# Patient Record
Sex: MALE | Race: BLACK OR AFRICAN AMERICAN | Employment: UNEMPLOYED | URBAN - METROPOLITAN AREA
[De-identification: names, ages, dates, MRNs, and addresses within clinical notes are randomized per-mention and may not be internally consistent; named-entity substitution may affect disease eponyms.]

---

## 2017-07-22 ENCOUNTER — HOSPITAL ENCOUNTER (EMERGENCY)
Age: 47
Discharge: HOME | End: 2017-07-22
Attending: EMERGENCY MEDICINE | Admitting: EMERGENCY MEDICINE
Payer: SELF-PAY

## 2017-07-22 VITALS
OXYGEN SATURATION: 98 % | HEART RATE: 68 BPM | HEIGHT: 69 IN | TEMPERATURE: 98.8 F | WEIGHT: 315 LBS | RESPIRATION RATE: 16 BRPM | BODY MASS INDEX: 46.65 KG/M2

## 2017-07-22 DIAGNOSIS — S80.01XA CONTUSION OF RIGHT KNEE, INITIAL ENCOUNTER: Primary | ICD-10-CM

## 2017-07-22 DIAGNOSIS — S80.02XA CONTUSION OF LEFT KNEE, INITIAL ENCOUNTER: ICD-10-CM

## 2017-07-22 PROCEDURE — 74011250637 HC RX REV CODE- 250/637: Performed by: EMERGENCY MEDICINE

## 2017-07-22 PROCEDURE — 99283 EMERGENCY DEPT VISIT LOW MDM: CPT

## 2017-07-22 RX ORDER — IBUPROFEN 800 MG/1
TABLET ORAL
Qty: 15 TAB | Refills: 0 | Status: SHIPPED | OUTPATIENT
Start: 2017-07-22 | End: 2018-07-13 | Stop reason: ALTCHOICE

## 2017-07-22 RX ORDER — IBUPROFEN 400 MG/1
800 TABLET ORAL
Status: COMPLETED | OUTPATIENT
Start: 2017-07-22 | End: 2017-07-22

## 2017-07-22 RX ADMIN — IBUPROFEN 800 MG: 400 TABLET, FILM COATED ORAL at 07:55

## 2017-07-22 NOTE — DISCHARGE INSTRUCTIONS
Bruises: Care Instructions  Your Care Instructions    Bruises occur when small blood vessels under the skin tear or rupture, most often from a twist, bump, or fall. Blood leaks into tissues under the skin and causes a black-and-blue spot that often turns colors, including purplish black, reddish blue, or yellowish green, as the bruise heals. Bruises hurt, but most are not serious and will go away on their own within 2 to 4 weeks. Sometimes, gravity causes them to spread down the body. A leg bruise usually will take longer to heal than a bruise on the face or arms. Follow-up care is a key part of your treatment and safety. Be sure to make and go to all appointments, and call your doctor if you are having problems. Its also a good idea to know your test results and keep a list of the medicines you take. How can you care for yourself at home? · Take pain medicines exactly as directed. ¨ If the doctor gave you a prescription medicine for pain, take it as prescribed. ¨ If you are not taking a prescription pain medicine, ask your doctor if you can take an over-the-counter medicine. · Put ice or a cold pack on the area for 10 to 20 minutes at a time. Put a thin cloth between the ice and your skin. · If you can, prop up the bruised area on pillows as much as possible for the next few days. Try to keep the bruise above the level of your heart. When should you call for help? Call your doctor now or seek immediate medical care if:  · You have signs of infection, such as:  ¨ Increased pain, swelling, warmth, or redness. ¨ Red streaks leading from the bruise. ¨ Pus draining from the bruise. ¨ A fever. · You have a bruise on your leg and signs of a blood clot, such as:  ¨ Increasing redness and swelling along with warmth, tenderness, and pain in the bruised area. ¨ Pain in your calf, back of the knee, thigh, or groin. ¨ Redness and swelling in your leg or groin. · Your pain gets worse.   Watch closely for changes in your health, and be sure to contact your doctor if:  · You do not get better as expected. Where can you learn more? Go to http://valerie-nate.info/. Enter [phone] in the search box to learn more about \"Bruises: Care Instructions. \"  Current as of: March 20, 2017  Content Version: 11.3  © 0232-2162 [employer]. Care instructions adapted under license by [employer] (which disclaims liability or warranty for this information). If you have questions about a medical condition or this instruction, always ask your healthcare professional. [place] [address] any warranty or liability for your use of this information.

## 2017-07-22 NOTE — ED TRIAGE NOTES
Pt presents to the ED with bilateral knee pain and bilateral elbow pain onset yesterday after sustaining a trip and fall. Pt denies head injury or LOC.

## 2017-07-22 NOTE — ED PROVIDER NOTES
HPI Comments: Tripped and fell yesterday, landed on knees and hands, this mornin knees are sore and still and \"elbows are sore\". Needs note for work. No pain meds taken. No other c/o's or sx. Patient is a 52 y.o. male presenting with fall, elbow pain, and knee pain. Fall     Elbow Pain      Knee Pain           History reviewed. No pertinent past medical history. Past Surgical History:   Procedure Laterality Date    HX CHOLECYSTECTOMY           History reviewed. No pertinent family history. Social History     Social History    Marital status: LEGALLY      Spouse name: N/A    Number of children: N/A    Years of education: N/A     Occupational History    Not on file. Social History Main Topics    Smoking status: Never Smoker    Smokeless tobacco: Never Used    Alcohol use Yes      Comment: Socially    Drug use: No    Sexual activity: Not on file     Other Topics Concern    Not on file     Social History Narrative         ALLERGIES: Review of patient's allergies indicates no known allergies. Review of Systems   Constitutional: Negative. HENT: Negative. Respiratory: Negative. Cardiovascular: Negative. Neurological: Negative. Vitals:    07/22/17 0728   Pulse: 68   Resp: 16   Temp: 98.8 °F (37.1 °C)   SpO2: 98%   Weight: 158.8 kg (350 lb)   Height: 5' 9\" (1.753 m)            Physical Exam   Constitutional: He is oriented to person, place, and time. He appears well-developed and well-nourished. HENT:   Head: Normocephalic and atraumatic. Mouth/Throat: Oropharynx is clear and moist.   Eyes: EOM are normal. Pupils are equal, round, and reactive to light. Neck: Neck supple. Cardiovascular: Normal rate and regular rhythm. Pulmonary/Chest: Effort normal and breath sounds normal.   Abdominal: Soft. Musculoskeletal: Normal range of motion. He exhibits tenderness.    Knees mildly tender to palp, no effusions, ligs stable   Neurological: He is alert and oriented to person, place, and time. Skin: Skin is warm and dry. Psychiatric: He has a normal mood and affect. Nursing note and vitals reviewed.        [facility]  ED Course       Procedures

## 2017-07-22 NOTE — LETTER
[facility] EMERGENCY DEPT 
[address] [id number] [phone] Work/School Note Date: 7/22/2017 To Whom It May concern: 
 
Lisbeth Monroy was seen and treated today in the emergency room by the following provider(s): 
Attending Provider: Deb Bonilla MD.   
 
Lisbeth Monroy may return to work on 7/24/2017.  
 
Sincerely, 
 
 
 
 
Deb Bonilla MD

## 2018-07-13 ENCOUNTER — HOSPITAL ENCOUNTER (OUTPATIENT)
Dept: LAB | Age: 48
Discharge: HOME | End: 2018-07-13
Payer: COMMERCIAL

## 2018-07-13 ENCOUNTER — OFFICE VISIT (OUTPATIENT)
Dept: INTERNAL MEDICINE CLINIC | Age: 48
End: 2018-07-13

## 2018-07-13 VITALS
WEIGHT: 315 LBS | BODY MASS INDEX: 46.65 KG/M2 | HEIGHT: 69 IN | RESPIRATION RATE: 14 BRPM | SYSTOLIC BLOOD PRESSURE: 124 MMHG | TEMPERATURE: 99 F | DIASTOLIC BLOOD PRESSURE: 96 MMHG | OXYGEN SATURATION: 96 % | HEART RATE: 73 BPM

## 2018-07-13 DIAGNOSIS — Z83.3 FAMILY HISTORY OF DIABETES MELLITUS: ICD-10-CM

## 2018-07-13 DIAGNOSIS — G89.29 CHRONIC PAIN OF BOTH KNEES: ICD-10-CM

## 2018-07-13 DIAGNOSIS — E66.01 OBESITY, MORBID (HCC): ICD-10-CM

## 2018-07-13 DIAGNOSIS — M17.0 OSTEOARTHRITIS OF BOTH KNEES, UNSPECIFIED OSTEOARTHRITIS TYPE: ICD-10-CM

## 2018-07-13 DIAGNOSIS — M25.562 CHRONIC PAIN OF BOTH KNEES: ICD-10-CM

## 2018-07-13 DIAGNOSIS — M25.561 CHRONIC PAIN OF BOTH KNEES: ICD-10-CM

## 2018-07-13 DIAGNOSIS — Z86.73 HISTORY OF TIA (TRANSIENT ISCHEMIC ATTACK): ICD-10-CM

## 2018-07-13 DIAGNOSIS — E66.01 OBESITY, MORBID (HCC): Primary | ICD-10-CM

## 2018-07-13 LAB
ALBUMIN SERPL-MCNC: 3.2 G/DL (ref 3.4–5)
ALBUMIN/GLOB SERPL: 0.8 {RATIO} (ref 0.8–1.7)
ALP SERPL-CCNC: 75 U/L (ref 45–117)
ALT SERPL-CCNC: 20 U/L (ref 16–61)
ANION GAP SERPL CALC-SCNC: 8 MMOL/L (ref 3–18)
AST SERPL-CCNC: 16 U/L (ref 15–37)
BASOPHILS # BLD: 0 K/UL (ref 0–0.1)
BASOPHILS NFR BLD: 0 % (ref 0–2)
BILIRUB SERPL-MCNC: 0.3 MG/DL (ref 0.2–1)
BUN SERPL-MCNC: 16 MG/DL (ref 7–18)
BUN/CREAT SERPL: 18 (ref 12–20)
CALCIUM SERPL-MCNC: 8.3 MG/DL (ref 8.5–10.1)
CHLORIDE SERPL-SCNC: 106 MMOL/L (ref 100–108)
CHOLEST SERPL-MCNC: 157 MG/DL
CO2 SERPL-SCNC: 29 MMOL/L (ref 21–32)
CREAT SERPL-MCNC: 0.88 MG/DL (ref 0.6–1.3)
DIFFERENTIAL METHOD BLD: ABNORMAL
EOSINOPHIL # BLD: 0.2 K/UL (ref 0–0.4)
EOSINOPHIL NFR BLD: 4 % (ref 0–5)
ERYTHROCYTE [DISTWIDTH] IN BLOOD BY AUTOMATED COUNT: 14.4 % (ref 11.6–14.5)
GLOBULIN SER CALC-MCNC: 4.1 G/DL (ref 2–4)
GLUCOSE SERPL-MCNC: 89 MG/DL (ref 74–99)
HCT VFR BLD AUTO: 39.8 % (ref 36–48)
HDLC SERPL-MCNC: 41 MG/DL (ref 40–60)
HDLC SERPL: 3.8 {RATIO} (ref 0–5)
HGB BLD-MCNC: 12.9 G/DL (ref 13–16)
LDLC SERPL CALC-MCNC: 103.4 MG/DL (ref 0–100)
LIPID PROFILE,FLP: ABNORMAL
LYMPHOCYTES # BLD: 1.6 K/UL (ref 0.9–3.6)
LYMPHOCYTES NFR BLD: 31 % (ref 21–52)
MCH RBC QN AUTO: 27.1 PG (ref 24–34)
MCHC RBC AUTO-ENTMCNC: 32.4 G/DL (ref 31–37)
MCV RBC AUTO: 83.6 FL (ref 74–97)
MONOCYTES # BLD: 0.5 K/UL (ref 0.05–1.2)
MONOCYTES NFR BLD: 10 % (ref 3–10)
NEUTS SEG # BLD: 2.9 K/UL (ref 1.8–8)
NEUTS SEG NFR BLD: 55 % (ref 40–73)
PLATELET # BLD AUTO: 189 K/UL (ref 135–420)
PMV BLD AUTO: 11.1 FL (ref 9.2–11.8)
POTASSIUM SERPL-SCNC: 3.9 MMOL/L (ref 3.5–5.5)
PROT SERPL-MCNC: 7.3 G/DL (ref 6.4–8.2)
RBC # BLD AUTO: 4.76 M/UL (ref 4.7–5.5)
SODIUM SERPL-SCNC: 143 MMOL/L (ref 136–145)
T4 FREE SERPL-MCNC: 1 NG/DL (ref 0.7–1.5)
TRIGL SERPL-MCNC: 63 MG/DL (ref ?–150)
TSH SERPL DL<=0.05 MIU/L-ACNC: 1.9 UIU/ML (ref 0.36–3.74)
VLDLC SERPL CALC-MCNC: 12.6 MG/DL
WBC # BLD AUTO: 5.3 K/UL (ref 4.6–13.2)

## 2018-07-13 PROCEDURE — 80074 ACUTE HEPATITIS PANEL: CPT | Performed by: INTERNAL MEDICINE

## 2018-07-13 PROCEDURE — 87389 HIV-1 AG W/HIV-1&-2 AB AG IA: CPT | Performed by: INTERNAL MEDICINE

## 2018-07-13 PROCEDURE — 84439 ASSAY OF FREE THYROXINE: CPT | Performed by: INTERNAL MEDICINE

## 2018-07-13 PROCEDURE — 85025 COMPLETE CBC W/AUTO DIFF WBC: CPT | Performed by: INTERNAL MEDICINE

## 2018-07-13 PROCEDURE — 80061 LIPID PANEL: CPT | Performed by: INTERNAL MEDICINE

## 2018-07-13 PROCEDURE — 80053 COMPREHEN METABOLIC PANEL: CPT | Performed by: INTERNAL MEDICINE

## 2018-07-13 PROCEDURE — 83036 HEMOGLOBIN GLYCOSYLATED A1C: CPT | Performed by: INTERNAL MEDICINE

## 2018-07-13 RX ORDER — DICLOFENAC SODIUM 75 MG/1
75 TABLET, DELAYED RELEASE ORAL
Qty: 60 TAB | Refills: 3 | Status: SHIPPED | OUTPATIENT
Start: 2018-07-13 | End: 2018-10-12

## 2018-07-13 RX ORDER — GUAIFENESIN 100 MG/5ML
81 LIQUID (ML) ORAL DAILY
Qty: 90 TAB | Refills: 3 | Status: SHIPPED | OUTPATIENT
Start: 2018-07-13 | End: 2018-10-12

## 2018-07-13 NOTE — PROGRESS NOTES
HPI     Garima Hassan is a 50 y.o. male with relevant past medical history of morbid obesity, osteoarthritis, chronic knee pain, reported history of \"mini stroke\" in 1996 presenting with left-sided numbness, completely resolved (TIA) who presents today to establish medical care, and for evaluation and management of chronic bilateral knee pain. Patient tells me that he has been having chronic knee pain for many decades, gradually worse, associated with movement, and weightbearing, has tried in the past ibuprofen with no relief of his pain, says that he is tried Percocet from a friend in the past which helped better with his pain. The patient admits he has gained weight over 150 pounds over the past couple of years, admits to sedentary lifestyle, and no diet restrictions. Denies any history of depression or suicidal ideation. Patient says he is engaged, is , has 3 children. Tells me he has family history of diabetes and hypertension on both mother and father, and history of stroke in his father. Patient works in [employer] in [employer]. Says he had cutt a few years ago and required tetanus shot, says within the last 10 years but none recall the exact date. Used to get influenza vaccinations but not for the past couple of years. Denies any family history of colon cancer. Denies any drug allergies. Reports past surgical history of cholecystectomy. ROS  As above included in HPI. Otherwise 11 point review of systems negative including constitutional, skin, HENT, eyes, respiratory, cardiovascular, gastrointestinal, genitourinary, musculoskeletal, endocrine, hematologic, allergy, and neurologic. Past Medical History  Past Medical History:   Diagnosis Date    Arthritis      Past Surgical History:   Procedure Laterality Date    HX CHOLECYSTECTOMY          Family History  History reviewed. No pertinent family history.     Social History  He  reports that he has never smoked. He has never used smokeless tobacco.   History   Alcohol Use    Yes     Comment: Socially       Immunization History    There is no immunization history on file for this patient. Allergies  No Known Allergies    Medications  Current Outpatient Prescriptions   Medication Sig    diclofenac EC (VOLTAREN) 75 mg EC tablet Take 1 Tab by mouth two (2) times daily as needed. Indications: OSTEOARTHRITIS    aspirin 81 mg chewable tablet Take 1 Tab by mouth daily. No current facility-administered medications for this visit. Visit Vitals    BP (!) 124/96 (BP 1 Location: Right arm, BP Patient Position: Sitting)    Pulse 73    Temp 99 °F (37.2 °C) (Oral)    Resp 14    Ht 5' 9\" (1.753 m)    Wt (!) 417 lb (189.1 kg)    SpO2 96%    BMI 61.58 kg/m2     Body mass index is 61.58 kg/(m^2). Physical Exam   Constitutional: He is well-developed, well-nourished, and in no distress. HENT:   Head: Normocephalic and atraumatic. Right Ear: External ear normal.   Left Ear: External ear normal.   Nose: Nose normal.   Mouth/Throat: Oropharynx is clear and moist.   Eyes: Conjunctivae and EOM are normal. Pupils are equal, round, and reactive to light. Neck: Normal range of motion. Neck supple. No thyromegaly present. Cardiovascular: Normal rate, regular rhythm and normal heart sounds. Pulmonary/Chest: Effort normal and breath sounds normal. No respiratory distress. He has no wheezes. He has no rales. He exhibits no tenderness. Abdominal: Soft. Bowel sounds are normal. There is no tenderness. There is no guarding. Musculoskeletal: He exhibits no edema, tenderness or deformity. Pain and crepitus on both knees with knee extension and flexion, no significant joint effusion, mild local warmth suprapatellar on both knees. No local erythema or tenderness. Lymphadenopathy:     He has no cervical adenopathy.    Psychiatric: Mood, memory, affect and judgment normal.   Nursing note and vitals reviewed. REVIEW OF DATA    Labs  No visits with results within 1 Month(s) from this visit. Latest known visit with results is:    Admission on 06/08/2016, Discharged on 06/08/2016   Component Date Value Ref Range Status    Glucose (POC) 06/08/2016 79  70 - 110 mg/dL Final         CT Results (most recent):  No results found for this or any previous visit. XR Results (most recent):  No results found for this or any previous visit. CT   All Micro Results     None                DIAGNOSIS AND PLAN  Patient Active Problem List   Diagnosis Code    Obesity, morbid ([facility]) E66.01    History of CVA (cerebrovascular accident) Z80.78    Chronic pain of both knees M25.561, M25.562, G89.29    Osteoarthritis of both knees M17.0    Family history of diabetes mellitus Z83.3     1. Obesity, morbid ([address]  Weight loss encouraged and treatment options discussed, patient would like bariatric surgery assessment, referral done. Patient does not think he has a discipline to start a diet or try an exercise routine. 2. History of CVA (cerebrovascular accident)  Recommended to start baby aspirin daily, will try to obtain previous records  Check lipid profile and rule out diabetes    3. Chronic pain of both knees  4. Osteoarthritis of both knees, unspecified osteoarthritis type  Trial of diclofenac as needed  X-rays of both knees to assess severity of osteoarthritis  Referral to Ortho for evaluation and management   Weight loss encouraged and treatment options discussed, patient would like bariatric surgery assessment, referral done  5. Family history of diabetes mellitus  Check hemoglobin A1c    Follow-up Disposition:  Return in about 6 months (around 1/13/2019). Nia Rios MD

## 2018-07-13 NOTE — PROGRESS NOTES
Chief Complaint   Patient presents with    Knee Pain     New patient present for pain in both knees lasting since 6 months ago and states he isn't taking anything for the pain. Ling states that he has arthritis in his knees. 1. Have you been to the ER, urgent care clinic or hospitalized within the last month? NO.     2. Have you seen or consulted any other health care providers outside of the [facility] within the last month (Include any pap smears or colon screening)?  NO

## 2018-07-13 NOTE — LETTER
NOTIFICATION RETURN TO WORK / SCHOOL 
 
7/13/2018 12:13 PM 
 
Mr. Dulce [address]Kan [address] [address] To Whom It May Concern: 
 
Dulce Khan is currently under the care of Randy Lemons. He will return to work/school on: 7/13/18. He was seen in clinic today. If there are questions or concerns please have the patient contact our office.  
 
 
 
Sincerely, 
 
 
Oneil Larsen MD

## 2018-07-14 LAB — HBA1C MFR BLD: 5.4 % (ref 4.2–5.6)

## 2018-07-16 LAB
HAV IGM SER QL: NEGATIVE
HBV CORE IGM SER QL: NEGATIVE
HBV SURFACE AG SER QL: <0.1 INDEX
HBV SURFACE AG SER QL: NEGATIVE
HCV AB SER IA-ACNC: 0.12 INDEX
HCV AB SERPL QL IA: NEGATIVE
HCV COMMENT,HCGAC: NORMAL
HIV 1+2 AB+HIV1 P24 AG SERPL QL IA: NONREACTIVE
HIV12 RESULT COMMENT, HHIVC: NORMAL
SP1: NORMAL
SP2: NORMAL
SP3: NORMAL

## 2018-07-25 ENCOUNTER — OFFICE VISIT (OUTPATIENT)
Dept: SURGERY | Age: 48
End: 2018-07-25

## 2018-07-25 VITALS
HEIGHT: 66 IN | WEIGHT: 315 LBS | SYSTOLIC BLOOD PRESSURE: 140 MMHG | HEART RATE: 86 BPM | BODY MASS INDEX: 50.62 KG/M2 | TEMPERATURE: 98.3 F | RESPIRATION RATE: 20 BRPM | DIASTOLIC BLOOD PRESSURE: 90 MMHG

## 2018-07-25 DIAGNOSIS — E66.01 MORBID OBESITY WITH BMI OF 60.0-69.9, ADULT (HCC): ICD-10-CM

## 2018-07-25 DIAGNOSIS — Z86.73 HISTORY OF CVA (CEREBROVASCULAR ACCIDENT): ICD-10-CM

## 2018-07-25 DIAGNOSIS — E66.01 OBESITY, MORBID (HCC): Primary | ICD-10-CM

## 2018-07-25 NOTE — PROGRESS NOTES
Pt ID confirmed    Weight Loss Metrics 7/25/2018 7/25/2018 7/13/2018 7/22/2017 12/30/2016 6/8/2016   Pre op / Initial Wt 419 - - - - -   Today's Wt - 419 lb 417 lb 350 lb 350 lb 323 lb   BMI - 67.63 kg/m2 61.58 kg/m2 51.69 kg/m2 51.69 kg/m2 47.68 kg/m2   Ideal Body Wt 145 - - - - -   Excess Body Wt 274 - - - - -   Goal Wt 200 - - - - -   Wt loss to date 0 - - - - -   % Wt Loss 0 - - - - -   80% .2 - - - - -   Rashi has been given the following recommendations today due to his elevated BP reading: to discuss bp with pcp    Body mass index is 67.63 kg/(m^2).

## 2018-07-25 NOTE — PROGRESS NOTES
Initial Consultation for Bariatric Surgery Template (Undecided)    Domenic Bee is a 50 y.o. male who comes into the office today for initial consultation for the surgical options for the treatment of morbid obesity. The patient initially identified obesity at the age of 15 and at age 25 weighed 36lbs. He has tried a variety of unsupervised weight-loss attempts including self imposed and exercise, but has yet to meet with lasting success. Maximum weight lost on a diet is about 200 lbs, but that the weight loss always seems to return. Today, the patient is  Height: 5' 6\" (167.6 cm) tall, Weight: (!) 190.1 kg (419 lb) lbs for a Body mass index is 67.63 kg/(m^2). It is due to the patient's severe obesity, which is further complicated by hypertension and weight related arthopathies  that the patient is now seeking out bariatric surgery. Past Medical History:   Diagnosis Date    Arthritis     Hypertension        Past Surgical History:   Procedure Laterality Date    HX CHOLECYSTECTOMY         Current Outpatient Prescriptions   Medication Sig Dispense Refill    diclofenac EC (VOLTAREN) 75 mg EC tablet Take 1 Tab by mouth two (2) times daily as needed. Indications: OSTEOARTHRITIS 60 Tab 3    aspirin 81 mg chewable tablet Take 1 Tab by mouth daily.  90 Tab 3       No Known Allergies    Social History   Substance Use Topics    Smoking status: Never Smoker    Smokeless tobacco: Never Used    Alcohol use Yes      Comment: Socially       Family History   Problem Relation Age of Onset    Heart Disease Mother     Stroke Father        Family Status   Relation Status    Mother     Father        Review of Systems:  Positive in BOLD    CONST: Fever, weight loss, fatigue or chills  GI: Nausea, vomiting, abdominal pain, change in bowel habits, hematochezia, melena, and GERD   INTEG: Dermatitis, abnormal moles  HEENT: Recent changes in vision, vertigo, epistaxis, dysphagia and hoarseness  CV: Chest pain, palpitations, HTN, edema and varicosities  RESP: Cough, shortness of breath, wheezing, hemoptysis, snoring and reactive airway disease  : Hematuria, dysuria, frequency, urgency, nocturia and stress urinary incontinence   MS: Weakness, joint pain and arthritis  ENDO: Diabetes, thyroid disease, polyuria, polydipsia, polyphagia, poor wound healing, heat intolerance, cold intolerance  LYMPH/HEME: Anemia, bruising and history of blood transfusions  NEURO: Dizziness, headache, fainting, seizures and stroke  PSYCH: Anxiety and depression      Physical Exam    Visit Vitals    /90    Pulse 86    Temp 98.3 °F (36.8 °C)    Resp 20    Ht 5' 6\" (1.676 m)    Wt (!) 190.1 kg (419 lb)    BMI 67.63 kg/m2       Pre op weight: 419  EBW: 274  Wt loss to date: 0       General: 50 y.o.) male in no acute distress. Morbidly obese in abdomen, hips, thighs - mixed pattern  HEENT: Normocephalic, atraumatic, Pupils equal and reactive, nasopharynx clear, oropharynx clear and moist without lesions  NECK: Supple, no lymphadenopathy, thyromegaly, carotid bruits or jugular venous distension. trachea midline  RESP: Clear to auscultation bilaterally, no wheezes, rhonchi, or rales, normal respiratory excursion  CV: Regular rate and rhythm, no murmurs, rubs or gallops. 3+/4 pulses in bilateral dorsalis pedis and posterior tibialis. No distal edema or varicosities. ABD: Soft, nontender, nondistended, normoactive bowel sounds, no hernias, no hepatosplenomegaly, minimally palpable costal margins, mixed distribution  Extremities: Warm, well perfused, no tenderness or swelling, normal gait/station  Neuro: Sensation and strength grossly intact and symmetrical  Psych: Alert and oriented to person, place, and time. Impression:    aJson High is a 50 y.o. male who is suffering from morbid obesity with a BMI of 68  and comorbidities including hypertension and weight related arthopathies  who would benefit from bariatric surgery. We have had an extensive discussion with regard to the risks, benefits and likely outcomes of the operation. We've discussed the restrictive and malabsorptive nature of the gastric bypass and compared and contrasted with the sleeve gastrectomy. The patient understands the likelihood of losing approximately 80% of their excess weight in 12 to 18 months. He also understands the risks including but not limited to bleeding, infection, need for reoperation, ulcers, leaks and strictures, bowel obstruction secondary to adhesions and internal hernias, DVT, PE, heart attack, stroke, and death. Patient also understands risks of inadequate weight loss, excess weight loss, vitamin insufficiency, protein malnutrition, excess skin, and loss of hair. We have reviewed the components of a successful postoperative course including requirement for a high protein, low carbohydrate diet, 60 oz a day of zero calorie liquids, daily vitamin supplementation, daily exercise, regular follow-up, and participation in support groups. At this time we will enroll the patient in our bariatric program, undertake routine laboratory evaluation, chest X-ray, EKG, possible UGI and evaluation by  nutritionist as well as psychologist and pending their satisfactory completion of the preop evaluation, plan to perform a gastric bypass if alec to lose weight in the WLT. (40 lbs). If unable to lose the weight, 2 stage is preferred.

## 2018-07-25 NOTE — LETTER
7/25/2018 11:31 AM 
 
Patient:  Demetrice Bey YOB: 1970 Date of Visit: 7/25/2018 Paul Vazquez MD 
[address] [address] VIA In Basket Dear Paul Vazquez MD, Thank you for referring Mr. Suleiman Laar to [facility]  for evaluation and treatment. Below are the relevant portions of my assessment and plan of care. Initial Consultation for Bariatric Surgery Template (Undecided) Demetrice Bey is a 50 y.o. male who comes into the office today for initial consultation for the surgical options for the treatment of morbid obesity. The patient initially identified obesity at the age of 15 and at age 25 weighed 36lbs. He has tried a variety of unsupervised weight-loss attempts including self imposed and exercise, but has yet to meet with lasting success. Maximum weight lost on a diet is about 200 lbs, but that the weight loss always seems to return. Today, the patient is  Height: 5' 6\" (167.6 cm) tall, Weight: (!) 190.1 kg (419 lb) lbs for a Body mass index is 67.63 kg/(m^2). It is due to the patient's severe obesity, which is further complicated by hypertension and weight related arthopathies  that the patient is now seeking out bariatric surgery. Past Medical History:  
Diagnosis Date  Arthritis  Hypertension Past Surgical History:  
Procedure Laterality Date  HX CHOLECYSTECTOMY Current Outpatient Prescriptions Medication Sig Dispense Refill  diclofenac EC (VOLTAREN) 75 mg EC tablet Take 1 Tab by mouth two (2) times daily as needed. Indications: OSTEOARTHRITIS 60 Tab 3  
 aspirin 81 mg chewable tablet Take 1 Tab by mouth daily. 90 Tab 3 No Known Allergies Social History Substance Use Topics  Smoking status: Never Smoker  Smokeless tobacco: Never Used  Alcohol use Yes Comment: Socially Family History Problem Relation Age of Onset  Heart Disease Mother  Stroke Father Family Status Relation Status  Mother   Father  Review of Systems:  Positive in BOLD 
 
CONST: Fever, weight loss, fatigue or chills GI: Nausea, vomiting, abdominal pain, change in bowel habits, hematochezia, melena, and GERD INTEG: Dermatitis, abnormal moles HEENT: Recent changes in vision, vertigo, epistaxis, dysphagia and hoarseness CV: Chest pain, palpitations, HTN, edema and varicosities RESP: Cough, shortness of breath, wheezing, hemoptysis, snoring and reactive airway disease : Hematuria, dysuria, frequency, urgency, nocturia and stress urinary incontinence MS: Weakness, joint pain and arthritis ENDO: Diabetes, thyroid disease, polyuria, polydipsia, polyphagia, poor wound healing, heat intolerance, cold intolerance LYMPH/HEME: Anemia, bruising and history of blood transfusions NEURO: Dizziness, headache, fainting, seizures and stroke PSYCH: Anxiety and depression Physical Exam 
 
Visit Vitals  /90  Pulse 86  Temp 98.3 °F (36.8 °C)  Resp 20  
 Ht 5' 6\" (1.676 m)  Wt (!) 190.1 kg (419 lb)  BMI 67.63 kg/m2 Pre op weight: 419 EBW: 274 Wt loss to date: 0 General: 50 y.o.) male in no acute distress. Morbidly obese in abdomen, hips, thighs - mixed pattern HEENT: Normocephalic, atraumatic, Pupils equal and reactive, nasopharynx clear, oropharynx clear and moist without lesions NECK: Supple, no lymphadenopathy, thyromegaly, carotid bruits or jugular venous distension. trachea midline RESP: Clear to auscultation bilaterally, no wheezes, rhonchi, or rales, normal respiratory excursion CV: Regular rate and rhythm, no murmurs, rubs or gallops. 3+/4 pulses in bilateral dorsalis pedis and posterior tibialis. No distal edema or varicosities.  
ABD: Soft, nontender, nondistended, normoactive bowel sounds, no hernias, no hepatosplenomegaly, minimally palpable costal margins, mixed distribution Extremities: Warm, well perfused, no tenderness or swelling, normal gait/station Neuro: Sensation and strength grossly intact and symmetrical 
Psych: Alert and oriented to person, place, and time. Impression: 
 
Esthela Barnhart is a 50 y.o. male who is suffering from morbid obesity with a BMI of 68  and comorbidities including hypertension and weight related arthopathies  who would benefit from bariatric surgery. We have had an extensive discussion with regard to the risks, benefits and likely outcomes of the operation. We've discussed the restrictive and malabsorptive nature of the gastric bypass and compared and contrasted with the sleeve gastrectomy. The patient understands the likelihood of losing approximately 80% of their excess weight in 12 to 18 months. He also understands the risks including but not limited to bleeding, infection, need for reoperation, ulcers, leaks and strictures, bowel obstruction secondary to adhesions and internal hernias, DVT, PE, heart attack, stroke, and death. Patient also understands risks of inadequate weight loss, excess weight loss, vitamin insufficiency, protein malnutrition, excess skin, and loss of hair. We have reviewed the components of a successful postoperative course including requirement for a high protein, low carbohydrate diet, 60 oz a day of zero calorie liquids, daily vitamin supplementation, daily exercise, regular follow-up, and participation in support groups. At this time we will enroll the patient in our bariatric program, undertake routine laboratory evaluation, chest X-ray, EKG, possible UGI and evaluation by  nutritionist as well as psychologist and pending their satisfactory completion of the preop evaluation, plan to perform a gastric bypass if alec to lose weight in the WLT. (40 lbs). If unable to lose the weight, 2 stage is preferred. Thank you very much for your referral of Mr. Tina Fraire. If you have questions, please do not hesitate to call me. I [facility] you and will keep you updated as to his progress.   
 
 
 
 
Sincerely, 
 
 
Eldon aMldonado MD

## 2018-07-25 NOTE — COMMUNICATION BODY
Initial Consultation for Bariatric Surgery Template (Undecided)    Satinder Bagley is a 50 y.o. male who comes into the office today for initial consultation for the surgical options for the treatment of morbid obesity. The patient initially identified obesity at the age of 15 and at age 25 weighed 36lbs. He has tried a variety of unsupervised weight-loss attempts including self imposed and exercise, but has yet to meet with lasting success. Maximum weight lost on a diet is about 200 lbs, but that the weight loss always seems to return. Today, the patient is  Height: 5' 6\" (167.6 cm) tall, Weight: (!) 190.1 kg (419 lb) lbs for a Body mass index is 67.63 kg/(m^2). It is due to the patient's severe obesity, which is further complicated by hypertension and weight related arthopathies  that the patient is now seeking out bariatric surgery. Past Medical History:   Diagnosis Date    Arthritis     Hypertension        Past Surgical History:   Procedure Laterality Date    HX CHOLECYSTECTOMY         Current Outpatient Prescriptions   Medication Sig Dispense Refill    diclofenac EC (VOLTAREN) 75 mg EC tablet Take 1 Tab by mouth two (2) times daily as needed. Indications: OSTEOARTHRITIS 60 Tab 3    aspirin 81 mg chewable tablet Take 1 Tab by mouth daily.  90 Tab 3       No Known Allergies    Social History   Substance Use Topics    Smoking status: Never Smoker    Smokeless tobacco: Never Used    Alcohol use Yes      Comment: Socially       Family History   Problem Relation Age of Onset    Heart Disease Mother     Stroke Father        Family Status   Relation Status    Mother     Father        Review of Systems:  Positive in BOLD    CONST: Fever, weight loss, fatigue or chills  GI: Nausea, vomiting, abdominal pain, change in bowel habits, hematochezia, melena, and GERD   INTEG: Dermatitis, abnormal moles  HEENT: Recent changes in vision, vertigo, epistaxis, dysphagia and hoarseness  CV: Chest pain, palpitations, HTN, edema and varicosities  RESP: Cough, shortness of breath, wheezing, hemoptysis, snoring and reactive airway disease  : Hematuria, dysuria, frequency, urgency, nocturia and stress urinary incontinence   MS: Weakness, joint pain and arthritis  ENDO: Diabetes, thyroid disease, polyuria, polydipsia, polyphagia, poor wound healing, heat intolerance, cold intolerance  LYMPH/HEME: Anemia, bruising and history of blood transfusions  NEURO: Dizziness, headache, fainting, seizures and stroke  PSYCH: Anxiety and depression      Physical Exam    Visit Vitals    /90    Pulse 86    Temp 98.3 °F (36.8 °C)    Resp 20    Ht 5' 6\" (1.676 m)    Wt (!) 190.1 kg (419 lb)    BMI 67.63 kg/m2       Pre op weight: 419  EBW: 274  Wt loss to date: 0       General: 50 y.o.) male in no acute distress. Morbidly obese in abdomen, hips, thighs - mixed pattern  HEENT: Normocephalic, atraumatic, Pupils equal and reactive, nasopharynx clear, oropharynx clear and moist without lesions  NECK: Supple, no lymphadenopathy, thyromegaly, carotid bruits or jugular venous distension. trachea midline  RESP: Clear to auscultation bilaterally, no wheezes, rhonchi, or rales, normal respiratory excursion  CV: Regular rate and rhythm, no murmurs, rubs or gallops. 3+/4 pulses in bilateral dorsalis pedis and posterior tibialis. No distal edema or varicosities. ABD: Soft, nontender, nondistended, normoactive bowel sounds, no hernias, no hepatosplenomegaly, minimally palpable costal margins, mixed distribution  Extremities: Warm, well perfused, no tenderness or swelling, normal gait/station  Neuro: Sensation and strength grossly intact and symmetrical  Psych: Alert and oriented to person, place, and time. Impression:    Lesli Sinclair is a 50 y.o. male who is suffering from morbid obesity with a BMI of 68  and comorbidities including hypertension and weight related arthopathies  who would benefit from bariatric surgery. We have had an extensive discussion with regard to the risks, benefits and likely outcomes of the operation. We've discussed the restrictive and malabsorptive nature of the gastric bypass and compared and contrasted with the sleeve gastrectomy. The patient understands the likelihood of losing approximately 80% of their excess weight in 12 to 18 months. He also understands the risks including but not limited to bleeding, infection, need for reoperation, ulcers, leaks and strictures, bowel obstruction secondary to adhesions and internal hernias, DVT, PE, heart attack, stroke, and death. Patient also understands risks of inadequate weight loss, excess weight loss, vitamin insufficiency, protein malnutrition, excess skin, and loss of hair. We have reviewed the components of a successful postoperative course including requirement for a high protein, low carbohydrate diet, 60 oz a day of zero calorie liquids, daily vitamin supplementation, daily exercise, regular follow-up, and participation in support groups. At this time we will enroll the patient in our bariatric program, undertake routine laboratory evaluation, chest X-ray, EKG, possible UGI and evaluation by  nutritionist as well as psychologist and pending their satisfactory completion of the preop evaluation, plan to perform a gastric bypass if alec to lose weight in the WLT. (40 lbs). If unable to lose the weight, 2 stage is preferred.

## 2018-07-26 LAB — UREA BREATH TEST QL: POSITIVE

## 2018-07-30 ENCOUNTER — OFFICE VISIT (OUTPATIENT)
Dept: ORTHOPEDIC SURGERY | Facility: CLINIC | Age: 48
End: 2018-07-30

## 2018-07-30 VITALS
DIASTOLIC BLOOD PRESSURE: 97 MMHG | HEART RATE: 85 BPM | OXYGEN SATURATION: 97 % | TEMPERATURE: 97.5 F | BODY MASS INDEX: 47.74 KG/M2 | HEIGHT: 68 IN | SYSTOLIC BLOOD PRESSURE: 148 MMHG | RESPIRATION RATE: 18 BRPM | WEIGHT: 315 LBS

## 2018-07-30 DIAGNOSIS — M22.41 CHONDROMALACIA OF BOTH PATELLAE: ICD-10-CM

## 2018-07-30 DIAGNOSIS — G89.29 CHRONIC PAIN OF LEFT KNEE: ICD-10-CM

## 2018-07-30 DIAGNOSIS — M25.562 CHRONIC PAIN OF BOTH KNEES: ICD-10-CM

## 2018-07-30 DIAGNOSIS — G89.29 CHRONIC PAIN OF BOTH KNEES: ICD-10-CM

## 2018-07-30 DIAGNOSIS — M25.562 CHRONIC PAIN OF LEFT KNEE: ICD-10-CM

## 2018-07-30 DIAGNOSIS — M22.42 CHONDROMALACIA OF BOTH PATELLAE: ICD-10-CM

## 2018-07-30 DIAGNOSIS — M25.561 CHRONIC PAIN OF RIGHT KNEE: ICD-10-CM

## 2018-07-30 DIAGNOSIS — M25.561 CHRONIC PAIN OF BOTH KNEES: ICD-10-CM

## 2018-07-30 DIAGNOSIS — M17.0 BILATERAL PRIMARY OSTEOARTHRITIS OF KNEE: Primary | ICD-10-CM

## 2018-07-30 DIAGNOSIS — G89.29 CHRONIC PAIN OF RIGHT KNEE: ICD-10-CM

## 2018-07-30 RX ORDER — BUPIVACAINE HYDROCHLORIDE 2.5 MG/ML
8 INJECTION, SOLUTION EPIDURAL; INFILTRATION; INTRACAUDAL ONCE
Qty: 8 ML | Refills: 0
Start: 2018-07-30 | End: 2018-07-30

## 2018-07-30 RX ORDER — BETAMETHASONE SODIUM PHOSPHATE AND BETAMETHASONE ACETATE 3; 3 MG/ML; MG/ML
6 INJECTION, SUSPENSION INTRA-ARTICULAR; INTRALESIONAL; INTRAMUSCULAR; SOFT TISSUE ONCE
Qty: 0.5 ML | Refills: 0
Start: 2018-07-30 | End: 2018-07-30

## 2018-07-30 NOTE — PATIENT INSTRUCTIONS
Knee Arthritis: Exercises  Your Care Instructions  Here are some examples of exercises for knee arthritis. Start each exercise slowly. Ease off the exercise if you start to have pain. Your doctor or physical therapist will tell you when you can start these exercises and which ones will work best for you. How to do the exercises  Knee flexion with heel slide    1. Lie on your back with your knees bent. 2. Slide your heel back by bending your affected knee as far as you can. Then hook your other foot around your ankle to help pull your heel even farther back. 3. Hold for about 6 seconds, then rest for up to 10 seconds. 4. Repeat 8 to 12 times. 5. Switch legs and repeat steps 1 through 4, even if only one knee is sore. Quad sets    1. Sit with your affected leg straight and supported on the floor or a firm bed. Place a small, rolled-up towel under your knee. Your other leg should be bent, with that foot flat on the floor. 2. Tighten the thigh muscles of your affected leg by pressing the back of your knee down into the towel. 3. Hold for about 6 seconds, then rest for up to 10 seconds. 4. Repeat 8 to 12 times. 5. Switch legs and repeat steps 1 through 4, even if only one knee is sore. Straight-leg raises to the front    1. Lie on your back with your good knee bent so that your foot rests flat on the floor. Your affected leg should be straight. Make sure that your low back has a normal curve. You should be able to slip your hand in between the floor and the small of your back, with your palm touching the floor and your back touching the back of your hand. 2. Tighten the thigh muscles in your affected leg by pressing the back of your knee flat down to the floor. Hold your knee straight. 3. Keeping the thigh muscles tight and your leg straight, lift your affected leg up so that your heel is about 12 inches off the floor. Hold for about 6 seconds, then lower slowly.   4. Relax for up to 10 seconds between repetitions. 5. Repeat 8 to 12 times. 6. Switch legs and repeat steps 1 through 5, even if only one knee is sore. Active knee flexion    1. Lie on your stomach with your knees straight. If your kneecap is uncomfortable, roll up a washcloth and put it under your leg just above your kneecap. 2. Lift the foot of your affected leg by bending the knee so that you bring the foot up toward your buttock. If this motion hurts, try it without bending your knee quite as far. This may help you avoid any painful motion. 3. Slowly move your leg up and down. 4. Repeat 8 to 12 times. 5. Switch legs and repeat steps 1 through 4, even if only one knee is sore. Quadriceps stretch (facedown)    1. Lie flat on your stomach, and rest your face on the floor. 2. Wrap a towel or belt strap around the lower part of your affected leg. Then use the towel or belt strap to slowly pull your heel toward your buttock until you feel a stretch. 3. Hold for about 15 to 30 seconds, then relax your leg against the towel or belt strap. 4. Repeat 2 to 4 times. 5. Switch legs and repeat steps 1 through 4, even if only one knee is sore. Stationary exercise bike    1. If you do not have a stationary exercise bike at home, you can find one to ride at your local health club or community center. 2. Adjust the height of the bike seat so that your knee is slightly bent when your leg is extended downward. If your knee hurts when the pedal reaches the top, you can raise the seat so that your knee does not bend as much. 3. Start slowly. At first, try to do 5 to 10 minutes of cycling with little to no resistance. Then increase your time and the resistance bit by bit until you can do 20 to 30 minutes without pain. 4. If you start to have pain, rest your knee until your pain gets back to the level that is normal for you. Or cycle for less time or with less effort. Follow-up care is a key part of your treatment and safety.  Be sure to make and go to all appointments, and call your doctor if you are having problems. It's also a good idea to know your test results and keep a list of the medicines you take. Where can you learn more? Go to http://valerie-nate.info/. Enter C159 in the search box to learn more about \"Knee Arthritis: Exercises. \"  Current as of: November 29, 2017  Content Version: 11.7  © [id number] [employer]. Care instructions adapted under license by [employer] (which disclaims liability or warranty for this information). If you have questions about a medical condition or this instruction, always ask your healthcare professional. [place] [address] any warranty or liability for your use of this information. Knee Arthritis: Care Instructions  Your Care Instructions    Knee arthritis is a breakdown of the cartilage that cushions your knee joint. When the cartilage wears down, your bones rub against each other. This causes pain and stiffness. Knee arthritis tends to get worse with time. Treatment for knee arthritis involves reducing pain, making the leg muscles stronger, and staying at a healthy body weight. The treatment usually does not improve the health of the cartilage, but it can reduce pain and improve how well your knee works. You can take simple measures to protect your knee joints, ease your pain, and help you stay active. Follow-up care is a key part of your treatment and safety. Be sure to make and go to all appointments, and call your doctor if you are having problems. It's also a good idea to know your test results and keep a list of the medicines you take. How can you care for yourself at home? · Know that knee arthritis will cause more pain on some days than on others. · Stay at a healthy weight. Lose weight if you are overweight. When you stand up, the pressure on your knees from every pound of body weight is multiplied four times.  So if you lose 10 pounds, you will reduce the pressure on your knees by 40 pounds. · Talk to your doctor or physical therapist about exercises that will help ease joint pain. ¨ Stretch to help prevent stiffness and to prevent injury before you exercise. You may enjoy gentle forms of yoga to help keep your knee joints and muscles flexible. ¨ Walk instead of jog. ¨ Ride a bike. This makes your thigh muscles stronger and takes pressure off your knee. ¨ Wear well-fitting and comfortable shoes. ¨ Exercise in chest-deep water. This can help you exercise longer with less pain. ¨ Avoid exercises that include squatting or kneeling. They can put a lot of strain on your knees. ¨ Talk to your doctor to make sure that the exercise you do is not making the arthritis worse. · Do not sit for long periods of time. Try to walk once in a while to keep your knee from getting stiff. · Ask your doctor or physical therapist whether shoe inserts may reduce your arthritis pain. · If you can afford it, get new athletic shoes at least every year. This can help reduce the strain on your knees. · Use a device to help you do everyday activities. ¨ A cane or walking stick can help you keep your balance when you walk. Hold the cane or walking stick in the hand opposite the painful knee. ¨ If you feel like you may fall when you walk, try using crutches or a front-wheeled walker. These can prevent falls that could cause more damage to your knee. ¨ A knee brace may help keep your knee stable and prevent pain. ¨ You also can use other things to make life easier, such as a higher toilet seat and handrails in the bathtub or shower. · Take pain medicines exactly as directed. ¨ Do not wait until you are in severe pain. You will get better results if you take it sooner. ¨ If you are not taking a prescription pain medicine, take an over-the-counter medicine such as acetaminophen (Tylenol), ibuprofen (Advil, Motrin), or naproxen (Aleve).  Read and follow all instructions on the label. ¨ Do not take two or more pain medicines at the same time unless the doctor told you to. Many pain medicines have acetaminophen, which is Tylenol. Too much acetaminophen (Tylenol) can be harmful. ¨ Tell your doctor if you take a blood thinner, have diabetes, or have allergies to shellfish. · Ask your doctor if you might benefit from a shot of steroid medicine into your knee. This may provide pain relief for several months. · Many people take the supplements glucosamine and chondroitin for osteoarthritis. Some people feel they help, but the medical research does not show that they work. Talk to your doctor before you take these supplements. When should you call for help? Call your doctor now or seek immediate medical care if:    · You have sudden swelling, warmth, or pain in your knee.     · You have knee pain and a fever or rash.     · You have such bad pain that you cannot use your knee.    Watch closely for changes in your health, and be sure to contact your doctor if you have any problems. Where can you learn more? Go to http://valerie-nate.info/. Enter G252 in the search box to learn more about \"Knee Arthritis: Care Instructions. \"  Current as of: October 10, 2017  Content Version: 11.7  © 3038-3797 [employer]. Care instructions adapted under license by [employer] (which disclaims liability or warranty for this information). If you have questions about a medical condition or this instruction, always ask your healthcare professional. [place] [address] any warranty or liability for your use of this information.

## 2018-07-30 NOTE — PROGRESS NOTES
Patient: Jason High                MRN: [id number]       SSN: [id number]  YOB: 1970        AGE: 50 y.o. SEX: male    PCP: Cj Ashley MD  07/30/18    Chief Complaint   Patient presents with    Knee Pain     D[place]     HISTORY:  Jason High is a 50 y.o. male who is seen for bilateral knee pain. He feels medial and lateral knee pain. He reports pain for the past few years. He reports no h/o injury. He notes pain with standing and walking--especially at work. 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 He lives with his fiance in [place]. Current weight is 419 pounds. He is 5'6\" tall. He is not hypertensive. He is not hypertensive. He will be seeing Dr. Jluis North for bariatric surgery consultation. He was instructed to lose 40 pounds prior to gastric bypass. Lab Results   Component Value Date/Time    Hemoglobin A1c 5.4 07/13/2018 12:21 PM     Weight Metrics 7/30/2018 7/25/2018 7/13/2018 7/22/2017 12/30/2016 6/8/2016   Weight 416 lb 3.2 oz 419 lb 417 lb 350 lb 350 lb 323 lb   BMI 63.28 kg/m2 67.63 kg/m2 61.58 kg/m2 51.69 kg/m2 51.69 kg/m2 47.68 kg/m2       Patient Active Problem List   Diagnosis Code    Obesity, morbid (HCC) E66.01    History of CVA (cerebrovascular accident) Z80.78    Chronic pain of both knees M25.561, M25.562, G89.29    Osteoarthritis of both knees M17.0    Family history of diabetes mellitus Z83.3     REVIEW OF SYSTEMS: All Below are Negative except: See HPI   Constitutional: negative for fever, chills, and weight loss.    Cardiovascular: negative for chest pain, claudication, leg swelling, SOB, LEWIS   Gastrointestinal: Negative for pain, N/V/C/D, Blood in stool or urine, dysuria,  hematuria, incontinence, pelvic pain. Musculoskeletal: See HPI   Neurological: Negative for dizziness and weakness. Negative for headaches, Visual changes, confusion, seizures   Phychiatric/Behavioral: Negative for depression, memory loss, substance  abuse. Extremities: Negative for hair changes, rash, or skin lesion changes. Hematologic: Negative for bleeding problems, bruising, pallor or swollen lymph  nodes   Peripheral Vascular: No calf pain, no circulation deficits. Social History     Social History    Marital status:      Spouse name: N/A    Number of children: N/A    Years of education: N/A     Occupational History    Not on file. Social History Main Topics    Smoking status: Never Smoker    Smokeless tobacco: Never Used    Alcohol use Yes      Comment: Socially    Drug use: No    Sexual activity: Not Currently     Other Topics Concern    Not on file     Social History Narrative      No Known Allergies   Current Outpatient Prescriptions   Medication Sig    diclofenac EC (VOLTAREN) 75 mg EC tablet Take 1 Tab by mouth two (2) times daily as needed. Indications: OSTEOARTHRITIS    aspirin 81 mg chewable tablet Take 1 Tab by mouth daily. No current facility-administered medications for this visit.        PHYSICAL EXAMINATION:  Visit Vitals    BP (!) 148/97    Pulse 85    Temp 97.5 °F (36.4 °C) (Oral)    Resp 18    Ht 5' 8\" (1.727 m)    Wt (!) 416 lb 3.2 oz (188.8 kg)    SpO2 97%    BMI 63.28 kg/m2      ORTHO EXAMINATION:  Examination Right knee Left knee   Skin Intact Intact   Range of motion 95-0 95-0   Effusion - -   Medial joint line tenderness + +   Lateral joint line tenderness + +   Popliteal tenderness - -   Osteophytes palpable - -   Demetras - -   Patella crepitus + +   Anterior drawer - -   Lateral laxity - -   Medial laxity - -   Varus deformity - -   Valgus deformity - -   Pretibial edema 2+ 2+   Calf tenderness - -     PROCEDURE:  After discussing treatment options, patient's knees were injected with 4 cc Marcaine and 1/2 cc Celestone. Chart reviewed for the following:   Terry Noriega MD, have reviewed the History, Physical and updated the Allergic reactions for Ravindra Parker 2 performed immediately prior to start of procedure:  Terry Noriega MD, have performed the following reviews on [facility] prior to the start of the procedure:            * Patient was identified by name and date of birth   * Agreement on procedure being performed was verified  * Risks and Benefits explained to the patient  * Procedure site verified and marked as necessary  * Patient was positioned for comfort  * Consent was obtained     Time: 11:14 AM     Date of procedure: 7/30/2018    Procedure performed by:  Sammie Amador MD    Mr. Nicol Cummings tolerated the procedure well with no complications. RADIOGRAPHS:  XR ASHLY KNEE 7/30/18 ANJEL  IMPRESSION:  Three views - No fractures, no effusion, varus deformities, severe medial and mild lateral joint space narrowing, + osteophytes present. IKDC Grade D    IMPRESSION:      ICD-10-CM ICD-9-CM    1. Bilateral primary osteoarthritis of knee M17.0 715.16 betamethasone (CELESTONE SOLUSPAN) 6 mg/mL injection      BETAMETHASONE ACETATE & SODIUM PHOSPHATE INJECTION 3 MG EA.      DRAIN/INJECT LARGE JOINT/BURSA      bupivacaine, PF, (MARCAINE, PF,) 0.25 % (2.5 mg/mL) injection      PROCEDURE AUTHORIZATION TO    2. Chronic pain of both knees M25.561 719.46 betamethasone (CELESTONE SOLUSPAN) 6 mg/mL injection    M25.562 338.29 BETAMETHASONE ACETATE & SODIUM PHOSPHATE INJECTION 3 MG EA.    G89.29  DRAIN/INJECT LARGE JOINT/BURSA      bupivacaine, PF, (MARCAINE, PF,) 0.25 % (2.5 mg/mL) injection      PROCEDURE AUTHORIZATION TO    3.  Chronic pain of right knee M25.561 719.46 AMB POC X-RAY KNEE 3 VIEW    G89.29 338.29    4. Chronic pain of left knee M25.562 719.46 AMB POC X-RAY KNEE 3 VIEW    G89.29 338.29    5. Chondromalacia of both patellae M22.41 717.7 betamethasone (CELESTONE SOLUSPAN) 6 mg/mL injection    M22.42  BETAMETHASONE ACETATE & SODIUM PHOSPHATE INJECTION 3 MG EA.      DRAIN/INJECT LARGE JOINT/BURSA      bupivacaine, PF, (MARCAINE, PF,) 0.25 % (2.5 mg/mL) injection      PROCEDURE AUTHORIZATION TO      PLAN:  After discussing treatment options, patient's knees were injected with 4 cc Marcaine and 1/2 cc Celestone. Lucia Hinsonr He will follow up as needed. Consider visco supplementation if pain continues. Continue efforts to lose weight with low carb diet. Follow up with Dr. Carmen Corbett for bariatric surgery. He will follow up with his PCP for his hypertension.       Scribed by [facility]) as dictated by Jose Enrique Sabillon MD

## 2018-07-31 ENCOUNTER — TELEPHONE (OUTPATIENT)
Dept: SURGERY | Age: 48
End: 2018-07-31

## 2018-07-31 RX ORDER — AMOXICILLIN 500 MG/1
500 CAPSULE ORAL 3 TIMES DAILY
Qty: 42 CAP | Refills: 0 | Status: SHIPPED | OUTPATIENT
Start: 2018-07-31 | End: 2018-08-14

## 2018-07-31 RX ORDER — BETAMETHASONE SODIUM PHOSPHATE AND BETAMETHASONE ACETATE 3; 3 MG/ML; MG/ML
6 INJECTION, SUSPENSION INTRA-ARTICULAR; INTRALESIONAL; INTRAMUSCULAR; SOFT TISSUE ONCE
Qty: 1 ML | Refills: 0 | Status: CANCELLED
Start: 2018-07-31 | End: 2018-07-31

## 2018-07-31 RX ORDER — OMEPRAZOLE 20 MG/1
20 CAPSULE, DELAYED RELEASE ORAL DAILY
Qty: 14 CAP | Refills: 0 | Status: SHIPPED | OUTPATIENT
Start: 2018-07-31 | End: 2018-08-14

## 2018-07-31 RX ORDER — CLARITHROMYCIN 500 MG/1
500 TABLET, FILM COATED ORAL 2 TIMES DAILY
Qty: 28 TAB | Refills: 0 | Status: SHIPPED | OUTPATIENT
Start: 2018-07-31 | End: 2018-08-14

## 2018-08-02 ENCOUNTER — TELEPHONE (OUTPATIENT)
Dept: SURGERY | Age: 48
End: 2018-08-02

## 2018-08-02 NOTE — TELEPHONE ENCOUNTER
Patient called in and inquired about the infection he was told he had. I informed him that it is called H.Pylori and that the bacteria can enter your body and live in the digestive tract. After many years, they can cause sores, or ulcers, in the lining of your stomach and to prevent that we send in 2 antibiotics and Prilosec to kill off the infection. Patient stated he understood and when I asked if he had any further questions he stated no he believes he understands fully now.

## 2018-08-07 ENCOUNTER — DOCUMENTATION ONLY (OUTPATIENT)
Dept: BARIATRICS/WEIGHT MGMT | Age: 48
End: 2018-08-07

## 2018-08-13 ENCOUNTER — TELEPHONE (OUTPATIENT)
Dept: ORTHOPEDIC SURGERY | Age: 48
End: 2018-08-13

## 2018-08-13 NOTE — TELEPHONE ENCOUNTER
Called patient and informed him that we could not prescribe him any pain medication but that we put in an auth for Euflexxa.

## 2018-08-13 NOTE — TELEPHONE ENCOUNTER
Pt was seen a couple weeks ago for bileral knee pain, at which time dr Dl Osman administered a cortisone injection. The patient commented the injection only worked for a couple days. Pt is requesting pain medication.  His f/u with dr Dl Osman is 8/27/18    Patient can be reached at p# [phone]

## 2018-08-22 ENCOUNTER — OFFICE VISIT (OUTPATIENT)
Dept: INTERNAL MEDICINE CLINIC | Age: 48
End: 2018-08-22

## 2018-08-22 VITALS
DIASTOLIC BLOOD PRESSURE: 96 MMHG | BODY MASS INDEX: 47.74 KG/M2 | WEIGHT: 315 LBS | OXYGEN SATURATION: 99 % | HEART RATE: 64 BPM | RESPIRATION RATE: 14 BRPM | SYSTOLIC BLOOD PRESSURE: 152 MMHG | TEMPERATURE: 98.3 F | HEIGHT: 68 IN

## 2018-08-22 DIAGNOSIS — E66.01 OBESITY, MORBID (HCC): ICD-10-CM

## 2018-08-22 DIAGNOSIS — M25.562 CHRONIC PAIN OF BOTH KNEES: Primary | ICD-10-CM

## 2018-08-22 DIAGNOSIS — M25.561 CHRONIC PAIN OF BOTH KNEES: Primary | ICD-10-CM

## 2018-08-22 DIAGNOSIS — M17.0 OSTEOARTHRITIS OF BOTH KNEES, UNSPECIFIED OSTEOARTHRITIS TYPE: ICD-10-CM

## 2018-08-22 DIAGNOSIS — G89.29 CHRONIC PAIN OF BOTH KNEES: Primary | ICD-10-CM

## 2018-08-22 RX ORDER — ACETAMINOPHEN AND CODEINE PHOSPHATE 300; 30 MG/1; MG/1
1 TABLET ORAL
Qty: 20 TAB | Refills: 0 | Status: SHIPPED | OUTPATIENT
Start: 2018-08-22 | End: 2018-11-15

## 2018-08-22 NOTE — PROGRESS NOTES
HPI     Robin Cruz is a 50 y.o. male with relevant past medical history of morbid obesity, osteoarthritis, chronic knee pain, reported history of \"mini stroke\" in 1996 presenting with left-sided numbness, completely resolved (TIA) who presents today for c/o acute on chronic bilateral knee pain. Patient has been having chronic knee pain for many decades, gradually worse, associated with movement, and weightbearing. He has been going to see bariatric surgery for weight loss management and orthopedic surgery- Dr. Hal Jennings, who performed BL intra-articular analgesia with mild relief of pain. The patient says diclofenac helps some but not completely. He denies any recent trauma, falls, injuries. His pain is worse during the day at work. He says it can be severe and he is having a hard time coping with the pain and would like a stronger medication. ROS  As above included in HPI. Otherwise 11 point review of systems negative including constitutional, skin, HENT, eyes, respiratory, cardiovascular, gastrointestinal, genitourinary, musculoskeletal, endocrine, hematologic, allergy, and neurologic. Past Medical History  Past Medical History:   Diagnosis Date    Arthritis     Hypertension      Past Surgical History:   Procedure Laterality Date    HX CHOLECYSTECTOMY          Family History  Family History   Problem Relation Age of Onset    Heart Disease Mother     Stroke Father        Social History  He  reports that he has never smoked. He has never used smokeless tobacco.   History   Alcohol Use    Yes     Comment: Socially       Immunization History    There is no immunization history on file for this patient. Allergies  No Known Allergies    Medications  Current Outpatient Prescriptions   Medication Sig    acetaminophen-codeine (TYLENOL #3) 300-30 mg per tablet Take 1 Tab by mouth two (2) times daily as needed for Pain. Max Daily Amount: 2 Tabs.     diclofenac EC (VOLTAREN) 75 mg EC tablet Take 1 Tab by mouth two (2) times daily as needed. Indications: OSTEOARTHRITIS    aspirin 81 mg chewable tablet Take 1 Tab by mouth daily. No current facility-administered medications for this visit. Visit Vitals    BP (!) 152/96 (BP 1 Location: Left arm, BP Patient Position: Sitting)    Pulse 64    Temp 98.3 °F (36.8 °C) (Oral)    Resp 14    Ht 5' 8\" (1.727 m)    Wt (!) 417 lb (189.1 kg)    SpO2 99%    BMI 63.4 kg/m2     Body mass index is 63.4 kg/(m^2). Physical Exam   Constitutional: He is well-developed, well-nourished, and in no distress. HENT:   Head: Normocephalic and atraumatic. Right Ear: External ear normal.   Left Ear: External ear normal.   Nose: Nose normal.   Mouth/Throat: Oropharynx is clear and moist.   Eyes: Conjunctivae and EOM are normal. Pupils are equal, round, and reactive to light. Neck: Normal range of motion. Neck supple. No thyromegaly present. Cardiovascular: Normal rate, regular rhythm and normal heart sounds. Pulmonary/Chest: Effort normal and breath sounds normal. No respiratory distress. He has no wheezes. He has no rales. He exhibits no tenderness. Abdominal: Soft. Bowel sounds are normal. There is no tenderness. There is no guarding. Musculoskeletal: He exhibits no edema, tenderness or deformity. Pain and crepitus on both knees with knee extension and flexion, no significant joint effusion, mild local warmth suprapatellar on both knees. No local erythema or tenderness. Lymphadenopathy:     He has no cervical adenopathy. Psychiatric: Mood, memory, affect and judgment normal.   Nursing note and vitals reviewed. REVIEW OF DATA    Labs  Office Visit on 07/25/2018   Component Date Value Ref Range Status    H pylori Breath Test 07/25/2018 Positive* Negative Final         CT Results (most recent):  No results found for this or any previous visit. XR Results (most recent):  No results found for this or any previous visit.    CT   All Micro Results     None                DIAGNOSIS AND PLAN  Patient Active Problem List   Diagnosis Code    Obesity, morbid ([address] E66.01    History of CVA (cerebrovascular accident) Z80.78    Chronic pain of both knees M25.561, M25.562, G89.29    Osteoarthritis of both knees M17.0    Family history of diabetes mellitus Z83.3     1. Obesity, morbid ([address]  2. Chronic pain of both knees  3. Osteoarthritis of both knees, unspecified osteoarthritis type  Referred to pain management as patient may need opiate chronic medication for pain control. Short term prescription of tylenol 3 to use only for severe pain not improved with diclofenac while awaiting to see pain management  Continue to follow up with ortho  Recommended to try OTC arnica gel or cream topically and turmeric for inflammation and pain. Referred to PT. Weight loss plan per bariatric surgery. Follow-up Disposition:  Return in about 6 months (around 2/22/2019). Nia Hong MD

## 2018-08-22 NOTE — MR AVS SNAPSHOT
[facility] 
 
 
 [address] [facility], [address] [facility] 
[phone] Patient: Sierra Almanza MRN: [id number] LII:[birth year] Visit Information Date & Time Provider Department Dept. Phone Encounter #  
 8/22/2018  1:00 PM Patricia Kimbrough MD [facility] [address] [place] [address] [id number] Follow-up Instructions Return in about 6 months (around 2/22/2019). Follow-up and Disposition History Your Appointments 8/27/2018  1:20 PM  
Follow Up with Bart Mei MD  
[address] [facility], [address] [facility] [place] [address] [address] [place] [address]) Appt Note: 1 m f/u  
 [address] [facility], [address] [facility]  
[phone]  
  
   
 [address] [facility], [address] [facility] [address]  
  
    
 10/31/2018 10:00 AM  
Follow Up with SHELDON Boss [facility] ([address] [place] [address]) Appt Note: [address] [facility], [place] [address] [facility], [place] [address] [facility]  
  
    
 2/15/2019 10:00 AM  
Office Visit with Patricia Kimbrough MD  
[facility] [address] [place] [address]) Appt Note: 6 month f/u  
 [address] [facility], [place] [id number] [address] [facility]  
  
   
 [address], [address] Upcoming Health Maintenance Date Due DTaP/Tdap/Td series (1 - Tdap) 1/2/1991 Influenza Age 5 to Adult 8/1/2018 Allergies as of 8/22/2018  Review Complete On: 8/22/2018 By: Patricia Kimbrough MD  
 No Known Allergies Current Immunizations  Never Reviewed No immunizations on file. Not reviewed this visit You Were Diagnosed With   
  
 Codes Comments Chronic pain of both knees    -  Primary ICD-10-CM: M25.561, M25.562, G89.29 ICD-9-CM: 719.46, 338.29 Osteoarthritis of both knees, unspecified osteoarthritis type     ICD-10-CM: M17.0 ICD-9-CM: 715.96 Obesity, morbid ([facility]     ICD-10-CM: E66.01 
ICD-9-CM: 278.01 Vitals BP Pulse Temp Resp Height(growth percentile) Weight(growth percentile) (!) 152/96 (BP 1 Location: Left arm, BP Patient Position: Sitting) 64 98.3 °F (36.8 °C) (Oral) 14 5' 8\" (1.727 m) (!) 417 lb (189.1 kg) SpO2 BMI Smoking Status 99% 63.4 kg/m2 Never Smoker Vitals History BMI and BSA Data Body Mass Index Body Surface Area  
 63.4 kg/m 2 3.01 m 2 Preferred Pharmacy Pharmacy Name Phone [address] [phone] Your Updated Medication List  
  
   
This list is accurate as of 8/22/18  1:57 PM.  Always use your most recent med list.  
  
  
  
  
 acetaminophen-codeine 300-30 mg per tablet Commonly known as:  TYLENOL #3 Take 1 Tab by mouth two (2) times daily as needed for Pain. Max Daily Amount: 2 Tabs. aspirin 81 mg chewable tablet Take 1 Tab by mouth daily. diclofenac EC 75 mg EC tablet Commonly known as:  VOLTAREN Take 1 Tab by mouth two (2) times daily as needed. Indications: OSTEOARTHRITIS Prescriptions Printed Refills  
 acetaminophen-codeine (TYLENOL #3) 300-30 mg per tablet 0 Sig: Take 1 Tab by mouth two (2) times daily as needed for Pain. Max Daily Amount: 2 Tabs. Class: Print Route: Oral  
  
We Performed the Following REFERRAL TO PAIN MANAGEMENT [PLU125 Custom] REFERRAL TO PHYSICAL THERAPY [VII96 Custom] Follow-up Instructions Return in about 6 months (around 2/22/2019). Referral Information Referral ID Referred By Referred To  
  
 [id number] DAVIDE Christianson Not Available Visits Status Start Date End Date 1 New Request 8/22/18 8/22/19 If your referral has a status of pending review or denied, additional information will be sent to support the outcome of this decision. Referral ID Referred By Referred To  
 [id number] DAVIDE Andre Not Available Visits Status Start Date End Date 1 New Request 8/22/18 8/22/19 If your referral has a status of pending review or denied, additional information will be sent to support the outcome of this decision. Introducing [facility]! [facility] introduces D4P patient portal. Now you can access parts of your medical record, email your doctor's office, and request medication refills online. 1. In your internet browser, go to [email or web address] 2. Click on the First Time User? Click Here link in the Sign In box. You will see the New Member Sign Up page. 3. Enter your D4P Access Code exactly as it appears below. You will not need to use this code after youve completed the sign-up process. If you do not sign up before the expiration date, you must request a new code. · D4P Access Code: [email or web address] Expires: 10/11/2018 11:23 AM 
 
4. Enter the last four digits of your Social Security Number (xxxx) and Date of Birth (mm/dd/yyyy) as indicated and click Submit. You will be taken to the next sign-up page. 5. Create a D4P ID. This will be your D4P login ID and cannot be changed, so think of one that is secure and easy to remember. 6. Create a D4P password. You can change your password at any time. 7. Enter your Password Reset Question and Answer. This can be used at a later time if you forget your password. 8. Enter your e-mail address. You will receive e-mail notification when new information is available in [address] 9. Click Sign Up. You can now view and download portions of your medical record. 10. Click the Download Summary menu link to download a portable copy of your medical information. If you have questions, please visit the Frequently Asked Questions section of the D4P website.  Remember, D4P is NOT to be used for urgent needs. For medical emergencies, dial 911. Now available from your iPhone and Android! Please provide this summary of care documentation to your next provider. Your primary care clinician is listed as Robert Recio. If you have any questions after today's visit, please call [phone].

## 2018-08-22 NOTE — PROGRESS NOTES
1. Have you been to the ER, urgent care clinic or hospitalized since your last visit? NO.     2. Have you seen or consulted any other health care providers outside of the [address] [place] [address] since your last visit (Include any pap smears or colon screening)? NO      Do you have an Advanced Directive? NO    Would you like information on Advanced Directives?  NO

## 2018-08-22 NOTE — LETTER
NOTIFICATION RETURN TO WORK / SCHOOL 
 
8/22/2018 1:17 PM 
 
Mr. Marcin Mtz [phone] [id number] To Whom It May Concern: 
 
Marcin Mtz is currently under the care of Randy Lemons. He will return to work/school on: 8/23/18 If there are questions or concerns please have the patient contact our office.  
 
 
 
Sincerely, 
 
 
Antonia Wong MD

## 2018-09-07 ENCOUNTER — TELEPHONE (OUTPATIENT)
Dept: INTERNAL MEDICINE CLINIC | Age: 48
End: 2018-09-07

## 2018-09-07 NOTE — TELEPHONE ENCOUNTER
Pt drop off [employer] form to have filled out.   Pt lost his job for coming to a doctors appt and trying to get Unemployment Benefits

## 2018-09-28 ENCOUNTER — TELEPHONE (OUTPATIENT)
Dept: SURGERY | Age: 48
End: 2018-09-28

## 2018-09-28 NOTE — TELEPHONE ENCOUNTER
Attempted to reach patient in regards to his enrollment in the bariatric program. Upon checking in CC, note from his PCP office states that \"patient lost his job due to coming to appointments\" ran insurance and insurance is inactive.

## 2018-10-12 ENCOUNTER — HOSPITAL ENCOUNTER (EMERGENCY)
Age: 48
Discharge: HOME | End: 2018-10-12
Attending: EMERGENCY MEDICINE
Payer: SELF-PAY

## 2018-10-12 VITALS
RESPIRATION RATE: 18 BRPM | BODY MASS INDEX: 46.65 KG/M2 | HEIGHT: 69 IN | DIASTOLIC BLOOD PRESSURE: 100 MMHG | TEMPERATURE: 98.1 F | SYSTOLIC BLOOD PRESSURE: 148 MMHG | WEIGHT: 315 LBS | OXYGEN SATURATION: 97 % | HEART RATE: 79 BPM

## 2018-10-12 DIAGNOSIS — G44.209 TENSION HEADACHE: Primary | ICD-10-CM

## 2018-10-12 PROCEDURE — 74011250636 HC RX REV CODE- 250/636: Performed by: EMERGENCY MEDICINE

## 2018-10-12 PROCEDURE — 96374 THER/PROPH/DIAG INJ IV PUSH: CPT

## 2018-10-12 PROCEDURE — 96375 TX/PRO/DX INJ NEW DRUG ADDON: CPT

## 2018-10-12 PROCEDURE — 99283 EMERGENCY DEPT VISIT LOW MDM: CPT

## 2018-10-12 PROCEDURE — 74011000258 HC RX REV CODE- 258: Performed by: EMERGENCY MEDICINE

## 2018-10-12 RX ORDER — ONDANSETRON 2 MG/ML
4 INJECTION INTRAMUSCULAR; INTRAVENOUS
Status: COMPLETED | OUTPATIENT
Start: 2018-10-12 | End: 2018-10-12

## 2018-10-12 RX ORDER — KETOROLAC TROMETHAMINE 30 MG/ML
15 INJECTION, SOLUTION INTRAMUSCULAR; INTRAVENOUS
Status: COMPLETED | OUTPATIENT
Start: 2018-10-12 | End: 2018-10-12

## 2018-10-12 RX ORDER — IBUPROFEN 800 MG/1
TABLET ORAL
Qty: 15 TAB | Refills: 0 | Status: SHIPPED | OUTPATIENT
Start: 2018-10-12 | End: 2018-11-15

## 2018-10-12 RX ORDER — DIPHENHYDRAMINE HYDROCHLORIDE 50 MG/ML
25 INJECTION, SOLUTION INTRAMUSCULAR; INTRAVENOUS ONCE
Status: COMPLETED | OUTPATIENT
Start: 2018-10-12 | End: 2018-10-12

## 2018-10-12 RX ADMIN — DIPHENHYDRAMINE HYDROCHLORIDE 25 MG: 50 INJECTION INTRAMUSCULAR; INTRAVENOUS at 07:47

## 2018-10-12 RX ADMIN — KETOROLAC TROMETHAMINE 15 MG: 30 INJECTION, SOLUTION INTRAMUSCULAR at 09:13

## 2018-10-12 RX ADMIN — SODIUM CHLORIDE 20 MG: 900 INJECTION, SOLUTION INTRAVENOUS at 07:46

## 2018-10-12 RX ADMIN — ONDANSETRON HYDROCHLORIDE 4 MG: 2 INJECTION, SOLUTION INTRAMUSCULAR; INTRAVENOUS at 09:14

## 2018-10-12 NOTE — ED NOTES
Sridhar Sierra is a 50 y.o. male that was discharged in stable condition. The patients diagnosis, condition and treatment were explained to  patient and aftercare instructions were given. The patient verbalized understanding. Patient armband removed and shredded.

## 2018-10-12 NOTE — ED NOTES
Patient states headache has improved to a 7/10 and now complains of nausea and is requesting medication for it. Provider aware.

## 2018-10-12 NOTE — DISCHARGE INSTRUCTIONS
Tension Headache: Care Instructions  Your Care Instructions  Most headaches are tension headaches. These headaches tend to happen again, especially if you are under stress. A tension headache may cause pain or a feeling of pressure all over your head. You probably can't pinpoint the center of the pain. If you keep getting tension headaches, the best thing you can do to limit them is to find out what is causing them and then make changes in those areas. Follow-up care is a key part of your treatment and safety. Be sure to make and go to all appointments, and call your doctor if you are having problems. It's also a good idea to know your test results and keep a list of the medicines you take. How can you care for yourself at home? · Rest in a quiet, dark room with a cool cloth on your forehead until your headache is gone. Close your eyes, and try to relax or go to sleep. Don't watch TV or read. Avoid using the computer. · Use a warm, moist towel or a heating pad set on low to relax tight shoulder and neck muscles. · Have someone gently massage your neck and shoulders. · Take pain medicines exactly as directed. ¨ If the doctor gave you a prescription medicine for pain, take it as prescribed. ¨ If you are not taking a prescription pain medicine, ask your doctor if you can take an over-the-counter medicine. · Be careful not to take pain medicine more often than the instructions allow, because you may get worse or more frequent headaches when the medicine wears off. · If you get another tension headache, stop what you are doing and sit quietly for a moment. Close your eyes and breathe slowly. Try to relax your head and neck muscles. · Do not ignore new symptoms that occur with a headache, such as fever, weakness or numbness, vision changes, or confusion. These may be signs of a more serious problem. To help prevent headaches  · Keep a headache diary so you can figure out what triggers your headaches. Avoiding triggers may help you prevent headaches. Record when each headache began, how long it lasted, and what the pain was like (throbbing, aching, stabbing, or dull). List anything that may have triggered the headache, such as being physically or emotionally stressed or being anxious or depressed. Other possible triggers are hunger, anger, fatigue, poor posture, and muscle strain. · Find healthy ways to deal with stress. Headaches are most common during or right after stressful times. Take time to relax before and after you do something that has caused a headache in the past.  · Exercise daily to relieve stress. Relaxation exercises may help reduce tension. · Get plenty of sleep. · Eat regularly and well. Long periods without food can trigger a headache. · Treat yourself to a massage. Some people find that massages are very helpful in relieving tension. · Try to keep your muscles relaxed by keeping good posture. Check your jaw, face, neck, and shoulder muscles for tension, and try to relax them. When sitting at a desk, change positions often, and stretch for 30 seconds each hour. · Reduce eyestrain from computers by blinking frequently and looking away from the computer screen every so often. Make sure you have proper eyewear and that your monitor is set up properly, about an arm's length away. When should you call for help? Call 911 anytime you think you may need emergency care. For example, call if:    · You have signs of a stroke. These may include:  ¨ Sudden numbness, paralysis, or weakness in your face, arm, or leg, especially on only one side of your body. ¨ Sudden vision changes. ¨ Sudden trouble speaking. ¨ Sudden confusion or trouble understanding simple statements. ¨ Sudden problems with walking or balance.   ¨ A sudden, severe headache that is different from past headaches.    Call your doctor now or seek immediate medical care if:    · You have new or worse nausea and vomiting.     · You have a new or higher fever.     · Your headache gets much worse.    Watch closely for changes in your health, and be sure to contact your doctor if:    · You are not getting better after 2 days (48 hours). Where can you learn more? Go to http://valerie-nate.info/. Enter 84 17 [phone] in the search box to learn more about \"Tension Headache: Care Instructions. \"  Current as of: June 4, 2018  Content Version: 11.8  © 0663-9301 [employer]. Care instructions adapted under license by [employer] (which disclaims liability or warranty for this information). If you have questions about a medical condition or this instruction, always ask your healthcare professional. [address] any warranty or liability for your use of this information.

## 2018-10-12 NOTE — ED NOTES
Patient states he needed to use the restroom after triage. Patient still is restroom. Knocked on door to check on patient and he said he was fine.

## 2018-10-12 NOTE — ED PROVIDER NOTES
EMERGENCY DEPARTMENT HISTORY AND PHYSICAL EXAM    7:20 AM    Date: 10/12/2018  Patient Name: Sridhar Sierra    History of Presenting Illness     Chief Complaint   Patient presents with    Headache     History Provided By: Patient    Chief Complaint:  Headache  Duration:2 Hours (Patient woke up with HA)  Timing:  Acute and Constant  Location: Frontal  Quality: Aching  Severity: Moderate  Modifying Factors: Took tylenol with no relief. Associated Symptoms: nausea    Additional History (Context): 7:22 AM Sridhar Sierra is a 50 y.o. male with h/o arthritis, HTN, cholecysectomy who presents to ED complaining of acute, constant, moderate frontal headache with onset 2 hours PTA upon the patient waking up. States he is also experiencing nausea. Reports he took Tylenol this morning with no relief. He did have a HA yesterday and was able to relieve it with Tylenol. Says he has had headaches in the past, but \"nothing like this before. \" Denies abd pain, CP. No other concerns or symptoms at this time. PCP: Camryn Stout MD    Current Outpatient Prescriptions   Medication Sig Dispense Refill    acetaminophen-codeine (TYLENOL #3) 300-30 mg per tablet Take 1 Tab by mouth two (2) times daily as needed for Pain. Max Daily Amount: 2 Tabs. 20 Tab 0     Past History     Past Medical History:  Past Medical History:   Diagnosis Date    Arthritis     Hypertension        Past Surgical History:  Past Surgical History:   Procedure Laterality Date    HX CHOLECYSTECTOMY         Family History:  Family History   Problem Relation Age of Onset    Heart Disease Mother     Stroke Father        Social History:  Social History   Substance Use Topics    Smoking status: Never Smoker    Smokeless tobacco: Never Used    Alcohol use Yes      Comment: Socially     Allergies:  No Known Allergies    Review of Systems     Review of Systems   Constitutional: Negative for activity change, fatigue and fever.    HENT: Negative for congestion and rhinorrhea. Eyes: Negative for visual disturbance. Respiratory: Negative for shortness of breath. Cardiovascular: Negative for chest pain and palpitations. Gastrointestinal: Positive for nausea. Negative for abdominal pain, diarrhea and vomiting. Genitourinary: Negative for dysuria and hematuria. Musculoskeletal: Negative for back pain. Skin: Negative for rash. Neurological: Positive for headaches. Negative for dizziness, weakness and light-headedness. All other systems reviewed and are negative. Physical Exam     Visit Vitals    BP (!) 148/100 (BP 1 Location: Left arm, BP Patient Position: At rest;Sitting)    Pulse 79    Temp 98.1 °F (36.7 °C)    Resp 18    Ht 5' 9\" (1.753 m)    Wt (!) 190.5 kg (420 lb)    SpO2 97%    BMI 62.02 kg/m2     Physical Exam   Constitutional: He is oriented to person, place, and time. He appears well-developed and well-nourished. No distress. Morbidly obese   HENT:   Head: Normocephalic and atraumatic. Right Ear: External ear normal.   Left Ear: External ear normal.   Nose: Nose normal.   Mouth/Throat: Oropharynx is clear and moist.   Eyes: Conjunctivae and EOM are normal. Pupils are equal, round, and reactive to light. No scleral icterus. Neck: Normal range of motion. Neck supple. No JVD present. No tracheal deviation present. No thyromegaly present. Cardiovascular: Normal rate, regular rhythm, normal heart sounds and intact distal pulses. Exam reveals no gallop and no friction rub. No murmur heard. Pulmonary/Chest: Effort normal and breath sounds normal. He exhibits no tenderness. Abdominal: Soft. Bowel sounds are normal. He exhibits no distension. There is no tenderness. There is no rebound and no guarding. Musculoskeletal: Normal range of motion. He exhibits no edema or tenderness. Lymphadenopathy:     He has no cervical adenopathy. Neurological: He is alert and oriented to person, place, and time.  No cranial nerve deficit. Coordination normal.   No sensory loss, Gait normal, Motor 5/5   Skin: Skin is warm and dry. Psychiatric: He has a normal mood and affect. His behavior is normal. Judgment and thought content normal.   Nursing note and vitals reviewed. Diagnostic Study Results     Labs -  No results found for this or any previous visit (from the past 12 hour(s)). Radiologic Studies -   No orders to display     Medical Decision Making   I am the first provider for this patient. I reviewed the vital signs, available nursing notes, past medical history, past surgical history, family history and social history. Vital Signs-Reviewed the patient's vital signs. Pulse Oximetry Analysis -  97% on room air - stable    Records Reviewed: Nursing Notes (Time of Review: 7:20 AM)    ED Course: Progress Notes, Reevaluation, and Consults:    Provider Notes (Medical Decision Making):     Diagnosis     Clinical Impression: No diagnosis found. Disposition: Discharged     Follow-up Information     None           Patient's Medications   Start Taking    No medications on file   Continue Taking    ACETAMINOPHEN-CODEINE (TYLENOL #3) 300-30 MG PER TABLET    Take 1 Tab by mouth two (2) times daily as needed for Pain. Max Daily Amount: 2 Tabs. These Medications have changed    No medications on file   Stop Taking    ASPIRIN 81 MG CHEWABLE TABLET    Take 1 Tab by mouth daily. DICLOFENAC EC (VOLTAREN) 75 MG EC TABLET    Take 1 Tab by mouth two (2) times daily as needed. Indications: OSTEOARTHRITIS     _______________________________    Attestations:  Scribe Attestation     [facility] acting as a scribe for and in the presence of Ruben Moore MD      October 12, 2018 at 10:05 AM       Provider Attestation:      I personally performed the services described in the documentation, reviewed the documentation, as recorded by the scribe in my presence, and it accurately and completely records my words and actions.  October 12, 2018 at 10:05 AM - Sea Riggs MD  _______________________________    PT feeling better after meds, wants to go home, agrees with dispo and F/U plan.   Jaqui Casper MD  10:09 AM

## 2018-10-12 NOTE — ED NOTES
Patient still complain of a headache, Dr. Lb Wilhelm aware. Patient just finished his IV medications.

## 2018-10-24 ENCOUNTER — TELEPHONE (OUTPATIENT)
Dept: SURGERY | Age: 48
End: 2018-10-24

## 2018-11-15 ENCOUNTER — HOSPITAL ENCOUNTER (EMERGENCY)
Age: 48
Discharge: HOME | End: 2018-11-15
Attending: EMERGENCY MEDICINE | Admitting: EMERGENCY MEDICINE
Payer: SELF-PAY

## 2018-11-15 ENCOUNTER — APPOINTMENT (OUTPATIENT)
Dept: GENERAL RADIOLOGY | Age: 48
End: 2018-11-15
Attending: EMERGENCY MEDICINE
Payer: SELF-PAY

## 2018-11-15 VITALS
DIASTOLIC BLOOD PRESSURE: 101 MMHG | SYSTOLIC BLOOD PRESSURE: 141 MMHG | RESPIRATION RATE: 16 BRPM | HEIGHT: 69 IN | BODY MASS INDEX: 46.65 KG/M2 | HEART RATE: 76 BPM | TEMPERATURE: 98.7 F | WEIGHT: 315 LBS | OXYGEN SATURATION: 96 %

## 2018-11-15 DIAGNOSIS — R03.0 ELEVATED BLOOD PRESSURE READING: ICD-10-CM

## 2018-11-15 DIAGNOSIS — S66.912A STRAIN OF LEFT WRIST, INITIAL ENCOUNTER: Primary | ICD-10-CM

## 2018-11-15 DIAGNOSIS — M54.50 ACUTE BILATERAL LOW BACK PAIN WITHOUT SCIATICA: ICD-10-CM

## 2018-11-15 PROCEDURE — L3809 WHFO W/O JOINTS PRE OTS: HCPCS

## 2018-11-15 PROCEDURE — 99283 EMERGENCY DEPT VISIT LOW MDM: CPT

## 2018-11-15 PROCEDURE — 75810000053 HC SPLINT APPLICATION

## 2018-11-15 PROCEDURE — 73110 X-RAY EXAM OF WRIST: CPT

## 2018-11-15 RX ORDER — NAPROXEN 500 MG/1
500 TABLET ORAL 2 TIMES DAILY WITH MEALS
Qty: 20 TAB | Refills: 0 | Status: SHIPPED | OUTPATIENT
Start: 2018-11-15 | End: 2019-02-22

## 2018-11-15 RX ORDER — LIDOCAINE 50 MG/G
PATCH TOPICAL
Qty: 1 PACKAGE | Refills: 0 | Status: SHIPPED | OUTPATIENT
Start: 2018-11-15 | End: 2019-02-22

## 2018-11-15 NOTE — ED TRIAGE NOTES
C/o left wrist pain. Attributes left wrist pain to \"new job and working on  C/o lower back pain. Attributes lower back pain to \"standing all day long at work\".

## 2018-11-15 NOTE — DISCHARGE INSTRUCTIONS
Back Pain: Care Instructions  Your Care Instructions    Back pain has many possible causes. It is often related to problems with muscles and ligaments of the back. It may also be related to problems with the nerves, discs, or bones of the back. Moving, lifting, standing, sitting, or sleeping in an awkward way can strain the back. Sometimes you don't notice the injury until later. Arthritis is another common cause of back pain. Although it may hurt a lot, back pain usually improves on its own within several weeks. Most people recover in 12 weeks or less. Using good home treatment and being careful not to stress your back can help you feel better sooner. Follow-up care is a key part of your treatment and safety. Be sure to make and go to all appointments, and call your doctor if you are having problems. It's also a good idea to know your test results and keep a list of the medicines you take. How can you care for yourself at home? · Sit or lie in positions that are most comfortable and reduce your pain. Try one of these positions when you lie down:  ? Lie on your back with your knees bent and supported by large pillows. ? Lie on the floor with your legs on the seat of a sofa or chair. ? Lie on your side with your knees and hips bent and a pillow between your legs. ? Lie on your stomach if it does not make pain worse. · Do not sit up in bed, and avoid soft couches and twisted positions. Bed rest can help relieve pain at first, but it delays healing. Avoid bed rest after the first day of back pain. · Change positions every 30 minutes. If you must sit for long periods of time, take breaks from sitting. Get up and walk around, or lie in a comfortable position. · Try using a heating pad on a low or medium setting for 15 to 20 minutes every 2 or 3 hours. Try a warm shower in place of one session with the heating pad. · You can also try an ice pack for 10 to 15 minutes every 2 to 3 hours.  Put a thin cloth between the ice pack and your skin. · Take pain medicines exactly as directed. ? If the doctor gave you a prescription medicine for pain, take it as prescribed. ? If you are not taking a prescription pain medicine, ask your doctor if you can take an over-the-counter medicine. · Take short walks several times a day. You can start with 5 to 10 minutes, 3 or 4 times a day, and work up to longer walks. Walk on level surfaces and avoid hills and stairs until your back is better. · Return to work and other activities as soon as you can. Continued rest without activity is usually not good for your back. · To prevent future back pain, do exercises to stretch and strengthen your back and stomach. Learn how to use good posture, safe lifting techniques, and proper body mechanics. When should you call for help? Call your doctor now or seek immediate medical care if:    · You have new or worsening numbness in your legs.     · You have new or worsening weakness in your legs. (This could make it hard to stand up.)     · You lose control of your bladder or bowels.    Watch closely for changes in your health, and be sure to contact your doctor if:    · You have a fever, lose weight, or don't feel well.     · You do not get better as expected. Where can you learn more? Go to http://valerie-nate.info/. Enter Q821 in the search box to learn more about \"Back Pain: Care Instructions. \"  Current as of: November 29, 2017  Content Version: 11.8  © 3634-8056 [employer]. Care instructions adapted under license by [employer] (which disclaims liability or warranty for this information). If you have questions about a medical condition or this instruction, always ask your healthcare professional. [place] [address] any warranty or liability for your use of this information. Learning About Relief for Back Pain  What is back tension and strain?     Back strain happens when you overstretch, or pull, a muscle in your back. You may hurt your back in an accident or when you exercise or lift something. Most back pain will get better with rest and time. You can take care of yourself at home to help your back heal.  What can you do first to relieve back pain? When you first feel back pain, try these steps:  · Walk. Take a short walk (10 to 20 minutes) on a level surface (no slopes, hills, or stairs) every 2 to 3 hours. Walk only distances you can manage without pain, especially leg pain. · Relax. Find a comfortable position for rest. Some people are comfortable on the floor or a medium-firm bed with a small pillow under their head and another under their knees. Some people prefer to lie on their side with a pillow between their knees. Don't stay in one position for too long. · Try heat or ice. Try using a heating pad on a low or medium setting, or take a warm shower, for 15 to 20 minutes every 2 to 3 hours. Or you can buy single-use heat wraps that last up to 8 hours. You can also try an ice pack for 10 to 15 minutes every 2 to 3 hours. You can use an ice pack or a bag of frozen vegetables wrapped in a thin towel. There is not strong evidence that either heat or ice will help, but you can try them to see if they help. You may also want to try switching between heat and cold. · Take pain medicine exactly as directed. ? If the doctor gave you a prescription medicine for pain, take it as prescribed. ? If you are not taking a prescription pain medicine, ask your doctor if you can take an over-the-counter medicine. What else can you do? · Stretch and exercise. Exercises that increase flexibility may relieve your pain and make it easier for your muscles to keep your spine in a good, neutral position. And don't forget to keep walking. · Do self-massage. You can use self-massage to unwind after work or school or to energize yourself in the morning.  You can easily massage your feet, hands, or neck. Self-massage works best if you are in comfortable clothes and are sitting or lying in a comfortable position. Use oil or lotion to massage bare skin. · Reduce stress. Back pain can lead to a vicious [place]: Distress about the pain tenses the muscles in your back, which in turn causes more pain. Learn how to relax your mind and your muscles to lower your stress. Where can you learn more? Go to http://valerie-nate.info/. Enter R957 in the search box to learn more about \"Learning About Relief for Back Pain. \"  Current as of: November 29, 2017  Content Version: 11.8  © 7304-8650 [employer]. Care instructions adapted under license by [employer] (which disclaims liability or warranty for this information). If you have questions about a medical condition or this instruction, always ask your healthcare professional. [address] any warranty or liability for your use of this information.

## 2018-11-15 NOTE — LETTER
[facility] EMERGENCY DEPT 
[address] [facility] [id number] 
[phone] Work/School Note Date: 11/15/2018 To Whom It May concern: 
 
Berenice Hobson was seen and treated today in the emergency room by the following provider(s): 
Attending Provider: Sapphire Hernandez MD 
Nurse Practitioner: Salima Mendoza NP. Berenice Hobson may return to work on 11/17/2018. Sincerely, Germnaia Martinez NP

## 2018-11-15 NOTE — ED NOTES
Rodrick Stein is a 50 y.o. male that was discharged in stable condition. The patients diagnosis, condition and treatment were explained to  patient and aftercare instructions were given. The patient verbalized understanding. Patient armband removed and shredded.

## 2018-11-15 NOTE — ED PROVIDER NOTES
3:09 PM   50 y.o. male presents to ED C/O left wrist and back pain. 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 Patient points to ulnar aspect of wrist when asked where pain is located. Patient is right hand dominant. Patient also reports intermittent lower back pain while at work. patient denies loss of bowel or bladder function, weakness in lower extremities, loss of sensation. Patient denies injury to back. Patient denies fever, SOB, CP. Patient denies any other symptoms or complaints. Past Medical History:   Diagnosis Date    Arthritis     Hypertension     Knee pain        Past Surgical History:   Procedure Laterality Date    HX CHOLECYSTECTOMY           Family History:   Problem Relation Age of Onset    Heart Disease Mother     Stroke Father        Social History     Socioeconomic History    Marital status:      Spouse name: Not on file    Number of children: Not on file    Years of education: Not on file    Highest education level: Not on file   Social Needs    Financial resource strain: Not on file    Food insecurity - worry: Not on file    Food insecurity - inability: Not on file   [employer] needs - medical: Not on file   [employer] needs - non-medical: Not on file   Occupational History    Not on file   Tobacco Use    Smoking status: Never Smoker    Smokeless tobacco: Never Used   Substance and Sexual Activity    Alcohol use: Yes     Comment: Socially    Drug use: No    Sexual activity: Not Currently   Other Topics Concern    Not on file   Social History Narrative    Not on file         ALLERGIES: Patient has no known allergies. Review of Systems   Constitutional: Negative for activity change, appetite change, chills, fatigue and fever. HENT: Negative for congestion, ear pain, rhinorrhea and sore throat. Eyes: Negative for pain, discharge, redness and itching. Respiratory: Negative for cough, chest tightness, shortness of breath and wheezing. Cardiovascular: Negative for chest pain and palpitations. Gastrointestinal: Negative for abdominal pain, blood in stool, constipation, diarrhea, nausea and vomiting. Endocrine: Negative for polyuria. Genitourinary: Negative for discharge, dysuria, flank pain, hematuria, penile pain and testicular pain. Musculoskeletal: Positive for arthralgias and back pain. Negative for joint swelling and neck pain. Skin: Negative for rash and wound. Allergic/Immunologic: Negative for immunocompromised state. Neurological: Negative for dizziness, weakness, light-headedness, numbness and headaches. Hematological: Negative for adenopathy. Psychiatric/Behavioral: Negative for agitation and confusion. The patient is not nervous/anxious. Vitals:    11/15/18 1444   BP: (!) 141/101   Pulse: 76   Resp: 16   Temp: 98.7 °F (37.1 °C)   SpO2: 96%   Weight: (!) 189.6 kg (418 lb)   Height: 5' 9\" (1.753 m)            Physical Exam   Constitutional: He is oriented to person, place, and time. Obese male, appears to be in no distress    HENT:   Head: Atraumatic. Right Ear: External ear normal.   Left Ear: External ear normal.   Mouth/Throat: Oropharynx is clear and moist.   Cardiovascular: Normal rate, regular rhythm and intact distal pulses. Pulmonary/Chest: Effort normal and breath sounds normal. No stridor. No respiratory distress. He has no wheezes. He has no rales. Musculoskeletal:        Left wrist: He exhibits tenderness and bony tenderness. He exhibits normal range of motion, no swelling, no deformity and no laceration. Lumbar back: He exhibits pain. He exhibits no spasm and normal pulse. Back:         Arms:  Patient ambulating with out issue. Neurological: He is alert and oriented to person, place, and time. No cranial nerve deficit or sensory deficit. He exhibits normal muscle tone.  Coordination normal. Skin: Skin is warm and dry. No rash noted. No erythema. Nursing note and vitals reviewed. MDM  Number of Diagnoses or Management Options  Acute bilateral low back pain without sciatica:   Elevated blood pressure reading:   Strain of left wrist, initial encounter:   Diagnosis management comments: MDM:  Triage ordered a left wrist xray  Progress - FINDINGS: 3 views of the left wrist were obtained. Bony alignment joint spaces  are intact. There is no acute fracture or dislocation. Soft tissues are  Unremarkable. -Patient informed of results, this a new job, discussed modifications of activity while at work and [employer]. Patient has no neuro deficits or trauma, do not believe imagining of lumbar spine is indicated. Kalie Mazariegos has taken no medication for pain, prescribed NSAID and splint of left wrist.  dicussed pain/ injury associated with repetitive motions. Patient referred to PCP for further evaluation of elevated blood pressure and pain as needed. Patient educated to return to the ED for any new or worsening symptoms. Patient denies questions. Amount and/or Complexity of Data Reviewed  Tests in the radiology section of CPT®: ordered and reviewed           Procedures        RESULTS:    XR WRIST LT AP/LAT/OBL MIN 3V   Final Result          Labs Reviewed - No data to display    No results found for this or any previous visit (from the past 12 hour(s)). PROGRESS NOTE:   3:09 PM   Initial assessment completed. Written by Kristen VILLAFUERTE    One or more blood pressure readings were noted elevated during the Pt's presentation in the emergency department this date. This abnormal reading has been cited in the Pt's diagnosis, and they have been encouraged to follow up with their primary care physician, or referred to a consultant for further evaluation and treatment. DISCHARGE NOTE:  3:32 PM   Vargas Guerrero  results have been reviewed with him.   He has been counseled regarding his diagnosis, treatment, and plan. He verbally conveys understanding and agreement of the signs, symptoms, diagnosis, treatment and prognosis and additionally agrees to follow up as discussed. He also agrees with the care-plan and conveys that all of his questions have been answered. I have also provided discharge instructions for him that include: educational information regarding their diagnosis and treatment, and list of reasons why they would want to return to the ED prior to their follow-up appointment, should his condition change. CLINICAL IMPRESSION:    1. Strain of left wrist, initial encounter    2. Acute bilateral low back pain without sciatica    3. Elevated blood pressure reading        AFTER VISIT PLAN:    Current Discharge Medication List      START taking these medications    Details   naproxen (NAPROSYN) 500 mg tablet Take 1 Tab by mouth two (2) times daily (with meals). Qty: 20 Tab, Refills: 0      lidocaine (LIDODERM) 5 % Apply patch to the affected area for 12 hours a day and remove for 12 hours a day.   Qty: 1 Package, Refills: 0              Follow-up Information     Follow up With Specialties Details Why Contact Info    Corinna Link MD Internal Medicine Schedule an appointment as soon as possible for a visit in 1 week Further evaluation [address] [facility]  [phone]             Written by Millie VILLAFUERTE

## 2019-01-15 ENCOUNTER — TELEPHONE (OUTPATIENT)
Dept: INTERNAL MEDICINE CLINIC | Age: 49
End: 2019-01-15

## 2019-02-15 ENCOUNTER — OFFICE VISIT (OUTPATIENT)
Dept: INTERNAL MEDICINE CLINIC | Age: 49
End: 2019-02-15

## 2019-02-15 VITALS
DIASTOLIC BLOOD PRESSURE: 92 MMHG | HEIGHT: 69 IN | HEART RATE: 81 BPM | RESPIRATION RATE: 16 BRPM | OXYGEN SATURATION: 98 % | TEMPERATURE: 98.4 F | BODY MASS INDEX: 46.65 KG/M2 | SYSTOLIC BLOOD PRESSURE: 142 MMHG | WEIGHT: 315 LBS

## 2019-02-15 DIAGNOSIS — Z00.00 WELLNESS EXAMINATION: Primary | ICD-10-CM

## 2019-02-15 DIAGNOSIS — M19.90 OSTEOARTHRITIS, UNSPECIFIED OSTEOARTHRITIS TYPE, UNSPECIFIED SITE: ICD-10-CM

## 2019-02-15 DIAGNOSIS — Z86.19 HISTORY OF HELICOBACTER PYLORI INFECTION: ICD-10-CM

## 2019-02-15 DIAGNOSIS — E66.01 MORBID OBESITY (HCC): ICD-10-CM

## 2019-02-15 NOTE — PROGRESS NOTES
ROOM # [id number] [facility] presents today for   Chief Complaint   Patient presents with    Follow-up       Zain Tomlin preferred language for health care discussion is english/other. Depression Screening:  3 most recent PHQ Screens 2/15/2019 8/22/2018 7/30/2018   PHQ Not Done - - Patient Decline   Little interest or pleasure in doing things Not at all Not at all -   Feeling down, depressed, irritable, or hopeless Not at all Not at all -   Total Score PHQ 2 0 0 -       Learning Assessment:  Learning Assessment 7/25/2018   PRIMARY LEARNER Patient   BARRIERS PRIMARY LEARNER NONE   PRIMARY LANGUAGE ENGLISH   LEARNER PREFERENCE PRIMARY LISTENING   ANSWERED BY self   RELATIONSHIP SELF       Abuse Screening:  No flowsheet data found. Fall Risk  No flowsheet data found. Visit Vitals  BP (!) 142/92 (BP 1 Location: Left arm, BP Patient Position: Sitting)   Pulse 81   Temp 98.4 °F (36.9 °C) (Oral)   Resp 16   Ht 5' 9\" (1.753 m)   Wt (!) 436 lb (197.8 kg)   SpO2 98%   BMI 64.39 kg/m²       Health Maintenance reviewed and discussed per provider. Yes    Zain Tomlin is due for   Health Maintenance Due   Topic Date Due    DTaP/Tdap/Td series (1 - Tdap) 01/02/1991    Influenza Age 5 to Adult  08/01/2018     Please order/place referral if appropriate. Advance Directive:  1. Do you have an advance directive in place? Patient Reply: NO    2. If not, would you like material regarding how to put one in place? Patient Reply: No    Coordination of Care:  1. Have you been to the ER, urgent care clinic since your last visit? Hospitalized since your last visit?  No

## 2019-02-15 NOTE — PROGRESS NOTES
HPI     Ana Jamil is a 52 y.o. male with relevant past medical history of morbid obesity (BMI 64), with osteoarthritis and chronic pain in knees, here for follow up. Tells me he lost follow up with all providers as he had lost his job and health insurance. Says he has now medicaid and would like to resume care with ortho and bariatric surgery. Reports no new symptoms other than chronic pain. Denies any GERD symptoms, Cardiopulmonary, GI/ or derm symptoms. Denies any CP, SOB, dizziness, F/C, N/V/D.  ROS  As above included in HPI. Otherwise 11 point review of systems negative including constitutional, skin, HENT, eyes, respiratory, cardiovascular, gastrointestinal, genitourinary, musculoskeletal, endocrine, hematologic, allergy, and neurologic. Past Medical History  Past Medical History:   Diagnosis Date    Arthritis     Hypertension     Knee pain      Past Surgical History:   Procedure Laterality Date    HX CHOLECYSTECTOMY          Family History  Family History   Problem Relation Age of Onset    Heart Disease Mother     Stroke Father        Social History  He  reports that  has never smoked. he has never used smokeless tobacco.   Social History     Substance and Sexual Activity   Alcohol Use Yes    Comment: Socially       Immunization History    There is no immunization history on file for this patient. Allergies  No Known Allergies    Medications  Current Outpatient Medications   Medication Sig    naproxen (NAPROSYN) 500 mg tablet Take 1 Tab by mouth two (2) times daily (with meals).  lidocaine (LIDODERM) 5 % Apply patch to the affected area for 12 hours a day and remove for 12 hours a day. No current facility-administered medications for this visit.           Visit Vitals  BP (!) 142/92 (BP 1 Location: Left arm, BP Patient Position: Sitting)   Pulse 81   Temp 98.4 °F (36.9 °C) (Oral)   Resp 16   Ht 5' 9\" (1.753 m)   Wt (!) 436 lb (197.8 kg)   SpO2 98%   BMI 64.39 kg/m²     Body mass index is 64.39 kg/m². Physical Exam   Constitutional: He is oriented to person, place, and time and well-developed, well-nourished, and in no distress. Neck: Neck supple. Cardiovascular: Normal rate and regular rhythm. Pulmonary/Chest: Effort normal and breath sounds normal.   Abdominal: Soft. Musculoskeletal: He exhibits no edema or deformity. Neurological: He is alert and oriented to person, place, and time. Nursing note and vitals reviewed. REVIEW OF DATA    Labs  No visits with results within 1 Month(s) from this visit. Latest known visit with results is:   Office Visit on 07/25/2018   Component Date Value Ref Range Status    H pylori Breath Test 07/25/2018 Positive* Negative Final         CT Results (most recent):  No results found for this or any previous visit. XR Results (most recent):  Results from Hospital Encounter encounter on 11/15/18   XR WRIST LT AP/LAT/OBL MIN 3V    Narrative EXAM: XR WRIST LT AP/LAT/OBL MIN 3V    INDICATION:  wrist pain    COMPARISON: None. FINDINGS: 3 views of the left wrist were obtained. Bony alignment joint spaces  are intact. There is no acute fracture or dislocation. Soft tissues are  unremarkable. Impression IMPRESSION: No acute osseous abnormality          CT   All Micro Results     None                  DIAGNOSIS AND PLAN  Patient Active Problem List   Diagnosis Code    Obesity, morbid ([facility]) E66.01    History of CVA (cerebrovascular accident) Z80.78    Chronic pain of both knees M25.561, M25.562, G89.29    Osteoarthritis of both knees M17.0    Family history of diabetes mellitus Z83.3     1. Wellness examination  Screening labs as ordered. - LIPID PANEL; Future  - CBC WITH AUTOMATED DIFF; Future  - METABOLIC PANEL, COMPREHENSIVE; Future  - HEMOGLOBIN A1C W/O EAG; Future  - TSH 3RD GENERATION; Future    2.  Morbid obesity ([facility] [address]  Baseline lab monitoring as follows, referred back to Bariatric surgery by paitent request.  - LIPID PANEL; Future  - CBC WITH AUTOMATED DIFF; Future  - METABOLIC PANEL, COMPREHENSIVE; Future  - HEMOGLOBIN A1C W/O EAG; Future  - TSH 3RD GENERATION; Future    3. Osteoarthritis, unspecified osteoarthritis type, unspecified site  Referred back to Dr. Bird Olivas, patient is interested in resuming care. 4. History of Helicobacter pylori infection  BOWEN  - H PYLORI AG, STOOL; Future             Follow-up Disposition:  Return in about 6 months (around 8/15/2019). Nia Arroyo MD

## 2019-02-18 ENCOUNTER — APPOINTMENT (OUTPATIENT)
Dept: INTERNAL MEDICINE CLINIC | Age: 49
End: 2019-02-18

## 2019-02-18 ENCOUNTER — HOSPITAL ENCOUNTER (OUTPATIENT)
Dept: LAB | Age: 49
Discharge: HOME | End: 2019-02-18
Payer: MEDICAID

## 2019-02-18 DIAGNOSIS — E66.01 MORBID OBESITY (HCC): ICD-10-CM

## 2019-02-18 DIAGNOSIS — Z00.00 WELLNESS EXAMINATION: ICD-10-CM

## 2019-02-18 LAB
ALBUMIN SERPL-MCNC: 3.5 G/DL (ref 3.4–5)
ALBUMIN/GLOB SERPL: 0.9 {RATIO} (ref 0.8–1.7)
ALP SERPL-CCNC: 79 U/L (ref 45–117)
ALT SERPL-CCNC: 21 U/L (ref 16–61)
ANION GAP SERPL CALC-SCNC: 7 MMOL/L (ref 3–18)
AST SERPL-CCNC: 18 U/L (ref 15–37)
BASOPHILS # BLD: 0 K/UL (ref 0–0.1)
BASOPHILS NFR BLD: 0 % (ref 0–2)
BILIRUB SERPL-MCNC: 0.5 MG/DL (ref 0.2–1)
BUN SERPL-MCNC: 10 MG/DL (ref 7–18)
BUN/CREAT SERPL: 11 (ref 12–20)
CALCIUM SERPL-MCNC: 8.4 MG/DL (ref 8.5–10.1)
CHLORIDE SERPL-SCNC: 104 MMOL/L (ref 100–108)
CHOLEST SERPL-MCNC: 146 MG/DL
CO2 SERPL-SCNC: 32 MMOL/L (ref 21–32)
CREAT SERPL-MCNC: 0.9 MG/DL (ref 0.6–1.3)
DIFFERENTIAL METHOD BLD: ABNORMAL
EOSINOPHIL # BLD: 0.2 K/UL (ref 0–0.4)
EOSINOPHIL NFR BLD: 3 % (ref 0–5)
ERYTHROCYTE [DISTWIDTH] IN BLOOD BY AUTOMATED COUNT: 14.8 % (ref 11.6–14.5)
GLOBULIN SER CALC-MCNC: 4 G/DL (ref 2–4)
GLUCOSE SERPL-MCNC: 94 MG/DL (ref 74–99)
HBA1C MFR BLD: 5.8 % (ref 4.2–5.6)
HCT VFR BLD AUTO: 40.4 % (ref 36–48)
HDLC SERPL-MCNC: 38 MG/DL (ref 40–60)
HDLC SERPL: 3.8 {RATIO} (ref 0–5)
HGB BLD-MCNC: 12.8 G/DL (ref 13–16)
LDLC SERPL CALC-MCNC: 95.2 MG/DL (ref 0–100)
LIPID PROFILE,FLP: ABNORMAL
LYMPHOCYTES # BLD: 1.9 K/UL (ref 0.9–3.6)
LYMPHOCYTES NFR BLD: 30 % (ref 21–52)
MCH RBC QN AUTO: 26.7 PG (ref 24–34)
MCHC RBC AUTO-ENTMCNC: 31.7 G/DL (ref 31–37)
MCV RBC AUTO: 84.3 FL (ref 74–97)
MONOCYTES # BLD: 0.5 K/UL (ref 0.05–1.2)
MONOCYTES NFR BLD: 8 % (ref 3–10)
NEUTS SEG # BLD: 3.7 K/UL (ref 1.8–8)
NEUTS SEG NFR BLD: 59 % (ref 40–73)
PLATELET # BLD AUTO: 223 K/UL (ref 135–420)
PMV BLD AUTO: 11.3 FL (ref 9.2–11.8)
POTASSIUM SERPL-SCNC: 3.8 MMOL/L (ref 3.5–5.5)
PROT SERPL-MCNC: 7.5 G/DL (ref 6.4–8.2)
RBC # BLD AUTO: 4.79 M/UL (ref 4.7–5.5)
SODIUM SERPL-SCNC: 143 MMOL/L (ref 136–145)
TRIGL SERPL-MCNC: 64 MG/DL (ref ?–150)
TSH SERPL DL<=0.05 MIU/L-ACNC: 3.1 UIU/ML (ref 0.36–3.74)
VLDLC SERPL CALC-MCNC: 12.8 MG/DL
WBC # BLD AUTO: 6.3 K/UL (ref 4.6–13.2)

## 2019-02-18 PROCEDURE — 36415 COLL VENOUS BLD VENIPUNCTURE: CPT

## 2019-02-18 PROCEDURE — 83036 HEMOGLOBIN GLYCOSYLATED A1C: CPT

## 2019-02-18 PROCEDURE — 84443 ASSAY THYROID STIM HORMONE: CPT

## 2019-02-18 PROCEDURE — 80053 COMPREHEN METABOLIC PANEL: CPT

## 2019-02-18 PROCEDURE — 85025 COMPLETE CBC W/AUTO DIFF WBC: CPT

## 2019-02-18 PROCEDURE — 80061 LIPID PANEL: CPT

## 2019-02-19 ENCOUNTER — TELEPHONE (OUTPATIENT)
Dept: INTERNAL MEDICINE CLINIC | Age: 49
End: 2019-02-19

## 2019-02-19 DIAGNOSIS — M25.561 CHRONIC PAIN OF BOTH KNEES: Primary | ICD-10-CM

## 2019-02-19 DIAGNOSIS — G89.29 CHRONIC PAIN OF BOTH KNEES: Primary | ICD-10-CM

## 2019-02-19 DIAGNOSIS — M25.562 CHRONIC PAIN OF BOTH KNEES: Primary | ICD-10-CM

## 2019-02-19 NOTE — TELEPHONE ENCOUNTER
Received call from Wood Hernandez at [facility] and she stated that Pain management will only generally see the patient if there is only additional imaging done like a MRI on the patient. Wood Hernandez wants to know if you would like to order a MRI of the knees or do you want to cancel this referral or send him else where. Wood Hernandez stated that Dr. Emil Lopez request this imaging be done before they schedule the patient.

## 2019-02-21 ENCOUNTER — OFFICE VISIT (OUTPATIENT)
Dept: ORTHOPEDIC SURGERY | Facility: CLINIC | Age: 49
End: 2019-02-21

## 2019-02-21 VITALS
DIASTOLIC BLOOD PRESSURE: 97 MMHG | WEIGHT: 315 LBS | TEMPERATURE: 97 F | RESPIRATION RATE: 16 BRPM | BODY MASS INDEX: 46.65 KG/M2 | HEART RATE: 76 BPM | OXYGEN SATURATION: 99 % | HEIGHT: 69 IN | SYSTOLIC BLOOD PRESSURE: 147 MMHG

## 2019-02-21 DIAGNOSIS — M22.41 CHONDROMALACIA OF BOTH PATELLAE: ICD-10-CM

## 2019-02-21 DIAGNOSIS — G89.29 CHRONIC PAIN OF BOTH KNEES: ICD-10-CM

## 2019-02-21 DIAGNOSIS — M17.0 BILATERAL PRIMARY OSTEOARTHRITIS OF KNEE: Primary | ICD-10-CM

## 2019-02-21 DIAGNOSIS — M25.561 CHRONIC PAIN OF BOTH KNEES: ICD-10-CM

## 2019-02-21 DIAGNOSIS — M22.42 CHONDROMALACIA OF BOTH PATELLAE: ICD-10-CM

## 2019-02-21 DIAGNOSIS — M25.562 CHRONIC PAIN OF BOTH KNEES: ICD-10-CM

## 2019-02-21 RX ORDER — BUPIVACAINE HYDROCHLORIDE 2.5 MG/ML
8 INJECTION, SOLUTION EPIDURAL; INFILTRATION; INTRACAUDAL ONCE
Qty: 8 ML | Refills: 0
Start: 2019-02-21 | End: 2019-02-21

## 2019-02-21 RX ORDER — BETAMETHASONE SODIUM PHOSPHATE AND BETAMETHASONE ACETATE 3; 3 MG/ML; MG/ML
6 INJECTION, SUSPENSION INTRA-ARTICULAR; INTRALESIONAL; INTRAMUSCULAR; SOFT TISSUE ONCE
Qty: 0.5 ML | Refills: 0
Start: 2019-02-21 | End: 2019-02-21

## 2019-02-21 NOTE — PROGRESS NOTES
Patient: Erick Potter                MRN: [id number]       SSN: [id number]  YOB: 1970        AGE: 52 y.o. SEX: male    PCP: Beatrice Prado MD  02/21/19    Chief Complaint   Patient presents with    Knee Pain     B/L knee pain     HISTORY:  Erick Potter is a 52 y.o. male who is seen for increased bilateral knee pain. He feels medial and lateral knee pain. He reports worsening pain for the past few years. He reports no h/o injury. He notes pain with standing and walking--especially at work. He has lost his job due to his knee pain. Pain Assessment  2/21/2019   Location of Pain Knee   Location Modifiers Left;Right   Severity of Pain 7   Quality of Pain Dull;Aching   Duration of Pain Persistent   Frequency of Pain Constant   Aggravating Factors Bending;Walking;Standing   Limiting Behavior Yes   Relieving Factors Rest   Result of Injury No     Occupation, etc:  Mr. 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 He lives with his fiance in [place]. Current weight is 419 pounds. He is 5'6\" tall. He is hypertensive. He will be seeing Dr. Doroteo Mejia or [facility] for bariatric surgery consultation. He was instructed to lose 40 pounds prior to gastric bypass. He lost his insurance when he lost his job but has since obtained medicare. He was present with his son Glenda Sterling. He has 2 sons.      Lab Results   Component Value Date/Time    Hemoglobin A1c 5.8 (H) 02/18/2019 09:50 AM     Weight Metrics 2/21/2019 2/15/2019 11/15/2018 10/12/2018 8/22/2018 7/30/2018 7/25/2018   Weight 433 lb 436 lb 418 lb 420 lb 417 lb 416 lb 3.2 oz 419 lb   BMI 63.94 kg/m2 64.39 kg/m2 61.73 kg/m2 62.02 kg/m2 63.4 kg/m2 63.28 kg/m2 67.63 kg/m2       Patient Active Problem List   Diagnosis Code    Obesity, morbid ([facility] [address] E66.01    History of CVA (cerebrovascular accident) Z80.78    Chronic pain of both knees M25.561, M25.562, G89.29    Osteoarthritis of both knees M17.0    Family history of diabetes mellitus Z83.3     REVIEW OF SYSTEMS: All Below are Negative except: See HPI   Constitutional: negative for fever, chills, and weight loss. Cardiovascular: negative for chest pain, claudication, leg swelling, SOB, LEWIS   Gastrointestinal: Negative for pain, N/V/C/D, Blood in stool or urine, dysuria,  hematuria, incontinence, pelvic pain. Musculoskeletal: See HPI   Neurological: Negative for dizziness and weakness. Negative for headaches, Visual changes, confusion, seizures   Phychiatric/Behavioral: Negative for depression, memory loss, substance  abuse. Extremities: Negative for hair changes, rash, or skin lesion changes. Hematologic: Negative for bleeding problems, bruising, pallor or swollen lymph  nodes   Peripheral Vascular: No calf pain, no circulation deficits. Social History     Socioeconomic History    Marital status:      Spouse name: Not on file    Number of children: Not on file    Years of education: Not on file    Highest education level: Not on file   Social Needs    Financial resource strain: Not on file    Food insecurity - worry: Not on file    Food insecurity - inability: Not on file    Transportation needs - medical: Not on file   [email or web address] needs - non-medical: Not on file   Occupational History    Not on file   Tobacco Use    Smoking status: Never Smoker    Smokeless tobacco: Never Used   Substance and Sexual Activity    Alcohol use: Yes     Comment: Socially    Drug use: No    Sexual activity: Not Currently   Other Topics Concern    Not on file   Social History Narrative    Not on file      No Known Allergies   Current Outpatient Medications   Medication Sig    betamethasone (CELESTONE SOLUSPAN) 6 mg/mL injection 1 mL by Intra artICUlar route once for 1 dose.     bupivacaine, PF, (MARCAINE, PF,) 0.25 % (2.5 mg/mL) injection 8 mL by Intra artICUlar route once for 1 dose.  naproxen (NAPROSYN) 500 mg tablet Take 1 Tab by mouth two (2) times daily (with meals).  lidocaine (LIDODERM) 5 % Apply patch to the affected area for 12 hours a day and remove for 12 hours a day. No current facility-administered medications for this visit. PHYSICAL EXAMINATION:  Visit Vitals  BP (!) 147/97 (BP 1 Location: Left arm, BP Patient Position: Sitting)   Pulse 76   Temp 97 °F (36.1 °C) (Oral)   Resp 16   Ht 5' 9\" (1.753 m)   Wt (!) 433 lb (196.4 kg)   SpO2 99%   BMI 63.94 kg/m²      ORTHO EXAMINATION:  Examination Right knee Left knee   Skin Intact Intact   Range of motion 95-0 95-0   Effusion - -   Medial joint line tenderness + +   Lateral joint line tenderness + +   Popliteal tenderness - -   Osteophytes palpable - -   Demetras - -   Patella crepitus + +   Anterior drawer - -   Lateral laxity - -   Medial laxity - -   Varus deformity - -   Valgus deformity - -   Pretibial edema 2+ 2+   Calf tenderness - -     Chart reviewed for the following:   I, Rachael Magana MD, have reviewed the History, Physical and updated the Allergic reactions for Ravindra Parker 2 performed immediately prior to start of procedure:  Caitie Carnes MD, have performed the following reviews on [place] prior to the start of the procedure:            * Patient was identified by name and date of birth   * Agreement on procedure being performed was verified  * Risks and Benefits explained to the patient  * Procedure site verified and marked as necessary  * Patient was positioned for comfort  * Consent was obtained     Time: 11:14 AM     Date of procedure: 2/21/2019    Procedure performed by:  Rachael Magana MD    Mr. Ravi Houser tolerated the procedure well with no complications.       RADIOGRAPHS:  XR ASHLY KNEE 7/30/18 ANJEL  IMPRESSION:  Three views - No fractures, no effusion, varus deformities, severe medial and mild lateral joint space narrowing, + osteophytes present. IKDC Grade D    IMPRESSION:      ICD-10-CM ICD-9-CM    1. Bilateral primary osteoarthritis of knee M17.0 715.16 PROCEDURE AUTHORIZATION TO       betamethasone (CELESTONE SOLUSPAN) 6 mg/mL injection      BETAMETHASONE ACETATE & SODIUM PHOSPHATE INJECTION 3 MG EA.      DRAIN/INJECT LARGE JOINT/BURSA      bupivacaine, PF, (MARCAINE, PF,) 0.25 % (2.5 mg/mL) injection   2. Chronic pain of both knees M25.561 719.46 PROCEDURE AUTHORIZATION TO     M25.562 338.29 betamethasone (CELESTONE SOLUSPAN) 6 mg/mL injection    G89.29  BETAMETHASONE ACETATE & SODIUM PHOSPHATE INJECTION 3 MG EA.      DRAIN/INJECT LARGE JOINT/BURSA      bupivacaine, PF, (MARCAINE, PF,) 0.25 % (2.5 mg/mL) injection   3. Chondromalacia of both patellae M22.41 717.7 PROCEDURE AUTHORIZATION TO     M22.42  betamethasone (CELESTONE SOLUSPAN) 6 mg/mL injection      BETAMETHASONE ACETATE & SODIUM PHOSPHATE INJECTION 3 MG EA.      DRAIN/INJECT LARGE JOINT/BURSA      bupivacaine, PF, (MARCAINE, PF,) 0.25 % (2.5 mg/mL) injection     PLAN:  After discussing treatment options, patient's knees were injected with 4 cc Marcaine and 1/2 cc Celestone. Wilfredo Hare He will follow up as needed. Consider visco supplementation if pain continues. Continue efforts to lose weight with low carb diet. Follow up with Dr. Farhad Justice for bariatric surgery. He will follow up with his PCP for his hypertension.       Scribed by Jacqui Hayes [facility]) as dictated by Jhoana Andersen MD

## 2019-02-22 ENCOUNTER — HOSPITAL ENCOUNTER (EMERGENCY)
Age: 49
Discharge: HOME | End: 2019-02-22
Attending: EMERGENCY MEDICINE
Payer: MEDICAID

## 2019-02-22 ENCOUNTER — APPOINTMENT (OUTPATIENT)
Dept: CT IMAGING | Age: 49
End: 2019-02-22
Attending: PHYSICIAN ASSISTANT
Payer: MEDICAID

## 2019-02-22 VITALS
TEMPERATURE: 98.4 F | HEART RATE: 72 BPM | DIASTOLIC BLOOD PRESSURE: 98 MMHG | OXYGEN SATURATION: 99 % | RESPIRATION RATE: 17 BRPM | SYSTOLIC BLOOD PRESSURE: 162 MMHG | BODY MASS INDEX: 46.65 KG/M2 | HEIGHT: 69 IN | WEIGHT: 315 LBS

## 2019-02-22 DIAGNOSIS — R51.9 NONINTRACTABLE EPISODIC HEADACHE, UNSPECIFIED HEADACHE TYPE: Primary | ICD-10-CM

## 2019-02-22 DIAGNOSIS — R03.0 ELEVATED BLOOD PRESSURE READING: ICD-10-CM

## 2019-02-22 DIAGNOSIS — Z86.73 HISTORY OF CVA (CEREBROVASCULAR ACCIDENT): ICD-10-CM

## 2019-02-22 LAB
ANION GAP SERPL CALC-SCNC: 6 MMOL/L (ref 3–18)
BASOPHILS # BLD: 0 K/UL (ref 0–0.1)
BASOPHILS NFR BLD: 0 % (ref 0–2)
BUN SERPL-MCNC: 20 MG/DL (ref 7–18)
BUN/CREAT SERPL: 19 (ref 12–20)
CALCIUM SERPL-MCNC: 8.7 MG/DL (ref 8.5–10.1)
CHLORIDE SERPL-SCNC: 104 MMOL/L (ref 100–108)
CO2 SERPL-SCNC: 29 MMOL/L (ref 21–32)
CREAT SERPL-MCNC: 1.06 MG/DL (ref 0.6–1.3)
DIFFERENTIAL METHOD BLD: ABNORMAL
EOSINOPHIL # BLD: 0 K/UL (ref 0–0.4)
EOSINOPHIL NFR BLD: 0 % (ref 0–5)
ERYTHROCYTE [DISTWIDTH] IN BLOOD BY AUTOMATED COUNT: 14.4 % (ref 11.6–14.5)
GLUCOSE SERPL-MCNC: 96 MG/DL (ref 74–99)
HCT VFR BLD AUTO: 42.5 % (ref 36–48)
HGB BLD-MCNC: 13.7 G/DL (ref 13–16)
LYMPHOCYTES # BLD: 2 K/UL (ref 0.9–3.6)
LYMPHOCYTES NFR BLD: 17 % (ref 21–52)
MCH RBC QN AUTO: 26.7 PG (ref 24–34)
MCHC RBC AUTO-ENTMCNC: 32.2 G/DL (ref 31–37)
MCV RBC AUTO: 82.7 FL (ref 74–97)
MONOCYTES # BLD: 1 K/UL (ref 0.05–1.2)
MONOCYTES NFR BLD: 8 % (ref 3–10)
NEUTS SEG # BLD: 8.8 K/UL (ref 1.8–8)
NEUTS SEG NFR BLD: 75 % (ref 40–73)
PLATELET # BLD AUTO: 215 K/UL (ref 135–420)
PMV BLD AUTO: 10.5 FL (ref 9.2–11.8)
POTASSIUM SERPL-SCNC: 4.5 MMOL/L (ref 3.5–5.5)
RBC # BLD AUTO: 5.14 M/UL (ref 4.7–5.5)
SODIUM SERPL-SCNC: 139 MMOL/L (ref 136–145)
WBC # BLD AUTO: 11.8 K/UL (ref 4.6–13.2)

## 2019-02-22 PROCEDURE — 70450 CT HEAD/BRAIN W/O DYE: CPT

## 2019-02-22 PROCEDURE — 96375 TX/PRO/DX INJ NEW DRUG ADDON: CPT

## 2019-02-22 PROCEDURE — 74011250636 HC RX REV CODE- 250/636: Performed by: PHYSICIAN ASSISTANT

## 2019-02-22 PROCEDURE — 74011250637 HC RX REV CODE- 250/637: Performed by: PHYSICIAN ASSISTANT

## 2019-02-22 PROCEDURE — 99283 EMERGENCY DEPT VISIT LOW MDM: CPT

## 2019-02-22 PROCEDURE — 96361 HYDRATE IV INFUSION ADD-ON: CPT

## 2019-02-22 PROCEDURE — 96374 THER/PROPH/DIAG INJ IV PUSH: CPT

## 2019-02-22 PROCEDURE — 85025 COMPLETE CBC W/AUTO DIFF WBC: CPT

## 2019-02-22 PROCEDURE — 80048 BASIC METABOLIC PNL TOTAL CA: CPT

## 2019-02-22 RX ORDER — DIPHENHYDRAMINE HYDROCHLORIDE 50 MG/ML
12.5 INJECTION, SOLUTION INTRAMUSCULAR; INTRAVENOUS
Status: COMPLETED | OUTPATIENT
Start: 2019-02-22 | End: 2019-02-22

## 2019-02-22 RX ORDER — METOCLOPRAMIDE HYDROCHLORIDE 5 MG/ML
10 INJECTION INTRAMUSCULAR; INTRAVENOUS
Status: COMPLETED | OUTPATIENT
Start: 2019-02-22 | End: 2019-02-22

## 2019-02-22 RX ORDER — IBUPROFEN 800 MG/1
800 TABLET ORAL
COMMUNITY
End: 2019-03-15

## 2019-02-22 RX ORDER — AMLODIPINE BESYLATE 5 MG/1
5 TABLET ORAL
Status: COMPLETED | OUTPATIENT
Start: 2019-02-22 | End: 2019-02-22

## 2019-02-22 RX ADMIN — DIPHENHYDRAMINE HYDROCHLORIDE 12.5 MG: 50 INJECTION INTRAMUSCULAR; INTRAVENOUS at 15:34

## 2019-02-22 RX ADMIN — METOCLOPRAMIDE 10 MG: 5 INJECTION, SOLUTION INTRAMUSCULAR; INTRAVENOUS at 15:35

## 2019-02-22 RX ADMIN — AMLODIPINE BESYLATE 5 MG: 5 TABLET ORAL at 16:38

## 2019-02-22 RX ADMIN — SODIUM CHLORIDE 1000 ML: 900 INJECTION, SOLUTION INTRAVENOUS at 15:35

## 2019-02-22 NOTE — ED PROVIDER NOTES
EMERGENCY DEPARTMENT HISTORY AND PHYSICAL EXAM    3:23 PM      Date: 2/22/2019  Patient Name: Susie Dempsey    History of Presenting Illness     Chief Complaint   Patient presents with    Headache    Nausea    Hypertension         History Provided By: Patient      Additional History (Context): Susie Dempsey is a 52 y.o. male with hypertension who presents with constant HA of left side of head onset x1 day PTA. Notes gradual onset of symptoms. Not WHOL. Patient confirms nausea but denies any vision changes, CP, SOB, vomiting, slurred speech, numbness/tingling, weakness. He notes he had similar sx in past when he came to ED and was dx with a tension HA. He denies any modifying factors of light or loud noises. Did not take any medication for symptoms PTA. He last saw PCP last week and is not currently on BP medications. No other concerns or symptoms at this time. PCP: Edgardo Lee MD    Chief Complaint: HA  Duration:  Days  Timing:  Acute and Constant  Location: left side  Quality: Aching  Severity: Moderate  Modifying Factors: none  Associated Symptoms: nausea    Current Outpatient Medications   Medication Sig Dispense Refill    ibuprofen (MOTRIN) 800 mg tablet Take 800 mg by mouth every eight (8) hours as needed for Pain.  hydrocodone/acetaminophen (VICODIN PO) Take  by mouth.       OTHER          Past History     Past Medical History:  Past Medical History:   Diagnosis Date    Arthritis     Hypertension     Knee pain        Past Surgical History:  Past Surgical History:   Procedure Laterality Date    HX CHOLECYSTECTOMY         Family History:  Family History   Problem Relation Age of Onset    Heart Disease Mother     Stroke Father        Social History:  Social History     Tobacco Use    Smoking status: Never Smoker    Smokeless tobacco: Never Used   Substance Use Topics    Alcohol use: Yes     Comment: Socially    Drug use: No       Allergies:  No Known Allergies      Review of Systems       Review of Systems   Constitutional: Negative for activity change, fatigue and fever. HENT: Negative for congestion and rhinorrhea. Eyes: Negative for photophobia and visual disturbance. Respiratory: Negative for shortness of breath. Cardiovascular: Negative for chest pain and palpitations. Gastrointestinal: Positive for nausea. Negative for abdominal pain, diarrhea and vomiting. Genitourinary: Negative for dysuria and hematuria. Musculoskeletal: Negative for back pain. Skin: Negative for rash. Neurological: Positive for headaches. Negative for dizziness, weakness and light-headedness. All other systems reviewed and are negative. Physical Exam     Visit Vitals  BP (!) 162/98 (BP 1 Location: Left arm, BP Patient Position: At rest)   Pulse 72   Temp 98.4 °F (36.9 °C)   Resp 17   Ht 5' 9\" (1.753 m)   Wt (!) 196.4 kg (433 lb)   SpO2 99%   BMI 63.94 kg/m²       Physical Exam   Constitutional: He is oriented to person, place, and time. He appears well-developed and well-nourished. No distress. HENT:   Head: Normocephalic and atraumatic. Eyes: Pupils are equal, round, and reactive to light. Neck: Normal range of motion. Neck supple. No nuchal rigidity    Cardiovascular: Normal rate, regular rhythm, normal heart sounds and intact distal pulses. Exam reveals no gallop and no friction rub. No murmur heard. Pulmonary/Chest: Effort normal and breath sounds normal. No respiratory distress. He has no wheezes. He has no rales. Musculoskeletal: Normal range of motion. Neurological: He is alert and oriented to person, place, and time. He has normal strength. He is not disoriented. No cranial nerve deficit or sensory deficit. He displays a negative Romberg sign. Gait normal.   Skin: Skin is warm. No rash noted. He is not diaphoretic. Nursing note and vitals reviewed.         Diagnostic Study Results     Labs -  Recent Results (from the past 12 hour(s)) CBC WITH AUTOMATED DIFF    Collection Time: 02/22/19  3:26 PM   Result Value Ref Range    WBC 11.8 4.6 - 13.2 K/uL    RBC 5.14 4.70 - 5.50 M/uL    HGB 13.7 13.0 - 16.0 g/dL    HCT 42.5 36.0 - 48.0 %    MCV 82.7 74.0 - 97.0 FL    MCH 26.7 24.0 - 34.0 PG    MCHC 32.2 31.0 - 37.0 g/dL    RDW 14.4 11.6 - 14.5 %    PLATELET 721 993 - 969 K/uL    MPV 10.5 9.2 - 11.8 FL    NEUTROPHILS 75 (H) 40 - 73 %    LYMPHOCYTES 17 (L) 21 - 52 %    MONOCYTES 8 3 - 10 %    EOSINOPHILS 0 0 - 5 %    BASOPHILS 0 0 - 2 %    ABS. NEUTROPHILS 8.8 (H) 1.8 - 8.0 K/UL    ABS. LYMPHOCYTES 2.0 0.9 - 3.6 K/UL    ABS. MONOCYTES 1.0 0.05 - 1.2 K/UL    ABS. EOSINOPHILS 0.0 0.0 - 0.4 K/UL    ABS. BASOPHILS 0.0 0.0 - 0.1 K/UL    DF AUTOMATED     METABOLIC PANEL, BASIC    Collection Time: 02/22/19  3:26 PM   Result Value Ref Range    Sodium 139 136 - 145 mmol/L    Potassium 4.5 3.5 - 5.5 mmol/L    Chloride 104 100 - 108 mmol/L    CO2 29 21 - 32 mmol/L    Anion gap 6 3.0 - 18 mmol/L    Glucose 96 74 - 99 mg/dL    BUN 20 (H) 7.0 - 18 MG/DL    Creatinine 1.06 0.6 - 1.3 MG/DL    BUN/Creatinine ratio 19 12 - 20      GFR est AA >60 >60 ml/min/1.73m2    GFR est non-AA >60 >60 ml/min/1.73m2    Calcium 8.7 8.5 - 10.1 MG/DL       Radiologic Studies -   CT HEAD WO CONT   Final Result   IMPRESSION:      No acute hemorrhage or mass effect identified. Multiple suspected nonacute right cerebral convexity infarcts, as well as right   greater than left probable periventricular small nonacute lacunar infarcts. No   prior imaging available for comparison. MRI would be useful to further   characterize. Medical Decision Making     It should be noted that Mary MARTIN PA will be the provider of record for this patient. I reviewed the vital signs, available nursing notes, past medical history, past surgical history, family history and social history. Vital Signs-Reviewed the patient's vital signs.     Pulse Oximetry Analysis -  96% on room air (Interpretation not WNL)    Cardiac Monitor:  Rate: 70 BPM    Records Reviewed: Nursing Notes and Old Medical Records (Time of Review: 3:23 PM)    ED Course: Progress Notes, Reevaluation, and Consults:  4:30 PM: Pt notes pain has reduced from 9/10 to 6/10.   5:23 PM: Pt notes pain has reduced to 2/10. Discussed findings on CT head, discussed need for close outpatient follow-up. Printed report for his records. One or more blood pressure readings were noted elevated during the patient's presentation in the emergency department today. This abnormal reading has been cited in the patients' diagnosis, and they have been encouraged to follow up with their primary care physician, or referred to a consultant for further evaluation and treatment. Provider Notes (Medical Decision Making): 53 yo M who presents due to L frontal CHUNG. No distress, non-toxic appearing. Alert and oriented, no neurologic deficits on examination. Not WHOL, not sudden in onset. Pain reduced with IVF and medicine to 2/10. Blood pressure improved to 162/98. CT brain negative for acute process, hx of CVA. Pt looks well, feeling better. Stable for d/c with close follow-up with PMD.      Diagnosis     Clinical Impression:   1. Nonintractable episodic headache, unspecified headache type    2. Elevated blood pressure reading    3. History of CVA (cerebrovascular accident)        Disposition: Discharge    Follow-up Information     Follow up With Specialties Details [facility] EMERGENCY DEPT Emergency Medicine  If symptoms worsen 1970 [address] Rhoda Roca MD Internal Medicine In 2 days  [facility]  [phone]                Medication List      ASK your doctor about these medications    betamethasone 6 mg/mL injection  Commonly known as:  CELESTONE SOLUSPAN  1 mL by Intra artICUlar route once for 1 dose.   Ask about: Should I take this medication? bupivacaine (PF) 0.25 % (2.5 mg/mL) injection  Commonly known as:  MARCAINE (PF)  8 mL by Intra artICUlar route once for 1 dose. Ask about: Should I take this medication?     ibuprofen 800 mg tablet  Commonly known as:  MOTRIN     OTHER     VICODIN PO          _______________________________       Scribe Lagos Peabody acting as a scribe for and in the presence of Kisha Morrisma        February 22, 2019 at 3:23 PM       Provider Attestation:      I personally performed the services described in the documentation, reviewed the documentation, as recorded by the scribe in my presence, and it accurately and completely records my words and actions.  February 22, 2019 at 3:23 PM - C[place] Morirs       _______________________________

## 2019-02-22 NOTE — DISCHARGE INSTRUCTIONS
Patient Education      Take medication as prescribed. Follow-up with your primary care physician in 2 days for reassessment. Bring the results from this visit with your for their review. Return to the ED immediately for any new, worsening, or persistent symptoms, including dizziness, weakness, or any other medical concerns. Elevated Blood Pressure: Care Instructions  Your Care Instructions    Blood pressure is a measure of how hard the blood pushes against the walls of your arteries. It's normal for blood pressure to go up and down throughout the day. But if it stays up over time, you have high blood pressure. Two numbers tell you your blood pressure. The first number is the systolic pressure. It shows how hard the blood pushes when your heart is pumping. The second number is the diastolic pressure. It shows how hard the blood pushes between heartbeats, when your heart is relaxed and filling with blood. An ideal blood pressure in adults is less than 120/80 (say \"120 over 80\"). High blood pressure is 140/90 or higher. You have high blood pressure if your top number is 140 or higher or your bottom number is 90 or higher, or both. The main test for high blood pressure is simple, fast, and painless. To diagnose high blood pressure, your doctor will test your blood pressure at different times. After testing your blood pressure, your doctor may ask you to test it again when you are home. If you are diagnosed with high blood pressure, you can work with your doctor to make a long-term plan to manage it. Follow-up care is a key part of your treatment and safety. Be sure to make and go to all appointments, and call your doctor if you are having problems. It's also a good idea to know your test results and keep a list of the medicines you take. How can you care for yourself at home? · Do not smoke. Smoking increases your risk for heart attack and stroke.  If you need help quitting, talk to your doctor about stop-smoking programs and medicines. These can increase your chances of quitting for good. · Stay at a healthy weight. · Try to limit how much sodium you eat to less than 2,300 milligrams (mg) a day. Your doctor may ask you to try to eat less than 1,500 mg a day. · Be physically active. Get at least 30 minutes of exercise on most days of the week. Walking is a good choice. You also may want to do other activities, such as running, swimming, cycling, or playing tennis or team sports. · Avoid or limit alcohol. Talk to your doctor about whether you can drink any alcohol. · Eat plenty of fruits, vegetables, and low-fat dairy products. Eat less saturated and total fats. · Learn how to check your blood pressure at home. When should you call for help? Call your doctor now or seek immediate medical care if:  ? · Your blood pressure is much higher than normal (such as 180/110 or higher). ? · You think high blood pressure is causing symptoms such as:  ¨ Severe headache. ¨ Blurry vision. ? Watch closely for changes in your health, and be sure to contact your doctor if:  ? · You do not get better as expected. Where can you learn more? Go to http://valerie-nate.info/. Enter E198 in the search box to learn more about \"Elevated Blood Pressure: Care Instructions. \"  Current as of: September 21, 2016  Content Version: 11.4  © 1851-5700 [employer]. Care instructions adapted under license by [employer] (which disclaims liability or warranty for this information). If you have questions about a medical condition or this instruction, always ask your healthcare professional. [place] [address] any warranty or liability for your use of this information. Patient Education        Headache: Care Instructions  Your Care Instructions    Headaches have many possible causes.  Most headaches aren't a sign of a more serious problem, and they will get better on their own. Home treatment may help you feel better faster. The doctor has checked you carefully, but problems can develop later. If you notice any problems or new symptoms, get medical treatment right away. Follow-up care is a key part of your treatment and safety. Be sure to make and go to all appointments, and call your doctor if you are having problems. It's also a good idea to know your test results and keep a list of the medicines you take. How can you care for yourself at home? · Do not drive if you have taken a prescription pain medicine. · Rest in a quiet, dark room until your headache is gone. Close your eyes and try to relax or go to sleep. Don't watch TV or read. · Put a cold, moist cloth or cold pack on the painful area for 10 to 20 minutes at a time. Put a thin cloth between the cold pack and your skin. · Use a warm, moist towel or a heating pad set on low to relax tight shoulder and neck muscles. · Have someone gently massage your neck and shoulders. · Take pain medicines exactly as directed. ? If the doctor gave you a prescription medicine for pain, take it as prescribed. ? If you are not taking a prescription pain medicine, ask your doctor if you can take an over-the-counter medicine. · Be careful not to take pain medicine more often than the instructions allow, because you may get worse or more frequent headaches when the medicine wears off. · Do not ignore new symptoms that occur with a headache, such as a fever, weakness or numbness, vision changes, or confusion. These may be signs of a more serious problem. To prevent headaches  · Keep a headache diary so you can figure out what triggers your headaches. Avoiding triggers may help you prevent headaches. Record when each headache began, how long it lasted, and what the pain was like (throbbing, aching, stabbing, or dull).  Write down any other symptoms you had with the headache, such as nausea, flashing lights or dark spots, or sensitivity to bright light or loud noise. Note if the headache occurred near your period. List anything that might have triggered the headache, such as certain foods (chocolate, cheese, wine) or odors, smoke, bright light, stress, or lack of sleep. · Find healthy ways to deal with stress. Headaches are most common during or right after stressful times. Take time to relax before and after you do something that has caused a headache in the past.  · Try to keep your muscles relaxed by keeping good posture. Check your jaw, face, neck, and shoulder muscles for tension, and try relaxing them. When sitting at a desk, change positions often, and stretch for 30 seconds each hour. · Get plenty of sleep and exercise. · Eat regularly and well. Long periods without food can trigger a headache. · Treat yourself to a massage. Some people find that regular massages are very helpful in relieving tension. · Limit caffeine by not drinking too much coffee, tea, or soda. But don't quit caffeine suddenly, because that can also give you headaches. · Reduce eyestrain from computers by blinking frequently and looking away from the computer screen every so often. Make sure you have proper eyewear and that your monitor is set up properly, about an arm's length away. · Seek help if you have depression or anxiety. Your headaches may be linked to these conditions. Treatment can both prevent headaches and help with symptoms of anxiety or depression. When should you call for help? Call 911 anytime you think you may need emergency care. For example, call if:    · You have signs of a stroke. These may include:  ? Sudden numbness, paralysis, or weakness in your face, arm, or leg, especially on only one side of your body. ? Sudden vision changes. ? Sudden trouble speaking. ? Sudden confusion or trouble understanding simple statements. ? Sudden problems with walking or balance. ? A sudden, severe headache that is different from past headaches.  Call your doctor now or seek immediate medical care if:    · You have a new or worse headache.     · Your headache gets much worse. Where can you learn more? Go to http://valerie-nate.info/. Enter M271 in the search box to learn more about \"Headache: Care Instructions. \"  Current as of: Siobhan 3, 2018  Content Version: 11.9  © 1867-1677 [employer]. Care instructions adapted under license by [employer] (which disclaims liability or warranty for this information). If you have questions about a medical condition or this instruction, always ask your healthcare professional. [place] [address] any warranty or liability for your use of this information.

## 2019-02-22 NOTE — ED TRIAGE NOTES
Pt reports left sided headache since last night. Denies blurred vision. + nausea. Pt states h/o hypertension; took meds while incarcerated but has not had any meds since release in 2007.   Pt is hypertensive at triage 175/110

## 2019-02-23 ENCOUNTER — HOSPITAL ENCOUNTER (EMERGENCY)
Age: 49
Discharge: HOME | End: 2019-02-23
Attending: EMERGENCY MEDICINE
Payer: MEDICAID

## 2019-02-23 VITALS
OXYGEN SATURATION: 98 % | SYSTOLIC BLOOD PRESSURE: 158 MMHG | BODY MASS INDEX: 46.65 KG/M2 | HEART RATE: 58 BPM | DIASTOLIC BLOOD PRESSURE: 108 MMHG | RESPIRATION RATE: 18 BRPM | TEMPERATURE: 98.9 F | HEIGHT: 69 IN | WEIGHT: 315 LBS

## 2019-02-23 DIAGNOSIS — R90.89 ABNORMAL CT OF BRAIN: ICD-10-CM

## 2019-02-23 DIAGNOSIS — R51.9 NONINTRACTABLE EPISODIC HEADACHE, UNSPECIFIED HEADACHE TYPE: Primary | ICD-10-CM

## 2019-02-23 DIAGNOSIS — I10 HYPERTENSION, UNSPECIFIED TYPE: ICD-10-CM

## 2019-02-23 PROCEDURE — 99283 EMERGENCY DEPT VISIT LOW MDM: CPT

## 2019-02-23 PROCEDURE — 74011250637 HC RX REV CODE- 250/637: Performed by: EMERGENCY MEDICINE

## 2019-02-23 RX ORDER — AMLODIPINE BESYLATE 5 MG/1
5 TABLET ORAL DAILY
Qty: 30 TAB | Refills: 0 | Status: SHIPPED | OUTPATIENT
Start: 2019-02-23 | End: 2019-03-25 | Stop reason: SDUPTHER

## 2019-02-23 RX ORDER — AMLODIPINE BESYLATE 5 MG/1
5 TABLET ORAL
Status: COMPLETED | OUTPATIENT
Start: 2019-02-23 | End: 2019-02-23

## 2019-02-23 RX ORDER — ACETAMINOPHEN 500 MG
1000 TABLET ORAL ONCE
Status: COMPLETED | OUTPATIENT
Start: 2019-02-23 | End: 2019-02-23

## 2019-02-23 RX ADMIN — AMLODIPINE BESYLATE 5 MG: 5 TABLET ORAL at 10:00

## 2019-02-23 RX ADMIN — ACETAMINOPHEN 1000 MG: 500 TABLET ORAL at 10:00

## 2019-02-23 NOTE — ED TRIAGE NOTES
Pt c/o head pain this morning. Pt was seen last night here in ED for head pain and pt states his head pain came   Back.

## 2019-02-23 NOTE — DISCHARGE INSTRUCTIONS
Patient Education     If you were prescribed any medication take as directed. Do not drive or use heavy equipment if prescribed narcotics. Follow up with your primary care physician or with specialist as directed. Return to the emergency room with any new or worsening conditions. High Blood Pressure: Care Instructions  Overview    It's normal for blood pressure to go up and down throughout the day. But if it stays up, you have high blood pressure. Another name for high blood pressure is hypertension. Despite what a lot of people think, high blood pressure usually doesn't cause headaches or make you feel dizzy or lightheaded. It usually has no symptoms. But it does increase your risk of stroke, heart attack, and other problems. You and your doctor will talk about your risks of these problems based on your blood pressure. Your doctor will give you a goal for your blood pressure. Your goal will be based on your health and your age. Lifestyle changes, such as eating healthy and being active, are always important to help lower blood pressure. You might also take medicine to reach your blood pressure goal.  Follow-up care is a key part of your treatment and safety. Be sure to make and go to all appointments, and call your doctor if you are having problems. It's also a good idea to know your test results and keep a list of the medicines you take. How can you care for yourself at home? Medical treatment  · If you stop taking your medicine, your blood pressure will go back up. You may take one or more types of medicine to lower your blood pressure. Be safe with medicines. Take your medicine exactly as prescribed. Call your doctor if you think you are having a problem with your medicine. · Talk to your doctor before you start taking aspirin every day. Aspirin can help certain people lower their risk of a heart attack or stroke.  But taking aspirin isn't right for everyone, because it can cause serious bleeding. · See your doctor regularly. You may need to see the doctor more often at first or until your blood pressure comes down. · If you are taking blood pressure medicine, talk to your doctor before you take decongestants or anti-inflammatory medicine, such as ibuprofen. Some of these medicines can raise blood pressure. · Learn how to check your blood pressure at home. Lifestyle changes  · Stay at a healthy weight. This is especially important if you put on weight around the waist. Losing even 10 pounds can help you lower your blood pressure. · If your doctor recommends it, get more exercise. Walking is a good choice. Bit by bit, increase the amount you walk every day. Try for at least 30 minutes on most days of the week. You also may want to swim, bike, or do other activities. · Avoid or limit alcohol. Talk to your doctor about whether you can drink any alcohol. · Try to limit how much sodium you eat to less than 2,300 milligrams (mg) a day. Your doctor may ask you to try to eat less than 1,500 mg a day. · Eat plenty of fruits (such as bananas and oranges), vegetables, legumes, whole grains, and low-fat dairy products. · Lower the amount of saturated fat in your diet. Saturated fat is found in animal products such as milk, cheese, and meat. Limiting these foods may help you lose weight and also lower your risk for heart disease. · Do not smoke. Smoking increases your risk for heart attack and stroke. If you need help quitting, talk to your doctor about stop-smoking programs and medicines. These can increase your chances of quitting for good. When should you call for help? Call 911 anytime you think you may need emergency care. This may mean having symptoms that suggest that your blood pressure is causing a serious heart or blood vessel problem. Your blood pressure may be over 180/120.   For example, call 911 if:    · You have symptoms of a heart attack. These may include:  ?  Chest pain or pressure, or a strange feeling in the chest.  ? Sweating. ? Shortness of breath. ? Nausea or vomiting. ? Pain, pressure, or a strange feeling in the back, neck, jaw, or upper belly or in one or both shoulders or arms. ? Lightheadedness or sudden weakness. ? A fast or irregular heartbeat.     · You have symptoms of a stroke. These may include:  ? Sudden numbness, tingling, weakness, or loss of movement in your face, arm, or leg, especially on only one side of your body. ? Sudden vision changes. ? Sudden trouble speaking. ? Sudden confusion or trouble understanding simple statements. ? Sudden problems with walking or balance. ? A sudden, severe headache that is different from past headaches.     · You have severe back or belly pain.    Do not wait until your blood pressure comes down on its own. Get help right away.   Call your doctor now or seek immediate care if:    · Your blood pressure is much higher than normal (such as 180/120 or higher), but you don't have symptoms.     · You think high blood pressure is causing symptoms, such as:  ? Severe headache.  ? Blurry vision.    Watch closely for changes in your health, and be sure to contact your doctor if:    · Your blood pressure measures higher than your doctor recommends at least 2 times. That means the top number is higher or the bottom number is higher, or both.     · You think you may be having side effects from your blood pressure medicine. Where can you learn more? Go to http://valerie-nate.info/. Enter U136 in the search box to learn more about \"High Blood Pressure: Care Instructions. \"  Current as of: July 22, 2018  Content Version: 11.9  © 7300-7646 [employer]. Care instructions adapted under license by [employer] (which disclaims liability or warranty for this information).  If you have questions about a medical condition or this instruction, always ask your healthcare professional. Manisha Auguste disclaims any warranty or liability for your use of this information. Patient Education        Headache: Care Instructions  Your Care Instructions    Headaches have many possible causes. Most headaches aren't a sign of a more serious problem, and they will get better on their own. Home treatment may help you feel better faster. The doctor has checked you carefully, but problems can develop later. If you notice any problems or new symptoms, get medical treatment right away. Follow-up care is a key part of your treatment and safety. Be sure to make and go to all appointments, and call your doctor if you are having problems. It's also a good idea to know your test results and keep a list of the medicines you take. How can you care for yourself at home? · Do not drive if you have taken a prescription pain medicine. · Rest in a quiet, dark room until your headache is gone. Close your eyes and try to relax or go to sleep. Don't watch TV or read. · Put a cold, moist cloth or cold pack on the painful area for 10 to 20 minutes at a time. Put a thin cloth between the cold pack and your skin. · Use a warm, moist towel or a heating pad set on low to relax tight shoulder and neck muscles. · Have someone gently massage your neck and shoulders. · Take pain medicines exactly as directed. ? If the doctor gave you a prescription medicine for pain, take it as prescribed. ? If you are not taking a prescription pain medicine, ask your doctor if you can take an over-the-counter medicine. · Be careful not to take pain medicine more often than the instructions allow, because you may get worse or more frequent headaches when the medicine wears off. · Do not ignore new symptoms that occur with a headache, such as a fever, weakness or numbness, vision changes, or confusion. These may be signs of a more serious problem. To prevent headaches  · Keep a headache diary so you can figure out what triggers your headaches. Avoiding triggers may help you prevent headaches. Record when each headache began, how long it lasted, and what the pain was like (throbbing, aching, stabbing, or dull). Write down any other symptoms you had with the headache, such as nausea, flashing lights or dark spots, or sensitivity to bright light or loud noise. Note if the headache occurred near your period. List anything that might have triggered the headache, such as certain foods (chocolate, cheese, wine) or odors, smoke, bright light, stress, or lack of sleep. · Find healthy ways to deal with stress. Headaches are most common during or right after stressful times. Take time to relax before and after you do something that has caused a headache in the past.  · Try to keep your muscles relaxed by keeping good posture. Check your jaw, face, neck, and shoulder muscles for tension, and try relaxing them. When sitting at a desk, change positions often, and stretch for 30 seconds each hour. · Get plenty of sleep and exercise. · Eat regularly and well. Long periods without food can trigger a headache. · Treat yourself to a massage. Some people find that regular massages are very helpful in relieving tension. · Limit caffeine by not drinking too much coffee, tea, or soda. But don't quit caffeine suddenly, because that can also give you headaches. · Reduce eyestrain from computers by blinking frequently and looking away from the computer screen every so often. Make sure you have proper eyewear and that your monitor is set up properly, about an arm's length away. · Seek help if you have depression or anxiety. Your headaches may be linked to these conditions. Treatment can both prevent headaches and help with symptoms of anxiety or depression. When should you call for help? Call 911 anytime you think you may need emergency care. For example, call if:    · You have signs of a stroke.  These may include:  ? Sudden numbness, paralysis, or weakness in your face, arm, or leg, especially on only one side of your body. ? Sudden vision changes. ? Sudden trouble speaking. ? Sudden confusion or trouble understanding simple statements. ? Sudden problems with walking or balance. ? A sudden, severe headache that is different from past headaches.    Call your doctor now or seek immediate medical care if:    · You have a new or worse headache.     · Your headache gets much worse. Where can you learn more? Go to http://valerie-nate.info/. Enter M271 in the search box to learn more about \"Headache: Care Instructions. \"  Current as of: Siobhan 3, 2018  Content Version: 11.9  © 1928-7549 [employer]. Care instructions adapted under license by [employer] (which disclaims liability or warranty for this information). If you have questions about a medical condition or this instruction, always ask your healthcare professional. [address] any warranty or liability for your use of this information.

## 2019-02-23 NOTE — ED PROVIDER NOTES
EMERGENCY DEPARTMENT HISTORY AND PHYSICAL EXAM    9:59 AM      Date: 2/23/2019  Patient Name: Bridget Bonner    History of Presenting Illness     Chief Complaint   Patient presents with    Headache         History Provided By: Patient    Additional History (Context): 9:59 AM Bridget Bonner is a 52 y.o. male with h/o HTN who presents to ED complaining of constant, moderate, left-sided HA onset 2 days, with associated nausea. He states that he is not on any medication for HTN. The patient reports that he came to [facility] yesterday with the same Sx. He notes that his HA improved when he left [facility] yesterday. He claims that Ibuprofen has not provided relief. The patient notes that his last dose of Ibuprofen was 2 hours before coming to the ED. He denies numbness, dizziness, blurred vision,  vomiting, fever, and congestion. The patient claims to have h/o migraines. No other concerns or symptoms at this time. PCP: Parris Painter MD      Chief Complaint: HA  Duration: 2 Days  Timing:  Constant  Location: Left-sided  Quality: Not obtained   Severity: Moderate  Modifying Factors: Motrin has not provided relief. Pt notes that the Kostas Moore was better when he left [facility] yesterday. Associated Symptoms: nausea      Past History     Past Medical History:  Past Medical History:   Diagnosis Date    Arthritis     Hypertension     Knee pain        Past Surgical History:  Past Surgical History:   Procedure Laterality Date    HX CHOLECYSTECTOMY         Family History:  Family History   Problem Relation Age of Onset    Heart Disease Mother     Stroke Father        Social History:  Social History     Tobacco Use    Smoking status: Never Smoker    Smokeless tobacco: Never Used   Substance Use Topics    Alcohol use: Yes     Comment: Socially    Drug use: No       Allergies:  No Known Allergies      Review of Systems       Review of Systems   Constitutional: Negative for chills and fever.    HENT: Negative for congestion, rhinorrhea, sore throat and trouble swallowing. Eyes: Negative for visual disturbance. Respiratory: Negative for cough, shortness of breath and wheezing. Cardiovascular: Negative for chest pain and leg swelling. Gastrointestinal: Positive for nausea. Negative for abdominal pain and vomiting. Endocrine: Negative for polyuria. Genitourinary: Negative for difficulty urinating and dysuria. Musculoskeletal: Negative for arthralgias and neck stiffness. Skin: Negative for rash. Neurological: Positive for headaches. Negative for dizziness, weakness and numbness. Hematological: Does not bruise/bleed easily. Psychiatric/Behavioral: Negative for confusion and dysphoric mood. All other systems reviewed and are negative. Physical Exam     Visit Vitals  BP (!) 158/108 (BP 1 Location: Left arm, BP Patient Position: At rest) Comment: Dr. Semaj Calvillo aware. Pt given a blood pressure prescription. Pulse (!) 58   Temp 98.9 °F (37.2 °C)   Resp 18   Ht 5' 9\" (1.753 m)   Wt (!) 196.4 kg (433 lb)   SpO2 98%   BMI 63.94 kg/m²       Physical Exam   Constitutional: He is oriented to person, place, and time. He appears well-developed and well-nourished. No distress. HENT:   Head: Normocephalic and atraumatic. Nose: Right sinus exhibits no maxillary sinus tenderness. Left sinus exhibits no maxillary sinus tenderness. Mouth/Throat: Oropharynx is clear and moist.   Eyes: Conjunctivae are normal. Pupils are equal, round, and reactive to light. No scleral icterus. Neck: Normal range of motion. Neck supple. Cardiovascular: Normal rate and intact distal pulses. No carotid bruit. Pulmonary/Chest: Effort normal and breath sounds normal. No respiratory distress. He has no wheezes. Abdominal: Soft. Bowel sounds are normal. He exhibits no distension. There is no tenderness. Musculoskeletal: Normal range of motion. He exhibits no edema. Full ROM head and neck.    Lymphadenopathy:     He has no cervical adenopathy. Neurological: He is alert and oriented to person, place, and time. No cranial nerve deficit or sensory deficit. No cerebellar ataxia. Strength 5/5 UE and LE. Skin: Skin is warm and dry. He is not diaphoretic. Nursing note and vitals reviewed. Diagnostic Study Results     Labs -  No results found for this or any previous visit (from the past 12 hour(s)). Radiologic Studies -   No orders to display         Medical Decision Making   I am the first provider for this patient. I reviewed the vital signs, available nursing notes, past medical history, past surgical history, family history and social history. Vital Signs-Reviewed the patient's vital signs. Pulse Oximetry Analysis -  99% on room air (Interpretation) WNL    Cardiac Monitor:  Rate: 63  Rhythm:  Normal Sinus Rhythm     Records Reviewed: Nursing Notes (Time of Review: 9:59 AM)    Provider Notes (Medical Decision Making):  MDM  Number of Diagnoses or Management Options  Diagnosis management comments: Review of multiple visits to ER as well as other provider visits shows pt with elevated BP. Will start him on BP medication,  Norvasc. Give dose now and Tylenol. Pt has CT brain yesterday, nothing acute. Does show old non-acute lacunar infarct. Pt is neuro intact today. No slurred speech, facial droop, or weakness. Will have pt FU with neurologist and PCP. Medications   acetaminophen (TYLENOL) tablet 1,000 mg (1,000 mg Oral Given 2/23/19 1000)   amLODIPine (NORVASC) tablet 5 mg (5 mg Oral Given 2/23/19 1000)       Diagnosis     Clinical Impression:   1. Nonintractable episodic headache, unspecified headache type    2. Hypertension, unspecified type    3.  Abnormal CT of brain        Disposition: home    Follow-up Information     Follow up With Specialties Details Why Contact Info    Maycol Mack MD Neurology Schedule an appointment as soon as possible for a visit in 2 days  [address] [facility] WYATT Stevenson MD Internal Medicine Schedule an appointment as soon as possible for a visit in 2 days  [address] [facility] [address]  [phone] [address] [facility] EMERGENCY DEPT Emergency Medicine  As needed, If symptoms worsen [id number] [facility]  [phone]              Medication List      START taking these medications    amLODIPine 5 mg tablet  Commonly known as:  NORVASC  Take 1 Tab by mouth daily. ASK your doctor about these medications    ibuprofen 800 mg tablet  Commonly known as:  MOTRIN     OTHER     VICODIN PO           Where to Get Your Medications      Information about where to get these medications is not yet available    Ask your nurse or doctor about these medications  · amLODIPine 5 mg tablet       _______________________________    Attestations:  Scribe Attestation     Helen St acting as a scribe for and in the presence of Yayo Tse DO      February 23, 2019 at 9:59 AM       Provider Attestation:      I personally performed the services described in the documentation, reviewed the documentation, as recorded by the scribe in my presence, and it accurately and completely records my words and actions.  February 23, 2019 at 9:59 AM - Yayo Tse DO    _______________________________

## 2019-02-26 ENCOUNTER — TELEPHONE (OUTPATIENT)
Dept: INTERNAL MEDICINE CLINIC | Age: 49
End: 2019-02-26

## 2019-03-01 ENCOUNTER — DOCUMENTATION ONLY (OUTPATIENT)
Dept: ORTHOPEDIC SURGERY | Facility: CLINIC | Age: 49
End: 2019-03-01

## 2019-03-15 ENCOUNTER — OFFICE VISIT (OUTPATIENT)
Dept: SURGERY | Age: 49
End: 2019-03-15

## 2019-03-15 VITALS
OXYGEN SATURATION: 96 % | HEART RATE: 63 BPM | WEIGHT: 315 LBS | RESPIRATION RATE: 18 BRPM | DIASTOLIC BLOOD PRESSURE: 94 MMHG | HEIGHT: 69 IN | BODY MASS INDEX: 46.65 KG/M2 | SYSTOLIC BLOOD PRESSURE: 138 MMHG | TEMPERATURE: 98.5 F

## 2019-03-15 DIAGNOSIS — R06.09 DYSPNEA ON EXERTION: ICD-10-CM

## 2019-03-15 DIAGNOSIS — M17.0 OSTEOARTHRITIS OF BOTH KNEES, UNSPECIFIED OSTEOARTHRITIS TYPE: ICD-10-CM

## 2019-03-15 DIAGNOSIS — Z86.73 HISTORY OF CVA (CEREBROVASCULAR ACCIDENT): ICD-10-CM

## 2019-03-15 DIAGNOSIS — Z83.3 FAMILY HISTORY OF DIABETES MELLITUS: ICD-10-CM

## 2019-03-15 DIAGNOSIS — I10 HYPERTENSION, UNSPECIFIED TYPE: ICD-10-CM

## 2019-03-15 DIAGNOSIS — E66.01 OBESITY, MORBID (HCC): ICD-10-CM

## 2019-03-15 DIAGNOSIS — Z01.812 BLOOD TESTS PRIOR TO TREATMENT OR PROCEDURE: ICD-10-CM

## 2019-03-15 DIAGNOSIS — R73.03 PRE-DIABETES: ICD-10-CM

## 2019-03-15 DIAGNOSIS — Z86.19 HISTORY OF HELICOBACTER PYLORI INFECTION: ICD-10-CM

## 2019-03-15 DIAGNOSIS — M25.561 CHRONIC PAIN OF BOTH KNEES: ICD-10-CM

## 2019-03-15 DIAGNOSIS — M25.562 CHRONIC PAIN OF BOTH KNEES: ICD-10-CM

## 2019-03-15 DIAGNOSIS — R06.83 SNORING: ICD-10-CM

## 2019-03-15 DIAGNOSIS — E66.01 OBESITY, MORBID (HCC): Primary | ICD-10-CM

## 2019-03-15 DIAGNOSIS — G89.29 CHRONIC PAIN OF BOTH KNEES: ICD-10-CM

## 2019-03-15 NOTE — PROGRESS NOTES
Chief Complaint   Patient presents with    Morbid Obesity     follow up consult     Pt ID confirmed    Weight Loss Metrics 3/15/2019 2/23/2019 2/22/2019 2/21/2019 2/15/2019 11/15/2018 10/12/2018   Pre op / Initial Wt - - - - - - -   Today's Wt 435 lb 433 lb 433 lb 433 lb 436 lb 418 lb 420 lb   BMI 64.24 kg/m2 63.94 kg/m2 63.94 kg/m2 63.94 kg/m2 64.39 kg/m2 61.73 kg/m2 62.02 kg/m2   Ideal Body Wt - - - - - - -   Excess Body Wt - - - - - - -   Goal Wt - - - - - - -   Wt loss to date - - - - - - -   % Wt Loss - - - - - - -   80% EBW - - - - - - -       Body mass index is 64.24 kg/m².

## 2019-03-15 NOTE — PROGRESS NOTES
Initial Consultation for Bariatric Surgery Template (Undecided)    Viraj Coronel is a 52 y.o. male who comes into the office today for initial consultation for the surgical options for the treatment of morbid obesity. The patient initially identified obesity at the age of 15 and at age 25 weighed 36lbs. He has tried a variety of unsupervised weight-loss attempts including self imposed and exercise, but has yet to meet with lasting success. Maximum weight lost on a diet is about 200 lbs, but that the weight loss always seems to return. Today, the patient is  Height: 5' 9\" (175.3 cm) tall, Weight: (!) 197.3 kg (435 lb) lbs for a Body mass index is 64.24 kg/m². It is due to the patient's severe obesity, which is further complicated by hypertension and weight related arthopathies  that the patient is now seeking out bariatric surgery. Past Medical History:   Diagnosis Date    Arthritis     Hypertension     Knee pain        Past Surgical History:   Procedure Laterality Date    HX CHOLECYSTECTOMY           Current Outpatient Medications:     amLODIPine (NORVASC) 5 mg tablet, Take 1 Tab by mouth daily. , Disp: 30 Tab, Rfl: 0    No Known Allergies    Social History     Tobacco Use    Smoking status: Never Smoker    Smokeless tobacco: Never Used   Substance Use Topics    Alcohol use: Yes     Comment: Socially    Drug use: No       Family History   Problem Relation Age of Onset    Heart Disease Mother     Stroke Father        Family Status   Relation Name Status    Mother      Father         Review of Systems:  Positive in BOLD  CONST: Fever, weight loss, fatigue or chills  GI: Nausea, vomiting, abdominal pain, change in bowel habits, hematochezia, melena, and GERD   INTEG: Dermatitis, abnormal moles  HEENT: Recent changes in vision, vertigo, epistaxis, dysphagia and hoarseness, tooth pain  CV: Chest pain, palpitations, HTN, edema and varicosities  RESP: Cough, shortness of breath, wheezing, hemoptysis, snoring and reactive airway disease  : Hematuria, dysuria, frequency, urgency, nocturia and stress urinary incontinence   MS: Weakness, joint pain and arthritis  ENDO: Diabetes, thyroid disease, polyuria, polydipsia, polyphagia, poor wound healing, heat intolerance, cold intolerance  LYMPH/HEME: Anemia, bruising and history of blood transfusions  NEURO: Dizziness, headache, fainting, seizures and stroke 1997 (was on blood thinner but was taken off)   PSYCH: Anxiety and depression      Physical Exam    Visit Vitals  BP (!) 138/94 (BP 1 Location: Left arm, BP Patient Position: Sitting) Comment: took bp meds this morning   Pulse 63   Temp 98.5 °F (36.9 °C) (Oral)   Resp 18   Ht 5' 9\" (1.753 m)   Wt (!) 197.3 kg (435 lb)   SpO2 96% Comment: room air   BMI 64.24 kg/m²       Pre op weight: 435  EBW: 281  Wt loss to date: 0       General: 52 y.o.) male in no acute distress. Morbidly obese in abdomen, hips, thighs - mixed pattern  HEENT: Normocephalic, atraumatic, Pupils equal and reactive, nasopharynx clear, oropharynx clear and moist without lesions  NECK: Supple, no lymphadenopathy, thyromegaly, carotid bruits or jugular venous distension. trachea midline  RESP: Clear to auscultation bilaterally, no wheezes, rhonchi, or rales, normal respiratory excursion  CV: Regular rate and rhythm, no murmurs, rubs or gallops. 3+/4 pulses in bilateral dorsalis pedis and posterior tibialis. No distal edema or varicosities. ABD: Soft, nontender, nondistended, normoactive bowel sounds, no hernias, no hepatosplenomegaly, minimally palpable costal margins, mixed distribution  Extremities: Warm, well perfused, no tenderness or swelling, normal gait/station  Neuro: Sensation and strength grossly intact and symmetrical  Psych: Alert and oriented to person, place, and time.     Impression:    Lit Llamas is a 52 y.o. male who is suffering from morbid obesity with a BMI of 68  and comorbidities including hypertension and weight related arthopathies  who would benefit from bariatric surgery. We have had an extensive discussion with regard to the risks, benefits and likely outcomes of the operation. We've discussed the restrictive and malabsorptive nature of the gastric bypass and compared and contrasted with the sleeve gastrectomy. The patient understands the likelihood of losing approximately 80% of their excess weight in 12 to 18 months. He also understands the risks including but not limited to bleeding, infection, need for reoperation, ulcers, leaks and strictures, bowel obstruction secondary to adhesions and internal hernias, DVT, PE, heart attack, stroke, and death. Patient also understands risks of inadequate weight loss, excess weight loss, vitamin insufficiency, protein malnutrition, excess skin, and loss of hair. We have reviewed the components of a successful postoperative course including requirement for a high protein, low carbohydrate diet, 60 oz a day of zero calorie liquids, daily vitamin supplementation, daily exercise, regular follow-up, and participation in support groups. At this time we will enroll the patient in our bariatric program, undertake routine laboratory evaluation, chest X-ray, EKG, possible UGI and evaluation by  nutritionist as well as psychologist and pending their satisfactory completion of the preop evaluation, plan to perform a gastric bypass if alec to lose weight in the WLT. (40 lbs). If unable to lose the weight, 2 stage is preferred. Sleep study needed  Pt to speak with surgery scheduler today to facilitate scheduling.

## 2019-03-25 RX ORDER — AMLODIPINE BESYLATE 5 MG/1
5 TABLET ORAL DAILY
Qty: 90 TAB | Refills: 3 | Status: SHIPPED | OUTPATIENT
Start: 2019-03-25 | End: 2020-01-28 | Stop reason: DRUGHIGH

## 2019-03-25 NOTE — TELEPHONE ENCOUNTER
Patient received this medication during a recent ED visit. Last Visit: 2-15-19 with MD Aysha Sherman  Next Appointment: 8-16-19 with MD [place] Prescriptions     Pending Prescriptions Disp Refills    amLODIPine (NORVASC) 5 mg tablet 90 Tab 3     Sig: Take 1 Tab by mouth daily.  Indications: high blood pressure

## 2019-04-03 ENCOUNTER — DOCUMENTATION ONLY (OUTPATIENT)
Dept: BARIATRICS/WEIGHT MGMT | Age: 49
End: 2019-04-03

## 2019-04-03 ENCOUNTER — HOSPITAL ENCOUNTER (OUTPATIENT)
Dept: BARIATRICS/WEIGHT MGMT | Age: 49
Discharge: HOME | End: 2019-04-03

## 2019-04-03 NOTE — PROGRESS NOTES
[address] [place] [facility] [address] [facility], [address]    Patient's Name: Sierra Almanza   Age: 52 y.o. YOB: 1970   Sex: male    Date:   4/3/2019    Insurance:              Session: 1 of 6  Revision:     Surgeon:  Dr. Sonja Palencia    Height: 5 f 9   Weight:    438      Lbs. BMI: 64.8   Pounds Lost since last month: 0                 Pounds Gained since last month: 3    Starting Weight: 435     Previous Months Weight: 435  Overall Pounds Lost: 0   Overall Pounds Gained: 3    Do you smoke? None    Alcohol intake:  Number of drinks at a time:  5  Number of times a week: 1    Class Guidelines    Guidelines are reviewed with patient at the start of every class. 1. Patient understands that weight loss trial classes must be consecutive. Patient understands if they miss a class, it is their responsibility to contact me to reschedule class. I will reach out to patient after their first no show. 2.  Patient understands the expectations that weight maintenance/weight loss is expected during the classes. Failure to demonstrate changes may result in one extra month of weight loss trial, followed by going back to see the surgeon. Patient understands that they CAN NOT gain any weight during the weight loss trial.  Gaining weight will result in extra classes. 3. Patient is also instructed to be doing their labs, blood work, psych visit, support group and any other test that the surgeon has used while they are working on their weight loss trial.  4.  Patient was instructed to bring their blue binder to every class and appointment. Other Pertinent Information:     Changes Made Since Last Class: Slowed down on alcohol intake. Eating Habits and Behaviors      We started off class today by reviewing key diet principles.   Patient was given a very specific list of foods that they can eat, which included meat, fish, vegetables, eggs, cheese, fats, soy, and berries. Patient was also given a list of foods that should be avoided. These included sweets (candy, soda, baked goods, ice cream), and starches, including pasta, rice, crackers, chips, oatmeal, bread. We talked about appropriate protein-based snacks, including deli meat, low fat cheese, yogurt, hummus, small handful of almonds. I also gave a power point on ways to help with the metabolism. Some of the food-related ways included: Healthy snacking, eating adequate protein, and getting adequate water. Mild dehydration may slow down one's weight loss. Patient's current diet habits include: 3 meals per day. Patient's diet history indicated fried food and bread, which I have talked to patient about other options that are lower in carbohydrates. Patient states he is snacking once a day. He states he will snack on cakes. He is drinking soda and water. Physical Activity/Exercise    Comments:     Currently for exercise, patient is doing very little, stating his knees hurt. We talked about activities for patient to do, including walking, swimming, or chair exercises. I also talked with patient about doing some strength training, which helps the metabolism, as well. Behavior Modification       Comments: In the power point, Mastering Your Metabolism, I also gave behaviors that can help with one's metabolism. These include not skipping meals and being sure to feed and fuel that body rather than going large gaps and putting the body in starvation mode. One goal for next month includes:  1. Cut out all soda. 2. Cut out sweets. 3. Cut out bread.       Devendra Nowak [address]  4/3/2019

## 2019-05-01 ENCOUNTER — DOCUMENTATION ONLY (OUTPATIENT)
Dept: BARIATRICS/WEIGHT MGMT | Age: 49
End: 2019-05-01

## 2019-05-01 ENCOUNTER — HOSPITAL ENCOUNTER (OUTPATIENT)
Dept: BARIATRICS/WEIGHT MGMT | Age: 49
Discharge: HOME | End: 2019-05-01

## 2019-05-01 NOTE — PROGRESS NOTES
[address] [place] [facility] [address] [facility], [address]    Patient's Name: Fab Flores   Age: 52 y.o. YOB: 1970   Sex: male    Date:   5/1/2019    Insurance:              Session: 2 of 6  Revision: None    Surgeon:  Dr. Melva Chirinos    Height: 5 f 9   Weight:    440      Lbs. BMI: 65.1   Pounds Lost since last month: 0                 Pounds Gained since last month: 2    Starting Weight: 435     Previous Months Weight: 438  Overall Pounds Lost: 0   Overall Pounds Gained: 5    Do you smoke? None    Alcohol intake:  Number of drinks at a time:  None  Number of times a week: None    Class Guidelines    Guidelines are reviewed with patient at the start of every class. 1. Patient understands that weight loss trial classes must be consecutive. Patient understands if they miss a class, it is their responsibility to contact me to reschedule class. I will reach out to patient after their first no show. 2.  Patient understands the expectations that weight maintenance/weight loss is expected during the classes. Failure to demonstrate changes may result in one extra month of weight loss trial, followed by going back to see the surgeon. Patient understands that they CAN NOT gain any weight during the weight loss trial.  Gaining weight will result in extra classes. 3. Patient is also instructed to be doing their labs, blood work, psych visit, support group and any other test that the surgeon has used while they are working on their weight loss trial.  4.  Patient was instructed to bring their blue binder to every class and appointment. Other Pertinent Information:     Changes Made Since Last Class: No alcohol every where. Eating Habits and Behaviors      Today in class we talked about the key diet principles.   We start off each class talking about these principles, which include cutting out liquid calories and focusing on water or other non-calorie, non-carbonated drinks. We also spent time talking about carbohydrates, including foods that have carbohydrates and the goal to keep daily carbohydrates under 100 grams per day. Patient was given ideas of meal and snack choices that are lower in carbohydrates and focus more on protein. Patient was encouraged to start trying protein shakes and was given a list of suggestions. The main topic of class today was: Portion Control. We reviewed in class a power point filled with tips on ways to control portions, including using smaller plates, boxing up portions at a restaurant before starting to eat, and not eating from the container, but rather portioning snacks into smaller bags. Patient's were encouraged to food journal, which helps increase awareness of what and how much they are eating. It was emphasized to patient the importance of reading labels and portion sizes, but also applying these portion sizes. Patient was given a list of items that can help to make portion control easier. For example, a deck of cards or a palm of a hand is a proper portion of meat, a fist is a cup or a proper serving of vegetables. Patient was given 10 tips to help with the portion control. Patient's current diet habits include: 3 meals per day. Patient states he is eating bread or something cooked in grease for breakfast.  Lunch is fast food. Dinner is fried food. I talked with patient about these habits and I made recommendations for meal choices that would be more appropriate. Patient is snacking on cookies, cake, and ice cream.  He is eating fast food daily. He is also drinking soda. I have talked to him about meal options that he can prepare at home. Physical Activity/Exercise    Comments:     Currently for exercise, patient is not doing anything stating he has very bad news.   Patient was given a list of ideas for activity and was encouraged to incorporate 30 minutes a day into their daily routine. Behavior Modification       Comments: In class, we also focused on the behavior aspects of weight management. This includes being a mindful eater and not eating in front of the TV. Patient is also encouraged to take 20 minutes to eat a meal and eat at a table. One goal for next month includes:  1. No snacks. 2. No soda. 3. Stop bread and pasta.       Devendra Monahan [address]  5/1/2019

## 2019-05-17 ENCOUNTER — TELEPHONE (OUTPATIENT)
Dept: INTERNAL MEDICINE CLINIC | Age: 49
End: 2019-05-17

## 2019-05-17 NOTE — TELEPHONE ENCOUNTER
This Pt has appt Monday -please advise- are you going to order imaging- at least an xray    Call Liu Ashley today at # [phone]

## 2019-05-17 NOTE — TELEPHONE ENCOUNTER
It looks like his last X-rays were a year ago almost. Please proceed with X-rays as ordered before seeing Ortho.  Thanks

## 2019-05-17 NOTE — TELEPHONE ENCOUNTER
He had bilateral knee xrays with ortho but we are unable to see them, just what ortho reported    RADIOGRAPHS:  XR ASHLY KNEE 7/30/18 ANJEL  IMPRESSION:  Three views - No fractures, no effusion, varus deformities, severe medial and mild lateral joint space narrowing, + osteophytes present. IKDC Grade D      Do you want them xrayed again?

## 2019-05-20 ENCOUNTER — OFFICE VISIT (OUTPATIENT)
Dept: PAIN MANAGEMENT | Age: 49
End: 2019-05-20

## 2019-05-20 VITALS
BODY MASS INDEX: 46.65 KG/M2 | TEMPERATURE: 97.2 F | OXYGEN SATURATION: 97 % | SYSTOLIC BLOOD PRESSURE: 144 MMHG | DIASTOLIC BLOOD PRESSURE: 98 MMHG | WEIGHT: 315 LBS | HEIGHT: 69 IN | RESPIRATION RATE: 20 BRPM | HEART RATE: 73 BPM

## 2019-05-20 DIAGNOSIS — E66.01 OBESITY, MORBID (HCC): ICD-10-CM

## 2019-05-20 DIAGNOSIS — M22.40 CHONDROMALACIA OF PATELLA, UNSPECIFIED LATERALITY: ICD-10-CM

## 2019-05-20 DIAGNOSIS — M25.561 CHRONIC PAIN OF BOTH KNEES: Primary | ICD-10-CM

## 2019-05-20 DIAGNOSIS — M25.562 CHRONIC PAIN OF BOTH KNEES: Primary | ICD-10-CM

## 2019-05-20 DIAGNOSIS — M17.0 OSTEOARTHRITIS OF BOTH KNEES, UNSPECIFIED OSTEOARTHRITIS TYPE: ICD-10-CM

## 2019-05-20 DIAGNOSIS — G89.29 CHRONIC PAIN OF BOTH KNEES: Primary | ICD-10-CM

## 2019-05-20 RX ORDER — CAPSAICIN 0.1 %
CREAM (GRAM) TOPICAL 3 TIMES DAILY
Qty: 56.6 G | Refills: 2 | Status: SHIPPED | OUTPATIENT
Start: 2019-05-20 | End: 2019-10-18 | Stop reason: ALTCHOICE

## 2019-05-20 RX ORDER — ACETAMINOPHEN 500 MG
500 TABLET ORAL
Qty: 180 TAB | Refills: 2 | Status: SHIPPED | OUTPATIENT
Start: 2019-05-20 | End: 2020-02-20

## 2019-05-20 RX ORDER — CELECOXIB 100 MG/1
100 CAPSULE ORAL 2 TIMES DAILY
Qty: 60 CAP | Refills: 2 | Status: SHIPPED | OUTPATIENT
Start: 2019-05-20 | End: 2019-06-11

## 2019-05-20 NOTE — PROGRESS NOTES
BRYANNA - 42%    HPI:  Esthela Barnhart is a 52 y.o. male here for initial visit referred by primary care provider for evaluation of bilateral knee pain. Pain began with Insidious onset and unknown mechanism beginning around and has been progressively worsening since then. He reports a constant aching pain symmetrically in the peripatellar region and along the joint lines bilaterally. The pain ranges from 8-10 over 10. He currently treats with Dr. Marco Antonio Horton with orthopedics and apparently has pending bilateral total knee arthroplasty but not until he loses a significant amount of weight. His BMI is currently 64.24. He has been evaluated by the bariatric surgeon and team and has pending gastric bypass surgery for September 2019. He gets relief with relative rest,     He has not tried ice, heat, PT, orthotics. No perceived benefit from topicals. Interventional history includes Feb 2019 had Corticosteroid injection with Dr. Marco Antonio Horton which gave only very transient relief. Patient reports bilat knee pain that worsens with physical activity and improves with rest.  There is also  difficulty walking, climbing stairs, or getting in and out of chairs, bath tubs, or cars. There is increased stiffness, especially in the morning or after prolonged sitting. Most recent Physical Therapy for primary pain issue was never. He has no knee orthotics. Positive locking and popping of bilateral knees. He denies giving way. He denies any falls. ROS:Review of systems is negative for fever, chills, nausea, vomiting, diarrhea, constipation, chest pain, shortness of breath, abdominal pain, weakness, trouble swallowing, acute changes in vision, acute changes in hearing, falls, dizziness, bladder incontinence, bowel incontinence, depression, anxiety, suicidal ideation, homicidal ideation, alcohol use.   Review of systems positive for Bilateral knee pain      Imaging: Point-of-care x-rays of bilateral knees as interpreted by Dr. Aguilar Neither showed,\"\"\"\"\"\"\"\"\"\"\"\"\"\"\"XR ASHLY KNEE 7/30/18 ANJEL  IMPRESSION:  Three views - No fractures, no effusion, varus deformities, severe medial and mild lateral joint space narrowing, + osteophytes present\"\"\"\"\"\"\"\"\"\"\"\"\"       Vitals:    05/20/19 1009 05/20/19 1032   BP: (!) 147/98 (!) 144/98   Pulse: 62 73   Resp: 20    Temp: 97.2 °F (36.2 °C)    TempSrc: Oral    SpO2: 97%    Weight: (!) 197.3 kg (435 lb)    Height: 5' 9\" (1.753 m)    PainSc:   8         PE:  AFVSS with elevated blood pressure, no acute distress, endomorphic body habitus. A&OXs 3.  normocephalic, atraumatic. Conjugate gaze, clear sclerae. Speech is clear and appropriate. Mood is pleasant and appropriate. Patient is cooperative. Knee EXAMINATION:  Examination Right knee Left knee   Skin     Range of motion 120-0 120-0   Effusion - -   Medial joint line tenderness + +   Lateral joint line tenderness - -   Popliteal tenderness - -   Osteophytes palpable + +   Demetras     Patella crepitus  Patellar mobility +  limited +  limited   Anterior drawer  Posterior Drawer         Lateral laxity - -   Medial laxity - -   Varus deformity - -   Valgus deformity + +   Pretibial edema - -   Calf tenderness  Pes Ancerine tenderness  Medial Plica tenderness -  -  + -  +  -        Gait is within functional limits with antalgia and AD. Balance is within functional limits. Primary Care Physician  MONISHA [address]  [address] [facility]  [phone]      PHQ -- .  3 most recent PHQ Screens 5/20/2019   PHQ Not Done -   Little interest or pleasure in doing things Not at all   Feeling down, depressed, irritable, or hopeless Not at all   Total Score PHQ 2 0         Assessment/Plan:     ICD-10-CM ICD-9-CM    1.  Chronic pain of both knees M25.561 719.46 XR KNEES BI AP LAT    M25.562 338.29 AMB SUPPLY ORDER    G89.29  capsaicin (CAPZASIN-HP) 0.1 % topical cream      REFERRAL TO PHYSICAL THERAPY acetaminophen (TYLENOL) 500 mg tablet      celecoxib (CELEBREX) 100 mg capsule   2. Obesity, morbid ([facility] [address] E66.01 278.01 XR KNEES BI AP LAT      AMB SUPPLY ORDER      capsaicin (CAPZASIN-HP) 0.1 % topical cream      REFERRAL TO PHYSICAL THERAPY      acetaminophen (TYLENOL) 500 mg tablet      celecoxib (CELEBREX) 100 mg capsule   3. Chondromalacia of patella, unspecified laterality M22.40 717.7 XR KNEES BI AP LAT      AMB SUPPLY ORDER      capsaicin (CAPZASIN-HP) 0.1 % topical cream      REFERRAL TO PHYSICAL THERAPY      acetaminophen (TYLENOL) 500 mg tablet      celecoxib (CELEBREX) 100 mg capsule   4. Osteoarthritis of both knees, unspecified osteoarthritis type M17.0 715.96 XR KNEES BI AP LAT      AMB SUPPLY ORDER      capsaicin (CAPZASIN-HP) 0.1 % topical cream      REFERRAL TO PHYSICAL THERAPY      acetaminophen (TYLENOL) 500 mg tablet      celecoxib (CELEBREX) 100 mg capsule        --I do not recommend long-term opioid therapy for this person's chronic pain. I believe the risks outweigh any potential benefits. --Patient to maintain close follow-up with his primary care physician and with the bariatric team.  --We will refer the patient for aquatic physical therapy which hopefully will help care for his knees while allowing him to remain active to assist with his weight loss goals. We will begin a trial of Celebrex 100 mg twice daily  -We will continue Tylenol as needed  -trial of h-wave therapy  --Obtain updated bilateral knee x-rays.  -Begin trial of capsaicin topical cream.  --Later, may consider trial of Visco supplementation of bilateral knees and will maintain consideration for bilateral geniculate nerve block/RF. Pt will return in 2 months for f/u with [facility]. GOALS:  To establish complementary and integrative plan of care to address chronic pain issues while minimizing pharmaceuticals to maximize patient's function improve quality of life.     A total of 45 minutes were spent with the patient, of which more than half of the time was spent counseling and/or coordinating care. F/u:. Follow-up and Dispositions    · Return in about 2 months (around 7/20/2019) for 30 min.               Social History     Socioeconomic History    Marital status:      Spouse name: Not on file    Number of children: Not on file    Years of education: Not on file    Highest education level: Not on file   Occupational History    Not on file   Social Needs    Financial resource strain: Not on file    Food insecurity:     Worry: Not on file     Inability: Not on file    Transportation needs:     Medical: Not on file     Non-medical: Not on file   Tobacco Use    Smoking status: Never Smoker    Smokeless tobacco: Never Used   Substance and Sexual Activity    Alcohol use: Yes     Comment: Socially    Drug use: No    Sexual activity: Not Currently   Lifestyle    Physical activity:     Days per week: Not on file     Minutes per session: Not on file    Stress: Not on file   Relationships    Social connections:     Talks on phone: Not on file     Gets together: Not on file     Attends [employer] service: Not on file     Active member of club or organization: Not on file     Attends meetings of clubs or organizations: Not on file     Relationship status: Not on file    Intimate partner violence:     Fear of current or ex partner: Not on file     Emotionally abused: Not on file     Physically abused: Not on file     Forced sexual activity: Not on file   Other Topics Concern    Not on file   Social History Narrative    Not on file     Family History   Problem Relation Age of Onset    Heart Disease Mother     Stroke Father      No Known Allergies  Past Medical History:   Diagnosis Date    Arthritis     Hypertension     Knee pain      Past Surgical History:   Procedure Laterality Date    HX CHOLECYSTECTOMY       Current Outpatient Medications on File Prior to Visit   Medication Sig    amLODIPine (NORVASC) 5 mg tablet Take 1 Tab by mouth daily. Indications: high blood pressure     No current facility-administered medications on file prior to visit.

## 2019-05-20 NOTE — PROGRESS NOTES
Nursing Notes    Patient presents to the office today for a new pt consult with Dr. Jaylyn Bailon for his chronic bilateral knee pain. Patient rates his pain at 8/10 on the numerical pain scale.  reviewed YES  Any aberrancies noted on  NO  Last opioid agreement N/A  Last urine drug screen N/A    Comments:     POC UDS was not performed in office today    Any new labs or imaging since last appointment? NO    Have you been to an emergency room (ER) or urgent care clinic since your last visit? NO            Have you been hospitalized since your last visit? NO     If yes, where, when, and reason for visit? Have you seen or consulted any other health care providers outside of the [address] [place] [address]  since your last visit? NO     If yes, where, when, and reason for visit? Mr. Mello Brar has a reminder for a \"due or due soon\" health maintenance. I have asked that he contact his primary care provider for follow-up on this health maintenance. 3 most recent PHQ Screens 5/20/2019   PHQ Not Done -   Little interest or pleasure in doing things Not at all   Feeling down, depressed, irritable, or hopeless Not at all   Total Score PHQ 2 0     Abuse Screening Questionnaire 5/20/2019   Do you ever feel afraid of your partner? N   Are you in a relationship with someone who physically or mentally threatens you? N   Is it safe for you to go home? Y     Fall Risk Assessment, last 12 mths 5/20/2019   Able to walk? Yes   Fall in past 12 months? No     The pt does not have an advanced medical directive, POA, or living will.

## 2019-05-20 NOTE — PATIENT INSTRUCTIONS
High Blood Pressure: Care Instructions  Overview    It's normal for blood pressure to go up and down throughout the day. But if it stays up, you have high blood pressure. Another name for high blood pressure is hypertension. Despite what a lot of people think, high blood pressure usually doesn't cause headaches or make you feel dizzy or lightheaded. It usually has no symptoms. But it does increase your risk of stroke, heart attack, and other problems. You and your doctor will talk about your risks of these problems based on your blood pressure. Your doctor will give you a goal for your blood pressure. Your goal will be based on your health and your age. Lifestyle changes, such as eating healthy and being active, are always important to help lower blood pressure. You might also take medicine to reach your blood pressure goal.  Follow-up care is a key part of your treatment and safety. Be sure to make and go to all appointments, and call your doctor if you are having problems. It's also a good idea to know your test results and keep a list of the medicines you take. How can you care for yourself at home? Medical treatment  · If you stop taking your medicine, your blood pressure will go back up. You may take one or more types of medicine to lower your blood pressure. Be safe with medicines. Take your medicine exactly as prescribed. Call your doctor if you think you are having a problem with your medicine. · Talk to your doctor before you start taking aspirin every day. Aspirin can help certain people lower their risk of a heart attack or stroke. But taking aspirin isn't right for everyone, because it can cause serious bleeding. · See your doctor regularly. You may need to see the doctor more often at first or until your blood pressure comes down. · If you are taking blood pressure medicine, talk to your doctor before you take decongestants or anti-inflammatory medicine, such as ibuprofen.  Some of these medicines can raise blood pressure. · Learn how to check your blood pressure at home. Lifestyle changes  · Stay at a healthy weight. This is especially important if you put on weight around the waist. Losing even 10 pounds can help you lower your blood pressure. · If your doctor recommends it, get more exercise. Walking is a good choice. Bit by bit, increase the amount you walk every day. Try for at least 30 minutes on most days of the week. You also may want to swim, bike, or do other activities. · Avoid or limit alcohol. Talk to your doctor about whether you can drink any alcohol. · Try to limit how much sodium you eat to less than 2,300 milligrams (mg) a day. Your doctor may ask you to try to eat less than 1,500 mg a day. · Eat plenty of fruits (such as bananas and oranges), vegetables, legumes, whole grains, and low-fat dairy products. · Lower the amount of saturated fat in your diet. Saturated fat is found in animal products such as milk, cheese, and meat. Limiting these foods may help you lose weight and also lower your risk for heart disease. · Do not smoke. Smoking increases your risk for heart attack and stroke. If you need help quitting, talk to your doctor about stop-smoking programs and medicines. These can increase your chances of quitting for good. When should you call for help? Call 911 anytime you think you may need emergency care. This may mean having symptoms that suggest that your blood pressure is causing a serious heart or blood vessel problem. Your blood pressure may be over 180/120.   For example, call 911 if:    · You have symptoms of a heart attack. These may include:  ? Chest pain or pressure, or a strange feeling in the chest.  ? Sweating. ? Shortness of breath. ? Nausea or vomiting. ? Pain, pressure, or a strange feeling in the back, neck, jaw, or upper belly or in one or both shoulders or arms. ? Lightheadedness or sudden weakness.   ? A fast or irregular heartbeat.     · You have symptoms of a stroke. These may include:  ? Sudden numbness, tingling, weakness, or loss of movement in your face, arm, or leg, especially on only one side of your body. ? Sudden vision changes. ? Sudden trouble speaking. ? Sudden confusion or trouble understanding simple statements. ? Sudden problems with walking or balance. ? A sudden, severe headache that is different from past headaches.     · You have severe back or belly pain.    Do not wait until your blood pressure comes down on its own. Get help right away.   Call your doctor now or seek immediate care if:    · Your blood pressure is much higher than normal (such as 180/120 or higher), but you don't have symptoms.     · You think high blood pressure is causing symptoms, such as:  ? Severe headache.  ? Blurry vision.    Watch closely for changes in your health, and be sure to contact your doctor if:    · Your blood pressure measures higher than your doctor recommends at least 2 times. That means the top number is higher or the bottom number is higher, or both.     · You think you may be having side effects from your blood pressure medicine. Where can you learn more? Go to http://valerie-nate.info/. Enter E922 in the search box to learn more about \"High Blood Pressure: Care Instructions. \"  Current as of: July 22, 2018  Content Version: 11.9  © 8896-0294 [employer]. Care instructions adapted under license by [employer] (which disclaims liability or warranty for this information). If you have questions about a medical condition or this instruction, always ask your healthcare professional. [place] [address] any warranty or liability for your use of this information. Knee Arthritis: Care Instructions  Your Care Instructions    Knee arthritis is a breakdown of the cartilage that cushions your knee joint. When the cartilage wears down, your bones rub against each other.  This causes pain and stiffness. Knee arthritis tends to get worse with time. Treatment for knee arthritis involves reducing pain, making the leg muscles stronger, and staying at a healthy body weight. The treatment usually does not improve the health of the cartilage, but it can reduce pain and improve how well your knee works. You can take simple measures to protect your knee joints, ease your pain, and help you stay active. Follow-up care is a key part of your treatment and safety. Be sure to make and go to all appointments, and call your doctor if you are having problems. It's also a good idea to know your test results and keep a list of the medicines you take. How can you care for yourself at home? · Know that knee arthritis will cause more pain on some days than on others. · Stay at a healthy weight. Lose weight if you are overweight. When you stand up, the pressure on your knees from every pound of body weight is multiplied four times. So if you lose 10 pounds, you will reduce the pressure on your knees by 40 pounds. · Talk to your doctor or physical therapist about exercises that will help ease joint pain. ? Stretch to help prevent stiffness and to prevent injury before you exercise. You may enjoy gentle forms of yoga to help keep your knee joints and muscles flexible. ? Walk instead of jog.  ? Ride a bike. This makes your thigh muscles stronger and takes pressure off your knee. ? Wear well-fitting and comfortable shoes. ? Exercise in chest-deep water. This can help you exercise longer with less pain. ? Avoid exercises that include squatting or kneeling. They can put a lot of strain on your knees. ? Talk to your doctor to make sure that the exercise you do is not making the arthritis worse. · Do not sit for long periods of time. Try to walk once in a while to keep your knee from getting stiff. · Ask your doctor or physical therapist whether shoe inserts may reduce your arthritis pain.   · If you can afford it, get new athletic shoes at least every year. This can help reduce the strain on your knees. · Use a device to help you do everyday activities. ? A cane or walking stick can help you keep your balance when you walk. Hold the cane or walking stick in the hand opposite the painful knee. ? If you feel like you may fall when you walk, try using crutches or a front-wheeled walker. These can prevent falls that could cause more damage to your knee. ? A knee brace may help keep your knee stable and prevent pain. ? You also can use other things to make life easier, such as a higher toilet seat and handrails in the bathtub or shower. · Take pain medicines exactly as directed. ? Do not wait until you are in severe pain. You will get better results if you take it sooner. ? If you are not taking a prescription pain medicine, take an over-the-counter medicine such as acetaminophen (Tylenol), ibuprofen (Advil, Motrin), or naproxen (Aleve). Read and follow all instructions on the label. ? Do not take two or more pain medicines at the same time unless the doctor told you to. Many pain medicines have acetaminophen, which is Tylenol. Too much acetaminophen (Tylenol) can be harmful. ? Tell your doctor if you take a blood thinner, have diabetes, or have allergies to shellfish. · Ask your doctor if you might benefit from a shot of steroid medicine into your knee. This may provide pain relief for several months. · Many people take the supplements glucosamine and chondroitin for osteoarthritis. Some people feel they help, but the medical research does not show that they work. Talk to your doctor before you take these supplements. When should you call for help?   Call your doctor now or seek immediate medical care if:    · You have sudden swelling, warmth, or pain in your knee.     · You have knee pain and a fever or rash.     · You have such bad pain that you cannot use your knee.    Watch closely for changes in your health, and be sure to contact your doctor if you have any problems. Where can you learn more? Go to http://valerie-nate.info/. Enter B436 in the search box to learn more about \"Knee Arthritis: Care Instructions. \"  Current as of: Siobhan 10, 2018  Content Version: 11.9  © 9977-6414 [employer]. Care instructions adapted under license by [employer] (which disclaims liability or warranty for this information). If you have questions about a medical condition or this instruction, always ask your healthcare professional. [address] any warranty or liability for your use of this information.

## 2019-05-21 ENCOUNTER — DOCUMENTATION ONLY (OUTPATIENT)
Dept: PAIN MANAGEMENT | Age: 49
End: 2019-05-21

## 2019-05-21 NOTE — PROGRESS NOTES
The pt's order for an H-wave, last office note and demographics were faxed over to [facility] for review. A fax confirmation was received and they will contact the pt to set this up.

## 2019-05-23 ENCOUNTER — TELEPHONE (OUTPATIENT)
Dept: PAIN MANAGEMENT | Age: 49
End: 2019-05-23

## 2019-05-23 NOTE — TELEPHONE ENCOUNTER
Celebrex was denied by [facility]. Must have trial and failure of 2 preferred medications - nsaids (ibuprofen, naproxen, meloxicam, sulindac, etc) within the past 12 months. [facility] pharmacy claims show pt has had trial and failure of ibuprofen. Pt must have trial and failure of 1 more prescription strength nsaid before celebrex will considered. No documentation of trial and failure of prescription strength nsaids pt has tried within the past year. [facility] does not show any other pharmacy claims for prescription strength nsaids.

## 2019-05-24 ENCOUNTER — TELEPHONE (OUTPATIENT)
Dept: PAIN MANAGEMENT | Age: 49
End: 2019-05-24

## 2019-05-24 NOTE — TELEPHONE ENCOUNTER
The pt had requested a handicap placard from Dr. Nita Corea. The form was filled out for the office section and signed by the physician. This is a 6 months temporary placard. I called the pt to make him aware that this was done and ready for . He was not able to be reached. A message could not be left. The automated message said that the pt is not accepting calls at this time.

## 2019-05-29 NOTE — TELEPHONE ENCOUNTER
Attempted to contact pt a second time and was not able to reach him. A message could not be left for the pt. The automated message on the phone said that the caller was not available at this time. No other contact numbers for pt.

## 2019-06-05 ENCOUNTER — DOCUMENTATION ONLY (OUTPATIENT)
Dept: BARIATRICS/WEIGHT MGMT | Age: 49
End: 2019-06-05

## 2019-06-05 ENCOUNTER — HOSPITAL ENCOUNTER (OUTPATIENT)
Dept: BARIATRICS/WEIGHT MGMT | Age: 49
Discharge: HOME | End: 2019-06-05

## 2019-06-05 NOTE — PROGRESS NOTES
[address] [place] [address] [facility], [address]    Patient's Name: Barbara Rodriguez   Age: 52 y.o. YOB: 1970   Sex: male    Date:   6/5/2019    Insurance:            Session: 3 of  6  Revision:   Surgeon:  Dr. Israel Cody    Height: 5 f 9 Weight:    442      Lbs. BMI: 65.4   Pounds Lost since last month: 0               Pounds Gained since last month: 2    Starting Weight: 435   Previous Months Weight: 440  Overall Pounds Lost: 0 Overall Pounds Gained: 7      Do you smoke? None    Alcohol intake:  Number of drinks at a time:  None  Number of times a week: None    Class Guidelines    Guidelines are reviewed with patient at the start of every class. 1. Patient understands that weight loss trial classes must be consecutive. Patient understands if they miss a class, it is their responsibility to contact me to reschedule class. I will reach out to patient after their first no show. 2.  Patient understands the expectations that weight maintenance/weight loss is expected during the classes. Failure to demonstrate changes may result in one extra month of weight loss trial, followed by going back to see the surgeon. Patient understands that they CAN NOT gain any weight during the weight loss trial.  Gaining weight will result in extra classes. 3. Patient is also instructed to be doing their labs, blood work, psych visit, support group and any other test that the surgeon has used while they are working on their weight loss trial.  4.  Patient was instructed to bring their blue binder to every class and appointment. Other Pertinent Information:     Changes Made Since Last Class: Started to drink more water. Eating Habits and Behaviors    Today in class, I reviewed a power point that discussed Nutrition, Behavior, and Exercise changes to start working on.   Some of the eating behaviors that we discussed included the importance of eating breakfast.  We talked about some of the reasons that people don't eat breakfast and food choices that would be appropriate. We also talked about avoiding liquid calories. Patient is encouraged to aim for 64 ounces of fluid per day and trying to get them from water, Crystal Light, sugar free drinks. We also talked about eating out options that are healthier. Patient was encouraged to always request sauces and dressings on the side and request a salad, broth-based soup, or vegetables in place of fries. Patient's current diet habits include: Patient is eating 3 meals per day. Breakfast is a sandwich. Lunch is a sandwich. He is eating a full meal for dinner. We talked about some lower carbohydrate options. He is snacking on cookies and cake. He is drinking 64 ounces of water and 36 ounces of soda, which he was instructed to stop immediately. Physical Activity/Exercise    Comments: We talked about exercise. Patient was given reasons of why exercise is so important and how that can help with their long-term success. I have encouraged patient to get a support system to help with the activity. Currently for activity, patient is doing nothing. Goals have been set. Behavior Modification       Comments: We also talked about behavior modifications. We talked about eating triggers, such as eating in front of the TV and solutions, such as making the TV a no eating zone. If patient is eating out out of emotion, food will only temporarily solve that. Patient is encouraged to HALT and assess if they are eating because they are Hungry, or out of emotions: Anxious, Lonely, Tired, which the food will only temporarily solve. We also talked about ways to prevent relapse. Goals that patient wants to work on includes:  1. Cut out all soda. 2. Cut all sweets.        Jay Martin, MS RD  6/5/2019

## 2019-06-06 ENCOUNTER — HOSPITAL ENCOUNTER (OUTPATIENT)
Dept: PHYSICAL THERAPY | Age: 49
Discharge: HOME | End: 2019-06-06
Payer: MEDICAID

## 2019-06-06 PROCEDURE — 97530 THERAPEUTIC ACTIVITIES: CPT

## 2019-06-06 PROCEDURE — 97110 THERAPEUTIC EXERCISES: CPT

## 2019-06-06 PROCEDURE — 97161 PT EVAL LOW COMPLEX 20 MIN: CPT

## 2019-06-06 NOTE — PROGRESS NOTES
PT DAILY TREATMENT NOTE - [facility]     Patient Name: Calderon Funez  Date:2019  : 1970  [x]  Patient  Verified  Payor: [facility] MEDICAID / Plan: [address] [place] [address] / Product Type: Managed Care Medicaid /    In time:10:11  Out time:10:43  Total Treatment Time (min): 32  Visit #: 1 of 10    Medicare/BCBS Only   Total Timed Codes (min):  n/a 1:1 Treatment Time:  n/a     Treatment Area: Bilateral knee pain [M25.561, M25.562]    SUBJECTIVE  Pain Level (0-10 scale): 7  Any medication changes, allergies to medications, adverse drug reactions, diagnosis change, or new procedure performed?: [x] No    [] Yes (see summary sheet for update)  Subjective functional status/changes:   [] No changes reported  See POC    OBJECTIVE    14 min [x]Eval                  []Re-Eval     10 min Therapeutic Exercise:  [] See flow sheet : HEP instruction and demonstration   Rationale: increase ROM and increase strength to improve the patients ability to tolerate ADLs    8 min Therapeutic Activity:  []  See flow sheet : pt education regarding anatomy and physiology of the knees and how it relates to the pt's condition, pt education regarding how aquatic therapy can help with the pt's mobility/ROM/pain. Rationale: increase ROM, increase strength, improve coordination and increase proprioception  to improve the patients ability to tolerate community ambulation           With   [] TE   [] TA   [] neuro   [] other: Patient Education: [x] Review HEP    [] Progressed/Changed HEP based on:   [] positioning   [] body mechanics   [] transfers   [] heat/ice application    [] other:      Other Objective/Functional Measures: See evaluation. Pain Level (0-10 scale) post treatment: 5    ASSESSMENT/Changes in Function: Pt given HEP handout to perform. Pt understood exercises in HEP handout. Pt demonstrated decreased AROM, decreased strength, muscle tightness, impaired gait and mobility.  Fair B patellar mobility noted but reported no pain with B patellar mobility. Tenderness noted to B HS and medial aspect of the knees. Pt would benefit from aquatic physical therapy to improve the above impairments to help the pt return to performing ADLs and functional activities. Patient will continue to benefit from skilled PT services to modify and progress therapeutic interventions, address functional mobility deficits, address ROM deficits, address strength deficits, analyze and address soft tissue restrictions, analyze and cue movement patterns, analyze and modify body mechanics/ergonomics, address imbalance/dizziness and instruct in home and community integration to attain remaining goals. [x]  See Plan of Care  []  See progress note/recertification  []  See Discharge Summary         Progress towards goals / Updated goals:  Short Term Goals: To be accomplished in 2 weeks:  1. Pt will report compliance and independence to [facility] to help the pt manage their pain and symptoms. Eval: established   2. Pt will tolerate a full aquatic therapy session with no increased pain/symptoms to improve ease of mobility. Eval: will establish in upcoming aquatic sessions  Long Term Goals: To be accomplished in 5 weeks:  1. Pt will increase FOTO score to 47 points to improve ability to perform ADLs. Eval: 26 points  2. Pt will report a decrease in at best B knee pain to 2/10 to improve the pt's ease of household mobility. Eval: 5/10 at best  3. Pt will increase AROM B knee to 0-115 degs to improve tolerance to weight bearing. Eval: right 3-105 degs with pain in the anterior right patella, left 2-109 degs   4. Pt will report being able to ambulate/stand for 10 mins with minimal to no increased B knee pain to improve the pt's activity tolerance in the community.   Eval: Reports increased B knee pain with walking/standing after a few mins    PLAN  [x]  Upgrade activities as tolerated     [x]  Continue plan of care  [x]  Update interventions per flow sheet       []  Discharge due to:_  []  Other:_      Abigail Marti, PT 6/6/2019  10:53 AM    Future Appointments   Date Time Provider Michelle Reddy   6/6/2019 10:30 AM Chandu Lin, EVELINA [facility]   6/7/2019 11:00 AM Julia Casarez NP [facility] SCHED   7/11/2019 10:00 AM CARRINGTON Garcia [facility] SCHED   7/12/2019 11:00 AM [facility]ET[facility]   8/16/2019 11:00 AM Fang Zhang MD [facility]

## 2019-06-06 NOTE — PROGRESS NOTES
[facility] - Erich Po Steward Ishaan Garcia [address]  [phone] fax  Plan of Care/ Statement of Necessity for Physical Therapy Services     Patient name: Jason High Start of Care: 2019   Referral source: Yevgeniy Tipton  : 1970    Medical Diagnosis: Bilateral knee pain [M25.561, M25.562]  Payor: [facility] MEDICAID / Plan: [address] [place] [address] / Product Type: Managed Care Medicaid /  Onset Date: 2017    Treatment Diagnosis: B knee pain   Prior Hospitalization: see medical history Provider#: [id number]   Medications: Verified on Patient summary List    Comorbidities: HTN   Prior Level of Function: Independent with ADLs and functional tasks with no limitations. The Plan of Care and following information is based on the information from the initial evaluation. Assessment/ key information:   Pt is a 52year old male who presents to therapy today with B knee pain. Pt states that his symptoms began in 2017 with insidious onset and denies any injury/trauma. Pt reports having increased pain with standing/walking. Pt uses a SPC for gait as well. Pt reports that this pain is mostly in the inferior B patellar region but has some pain in the medial/lateral aspects of the B knees. Pt demonstrated decreased AROM, decreased strength, muscle tightness, impaired gait and mobility. Fair B patellar mobility noted but reported no pain with B patellar mobility. Tenderness noted to B HS and medial aspect of the knees. Pt would benefit from aquatic physical therapy to improve the above impairments to help the pt return to performing ADLs and functional activities.      Evaluation Complexity History MEDIUM  Complexity : 1-2 comorbidities / personal factors will impact the outcome/ POC ; Examination MEDIUM Complexity : 3 Standardized tests and measures addressing body structure, function, activity limitation and / or participation in recreation ;Presentation LOW Complexity : Stable, uncomplicated  ;Clinical Decision Making MEDIUM Complexity : FOTO score of 26-74  Overall Complexity Rating: LOW   Problem List: pain affecting function, decrease ROM, decrease strength, edema affecting function, impaired gait/ balance, decrease ADL/ functional abilitiies, decrease activity tolerance, decrease flexibility/ joint mobility and decrease transfer abilities   Treatment Plan may include any combination of the following: Therapeutic exercise, Therapeutic activities, Neuromuscular re-education, Physical agent/modality, Gait/balance training, Manual therapy, Aquatic therapy, Patient education, Self Care training, Functional mobility training, Home safety training and Stair training  Patient / Family readiness to learn indicated by: asking questions, trying to perform skills and interest  Persons(s) to be included in education: patient (P)  Barriers to Learning/Limitations: None  Patient Goal (s): relieve the pain  Patient Self Reported Health Status: fair  Rehabilitation Potential: fair    Short Term Goals: To be accomplished in 2 weeks:  1. Pt will report compliance and independence to [facility] to help the pt manage their pain and symptoms. Eval: established   2. Pt will tolerate a full aquatic therapy session with no increased pain/symptoms to improve ease of mobility. Eval: will establish in upcoming aquatic sessions  Long Term Goals: To be accomplished in 5 weeks:  1. Pt will increase FOTO score to 47 points to improve ability to perform ADLs. Eval: 26 points  2. Pt will report a decrease in at best B knee pain to 2/10 to improve the pt's ease of household mobility. Eval: 5/10 at best  3. Pt will increase AROM B knee to 0-115 degs to improve tolerance to weight bearing. Eval: right 3-105 degs with pain in the anterior right patella, left 2-109 degs   4.  Pt will report being able to ambulate/stand for 10 mins with minimal to no increased B knee pain to improve the pt's activity tolerance in the community. Eval: Reports increased B knee pain with walking/standing after a few mins    Frequency / Duration: Patient to be seen 2 times per week for 5 weeks. Patient/ Caregiver education and instruction: Diagnosis, prognosis, self care, activity modification and exercises   [x]  Plan of care has been reviewed with DANAY Heredia, PT 6/6/2019 10:50 AM  _____________________________________________________________________  I certify that the above Therapy Services are being furnished while the patient is under my care. I agree with the treatment plan and certify that this therapy is necessary.     Physician's Signature:____________Date:_________TIME:________    ** Signature, Date and Time must be completed for valid certification **    Please sign and return to [facility] - [address]  [phone] fax

## 2019-06-10 ENCOUNTER — OFFICE VISIT (OUTPATIENT)
Dept: INTERNAL MEDICINE CLINIC | Age: 49
End: 2019-06-10

## 2019-06-10 VITALS
OXYGEN SATURATION: 96 % | DIASTOLIC BLOOD PRESSURE: 83 MMHG | RESPIRATION RATE: 16 BRPM | HEIGHT: 69 IN | BODY MASS INDEX: 46.65 KG/M2 | HEART RATE: 73 BPM | TEMPERATURE: 98 F | WEIGHT: 315 LBS | SYSTOLIC BLOOD PRESSURE: 132 MMHG

## 2019-06-10 DIAGNOSIS — G89.29 CHRONIC MIDLINE LOW BACK PAIN WITHOUT SCIATICA: Primary | ICD-10-CM

## 2019-06-10 DIAGNOSIS — M54.50 CHRONIC MIDLINE LOW BACK PAIN WITHOUT SCIATICA: Primary | ICD-10-CM

## 2019-06-10 PROBLEM — I10 ESSENTIAL HYPERTENSION: Status: ACTIVE | Noted: 2019-06-10

## 2019-06-10 PROBLEM — M17.0 OSTEOARTHRITIS OF BOTH KNEES: Status: RESOLVED | Noted: 2018-07-13 | Resolved: 2019-06-10

## 2019-06-10 PROBLEM — Z83.3 FAMILY HISTORY OF DIABETES MELLITUS: Status: RESOLVED | Noted: 2018-07-13 | Resolved: 2019-06-10

## 2019-06-10 PROBLEM — Z86.19 HISTORY OF HELICOBACTER PYLORI INFECTION: Status: ACTIVE | Noted: 2019-06-10

## 2019-06-10 PROBLEM — R73.02 IMPAIRED GLUCOSE TOLERANCE: Status: ACTIVE | Noted: 2019-06-10

## 2019-06-10 RX ORDER — IBUPROFEN 800 MG/1
800 TABLET ORAL
Refills: 0 | COMMUNITY
Start: 2019-05-02 | End: 2019-07-03 | Stop reason: ALTCHOICE

## 2019-06-10 RX ORDER — CYCLOBENZAPRINE HCL 5 MG
5 TABLET ORAL
Qty: 30 TAB | Refills: 3 | Status: SHIPPED | OUTPATIENT
Start: 2019-06-10 | End: 2019-07-08

## 2019-06-10 NOTE — PATIENT INSTRUCTIONS
Patient was given a copy of the Advanced Medical Directive Form, and understands to bring it in once completed. Health Maintenance Due   Topic Date Due    DTaP/Tdap/Td series (1 - Tdap) 01/02/1991          Learning About How to Have a Healthy Back  What causes back pain? Back pain is often caused by overuse, strain, or injury. For example, people often hurt their backs playing sports or working in the yard, being jolted in a car accident, or lifting something too heavy. Aging plays a part too. Your bones and muscles tend to lose strength as you age, which makes injury more likely. The spongy discs between the bones of the spine (vertebrae) may suffer from wear and tear and no longer provide enough cushion between the bones. A disc that bulges or breaks open (herniated disc) can press on nerves, causing back pain. In some people, back pain is the result of arthritis, broken vertebrae caused by bone loss (osteoporosis), illness, or a spine problem. Although most people have back pain at one time or another, there are steps you can take to make it less likely. How can you have a healthy back? Reduce stress on your back through good posture  Slumping or slouching alone may not cause low back pain. But after the back has been strained or injured, bad posture can make pain worse. · Sleep in a position that maintains your back's normal curves and on a mattress that feels comfortable. Sleep on your side with a pillow between your knees, or sleep on your back with a pillow under your knees. These positions can reduce strain on your back. · Stand and sit up straight. \"Good posture\" generally means your ears, shoulders, and hips are in a straight line. · If you must stand for a long time, put one foot on a stool, ledge, or box. Switch feet every now and then. · Sit in a chair that is low enough to let you place both feet flat on the floor with both knees nearly level with your hips.  If your chair or desk is too high, use a footrest to raise your knees. Place a small pillow, a rolled-up towel, or a lumbar roll in the curve of your back if you need extra support. · Try a kneeling chair, which helps tilt your hips forward. This takes pressure off your lower back. · Try sitting on an exercise ball. It can rock from side to side, which helps keep your back loose. · When driving, keep your knees nearly level with your hips. Sit straight, and drive with both hands on the steering wheel. Your arms should be in a slightly bent position. Reduce stress on your back through careful lifting  · Squat down, bending at the hips and knees only. If you need to, put one knee to the floor and extend your other knee in front of you, bent at a right angle (half kneeling). · Press your chest straight forward. This helps keep your upper back straight while keeping a slight arch in your low back. · Hold the load as close to your body as possible, at the level of your belly button (navel). · Use your feet to change direction, taking small steps. · Lead with your hips as you change direction. Keep your shoulders in line with your hips as you move. · Set down your load carefully, squatting with your knees and hips only. Exercise and stretch your back  · Do some exercise on most days of the week, if your doctor says it is okay. You can walk, run, swim, or cycle. · Stretch your back muscles. Here are a few exercises to try:  ? Lie on your back, and gently pull one bent knee to your chest. Put that foot back on the floor, and then pull the other knee to your chest.  ? Do pelvic tilts. Lie on your back with your knees bent. Tighten your stomach muscles. Pull your belly button (navel) in and up toward your ribs. You should feel like your back is pressing to the floor and your hips and pelvis are slightly lifting off the floor. Hold for 6 seconds while breathing smoothly. ? Sit with your back flat against a wall.   · Keep your core muscles strong. The muscles of your back, belly (abdomen), and buttocks support your spine. ? Pull in your belly and imagine pulling your navel toward your spine. Hold this for 6 seconds, then relax. Remember to keep breathing normally as you tense your muscles. ? Do curl-ups. Always do them with your knees bent. Keep your low back on the floor, and curl your shoulders toward your knees using a smooth, slow motion. Keep your arms folded across your chest. If this bothers your neck, try putting your hands behind your neck (not your head), with your elbows spread apart. ? Lie on your back with your knees bent and your feet flat on the floor. Tighten your belly muscles, and then push with your feet and raise your buttocks up a few inches. Hold this position 6 seconds as you continue to breathe normally, then lower yourself slowly to the floor. Repeat 8 to 12 times. ? If you like group exercise, try Pilates or yoga. These classes have poses that strengthen the core muscles. Lead a healthy lifestyle  · Stay at a healthy weight to avoid strain on your back. · Do not smoke. Smoking increases the risk of osteoporosis, which weakens the spine. If you need help quitting, talk to your doctor about stop-smoking programs and medicines. These can increase your chances of quitting for good. Where can you learn more? Go to http://valerie-nate.info/. Enter L315 in the search box to learn more about \"Learning About How to Have a Healthy Back. \"  Current as of: September 20, 2018  Content Version: 11.9  © 8743-3815 [employer]. Care instructions adapted under license by [employer] (which disclaims liability or warranty for this information). If you have questions about a medical condition or this instruction, always ask your healthcare professional. [address] any warranty or liability for your use of this information.

## 2019-06-10 NOTE — PROGRESS NOTES
Chief Complaint   Patient presents with    LOW BACK PAIN     since February 2019, and in that same month was newly diagnosed with having Bilateral Osteoarthritis of Knee by Orthopedic Specialist Dr Jarret Han      The patient states his Lower Back has been hurting since being diagnosed in February 2019 with having Bilateral Osteoarthritis of Knee. The patient is under the care of Orthopedic Specialist Dr Jarret Han. 1. Have you been to the ER, urgent care clinic since your last visit? Hospitalized since your last visit? No    2. Have you seen or consulted any other health care providers outside of the [address] [place] [address] since your last visit? Include any pap smears or colon screening. No    Patient was given a copy of the Advanced Directive and understands to bring it in once completed.   Health Maintenance Due   Topic Date Due    DTaP/Tdap/Td series (1 - Tdap) 01/02/1991

## 2019-06-10 NOTE — PROGRESS NOTES
INTERNISTS [facility]:  6/10/2019, MRN: [id number]      Andrew Pacheco is a 52 y.o. male and presents to clinic for LOW BACK PAIN (since February 2019, and in that same month was newly diagnosed with having Bilateral Osteoarthritis of Knee by Orthopedic Specialist Dr Kacey Doherty )    Subjective: The patient is a 68-year-old male with history of hypertension, obesity, CVA, h/o H.pylori infection (treated last year), prediabetes, and chronic bilateral knee pain (followed by Orthopedics). Lower Back Pain: Present since February. No alleviating factors are known. Tylenol does not work to control his pain. Sx are worsening. No h/o trauma. Aggravating factors include: walking. Pain does not radiate. No BLE weakness or paresthesias. He has never had sx like this before. Symptoms are intermittent. Patient Active Problem List    Diagnosis Date Noted    Essential hypertension 06/10/2019    Impaired glucose tolerance 06/10/2019    History of Helicobacter pylori infection 06/10/2019    Obesity, morbid ([address] 07/13/2018    History of CVA (cerebrovascular accident) 07/13/2018    Chronic pain of both knees 07/13/2018       Current Outpatient Medications   Medication Sig Dispense Refill    amLODIPine (NORVASC) 5 mg tablet Take 1 Tab by mouth daily. Indications: high blood pressure 90 Tab 3    ibuprofen (MOTRIN) 800 mg tablet Take 800 mg by mouth every eight (8) hours as needed. 0    capsaicin (CAPZASIN-HP) 0.1 % topical cream Apply  to affected area three (3) times daily. 56.6 g 2    acetaminophen (TYLENOL) 500 mg tablet Take 1 Tab by mouth every six (6) hours as needed for Pain. Indications: Pain associated with Arthritis 180 Tab 2    celecoxib (CELEBREX) 100 mg capsule Take 1 Cap by mouth two (2) times a day for 90 days.  60 Cap 2       No Known Allergies    Past Medical History:   Diagnosis Date    Arthritis     Hypertension     Knee pain        Past Surgical History:   Procedure Laterality Date    HX CHOLECYSTECTOMY         Family History   Problem Relation Age of Onset    Heart Disease Mother     Stroke Father        Social History     Tobacco Use    Smoking status: Never Smoker    Smokeless tobacco: Never Used   Substance Use Topics    Alcohol use: Yes     Comment: Socially       ROS   Review of Systems   Constitutional: Negative for chills and fever. HENT: Negative for ear pain and sore throat. Eyes: Negative for blurred vision and pain. Respiratory: Negative for cough and shortness of breath. Cardiovascular: Negative for chest pain. Gastrointestinal: Negative for abdominal pain, blood in stool and melena. Genitourinary: Negative for dysuria and hematuria. Musculoskeletal: Positive for back pain (none today) and joint pain. Negative for myalgias. Skin: Negative for rash. Neurological: Negative for tingling, focal weakness and headaches. Endo/Heme/Allergies: Does not bruise/bleed easily. Psychiatric/Behavioral: Negative for substance abuse. Objective     Vitals:    06/10/19 0841 06/10/19 0846   BP: 132/83    Pulse: 73    Resp: 16    Temp: 98 °F (36.7 °C)    TempSrc: Oral    SpO2: 96%    Weight: (!) 440 lb 12.8 oz (199.9 kg)    Height: 5' 9\" (1.753 m)    PainSc:   8   9   PainLoc: Back Knee       Physical Exam   Constitutional: He is well-developed, well-nourished, and in no distress. HENT:   Head: Normocephalic and atraumatic. Right Ear: External ear normal.   Left Ear: External ear normal.   Nose: Nose normal.   Mouth/Throat: Oropharynx is clear and moist. No oropharyngeal exudate. Eyes: Conjunctivae and EOM are normal. Right eye exhibits no discharge. Left eye exhibits no discharge. No scleral icterus. Neck: Neck supple. Cardiovascular: Normal rate, regular rhythm, normal heart sounds and intact distal pulses. Pulmonary/Chest: Effort normal and breath sounds normal. No respiratory distress. He has no wheezes. Abdominal: Soft.  Bowel sounds are normal. He exhibits no distension. Musculoskeletal: He exhibits no edema or tenderness (BUE). Stable gait with DME. All spinous processes and paraspinal muscles are nontender to palpation today. Lymphadenopathy:     He has no cervical adenopathy. Neurological: He is alert. He exhibits normal muscle tone. Skin: Skin is warm and dry. No erythema. Psychiatric: Affect normal.   Nursing note and vitals reviewed. LABS   Data Review:   Lab Results   Component Value Date/Time    WBC 11.8 02/22/2019 03:26 PM    HGB 13.7 02/22/2019 03:26 PM    HCT 42.5 02/22/2019 03:26 PM    PLATELET 209 19/95/3564 03:26 PM    MCV 82.7 02/22/2019 03:26 PM       Lab Results   Component Value Date/Time    Sodium 139 02/22/2019 03:26 PM    Potassium 4.5 02/22/2019 03:26 PM    Chloride 104 02/22/2019 03:26 PM    CO2 29 02/22/2019 03:26 PM    Anion gap 6 02/22/2019 03:26 PM    Glucose 96 02/22/2019 03:26 PM    BUN 20 (H) 02/22/2019 03:26 PM    Creatinine 1.06 02/22/2019 03:26 PM    BUN/Creatinine ratio 19 02/22/2019 03:26 PM    GFR est AA >60 02/22/2019 03:26 PM    GFR est non-AA >60 02/22/2019 03:26 PM    Calcium 8.7 02/22/2019 03:26 PM       Lab Results   Component Value Date/Time    Cholesterol, total 146 02/18/2019 09:50 AM    HDL Cholesterol 38 (L) 02/18/2019 09:50 AM    LDL, calculated 95.2 02/18/2019 09:50 AM    VLDL, calculated 12.8 02/18/2019 09:50 AM    Triglyceride 64 02/18/2019 09:50 AM    CHOL/HDL Ratio 3.8 02/18/2019 09:50 AM       Lab Results   Component Value Date/Time    Hemoglobin A1c 5.8 (H) 02/18/2019 09:50 AM       Assessment/Plan:   1. Chronic midline low back pain without sciatica  -Activity as tolerated. -Ordering Flexeril.  -I encouraged him to use warm compresses. -Tylenol as needed. - He is already following up with PT later this month. I encouraged him to ask them about lower back stretching exercises as well. - He declines a referral to Orthopedics at this time.   I agree with this decision as I think his symptoms are relatively mild. His PE findings are reassuring today. ORDERS:  - cyclobenzaprine (FLEXERIL) 5 mg tablet; Take 1 Tab by mouth three (3) times daily as needed for Muscle Spasm(s). Dispense: 30 Tab; Refill: 3    2. Prediabetes:  - I encouraged him to maintain a healthier diet given his new diagnosis of prediabetes. I will recheck his weight at his follow-up appointment. Health Maintenance Due   Topic Date Due    DTaP/Tdap/Td series (1 - Tdap) 01/02/1991     Lab review: labs are reviewed in the EHR    I have discussed the diagnosis with the patient and the intended plan as seen in the above orders. The patient has received an after-visit summary and questions were answered concerning future plans. I have discussed medication side effects and warnings with the patient as well. I have reviewed the plan of care with the patient, accepted their input and they are in agreement with the treatment goals. All questions were answered. The patient understands the plan of care. Handouts provided today with above information. Pt instructed if symptoms worsen to call the office or report to the ED for continued care. Greater than 50% of the visit time was spent in counseling and/or coordination of care. Voice recognition was used to generate this report, which may have resulted in some phonetic based errors in grammar and contents. Even though attempts were made to correct all the mistakes, some may have been missed, and remained in the body of the document.           Guevara Abarca MD

## 2019-06-11 ENCOUNTER — HOSPITAL ENCOUNTER (OUTPATIENT)
Dept: PHYSICAL THERAPY | Age: 49
Discharge: HOME | End: 2019-06-11
Payer: MEDICAID

## 2019-06-11 DIAGNOSIS — M22.40 CHONDROMALACIA OF PATELLA, UNSPECIFIED LATERALITY: ICD-10-CM

## 2019-06-11 DIAGNOSIS — M25.561 CHRONIC PAIN OF BOTH KNEES: Primary | ICD-10-CM

## 2019-06-11 DIAGNOSIS — M25.562 CHRONIC PAIN OF BOTH KNEES: Primary | ICD-10-CM

## 2019-06-11 DIAGNOSIS — G89.29 CHRONIC PAIN OF BOTH KNEES: Primary | ICD-10-CM

## 2019-06-11 DIAGNOSIS — M17.0 OSTEOARTHRITIS OF BOTH KNEES, UNSPECIFIED OSTEOARTHRITIS TYPE: ICD-10-CM

## 2019-06-11 PROCEDURE — 97113 AQUATIC THERAPY/EXERCISES: CPT

## 2019-06-11 RX ORDER — MELOXICAM 7.5 MG/1
7.5 TABLET ORAL DAILY
Qty: 30 TAB | Refills: 1 | Status: SHIPPED | OUTPATIENT
Start: 2019-06-11 | End: 2019-07-11 | Stop reason: SDUPTHER

## 2019-06-13 ENCOUNTER — HOSPITAL ENCOUNTER (OUTPATIENT)
Dept: PHYSICAL THERAPY | Age: 49
Discharge: HOME | End: 2019-06-13
Payer: MEDICAID

## 2019-06-13 PROCEDURE — 97113 AQUATIC THERAPY/EXERCISES: CPT

## 2019-06-18 ENCOUNTER — HOSPITAL ENCOUNTER (OUTPATIENT)
Dept: PHYSICAL THERAPY | Age: 49
Discharge: HOME | End: 2019-06-18
Payer: MEDICAID

## 2019-06-18 PROCEDURE — 97113 AQUATIC THERAPY/EXERCISES: CPT

## 2019-06-25 ENCOUNTER — APPOINTMENT (OUTPATIENT)
Dept: PHYSICAL THERAPY | Age: 49
End: 2019-06-25
Payer: MEDICAID

## 2019-06-27 ENCOUNTER — HOSPITAL ENCOUNTER (OUTPATIENT)
Dept: PHYSICAL THERAPY | Age: 49
Discharge: HOME | End: 2019-06-27
Payer: MEDICAID

## 2019-06-27 PROCEDURE — 97113 AQUATIC THERAPY/EXERCISES: CPT

## 2019-06-27 NOTE — PROGRESS NOTES
[facility] [place]  [address]  [phone] fax    Discharge Summary    Patient name: Reynold Collet Start of Care: 2019   Referral source: Lisa Fernandez DO : 1970                Medical Diagnosis: Bilateral knee pain [M25.561, M25.562]  Payor: [facility] MEDICAID / Plan: [address] [place] [address] / Product Type: Managed Care Medicaid /  Onset Date: 2017                Treatment Diagnosis: B knee pain   Prior Hospitalization: see medical history Provider#: [id number]   Medications: Verified on Patient summary List    Comorbidities: HTN   Prior Level of Function: Independent with ADLs and functional tasks with no limitations. Visits from Start of Care: 5    Missed Visits: 1    Reporting Period : 19 to 19    Short Term Goals: To be accomplished in 2 weeks:  1. Pt will report compliance and independence to HEP to help the pt manage their pain and symptoms. Eval: established   Current status: met - Pt compliant with HEP  2. Pt will tolerate a full aquatic therapy session with no increased pain/symptoms to improve ease of mobility. Eval: will establish in upcoming aquatic sessions  Current status: met  Long Term Goals: To be accomplished in 5 weeks:  1. Pt will increase FOTO score to 47 points to improve ability to perform ADLs. Eval: 26 points  Current status: progressing - FOTO 45 pts  2. Pt will report a decrease in at best B knee pain to 2/10 to improve the pt's ease of household mobility. Eval: 5/10 at best  Current status: met - 0/10 pain at best in B knees  3. Pt will increase AROM B knee to 0-115 degs to improve tolerance to weight bearing. Eval: right 3-105 degs with pain in the anterior right patella, left 2-109 degs   Current status: progressing - 0-111 deg right; 0-101 deg left  4.  Pt will report being able to ambulate/stand for 10 mins with minimal to no increased B knee pain to improve the pt's activity tolerance in the community. Eval: Reports increased B knee pain with walking/standing after a few mins  Current status: progressing - 5 minutes due to LBP but feels could amb 20 minutes if LBP not present      Assessment/ Summary of Care: Pt made good progress with skilled therapy measures. Pt reports reduced pain overall in B knees of 0/10 at best, 5/10 on average, and 8/10 at worst with prolonged amb; 50% improvement in overall function. Pt reports limitations at this time are more attributed to the pain in his back - not a part of current treatment plan - than his knees. Pt does note ability to squat and  objects with ease but does have some pain with stairs. Pt is compliant with updated HEP and is appropriate to D/C at this time to continue to self-manage care. Pt to follow up with PCP in reference to LBP. Thank you for this referral.    RECOMMENDATIONS:  [x]Discontinue therapy: [x]Patient has reached or is progressing toward set goals      []Patient is non-compliant or has abdicated      []Due to lack of appreciable progress towards set goals    Celi Zimmer, PT 6/27/2019 10:25 AM    NOTE TO PHYSICIAN:  Please complete the following and fax to: [facility] [place] at [phone]  . Retain this original for your records. If you are unable to process this request in   24 hours, please contact our office.      [] I have read the above report and request that my patient continue therapy with the following changes/special instructions:  [] I have read the above report and request that my patient be discharged from therapy    Physician's Signature:____________Date:_________TIME:________    ** Signature, Date and Time must be completed for valid certification **

## 2019-07-03 ENCOUNTER — OFFICE VISIT (OUTPATIENT)
Dept: INTERNAL MEDICINE CLINIC | Age: 49
End: 2019-07-03

## 2019-07-03 ENCOUNTER — HOSPITAL ENCOUNTER (OUTPATIENT)
Dept: GENERAL RADIOLOGY | Age: 49
Discharge: HOME | End: 2019-07-03
Payer: MEDICAID

## 2019-07-03 VITALS
TEMPERATURE: 97.7 F | BODY MASS INDEX: 46.65 KG/M2 | WEIGHT: 315 LBS | HEIGHT: 69 IN | RESPIRATION RATE: 18 BRPM | OXYGEN SATURATION: 96 % | DIASTOLIC BLOOD PRESSURE: 82 MMHG | HEART RATE: 72 BPM | SYSTOLIC BLOOD PRESSURE: 138 MMHG

## 2019-07-03 DIAGNOSIS — G89.29 CHRONIC MIDLINE LOW BACK PAIN WITHOUT SCIATICA: Primary | ICD-10-CM

## 2019-07-03 DIAGNOSIS — M54.50 CHRONIC MIDLINE LOW BACK PAIN WITHOUT SCIATICA: ICD-10-CM

## 2019-07-03 DIAGNOSIS — G89.29 CHRONIC MIDLINE LOW BACK PAIN WITHOUT SCIATICA: ICD-10-CM

## 2019-07-03 DIAGNOSIS — R73.02 IMPAIRED GLUCOSE TOLERANCE: ICD-10-CM

## 2019-07-03 DIAGNOSIS — R35.1 NOCTURIA: ICD-10-CM

## 2019-07-03 DIAGNOSIS — N52.9 ERECTILE DYSFUNCTION, UNSPECIFIED ERECTILE DYSFUNCTION TYPE: ICD-10-CM

## 2019-07-03 DIAGNOSIS — M54.50 CHRONIC MIDLINE LOW BACK PAIN WITHOUT SCIATICA: Primary | ICD-10-CM

## 2019-07-03 PROCEDURE — 72114 X-RAY EXAM L-S SPINE BENDING: CPT

## 2019-07-03 RX ORDER — TADALAFIL 5 MG/1
5 TABLET ORAL DAILY
Qty: 30 TAB | Refills: 11 | Status: SHIPPED | OUTPATIENT
Start: 2019-07-03 | End: 2019-07-08

## 2019-07-03 RX ORDER — METHYLPREDNISOLONE 4 MG/1
TABLET ORAL
Qty: 1 DOSE PACK | Refills: 0 | Status: SHIPPED | OUTPATIENT
Start: 2019-07-03 | End: 2019-10-18 | Stop reason: ALTCHOICE

## 2019-07-03 NOTE — PROGRESS NOTES
Chief Complaint   Patient presents with    LOW BACK PAIN     ROOM  4       1. Have you been to the ER, urgent care clinic since your last visit? Hospitalized since your last visit? No    2. Have you seen or consulted any other health care providers outside of the [address] [place] [address] since your last visit? Include any pap smears or colon screening. No    Patient was given a copy of the Advanced Directive and understands to bring it in once completed.   Health Maintenance Due   Topic Date Due    DTaP/Tdap/Td series (1 - Tdap) 01/02/1991

## 2019-07-03 NOTE — PROGRESS NOTES
INTERNISTS OF [facility] [place]:  7/3/2019, MRN: [id number]      Lucita Peñaloza is a 52 y.o. male and presents to clinic for LOW BACK PAIN (follow up  ROOM  4)    Subjective: The patient is a 20-year-old male with history of hypertension, obesity, CVA, h/o H.pylori infection (treated last year), prediabetes, and chronic bilateral knee pain (followed by Orthopedics). 1. Lower Back Pain: At his last appointment, he reported lower back pain since February. He stated that Tylenol did not relieve his symptoms. There is no history of trauma. Walking makes his symptoms worse. Pain was along his lower back and did not radiate. He denies paresthesias. Symptoms come and go. At his last appointment, he declined a referral to orthopedics for this issue. Flexeril was prescribed. He was also pretty spitting in PT earlier this year. Since then, he has had persistent lower back pain. Symptoms are off and on and brought about by prolonged periods of standing or walking. His Flexeril did not work well to relieve symptoms of lower back pain. It caused drowsiness as well. He has been taking NSAIDs sparingly. He takes Tylenol as needed. Pain is still not radiating, localized to his midline lower back. No paresthesias. 2. Obesity: His weight today is 443 pounds. His weight was 440 pounds at his last appointment. He has a history of prediabetes with an A1c of 5.8 in February 2019. He is scheduled to follow-up with Bariatric Surgery later this month. He is scheduled to have bariatric surgery later this year. 3.  ED and Nocturia: He reports difficulty maintaining erection and getting an erection for the past 3 to 4 weeks. As mentioned above, he has a history of prediabetes. His last A1c was in February. Meanwhile, he has a several month history of nocturia symptoms, getting up on average 6-7 times per night to urinate. No alleviating factors are known. No dysuria or hematuria.       Patient Active Problem List Diagnosis Date Noted    Essential hypertension 06/10/2019    Impaired glucose tolerance 06/10/2019    History of Helicobacter pylori infection 06/10/2019    Obesity, morbid ([address] 07/13/2018    History of CVA (cerebrovascular accident) 07/13/2018    Chronic pain of both knees 07/13/2018       Current Outpatient Medications   Medication Sig Dispense Refill    meloxicam (MOBIC) 7.5 mg tablet Take 1 Tab by mouth daily for 30 days. Indications: Joint Damage causing Pain and Loss of Function 30 Tab 1    ibuprofen (MOTRIN) 800 mg tablet Take 800 mg by mouth every eight (8) hours as needed. 0    cyclobenzaprine (FLEXERIL) 5 mg tablet Take 1 Tab by mouth three (3) times daily as needed for Muscle Spasm(s). 30 Tab 3    capsaicin (CAPZASIN-HP) 0.1 % topical cream Apply  to affected area three (3) times daily. 56.6 g 2    acetaminophen (TYLENOL) 500 mg tablet Take 1 Tab by mouth every six (6) hours as needed for Pain. Indications: Pain associated with Arthritis 180 Tab 2    amLODIPine (NORVASC) 5 mg tablet Take 1 Tab by mouth daily. Indications: high blood pressure 90 Tab 3       No Known Allergies    Past Medical History:   Diagnosis Date    Arthritis     Hypertension     Knee pain        Past Surgical History:   Procedure Laterality Date    HX CHOLECYSTECTOMY         Family History   Problem Relation Age of Onset    Heart Disease Mother     Stroke Father        Social History     Tobacco Use    Smoking status: Never Smoker    Smokeless tobacco: Never Used   Substance Use Topics    Alcohol use: Not Currently     Comment: Socially       ROS   Review of Systems   Constitutional: Negative for chills and fever. HENT: Negative for ear pain and sore throat. Eyes: Negative for blurred vision and pain. Respiratory: Negative for cough and shortness of breath. Cardiovascular: Negative for chest pain. Gastrointestinal: Negative for abdominal pain, blood in stool and melena.    Genitourinary: Negative for dysuria and hematuria. Musculoskeletal: Positive for back pain and joint pain. Negative for myalgias. Skin: Negative for rash. Neurological: Negative for tingling, focal weakness and headaches. Endo/Heme/Allergies: Does not bruise/bleed easily. Psychiatric/Behavioral: Negative for substance abuse. Objective     Vitals:    07/03/19 0847 07/03/19 0855   BP: (!) 144/101 138/82   Pulse: 75 72   Resp: 20 18   Temp: 97.7 °F (36.5 °C)    TempSrc: Oral    SpO2: 93% 96%   Weight: (!) 443 lb 9.6 oz (201.2 kg)    Height: 5' 9\" (1.753 m)    PainSc:   4    PainLoc: Back        Physical Exam   Constitutional: He is oriented to person, place, and time and well-developed, well-nourished, and in no distress. HENT:   Head: Normocephalic and atraumatic. Right Ear: External ear normal.   Left Ear: External ear normal.   Nose: Nose normal.   Mouth/Throat: Oropharynx is clear and moist. No oropharyngeal exudate. Eyes: Pupils are equal, round, and reactive to light. Conjunctivae and EOM are normal. Right eye exhibits no discharge. Left eye exhibits no discharge. No scleral icterus. Neck: Neck supple. Cardiovascular: Normal rate, regular rhythm, normal heart sounds and intact distal pulses. Pulmonary/Chest: Effort normal and breath sounds normal. No respiratory distress. Abdominal: Soft. Bowel sounds are normal. He exhibits no distension. There is no tenderness. There is no rebound and no guarding. Musculoskeletal: He exhibits no edema or tenderness (BUE). All spinous processes and paraspinal muscles are nontender to palpation. Lymphadenopathy:     He has no cervical adenopathy. Neurological: He is alert and oriented to person, place, and time. He exhibits normal muscle tone. Gait normal.   Skin: Skin is warm and dry. No erythema. Psychiatric: Affect normal.   Nursing note and vitals reviewed.       LABS   Data Review:   Lab Results   Component Value Date/Time    WBC 11.8 02/22/2019 03:26 PM HGB 13.7 02/22/2019 03:26 PM    HCT 42.5 02/22/2019 03:26 PM    PLATELET 986 75/95/6415 03:26 PM    MCV 82.7 02/22/2019 03:26 PM       Lab Results   Component Value Date/Time    Sodium 139 02/22/2019 03:26 PM    Potassium 4.5 02/22/2019 03:26 PM    Chloride 104 02/22/2019 03:26 PM    CO2 29 02/22/2019 03:26 PM    Anion gap 6 02/22/2019 03:26 PM    Glucose 96 02/22/2019 03:26 PM    BUN 20 (H) 02/22/2019 03:26 PM    Creatinine 1.06 02/22/2019 03:26 PM    BUN/Creatinine ratio 19 02/22/2019 03:26 PM    GFR est AA >60 02/22/2019 03:26 PM    GFR est non-AA >60 02/22/2019 03:26 PM    Calcium 8.7 02/22/2019 03:26 PM       Lab Results   Component Value Date/Time    Cholesterol, total 146 02/18/2019 09:50 AM    HDL Cholesterol 38 (L) 02/18/2019 09:50 AM    LDL, calculated 95.2 02/18/2019 09:50 AM    VLDL, calculated 12.8 02/18/2019 09:50 AM    Triglyceride 64 02/18/2019 09:50 AM    CHOL/HDL Ratio 3.8 02/18/2019 09:50 AM       Lab Results   Component Value Date/Time    Hemoglobin A1c 5.8 (H) 02/18/2019 09:50 AM       Assessment/Plan:   1. Chronic midline low back pain without sciatica:   -Ordering a lumbar x-ray series given to the duration of symptoms.  -Placing a referral to Physical Therapy.  -Ordering a Medrol Dosepak. He was instructed not to take any NSAIDs while on this medication. He was also told that he can take NSAIDs after he completes the Medrol Dosepak. He was told to only take his Mobic and no other NSAIDs.  -Activity as tolerated. ORDERS:  - XR SPINE LUMB COMP W BEND; Future  - REFERRAL TO PHYSICAL THERAPY  - methylPREDNISolone (MEDROL DOSEPACK) 4 mg tablet; Take per package instructions  Dispense: 1 Dose Pack; Refill: 0    2. Nocturia and ED Sx:   -Checking a PSA and A1c.  - Ordering Cialis. - Placing a referral to Urology. Will defer YAMILKA to Urology. ORDERS:  - PSA, TOTAL &  FREE; Future  - HEMOGLOBIN A1C W/O EAG; Future  - REFERRAL TO UROLOGY  - tadalafil (CIALIS) 5 mg tablet;  Take 1 Tab by mouth daily. Indications: enlarged prostate with urination problem, Inability to have an Erection  Dispense: 30 Tab; Refill: 11    3. Obesity: +Impaired glucose tolerance. His obesity is likely worsening #1 and his chronic bilateral knee pain from OA. This was discussed with him today.  -Checking A1c as mentioned above.  -I encouraged him to reduce his weight. I encouraged him to get bariatric surgery later this year and to follow-up with Bariatric Surgery. I will recheck his weight at his follow-up appointment. ORDERS:  - HEMOGLOBIN A1C W/O EAG; Future      Health Maintenance Due   Topic Date Due    DTaP/Tdap/Td series (1 - Tdap) 01/02/1991     Lab review: labs are reviewed in the EHR and ordered as mentioned above    I have discussed the diagnosis with the patient and the intended plan as seen in the above orders. The patient has received an after-visit summary and questions were answered concerning future plans. I have discussed medication side effects and warnings with the patient as well. I have reviewed the plan of care with the patient, accepted their input and they are in agreement with the treatment goals. All questions were answered. The patient understands the plan of care. Handouts provided today with above information. Pt instructed if symptoms worsen to call the office or report to the ED for continued care. Greater than 50% of the visit time was spent in counseling and/or coordination of care. Voice recognition was used to generate this report, which may have resulted in some phonetic based errors in grammar and contents. Even though attempts were made to correct all the mistakes, some may have been missed, and remained in the body of the document.           Jamee Lujan MD

## 2019-07-03 NOTE — PATIENT INSTRUCTIONS
Health Maintenance Due   Topic Date Due    DTaP/Tdap/Td series (1 - Tdap) 01/02/1991          Back Pain: Care Instructions  Your Care Instructions    Back pain has many possible causes. It is often related to problems with muscles and ligaments of the back. It may also be related to problems with the nerves, discs, or bones of the back. Moving, lifting, standing, sitting, or sleeping in an awkward way can strain the back. Sometimes you don't notice the injury until later. Arthritis is another common cause of back pain. Although it may hurt a lot, back pain usually improves on its own within several weeks. Most people recover in 12 weeks or less. Using good home treatment and being careful not to stress your back can help you feel better sooner. Follow-up care is a key part of your treatment and safety. Be sure to make and go to all appointments, and call your doctor if you are having problems. It's also a good idea to know your test results and keep a list of the medicines you take. How can you care for yourself at home? · Sit or lie in positions that are most comfortable and reduce your pain. Try one of these positions when you lie down:  ? Lie on your back with your knees bent and supported by large pillows. ? Lie on the floor with your legs on the seat of a sofa or chair. ? Lie on your side with your knees and hips bent and a pillow between your legs. ? Lie on your stomach if it does not make pain worse. · Do not sit up in bed, and avoid soft couches and twisted positions. Bed rest can help relieve pain at first, but it delays healing. Avoid bed rest after the first day of back pain. · Change positions every 30 minutes. If you must sit for long periods of time, take breaks from sitting. Get up and walk around, or lie in a comfortable position. · Try using a heating pad on a low or medium setting for 15 to 20 minutes every 2 or 3 hours.  Try a warm shower in place of one session with the heating pad.  · You can also try an ice pack for 10 to 15 minutes every 2 to 3 hours. Put a thin cloth between the ice pack and your skin. · Take pain medicines exactly as directed. ? If the doctor gave you a prescription medicine for pain, take it as prescribed. ? If you are not taking a prescription pain medicine, ask your doctor if you can take an over-the-counter medicine. · Take short walks several times a day. You can start with 5 to 10 minutes, 3 or 4 times a day, and work up to longer walks. Walk on level surfaces and avoid hills and stairs until your back is better. · Return to work and other activities as soon as you can. Continued rest without activity is usually not good for your back. · To prevent future back pain, do exercises to stretch and strengthen your back and stomach. Learn how to use good posture, safe lifting techniques, and proper body mechanics. When should you call for help? Call your doctor now or seek immediate medical care if:    · You have new or worsening numbness in your legs.     · You have new or worsening weakness in your legs. (This could make it hard to stand up.)     · You lose control of your bladder or bowels.    Watch closely for changes in your health, and be sure to contact your doctor if:    · You have a fever, lose weight, or don't feel well.     · You do not get better as expected. Where can you learn more? Go to http://valerie-nate.info/. Enter N672 in the search box to learn more about \"Back Pain: Care Instructions. \"  Current as of: September 20, 2018  Content Version: 11.9  © 2402-5610 [employer]. Care instructions adapted under license by "[employer] (which disclaims liability or warranty for this information). If you have questions about a medical condition or this instruction, always ask your healthcare professional. [address] any warranty or liability for your use of this information.

## 2019-07-04 LAB
AVG GLU, 10930: 106 MG/DL (ref 91–123)
HBA1C MFR BLD HPLC: 5.3 % (ref 4.8–5.9)

## 2019-07-04 NOTE — PROGRESS NOTES
Please let him know that his lumbar x-ray series shows findings consistent with osteoarthritis and lumbar disc disease.     Dr. Linda Lopez  [facility], [address] [place] [address], [address] [place] [address].  Phone: [phone]  Fax: [phone]

## 2019-07-04 NOTE — PROGRESS NOTES
Please let him know that his A1c is normal indicating that he does not have diabetes at this time.     Dr. Kaela Reyes  [facility], [place], [facility], [address] [place] [address] Str.  Phone: [phone]  Fax: [phone]

## 2019-07-05 ENCOUNTER — TELEPHONE (OUTPATIENT)
Dept: INTERNAL MEDICINE CLINIC | Age: 49
End: 2019-07-05

## 2019-07-05 NOTE — TELEPHONE ENCOUNTER
----- Message from Kimberlee Bates MD sent at 7/4/2019 12:33 PM EDT -----  Please let him know that his lumbar x-ray series shows findings consistent with osteoarthritis and lumbar disc disease.     Dr. Anna Marie Sanchez  [facility] [address], [address] [place] [address] Str.  Phone: [phone]  Fax: [phone]

## 2019-07-05 NOTE — TELEPHONE ENCOUNTER
----- Message from Yair Gonzalez MD sent at 7/4/2019 12:35 PM EDT -----  Please let him know that his A1c is normal indicating that he does not have diabetes at this time.     Dr. Abdon Gooden  [facility] [address], [address] [place] [address] Str.  Phone: [phone]  Fax: [phone]

## 2019-07-06 LAB
PSA FREE MFR SERPL: 15.3 %
PSA FREE SERPL-MCNC: 0.23 NG/ML
PSA SERPL-MCNC: 1.5 NG/ML (ref 0–4)

## 2019-07-08 ENCOUNTER — HOSPITAL ENCOUNTER (OUTPATIENT)
Dept: LAB | Age: 49
Discharge: HOME | End: 2019-07-08

## 2019-07-08 ENCOUNTER — OFFICE VISIT (OUTPATIENT)
Dept: SURGERY | Age: 49
End: 2019-07-08

## 2019-07-08 VITALS
SYSTOLIC BLOOD PRESSURE: 130 MMHG | WEIGHT: 315 LBS | DIASTOLIC BLOOD PRESSURE: 92 MMHG | RESPIRATION RATE: 16 BRPM | OXYGEN SATURATION: 95 % | HEART RATE: 74 BPM | HEIGHT: 69 IN | TEMPERATURE: 98.5 F | BODY MASS INDEX: 46.65 KG/M2

## 2019-07-08 DIAGNOSIS — E66.01 OBESITY, MORBID (HCC): ICD-10-CM

## 2019-07-08 DIAGNOSIS — M17.0 OSTEOARTHRITIS OF BOTH KNEES, UNSPECIFIED OSTEOARTHRITIS TYPE: ICD-10-CM

## 2019-07-08 DIAGNOSIS — R73.03 PRE-DIABETES: ICD-10-CM

## 2019-07-08 DIAGNOSIS — M25.562 CHRONIC PAIN OF BOTH KNEES: ICD-10-CM

## 2019-07-08 DIAGNOSIS — G89.29 CHRONIC PAIN OF BOTH KNEES: ICD-10-CM

## 2019-07-08 DIAGNOSIS — Z83.3 FAMILY HISTORY OF DIABETES MELLITUS: ICD-10-CM

## 2019-07-08 DIAGNOSIS — Z01.818 PREOPERATIVE TESTING: Primary | ICD-10-CM

## 2019-07-08 DIAGNOSIS — M25.561 CHRONIC PAIN OF BOTH KNEES: ICD-10-CM

## 2019-07-08 DIAGNOSIS — Z86.73 HISTORY OF CVA (CEREBROVASCULAR ACCIDENT): ICD-10-CM

## 2019-07-08 DIAGNOSIS — Z01.818 PREOPERATIVE TESTING: ICD-10-CM

## 2019-07-08 LAB — SENTARA SPECIMEN COL,SENBCF: NORMAL

## 2019-07-08 PROCEDURE — 99001 SPECIMEN HANDLING PT-LAB: CPT

## 2019-07-08 NOTE — PROGRESS NOTES
Chief Complaint   Patient presents with    Morbid Obesity     possible stage 2. Pt ID confirmed    Weight Loss Metrics 7/8/2019 7/3/2019 6/10/2019 5/20/2019 3/15/2019 3/15/2019 2/23/2019   Pre op / Initial Wt - - - - 435 - -   Today's Wt 445 lb 443 lb 9.6 oz 440 lb 12.8 oz 435 lb - 435 lb 433 lb   BMI 65.72 kg/m2 65.51 kg/m2 65.09 kg/m2 64.24 kg/m2 - 64.24 kg/m2 63.94 kg/m2   Ideal Body Wt - - - - 154 - -   Excess Body Wt - - - - 281 - -   Goal Wt - - - - 211 - -   Wt loss to date - - - - 0 - -   % Wt Loss - - - - 0 - -   80% EBW - - - - 224.8 - -       Body mass index is 65.72 kg/m².

## 2019-07-08 NOTE — PROGRESS NOTES
Bariatric Preoperative Progress Note    Subjective:     Katharine Lam is a 52 y.o. male who presents today for followup of their candidacy for bariatric surgery. Since last seen, Katharine Lam has been working through bariatric program towards possible two stage procedure for weight loss, starting with laparoscopic gastric sleeve to be followed thereafter by potential gastric bypass. Pt was instructed that prior to surgery he will need to lose 43 lbs to be at or below 400 lbs to move forward with 2 stage approach. As of June 2019, he has not lost any weight. .     Past Medical History:   Diagnosis Date    Arthritis     Hypertension     Knee pain        Past Surgical History:   Procedure Laterality Date    HX CHOLECYSTECTOMY         Current Outpatient Medications   Medication Sig Dispense Refill    methylPREDNISolone (MEDROL DOSEPACK) 4 mg tablet Take per package instructions 1 Dose Pack 0    meloxicam (MOBIC) 7.5 mg tablet Take 1 Tab by mouth daily for 30 days. Indications: Joint Damage causing Pain and Loss of Function 30 Tab 1    capsaicin (CAPZASIN-HP) 0.1 % topical cream Apply  to affected area three (3) times daily. 56.6 g 2    amLODIPine (NORVASC) 5 mg tablet Take 1 Tab by mouth daily. Indications: high blood pressure 90 Tab 3    acetaminophen (TYLENOL) 500 mg tablet Take 1 Tab by mouth every six (6) hours as needed for Pain.  Indications: Pain associated with Arthritis 180 Tab 2       No Known Allergies    Review of Systems:  Positive in BOLD  CONST: Fever, weight loss, fatigue or chills  GI: Nausea, vomiting, abdominal pain, change in bowel habits, hematochezia, melena, and GERD   INTEG: Dermatitis, abnormal moles  HEENT: Recent changes in vision, vertigo, epistaxis, dysphagia and hoarseness, tooth pain  CV: Chest pain, palpitations, HTN, edema and varicosities  RESP: Cough, shortness of breath, wheezing, hemoptysis, snoring and reactive airway disease  : Hematuria, dysuria, frequency, urgency, nocturia and stress urinary incontinence   MS: Weakness, joint pain and arthritis  ENDO: Diabetes, thyroid disease, polyuria, polydipsia, polyphagia, poor wound healing, heat intolerance, cold intolerance  LYMPH/HEME: Anemia, bruising and history of blood transfusions  NEURO: Dizziness, headache, fainting, seizures and stroke 1997 (was on blood thinner but was taken off)   PSYCH: Anxiety and depression        Remainder of ROS as per HPI. Pre op weight: 435  EBW: 281  Wt loss to date: 0 --> up 10 lbs. Objective:     Physical Exam:  Visit Vitals  BP (!) 130/92   Pulse 74   Temp 98.5 °F (36.9 °C) (Oral)   Resp 16   Ht 5' 9\" (1.753 m)   Wt (!) 201.9 kg (445 lb)   SpO2 95%   BMI 65.72 kg/m²         General: 52 y.o. male in no acute distress. Morbidly obese in abdomen, hips, thighs - mixed pattern  HEENT: Normocephalic, atraumatic, Pupils equal and reactive, nasopharynx clear, oropharynx clear and moist without lesions  NECK: Supple, no lymphadenopathy, thyromegaly, carotid bruits or jugular venous distension. trachea midline  RESP: Clear to auscultation bilaterally, no wheezes, rhonchi, or rales, normal respiratory excursion  CV: Regular rate and rhythm, no murmurs, rubs or gallops. 3+/4 pulses in bilateral dorsalis pedis and posterior tibialis. No distal edema or varicosities. ABD: Soft, nontender, nondistended, normoactive bowel sounds, no hernias, no hepatosplenomegaly, minimally palpable costal margins, mixed distribution  Extremities: Warm, well perfused, no tenderness or swelling, normal gait/station  Neuro: Sensation and strength grossly intact and symmetrical  Psych: Alert and oriented to person, place, and time. Studies to date:    Labs: significant Pending. H. pylori positive, treated.     EKG: Pending    Nutritional evaluation: 3/6 WLT with Edis Farris RD 5 June 2019    Psychiatric evaluation: Pending    Support Group: Pending    Additional evaluations: PCP pending. Sleep study pending. Weight loss: 0  Assessment:   Reynold Collet is a 52 y.o. male who is progressing through the bariatric preoperative evaluation. At this time, they are not yet an appropriate candidate for weight loss surgery. Given the fact the patient is having trouble not only losing weight, but has actually gained weight, we have talked about options including medically managed weight loss. He is agreeable to proceeding with medically managed weight loss. He will obtain his preoperative labs today in addition to his EKG and uric acid levels. Once he is able to be oriented with regards to the very low calorie diet he will be brought back in to see me and started on this weight loss program preoperatively. He will also stop steroids as soon as possible. Questions were solicited and answered to his satisfaction. Mr. Marley Segal has a reminder for a \"due or due soon\" health maintenance. I have asked that he contact his primary care provider for follow-up on this health maintenance. Plan:   -complete remainder of preop evaluation including Those outlined above. -Patient voices understanding that weight gain during weight loss trial may result in cancellation of weight loss surgery.   -Follow up once has completed entirety of weight loss workup to determine next steps.       Gertrude Monge, MS, PA-C

## 2019-07-08 NOTE — PATIENT INSTRUCTIONS
Monthly goal:      4 meal replacements daily, no other food. First one within about 1 hour of waking up to get metabolism started. Don't go more than 6 hours between meals. No more than 1 soup a day, this is too much salt. No more than 1 bar a day, this not enough protein. You are allotted 10 carbs extra a day. Recommendations       - Consume your 4 daily meal replacements equally spaced over the day. Dont go more than 6 hours between each meal. Breakfast is especially important!      - Get the support of family and friends. - Snack-proof your home. - Have strategies for social situations, meetings that run over or vacation.       - If you fall off the plan; just start right back. Reflect on those days into examples of what to change or avoid next time. Program Compliance      We do not expect perfection. However, we do ask for your persistence and your willingness to do the work of growing yourself. You will hit plateaus in your weight loss. You will run into situations that test your will power. You will encounter times when you feel frustrated. How your respond to your slip ups and, the adjustments you make to prevent future slip ups will determine you long-term success. If you find yourself temporarily slipping in the program we will gently nudge you forward and encourage you to do the work of identifying and move beyond your stuck areas. However, if you find yourself wavering in the program for a prolonged time (more than 3 months) we will ask that you take a break from the program. At that time you will have 3 options:       1. Consult with one of our weight loss specialists to explore additional weight loss options. 2. Work with a counselor to do some focused work in order to identify and break the patterns that are holding you back. 3. A combination of both these.       Once you, your counselor and/or your weight loss specialist feel that you have moved past those patterns and want to give the program another chance, we will gladly work with you to determine if you're ready to start back in the program.      When returning after a break, if it has been over 3 months you will required to repeat an orientation and, if greater than 6 months, you will be required to repeat an orientation, labs and an EKG. Homework for [employer]        Exercise:   - Daily starting slow, gradually increase your time by 10- 20 % per week     - To prevent injury, take a recovery week every 4 weeks (reduce your exercise time and intensity by 1/2 during this time)     - Your goal is to work up to 150 min a week; hard enough that you can't whistle or sing. It may take 6 months to work up to this. - Call the Health  at [facility] labs for exercise ideas ([phone])          Diet:   VLCD    See brochure for emergency meal instructions.

## 2019-07-09 LAB
25(OH)D3 SERPL-MCNC: 11.6 NG/ML (ref 32–100)
ABSOLUTE LYMPHOCYTE COUNT, 10803: 2.2 K/UL (ref 1–4.8)
ALBUMIN SERPL-MCNC: 3.8 G/DL (ref 3.5–5)
ANION GAP SERPL CALC-SCNC: 12 MMOL/L
BASOPHILS # BLD: 0 K/UL (ref 0–0.2)
BASOPHILS NFR BLD: 0 % (ref 0–2)
BUN SERPL-MCNC: 13 MG/DL (ref 6–22)
CALCIUM SERPL-MCNC: 8.3 MG/DL (ref 8.4–10.4)
CHLORIDE SERPL-SCNC: 102 MMOL/L (ref 98–110)
CO2 SERPL-SCNC: 28 MMOL/L (ref 20–32)
CREAT SERPL-MCNC: 0.8 MG/DL (ref 0.5–1.2)
EOSINOPHIL # BLD: 0.1 K/UL (ref 0–0.5)
EOSINOPHIL NFR BLD: 2 % (ref 0–6)
ERYTHROCYTE [DISTWIDTH] IN BLOOD BY AUTOMATED COUNT: 15.4 % (ref 10–15.5)
FOLATE,FOL: 8.48 NG/ML
GFRAA, 66117: >60
GFRNA, 66118: >60
GLUCOSE SERPL-MCNC: 92 MG/DL (ref 70–99)
GRANULOCYTES,GRANS: 63 % (ref 40–75)
HCT VFR BLD AUTO: 39.8 % (ref 39.3–51.6)
HGB BLD-MCNC: 12.2 G/DL (ref 13.1–17.2)
IRON,IRN: 55 MCG/DL (ref 45–160)
LYMPHOCYTES, LYMLT: 30 % (ref 20–45)
MCH RBC QN AUTO: 26 PG (ref 26–34)
MCHC RBC AUTO-ENTMCNC: 31 G/DL (ref 31–36)
MCV RBC AUTO: 85 FL (ref 80–95)
MONOCYTES # BLD: 0.4 K/UL (ref 0.1–1)
MONOCYTES NFR BLD: 6 % (ref 3–12)
NEUTROPHILS # BLD AUTO: 4.6 K/UL (ref 1.8–7.7)
PLATELET # BLD AUTO: 230 K/UL (ref 140–440)
PMV BLD AUTO: 10.8 FL (ref 9–13)
POTASSIUM SERPL-SCNC: 3.9 MMOL/L (ref 3.5–5.5)
RBC # BLD AUTO: 4.66 M/UL (ref 3.8–5.8)
SODIUM SERPL-SCNC: 142 MMOL/L (ref 133–145)
TSH SERPL DL<=0.005 MIU/L-ACNC: 2.77 MCU/ML (ref 0.27–4.2)
VIT B12 SERPL-MCNC: 431 PG/ML (ref 211–911)
WBC # BLD AUTO: 7.3 K/UL (ref 4–11)

## 2019-07-10 ENCOUNTER — TELEPHONE (OUTPATIENT)
Dept: SURGERY | Age: 49
End: 2019-07-10

## 2019-07-10 NOTE — PROGRESS NOTES
7/10/19: Patient was contacted regarding his blood work. Normal Vitamin D is . Patient's level is 11.6. He has been instructed to take 5000 IU of Vitamin D3 daily. Patient understood these instructions.     Flavio Griffith MS RD

## 2019-07-10 NOTE — TELEPHONE ENCOUNTER
Attempted to reach patient today per Trudi  request to discuss [facility] program and lisa lester followup for official [facility] consult with Patti Post assuming patient got his labs/EKG done.  Patient will receive 1-1 orientation facilitated by  due to quick start [facility] referral.

## 2019-07-11 ENCOUNTER — OFFICE VISIT (OUTPATIENT)
Dept: PAIN MANAGEMENT | Age: 49
End: 2019-07-11

## 2019-07-11 ENCOUNTER — HOSPITAL ENCOUNTER (OUTPATIENT)
Dept: GENERAL RADIOLOGY | Age: 49
Discharge: HOME | End: 2019-07-11
Payer: MEDICAID

## 2019-07-11 VITALS
WEIGHT: 315 LBS | HEIGHT: 69 IN | HEART RATE: 86 BPM | SYSTOLIC BLOOD PRESSURE: 141 MMHG | BODY MASS INDEX: 46.65 KG/M2 | TEMPERATURE: 98.1 F | RESPIRATION RATE: 16 BRPM | OXYGEN SATURATION: 96 % | DIASTOLIC BLOOD PRESSURE: 87 MMHG

## 2019-07-11 DIAGNOSIS — M25.561 CHRONIC PAIN OF BOTH KNEES: ICD-10-CM

## 2019-07-11 DIAGNOSIS — M17.0 OSTEOARTHRITIS OF BOTH KNEES, UNSPECIFIED OSTEOARTHRITIS TYPE: ICD-10-CM

## 2019-07-11 DIAGNOSIS — G89.29 CHRONIC PAIN OF BOTH KNEES: ICD-10-CM

## 2019-07-11 DIAGNOSIS — M25.562 CHRONIC PAIN OF BOTH KNEES: ICD-10-CM

## 2019-07-11 DIAGNOSIS — E66.01 OBESITY, MORBID (HCC): Primary | ICD-10-CM

## 2019-07-11 DIAGNOSIS — M22.40 CHONDROMALACIA OF PATELLA, UNSPECIFIED LATERALITY: ICD-10-CM

## 2019-07-11 PROCEDURE — 73560 X-RAY EXAM OF KNEE 1 OR 2: CPT

## 2019-07-11 RX ORDER — MELOXICAM 15 MG/1
15 TABLET ORAL DAILY
Qty: 30 TAB | Refills: 1 | Status: SHIPPED | OUTPATIENT
Start: 2019-07-11 | End: 2019-08-10

## 2019-07-11 RX ORDER — MELOXICAM 7.5 MG/1
7.5 TABLET ORAL DAILY
Qty: 30 TAB | Refills: 1 | Status: SHIPPED | OUTPATIENT
Start: 2019-07-11 | End: 2019-07-11

## 2019-07-11 NOTE — PROGRESS NOTES
Nursing Notes    Patient presents to the office today in follow-up. Patient rates his pain at 8/10 on the numerical pain scale. Reviewed medications with counts as follows:      Comments:       POC UDS was not performed in office today    Any new labs or imaging since last appointment? YES, Lab work    Have you been to an emergency room (ER) or urgent care clinic since your last visit? NO            Have you been hospitalized since your last visit? NO     If yes, where, when, and reason for visit? Have you seen or consulted any other health care providers outside of the [address] [place] [address]  since your last visit? YES, PCP. If yes, where, when, and reason for visit? Mr. Akila Kaufman has a reminder for a \"due or due soon\" health maintenance. I have asked that he contact his primary care provider for follow-up on this health maintenance.       3 most recent PHQ Screens 7/11/2019   PHQ Not Done -   Little interest or pleasure in doing things Several days   Feeling down, depressed, irritable, or hopeless Several days   Total Score PHQ 2 2

## 2019-07-11 NOTE — PATIENT INSTRUCTIONS

## 2019-07-12 ENCOUNTER — HOSPITAL ENCOUNTER (OUTPATIENT)
Dept: BARIATRICS/WEIGHT MGMT | Age: 49
Discharge: HOME | End: 2019-07-12

## 2019-07-12 ENCOUNTER — DOCUMENTATION ONLY (OUTPATIENT)
Dept: BARIATRICS/WEIGHT MGMT | Age: 49
End: 2019-07-12

## 2019-07-12 ENCOUNTER — DOCUMENTATION ONLY (OUTPATIENT)
Dept: PAIN MANAGEMENT | Age: 49
End: 2019-07-12

## 2019-07-12 LAB — VITAMIN B1, WHOLE BLOOD, 66250: 123.8 NMOL/L (ref 66.5–200)

## 2019-07-12 NOTE — PROGRESS NOTES
Referral date 5/20/19, source PCP and for bilateral knee pain. Today    Last Visit  [facility] - 2 & 8  [facility] - at least 40%  42%   COMM - 12      HPI:  Lara Osuna is a 52 y.o. male here for f/u visit for ongoing evaluation of chronic bilateral knee pain. Pt was last seen here on 5/20/19. Pt denies interval changes on the character or distribution of pain. Pain is located symmetrically in the peripatellar region and along bilateral joint lines described as constant aching. Stiffness andrade in the morning or after prolonged sitting. Some locking and popping. Pain at its best is 6/10. Pain at its worse is 9/10. The pain is worsened by physical activity, prolonged walking, climbing stairs and getting in and out of chairs/bathtubs/cars. Symptoms are improved with rest. Pt has tried topical medications with no perceived benefit. Pt has never tried ice, heat or orthotics. Treats with Dr Galvan/orthopedics and needs bilateral TKA but not until he loses a significant amount of weight. He has been evaluated by bariatric team and has pending gastric bypass in September 2019. Since last visit, hs completed PT which help. He has not had trail of H-wave yet. He has been taking about 2 tylenol 500mg tabs a day. He has not completed requested x-ray. Celebrex ordered at last visit was not covered by his insurance, therefore, it was switched to Mobic. He doesn't see much relief with the Mobic. ROS:  Reports depression. Denies fever, chills, nausea, vomiting, diarrhea, constipation, abdominal pain, chest pain, shortness or breath/trouble breathing, weakness, trouble swallowing, changes in vision, changes in hearing, falls, dizziness, bladder incontinence, bowel incontinence, anxiety, suicidal ideations, homicidal ideations or alcohol use.       Vitals:    07/11/19 0922   BP: 141/87   Pulse: 86   Resp: 16   Temp: 98.1 °F (36.7 °C)   TempSrc: Oral   SpO2: 96%   Weight: (!) 201.9 kg (445 lb)   Height: 5' 9\" (1.753 m) PainSc:   8        Imaging:  Pending bilateral knee x-rays. Physical exam:  AFVSS with elevated blood pressure, no acute distress, endomorphic body habitus. A&OXs 3. Normocephalic, atraumatic. Conjugate gaze, clear sclerae. EOM intact. Speech is clear and appropriate. Mood is appropriate and patient is cooperative. Gait is antalgicwithin functional limits. Balance is within functional limits. Non-labored breathing. Even rise of chest wall. Primary Care Physician  Antonio Richardson [address]  [phone]      PHQ -- .  3 most recent PHQ Screens 7/11/2019   PHQ Not Done -   Little interest or pleasure in doing things Several days   Feeling down, depressed, irritable, or hopeless Several days   Total Score PHQ 2 2         Assessment/Plan:     ICD-10-CM ICD-9-CM    1. Obesity, morbid ([facility] [address] E66.01 278.01    2. Chronic pain of both knees M25.561 719.46 meloxicam (MOBIC) 15 mg tablet    M25.562 338.29 DISCONTINUED: meloxicam (MOBIC) 7.5 mg tablet    G89.29     3. Osteoarthritis of both knees, unspecified osteoarthritis type M17.0 715.96 meloxicam (MOBIC) 15 mg tablet      DISCONTINUED: meloxicam (MOBIC) 7.5 mg tablet   4. Chondromalacia of patella, unspecified laterality M22.40 717.7 meloxicam (MOBIC) 15 mg tablet      DISCONTINUED: meloxicam (MOBIC) 7.5 mg tablet        --I do not recommend long term opioid therapy for this patient at this time for their chronic pain; the risks outweigh the potential benefits. --Mobic increased from 7.5 daily to 15mg daily. --Continue to optimize your tylenol; he can take a total of 3000mg of acetaminophen per 24 hour period. --Continue capsaicin cream as needed. --Compound cream (diclofenac and lidocaine) ordered from [facility] today. --Obtain requested xrays as previously recommended; this order was reprinted today. --Will request in office trial of H-Wave therapy with their rep Ashley Pro.   --Later, may consider trial of Visco supplementation of bilateral knees and will maintain consideration for bilateral geniculate nerve block/RF. --Follow up ongoing assessment and ongoing development of integrative and comprehensive plan of care for chronic pain. Goals: To establish complementary and integrative plan of care to address chronic pain issues while minimizing pharmaceuticals to maximize patient's function improve quality of life. Follow-up and Dispositions    · Return in about 3 months (around 10/11/2019) for 30 min.               Social History     Socioeconomic History    Marital status:      Spouse name: Not on file    Number of children: Not on file    Years of education: Not on file    Highest education level: Not on file   Occupational History    Not on file   Social Needs    Financial resource strain: Not on file    Food insecurity:     Worry: Not on file     Inability: Not on file    Transportation needs:     Medical: Not on file     Non-medical: Not on file   Tobacco Use    Smoking status: Never Smoker    Smokeless tobacco: Never Used   Substance and Sexual Activity    Alcohol use: Not Currently     Comment: Socially    Drug use: Not Currently    Sexual activity: Not Currently   Lifestyle    Physical activity:     Days per week: Not on file     Minutes per session: Not on file    Stress: Not on file   Relationships    Social connections:     Talks on phone: Not on file     Gets together: Not on file     Attends [employer] service: Not on file     Active member of club or organization: Not on file     Attends meetings of clubs or organizations: Not on file     Relationship status: Not on file    Intimate partner violence:     Fear of current or ex partner: Not on file     Emotionally abused: Not on file     Physically abused: Not on file     Forced sexual activity: Not on file   Other Topics Concern    Not on file   Social History Narrative    Not on file     Family History   Problem Relation Age of Onset    Heart Disease Mother     Stroke Father      No Known Allergies  Past Medical History:   Diagnosis Date    Arthritis     Hypertension     Knee pain      Past Surgical History:   Procedure Laterality Date    HX CHOLECYSTECTOMY       Current Outpatient Medications on File Prior to Visit   Medication Sig    capsaicin (CAPZASIN-HP) 0.1 % topical cream Apply  to affected area three (3) times daily.  acetaminophen (TYLENOL) 500 mg tablet Take 1 Tab by mouth every six (6) hours as needed for Pain. Indications: Pain associated with Arthritis    amLODIPine (NORVASC) 5 mg tablet Take 1 Tab by mouth daily. Indications: high blood pressure    methylPREDNISolone (MEDROL DOSEPACK) 4 mg tablet Take per package instructions     No current facility-administered medications on file prior to visit. [address] was used for portions of this report. Unintended errors may occur.

## 2019-07-12 NOTE — PROGRESS NOTES
[address] [place] [address] [facility], [address]    Patient's Name: Tyree Hawkins   Age: 52 y.o. YOB: 1970   Sex: male    Date:   7/12/2019    Insurance:              Session: 4 of 6  Revision:     Surgeon:  Dr. Robertson    Height: 5 f 9   Weight:    440      Lbs. BMI: 65.1   Pounds Lost since last month: 0                 Pounds Gained since last month: 2    Starting Weight: 435     Previous Months Weight: 442  Overall Pounds Lost: 0   Overall Pounds Gained: 5      Do you smoke? None    Alcohol intake:  Number of drinks at a time:  None  Number of times a week: none    Class Guidelines    Guidelines are reviewed with patient at the start of every class. 1. Patient understands that weight loss trial classes must be consecutive. Patient understands if they miss a class, it is their responsibility to contact me to reschedule class. I will reach out to patient after their first no show. 2.  Patient understands the expectations that weight maintenance/weight loss is expected during the classes. Failure to demonstrate changes may result in one extra month of weight loss trial, followed by going back to see the surgeon. Patient understands that they CAN NOT gain any weight during the weight loss trial.  Gaining weight will result in extra classes. 3. Patient is also instructed to be doing their labs, blood work, psych visit, support group and any other test that the surgeon has used while they are working on their weight loss trial.  4.  Patient was instructed to bring their blue binder to every class and appointment. Other Pertinent Information: 43 pounds per Dr. Tiki La Since Last Class: Drinking more water. Eating Habits and Behaviors    Today in class, we started talking about the key diet principles. We first focused on stopping liquid calories. Patient was also educated on carbohydrates.   Patient was instructed to start cutting out bread, rice, and pasta from the diet and start focusing more on meat and vegetables. I then gave a power point, which focused on Label Reading. In class, I gave patients a labels and we worked through a series of questions to help patients have a better understanding of label reading. Patient was instructed to review the serving size. Patient was encouraged to focus on protein and carbohydrates. We also did a few label reading activities to help the patient become more familiar with label reading. Patient's current diet habits include: Patient states he will eat his first meal around 9 am.  Patient stated he may have a breakfast sandwich, which has eggs and sausage Kristian Safer). Patient states that doesn't fill him up, but he stops eating. He states he will eat lunch around 1 pm.  He states he will drink a Lean shake that he got from the nutrition store. Patient was advised to check this to make sure it is less than 3 grams of fat and 3 grams of sugar. Lunch-Dinner:   States he may grab a bag of chips at this point. 5:30-6:00 pm:  Patient states he may have a full meal of meat and vegetables. Patient states he may have some rice. He states between dinner and bedtime he might have a yogurt. Patient states he is mainly drinking water, but may have a Coke Zero. Patient states he may have some type of cake or pastry once a day. Patient states his frequency of eating out is only once a week. Physical Activity/Exercise    Comments: We talked about exercise. Patient was given reasons of why exercise is so important and how that can help with their long-term success. I have encouraged patient to get a support system to help with the activity. Currently for activity, patient is doing very little. He states that he can't do much activity because it physically hurts. .      Behavior Modification       Comments:  Behavior modifications were reinforced. This included not eating in front of the TV, which could lead to bigger portions and eating when one is not hungry. We also talked about the importance of eating 3 meals per day. Patient was encouraged to food journal to keep their daily carbohydrates less than 30 grams per meal.      Goals that patient wants to work on includes:  1. We talked about doing some chair exercises. 2. Patient states that Kameron Kelly is putting him on the MSWL program and will do meal replacements. 3. I have talked to him about meal options and cutting meat and vegetables only. 4.  Patient needs to eliminate all of his bread, rice, pasta, cereal, chips.       Tessa Short, Devendra Trever [address]  7/12/2019

## 2019-07-15 ENCOUNTER — HOSPITAL ENCOUNTER (OUTPATIENT)
Dept: PHYSICAL THERAPY | Age: 49
Discharge: HOME | End: 2019-07-15
Payer: MEDICAID

## 2019-07-15 PROCEDURE — 97110 THERAPEUTIC EXERCISES: CPT

## 2019-07-15 PROCEDURE — 97162 PT EVAL MOD COMPLEX 30 MIN: CPT

## 2019-07-15 NOTE — PROGRESS NOTES
[facility]  [address] [facility] [address]  [place], [address]  [phone] fax    Plan of Care/ Statement of Necessity for Physical Therapy Services    Patient name: Tyree Hawkins Start of Care: 7/15/2019   Referral source: Chaparrita Cunningham MD : 1970    Medical Diagnosis: Low back pain [M54.5]  Payor: [facility] MEDICAID / Plan: [address] [place] [address] / Product Type: Managed Care Medicaid /  Onset Date:3 months ago    Treatment Diagnosis: LBP   Prior Hospitalization: see medical history Provider#: [id number]   Medications: Verified on Patient summary List    Comorbidities: arthritis, HBP, BMI > 30   Prior Level of Function: (I) with ADLs, patient has had (B) knee pain that has limited his functional mobility. The Plan of Care and following information is based on the information from the initial evaluation. Assessment/ key information: Patient is a 52 y.o male presenting with a chief complaint of LBP that has gradually gotten worse over the last few months. Patient reports his pain is at its worse with standing and walking greater than 5 minutes. Patient states he is also awaiting gastric bypass surgery in September, however he needs to lose 40 pounds prior to surgery per patient report. Patient currently ambulated with a SPC due to heightened pain. Patient presents with impairments consistent with mechanical LBP, limited functional mobility and endurance, decreased lumbar AROM, decreased strength, mild restriction (B) QL, pain with NAGI (B), increased lumbar lordosis. Patient will benefit from skilled physical therapy in order to improve patients quality of life and activity tolerance.    Evaluation Complexity History MEDIUM  Complexity : 1-2 comorbidities / personal factors will impact the outcome/ POC ; Examination MEDIUM Complexity : 3 Standardized tests and measures addressing body structure, function, activity limitation and / or participation in recreation  ;Presentation MEDIUM Complexity : Evolving with changing characteristics  ; Clinical Decision Making MEDIUM Complexity : FOTO score of 26-74  Overall Complexity Rating: MEDIUM  Problem List: pain affecting function, decrease ROM, decrease strength, impaired gait/ balance, decrease ADL/ functional abilitiies, decrease activity tolerance and decrease flexibility/ joint mobility   Treatment Plan may include any combination of the following: Therapeutic exercise, Therapeutic activities, Neuromuscular re-education, Physical agent/modality, Gait/balance training, Manual therapy, Patient education and Functional mobility training  Patient / Family readiness to learn indicated by: asking questions, trying to perform skills and interest  Persons(s) to be included in education: patient (P)  Barriers to Learning/Limitations: None  Patient Goal (s): relieve some of this pain  Patient Self Reported Health Status: poor  Rehabilitation Potential: fair    Short Term Goals: To be accomplished in 2 weeks:   1. Patient will be compliant with HEP 2x/day in order to maximize therapeutic benefit. 2. Patent will be able to decrease LBP to no more than 4/10 in order to improve ADL tolerance. Long Term Goals: To be accomplished in 4 weeks:   1. Patient will be able to improve (B) glute med strength to 4+/5 in order to improve ease of ADLs. 2. Patient will be able to tolerate standing greater than 10 minutes in order to improve quality of life. 3. Patient will be able to improve lumbar AROM to 75 % WNL in order to improve ease of ADLs. 4. Patient will be able to improve FOTO score to 64 in order to demonstrate improvements in functional independence. Frequency / Duration: Patient to be seen 2 times per week for 4 weeks.     Patient/ Caregiver education and instruction: Diagnosis, prognosis, activity modification and exercises   [x]  Plan of care has been reviewed with [facility], PT 7/15/2019 9:37 AM    ________________________________________________________________________    I certify that the above Therapy Services are being furnished while the patient is under my care. I agree with the treatment plan and certify that this therapy is necessary.     Physician's Signature:____________Date:_________TIME:________    ** Signature, Date and Time must be completed for valid certification **    Please sign and return to [facility] [address] [place] [address] & [facility]  [address]  [place], [facility] [address]  [phone] fax

## 2019-07-15 NOTE — PROGRESS NOTES
PT DAILY TREATMENT NOTE 10-18    Patient Name: Jose David Naranjo  Date:7/15/2019  : 1970  [x]  Patient  Verified  Payor: [facility] MEDICAID / Plan: [address] [place] [address] / Product Type: Managed Care Medicaid /    In time:9:08  Out time:9:46  Total Treatment Time (min): 38  Visit #: 1 of 8      Treatment Area: Low back pain [M54.5]    SUBJECTIVE  Pain Level (0-10 scale): 6-7  Any medication changes, allergies to medications, adverse drug reactions, diagnosis change, or new procedure performed?: [x] No    [] Yes (see summary sheet for update)  Subjective functional status/changes:   [] No changes reported   Patient is a 52 y.o male presenting with a chief complaint of LBP that has gradually gotten worse over the last few months. Patient reports his pain is at its worse with standing and walking greater than 5 minutes. Patient states he is also awaiting gastric bypass surgery in September, however he needs to lose 40 pounds prior to surgery per patient report. OBJECTIVE      15 min [x]Eval                  []Re-Eval       23 min Therapeutic Exercise:  [x] See flow sheet :   Rationale: increase ROM and increase strength to improve the patients ability to perform ADLs. With   [] TE   [] TA   [] neuro   [] other: Patient Education: [x] Review HEP    [] Progressed/Changed HEP based on:   [] positioning   [] body mechanics   [] transfers   [] heat/ice application    [] other:      Other Objective/Functional Measures: See IE     Pain Level (0-10 scale) post treatment: 6    ASSESSMENT/Changes in Function: Patient currently ambulated with a SPC due to heightened pain. Patient presents with impairments consistent with mechanical LBP, limited functional mobility and endurance, decreased lumbar AROM, decreased strength, mild restriction (B) QL, pain with NAGI (B), increased lumbar lordosis.  Patient will benefit from skilled physical therapy in order to improve patients quality of life and activity tolerance. Patient will continue to benefit from skilled PT services to modify and progress therapeutic interventions, address functional mobility deficits, address ROM deficits, address strength deficits, analyze and address soft tissue restrictions, analyze and cue movement patterns, analyze and modify body mechanics/ergonomics and assess and modify postural abnormalities to attain remaining goals. [x]  See Plan of Care  []  See progress note/recertification  []  See Discharge Summary         Progress towards goals / Updated goals:  Short Term Goals: To be accomplished in 2 weeks:               1. Patient will be compliant with HEP 2x/day in order to maximize therapeutic benefit. 2. Patent will be able to decrease LBP to no more than 4/10 in order to improve ADL tolerance. Long Term Goals: To be accomplished in 4 weeks:               1. Patient will be able to improve (B) glute med strength to 4+/5 in order to improve ease of ADLs. 2. Patient will be able to tolerate standing greater than 10 minutes in order to improve quality of life. 3. Patient will be able to improve lumbar AROM to 75 % WNL in order to improve ease of ADLs. 4. Patient will be able to improve FOTO score to 64 in order to demonstrate improvements in functional independence.      PLAN  []  Upgrade activities as tolerated     [x]  Continue plan of care  []  Update interventions per flow sheet       []  Discharge due to:_  []  Other:_      Bronwyn Boston PT 7/15/2019  9:37 AM    Future Appointments   Date Time Provider Michelle Reddy   8/8/2019 10:15 AM [facility] BARIATRIC DIETITIAN [facility]   8/21/2019  8:30 AM Cinthia Nolan MD [facility]   9/3/2019 11:00 AM MD Raj Patrick

## 2019-07-25 ENCOUNTER — HOSPITAL ENCOUNTER (OUTPATIENT)
Dept: PHYSICAL THERAPY | Age: 49
Discharge: HOME | End: 2019-07-25
Payer: MEDICAID

## 2019-07-25 PROCEDURE — 97110 THERAPEUTIC EXERCISES: CPT

## 2019-07-25 NOTE — PROGRESS NOTES
PT DAILY TREATMENT NOTE     Patient Name: Jose David Naranjo  Date:2019  : 1970  [x]  Patient  Verified  Payor: [facility] MEDICAID / Plan: [facility] [address] / Product Type: Managed Care Medicaid /    In time:8:00  Out time:8:37  Total Treatment Time (min): 37  Visit #: 2 of 8    Treatment Area: Low back pain [M54.5]    SUBJECTIVE  Pain Level (0-10 scale): 8/10  Any medication changes, allergies to medications, adverse drug reactions, diagnosis change, or new procedure performed?: [x] No    [] Yes (see summary sheet for update)  Subjective functional status/changes:   [] No changes reported  \"Knees and back right now. \"    OBJECTIVE    37 min Therapeutic Exercise:  [x] See flow sheet :   Rationale: increase ROM, increase strength and increase proprioception to improve the patients ability to perform ADL's. With   [x] TE   [] TA   [] neuro   [] other: Patient Education: [x] Review HEP    [] Progressed/Changed HEP based on:   [] positioning   [] body mechanics   [] transfers   [] heat/ice application    [] other:      Other Objective/Functional Measures: Initiated exercises per flow sheet. Pain Level (0-10 scale) post treatment: 5/10    ASSESSMENT/Changes in Function: First F/U visit. Limited trunk and hip mobility. Pt fully participated in treatment. Patient will continue to benefit from skilled PT services to modify and progress therapeutic interventions, address functional mobility deficits, address ROM deficits, address strength deficits, analyze and cue movement patterns and analyze and modify body mechanics/ergonomics to attain remaining goals. [x]  See Plan of Care  []  See progress note/recertification  []  See Discharge Summary         Progress towards goals / Updated goals:  Short Term Goals: To be accomplished in 2 weeks:               1. Patient will be compliant with HEP 2x/day in order to maximize therapeutic benefit. - Performing every other day per pt. 7/25/2019               2. Patent will be able to decrease LBP to no more than 4/10 in order to improve ADL tolerance. Long Term Goals: To be accomplished in 4 weeks:               1. Patient will be able to improve (B) glute med strength to 4+/5 in order to improve ease of ADLs.              2. Patient will be able to tolerate standing greater than 10 minutes in order to improve quality of life.                3. Patient will be able to improve lumbar AROM to 75 % WNL in order to improve ease of ADLs.              4. Patient will be able to improve FOTO score to 64 in order to demonstrate improvements in functional independence.      PLAN  []  Upgrade activities as tolerated     [x]  Continue plan of care  []  Update interventions per flow sheet       []  Discharge due to:_  []  Other:_      Fidel Messer PTA 7/25/2019  7:57 AM    Future Appointments   Date Time Provider Michelle Reddy   7/25/2019  8:00 AM Olivia Jung PTA [facility]   7/29/2019  8:30 [facility]   8/1/2019  8:00 AM [facility]   8/5/2019  8:30 AM [facility]   8/8/2019 10:15 AM HBV BARIATRIC DIETITIAN [facility]   8/9/2019  8:30 AM [facility]   8/21/2019  8:30 AM Cinthia Nolan MD [facility]   9/3/2019 11:00 AM [facility]

## 2019-08-01 ENCOUNTER — HOSPITAL ENCOUNTER (OUTPATIENT)
Dept: LAB | Age: 49
Discharge: HOME | End: 2019-08-01
Payer: MEDICAID

## 2019-08-01 ENCOUNTER — HOSPITAL ENCOUNTER (OUTPATIENT)
Dept: PHYSICAL THERAPY | Age: 49
Discharge: HOME | End: 2019-08-01
Payer: MEDICAID

## 2019-08-01 ENCOUNTER — DOCUMENTATION ONLY (OUTPATIENT)
Dept: SURGERY | Age: 49
End: 2019-08-01

## 2019-08-01 DIAGNOSIS — Z01.818 PREOPERATIVE TESTING: ICD-10-CM

## 2019-08-01 LAB
ATRIAL RATE: 72 BPM
CALCULATED P AXIS, ECG09: 30 DEGREES
CALCULATED R AXIS, ECG10: 23 DEGREES
CALCULATED T AXIS, ECG11: 25 DEGREES
DIAGNOSIS, 93000: NORMAL
P-R INTERVAL, ECG05: 206 MS
Q-T INTERVAL, ECG07: 418 MS
QRS DURATION, ECG06: 110 MS
QTC CALCULATION (BEZET), ECG08: 457 MS
VENTRICULAR RATE, ECG03: 72 BPM

## 2019-08-01 PROCEDURE — 93005 ELECTROCARDIOGRAM TRACING: CPT

## 2019-08-01 PROCEDURE — 97110 THERAPEUTIC EXERCISES: CPT

## 2019-08-01 NOTE — PROGRESS NOTES
Please let him know that his x-ray series shows severe bilateral arthritis.     Dr. Ramirez Record  [facility], [address] [facility] [place], [address]  Phone: [phone]  Fax: [phone]

## 2019-08-01 NOTE — PROGRESS NOTES
Patient attended a Medically Supervised Weight Loss New Patient Orientation today where we discussed:  - New Direction Very Low Calorie Diet details  - Medical Supervision  - Nutrition education  - Cost of Meal Replacements  - Policies and compliance required for program enrollment.      Patients initial consultation with provider is tentatively scheduled for:  Future Appointments   Date Time Provider Michelle Reddy   8/5/2019  8:30 AM Izzy Nelson PTA [facility]   8/8/2019 10:15 AM HBV BARIATRIC DIETITIAN [facility]   8/9/2019  8:30 AM Saira Tanner PTA [facility]   8/21/2019  8:30 AM Sidra Hong MD [facility] JULI[facility]N SCHED   8/21/2019 10:30 AM Fabienne Fernandez PA-C [facility]UL[facility]N SCHED   9/3/2019 11:00 AM MD [facility]       Patient has already completed MWL initial labs/EKG ordered by CARRINGTON Polanco.

## 2019-08-01 NOTE — PROGRESS NOTES
PT DAILY TREATMENT NOTE 10-18    Patient Name: Katharine Lam  Date:2019  : 1970  [x]  Patient  Verified  Payor: [facility] MEDICAID / Plan: [address] [place] [address] / Product Type: Managed Care Medicaid /    In time:8:00 Out time:8:38  Total Treatment Time (min): 38  Visit #: 3 of 8    Treatment Area: Low back pain [M54.5]    SUBJECTIVE  Pain Level (0-10 scale): 8/10  Any medication changes, allergies to medications, adverse drug reactions, diagnosis change, or new procedure performed?: [x] No    [] Yes (see summary sheet for update)  Subjective functional status/changes:   [] No changes reported  Patient stated that he felt better after he left last session, but the pain relief didn't last.     OBJECTIVE      38 min Therapeutic Exercise:  [x] See flow sheet :   Rationale: increase ROM and increase strength to improve the patients ability to perform ADls. With   [] TE   [] TA   [] neuro   [] other: Patient Education: [x] Review HEP    [] Progressed/Changed HEP based on:   [] positioning   [] body mechanics   [] transfers   [] heat/ice application    [] other:      Other Objective/Functional Measures: Patient is challenged with step ups due to knee discomfort, but denied LBP. Pain Level (0-10 scale) post treatment: 6/10     ASSESSMENT/Changes in Function: Patient exhibits limited trunk and hip mobility and is cautious with movement. (B) knee pain limited patient's tolerance to weight bearing exercises so reduced amount of reps. Patient reported reduction in low back pain at end of the session. Patient will continue to benefit from skilled PT services to modify and progress therapeutic interventions, address functional mobility deficits, address ROM deficits, address strength deficits, analyze and address soft tissue restrictions, analyze and cue movement patterns, assess and modify postural abnormalities and address imbalance/dizziness to attain remaining goals.      []  See Plan of Care  []  See progress note/recertification  []  See Discharge Summary         Progress towards goals / Updated goals:  Short Term Goals: To be accomplished in 2 weeks:               1. Patient will be compliant with HEP 2x/day in order to maximize therapeutic benefit. - Performing every other day per pt. 7/25/2019               2. Patent will be able to decrease LBP to no more than 4/10 in order to improve ADL tolerance. Long Term Goals: To be accomplished in 4 weeks:               1. Patient will be able to improve (B) glute med strength to 4+/5 in order to improve ease of ADLs.              2. Patient will be able to tolerate standing greater than 10 minutes in order to improve quality of life.                3. Patient will be able to improve lumbar AROM to 75 % WNL in order to improve ease of ADLs.                 4. Patient will be able to improve FOTO score to 64 in order to demonstrate improvements in functional independence.      PLAN  []  Upgrade activities as tolerated     [x]  Continue plan of care  []  Update interventions per flow sheet       []  Discharge due to:_  []  Other:_      Ananth Score, PT 8/1/2019  7:58 AM    Future Appointments   Date Time Provider Michelle Maureen   8/1/2019  8:00 AM [facility]   8/5/2019  8:30 AM [facility]ram Yeung[facility]   8/8/2019 10:15 AM HBV BARIATRIC DIETITIAN [facility]   8/9/2019  8:30 AM Anjelica Horn, [facility]   8/21/2019  8:30 AM Anali Pichardo MD [facility]   9/3/2019 11:00 AM Dariana Greenfield MD [id number]

## 2019-08-02 ENCOUNTER — TELEPHONE (OUTPATIENT)
Dept: INTERNAL MEDICINE CLINIC | Age: 49
End: 2019-08-02

## 2019-08-02 ENCOUNTER — TELEPHONE (OUTPATIENT)
Dept: SURGERY | Age: 49
End: 2019-08-02

## 2019-08-02 NOTE — TELEPHONE ENCOUNTER
----- Message from Abbey Guardado MD sent at 8/1/2019  7:58 AM EDT -----  Please let him know that his x-ray series shows severe bilateral arthritis.     Dr. Daxa Ingiuez  [facility] [address], [facility] [address]  Phone: [phone]  Fax: [phone]

## 2019-08-02 NOTE — TELEPHONE ENCOUNTER
Attempted to contact patient to notify him that per Beth Yeung he is able to start VLCD. We will use the upcoming appt as a followup.     Future Appointments   Date Time Provider Michelle Reddy   8/5/2019  8:30 AM [facility]   8/8/2019 10:15 AM HBV BARIATRIC DIETITIAN [facility]   8/9/2019  8:30 AM [facility]   8/21/2019  8:30 AM Laverne Esparza MD [facility] JULIÁN SCHED   8/21/2019 10:30 AM Letitia Torres PA-C [facility] SCHED   9/3/2019 11:00 AM [facility]

## 2019-08-05 ENCOUNTER — HOSPITAL ENCOUNTER (OUTPATIENT)
Dept: PHYSICAL THERAPY | Age: 49
Discharge: HOME | End: 2019-08-05
Payer: MEDICAID

## 2019-08-05 PROCEDURE — 97110 THERAPEUTIC EXERCISES: CPT

## 2019-08-05 NOTE — PROGRESS NOTES
PT DAILY TREATMENT NOTE 10-18    Patient Name: Savage Tejeda  Date:2019  : 1970  [x]  Patient  Verified  Payor: [facility] MEDICAID / Plan: [address] / Product Type: Managed Care Medicaid /    In time:830  Out time:902  Total Treatment Time (min): 32  Visit #: 4 of 8    Treatment Area: Low back pain [M54.5]    SUBJECTIVE  Pain Level (0-10 scale): 7  Any medication changes, allergies to medications, adverse drug reactions, diagnosis change, or new procedure performed?: [x] No    [] Yes (see summary sheet for update)  Subjective functional status/changes:   [] No changes reported  Patient reported back and right knee pain upon arrival    OBJECTIVE      32 min Therapeutic Exercise:  [] See flow sheet :   Rationale: increase ROM and increase strength to improve the patients ability to perform ADLs          With   [] TE   [] TA   [] neuro   [] other: Patient Education: [x] Review HEP    [] Progressed/Changed HEP based on:   [] positioning   [] body mechanics   [] transfers   [] heat/ice application    [] other:      Other Objective/Functional Measures: added TA set with marching     Pain Level (0-10 scale) post treatment: 5 back 2 knee    ASSESSMENT/Changes in Function: Patient tolerated added therex without c/o increased pain but was limited in sit<>stand due to increased knee pain. Discussed importance of compliance with HEP today and patient verbalized understanding and intent to start performing HEP 2x per day. Patient will continue to benefit from skilled PT services to modify and progress therapeutic interventions, address functional mobility deficits, address ROM deficits, address strength deficits, analyze and address soft tissue restrictions and analyze and cue movement patterns to attain remaining goals.      []  See Plan of Care  []  See progress note/recertification  []  See Discharge Summary         Progress towards goals / Updated goals:  Short Term Goals: To be accomplished in 2 weeks:               1. Patient will be compliant with HEP 2x/day in order to maximize therapeutic benefit.  - Performing every other day per pt. 7/25/2019               2. Patent will be able to decrease LBP to no more than 4/10 in order to improve ADL tolerance. Not met - averaging around 7/10 at this [address] be accomplished in 4 weeks:               1. Patient will be able to improve (B) glute med strength to 4+/5 in order to improve ease of ADLs.              2. Patient will be able to tolerate standing greater than 10 minutes in order to improve quality of life.                3. Patient will be able to improve lumbar AROM to 75 % WNL in order to improve ease of ADLs.                 4. Patient will be able to improve FOTO score to 64 in order to demonstrate improvements in functional independence.        PLAN  []  Upgrade activities as tolerated     [x]  Continue plan of care  []  Update interventions per flow sheet       []  Discharge due to:_  []  Other:_      Lesvia Solares PTA 8/5/2019  8:57 AM    Future Appointments   Date Time Provider Michelle Reddy   8/8/2019 10:15 AM [facility] BARIATRIC DIETITIAN [facility]   8/9/2019  8:30 AM Thais Molina PTA [facility]   8/21/2019  8:30 AM Anali Pichardo MD [facility]   8/21/2019 10:30 AM Una Matson PA-C [facility]   9/3/2019 11:00 AM Dariana [facility]

## 2019-08-08 ENCOUNTER — DOCUMENTATION ONLY (OUTPATIENT)
Dept: BARIATRICS/WEIGHT MGMT | Age: 49
End: 2019-08-08

## 2019-08-08 ENCOUNTER — HOSPITAL ENCOUNTER (OUTPATIENT)
Dept: BARIATRICS/WEIGHT MGMT | Age: 49
Discharge: HOME | End: 2019-08-08

## 2019-08-08 NOTE — PROGRESS NOTES
[address] [place] [address] [facility], [address]    Patient's Name: Reynold Collet   Age: 52 y.o. YOB: 1970   Sex: male    Date:   8/8/2019    Insurance:            Session: 5 of 6  Revision:   Surgeon:  Dr. Anna Wick    Height: 5 f 9 Weight:    447      Lbs. BMI: 66.1   Pounds Lost since last month: 0               Pounds Gained since last month: 7      Starting Weight: 435   Previous Months Weight: 440  Overall Pounds Lost: 0 Overall Pounds Gained: 12      Do you smoke? None    Alcohol intake:  Number of drinks at a time:  None  Number of times a week: None    Class Guidelines    Guidelines are reviewed with patient at the start of every class. 1. Patient understands that weight loss trial classes must be consecutive. Patient understands if they miss a class, it is their responsibility to contact me to reschedule class. I will reach out to patient after their first no show. 2.  Patient understands the expectations that weight maintenance/weight loss is expected during the classes. Failure to demonstrate changes may result in one extra month of weight loss trial, followed by going back to see the surgeon. Patient understands that they CAN NOT gain any weight during the weight loss trial.  Gaining weight will result in extra classes. 3. Patient is also instructed to be doing their labs, blood work, psych visit, support group and any other test that the surgeon has used while they are working on their weight loss trial.  4.  Patient was instructed to bring their blue binder to every class and appointment. Other Pertinent Information:     Changes Made Since Last Class: None listed. Eating Habits and Behaviors    Today in class, we reviewed the key diet principles. I have talked to patient about pushing the fluid and working towards 64 ounces per day. We focused on following a low-calorie diet.   Patient was instructed to count their carbohydrates and try to keep their daily intake under 100 grams per day and try to keep their daily protein at 80 grams per day. Patient was given examples of carbohydrates in starches. Patient was encouraged to focus on meat and vegetables and begin cutting carbohydrates out. We talked about foods that are protein-based and how to incorporate those into their meals. I also reviewed with patient the importance of eating 3 meals per day and suggestions were made for breakfast items. Patient's current diet habits include: 3 meals per day. Patient states he is doing a breakfast sandwich for breakfast.  Lunch is a sandwich. He states dinner is a full meal, but he did not indicate what he is eating. Patient states he is snacking 2-3 x a day on cakes, cookies, and ice cream, which I have talked to him about again. He is drinking 50 ounces of water and some unsweetened tea. Physical Activity/Exercise    Comments: We talked about exercise. Patient was given reasons of why exercise is so important and how that can help with their long-term success. I have encouraged patient to get a support system to help with the activity. Currently for activity, patient is doing very little, stating his knees hurt. Behavior Modification       Comments:  During today's lesson, I gave a presentation called The 100-200 Calorie \"Mindless Margin. \"  The goal is to make modest daily 100-200 calorie reductions in certain things that the body won't notice. One, 100-200 calorie change and would will look 10-20 pounds in one year. An example could be cutting soda. Patient was given a check off list and was encouraged to come up with 1-3 100 calories changes they could make. The check off list is a daily tracker to see if these goals are being met. Goals that patient wants to work on includes:  1. Cut out snacks. 2. Cut out all bread.       Darya Nguyen MS RD  8/8/2019

## 2019-08-09 ENCOUNTER — HOSPITAL ENCOUNTER (OUTPATIENT)
Dept: PHYSICAL THERAPY | Age: 49
Discharge: HOME | End: 2019-08-09
Payer: MEDICAID

## 2019-08-09 PROCEDURE — 97110 THERAPEUTIC EXERCISES: CPT

## 2019-08-09 NOTE — PROGRESS NOTES
PT DAILY TREATMENT NOTE     Patient Name: Melissa Driver  Date:2019  : 1970  [x]  Patient  Verified  Payor: [facility] MEDICAID / Plan: [address] [place] [address] / Product Type: Managed Care Medicaid /    In time:8:25  Out time:9:00  Total Treatment Time (min): 35  Visit #: 5 of 8    Treatment Area: Low back pain [M54.5]    SUBJECTIVE  Pain Level (0-10 scale): 7/10  Any medication changes, allergies to medications, adverse drug reactions, diagnosis change, or new procedure performed?: [x] No    [] Yes (see summary sheet for update)  Subjective functional status/changes:   [x] No changes reported    OBJECTIVE    35 min Therapeutic Exercise:  [x] See flow sheet :   Rationale: increase ROM, increase strength and increase proprioception to improve the patients ability to perform ADL's and functional activities. With   [x] TE   [] TA   [] neuro   [] other: Patient Education: [x] Review HEP    [] Progressed/Changed HEP based on:   [] positioning   [] body mechanics   [] transfers   [] heat/ice application    [] other:      Other Objective/Functional Measures: Increased mini-squats to 15 reps. Pain Level (0-10 scale) post treatment: 3/10    ASSESSMENT/Changes in Function: Pt reports being able to stand for ~5 minutes before having to sit down. Pt has 1 more appointment available, pt will call back next week and either schedule 1 visit or D/C to HEP. Patient will continue to benefit from skilled PT services to modify and progress therapeutic interventions, address functional mobility deficits, address ROM deficits, address strength deficits, analyze and cue movement patterns and analyze and modify body mechanics/ergonomics to attain remaining goals. [x]  See Plan of Care  []  See progress note/recertification  []  See Discharge Summary         Progress towards goals / Updated goals:  Short Term Goals: To be accomplished in 2 weeks:               1.  Patient will be compliant with HEP 2x/day in order to maximize therapeutic benefit.  - Performing every other day per pt. 7/25/2019               2. Patent will be able to decrease LBP to no more than 4/10 in order to improve ADL tolerance. Not met - averaging around 7/10 at this [address] be accomplished in 4 weeks:               1. Patient will be able to improve (B) glute med strength to 4+/5 in order to improve ease of ADLs.              2. Patient will be able to tolerate standing greater than 10 minutes in order to improve quality of life. - Pt reports being able to stand for ~5 minutes before having to sit down. 8/9/2019               3. Patient will be able to improve lumbar AROM to 75 % WNL in order to improve ease of ADLs.                4. Patient will be able to improve FOTO score to 64 in order to demonstrate improvements in functional independence.     PLAN  []  Upgrade activities as tolerated     [x]  Continue plan of care  []  Update interventions per flow sheet       []  Discharge due to:_  []  Other:_      Jose Francisco Thomas PTA 8/9/2019  8:11 AM    Future Appointments   Date Time Provider Michelle Reddy   8/9/2019  8:30 AM Jerel Larsen PTA [facility]   8/21/2019  8:30 AM Edmar Aguero MD [facility]   8/21/2019 10:30 AM MANN MonV [facility]UL[facility]   9/3/2019 11:00 AM MD Raj Muhammad   9/12/2019 10:15 AM HBV BARIATRIC DIETITIAN [facility]

## 2019-08-20 NOTE — PROGRESS NOTES
Initial Consultation for Weight Loss    Romaine Dos Santos is a 50 y.o. male who comes into the office today for initial consultation for the medically managed weight loss options for the treatment of morbid obesity. The patient has been involved in our bariatric surgery program but due to the fact that he has been unable to lose weight throughout his weight loss trial he is agreed to pursue medically managed weight loss in an effort to reach his goal weight for surgery. The patient initially identified obesity at the age of 15 and at age 25 weighed 36lbs. He has tried a variety of unsupervised weight-loss attempts including self imposed and exercise, but has yet to meet with lasting success. Maximum weight lost on a diet is about 200 lbs, but that the weight loss always seems to return. Today, the patient is   Height: 5' 9\" (175.3 cm) tall, Weight: (!) 199.3 kg (439 lb 4.8 oz) lbs for a Body mass index is 64.87 kg/m². It is due to the patient's severe obesity, which is further complicated by hypertension and weight related arthopathies  that the patient is now seeking out medically supervised VLCD. Pt was instructed that prior to surgery he will need to lose 39 lbs to be at or below 400 lbs to move forward with 2 stage approach. As of August 2019, he has not lost any significant weight.  .      Ideal body weight: 70.7 kg (155 lb 13.8 oz)  Adjusted ideal body weight: 122.1 kg (269 lb 3.8 oz) (Based on [employer] Tables)      Wt Readings from Last 10 Encounters:   08/21/19 (!) 199.3 kg (439 lb 4.8 oz)   07/11/19 (!) 201.9 kg (445 lb)   07/08/19 (!) 201.9 kg (445 lb)   07/03/19 (!) 201.2 kg (443 lb 9.6 oz)   06/10/19 (!) 199.9 kg (440 lb 12.8 oz)   05/20/19 (!) 197.3 kg (435 lb)   03/15/19 (!) 197.3 kg (435 lb)   02/23/19 (!) 196.4 kg (433 lb)   02/22/19 (!) 196.4 kg (433 lb)   02/21/19 (!) 196.4 kg (433 lb)       Weight Metrics 8/21/2019 8/21/2019 7/11/2019 7/8/2019 7/3/2019 6/10/2019 5/20/2019 Weight - 439 lb 4.8 oz 445 lb 445 lb 443 lb 9.6 oz 440 lb 12.8 oz 435 lb   Waist Measure Inches 72 - - - - - -   BMI - 64.87 kg/m2 65.72 kg/m2 65.72 kg/m2 65.51 kg/m2 65.09 kg/m2 64.24 kg/m2           History of binge eating: no    History of purging: no    Major lifestyle changes: no   Other commitments: no   Any potential unsupportive: no     Has Gaetano Santillan ever been told by a physician not to exercise: no    Does Gaetano Santillan know of any reason they shouldn't exercise: Yes if yes, why? Patient states it is quite difficult for him to ambulate on a regular basis as a result of his weight related arthropathies. He does not have access to a pool. Does Gaetano Santillan have any food allergies or sensitivities: no      MWL questionnaire reviewed. If female:  No LMP for male patient. Past Medical History:   Diagnosis Date    Arthritis     lower back and kness    Hypertension     Knee pain        Past Surgical History:   Procedure Laterality Date    HX CHOLECYSTECTOMY         Current Outpatient Medications   Medication Sig Dispense Refill    meloxicam (MOBIC) 7.5 mg tablet TAKE 1 TABLET BY MOUTH ONCE DAILY  1    capsaicin (CAPZASIN-HP) 0.1 % topical cream Apply  to affected area three (3) times daily. 56.6 g 2    acetaminophen (TYLENOL) 500 mg tablet Take 1 Tab by mouth every six (6) hours as needed for Pain. Indications: Pain associated with Arthritis 180 Tab 2    amLODIPine (NORVASC) 5 mg tablet Take 1 Tab by mouth daily.  Indications: high blood pressure 90 Tab 3    methylPREDNISolone (MEDROL DOSEPACK) 4 mg tablet Take per package instructions 1 Dose Pack 0       No Known Allergies    Social History     Tobacco Use    Smoking status: Never Smoker    Smokeless tobacco: Never Used   Substance Use Topics    Alcohol use: Not Currently     Comment: Socially    Drug use: Not Currently       Family History   Problem Relation Age of Onset    Heart Disease Mother     Diabetes Mother     Stroke Father        Family Status   Relation Name Status    Mother      Father         Review of Systems:   ROS       Review of Systems:  Positive in BOLD  CONST: Fever, weight loss, fatigue or chills  GI: Nausea, vomiting, abdominal pain, change in bowel habits, hematochezia, melena, and GERD   INTEG: Dermatitis, abnormal moles  HEENT: Recent changes in vision, vertigo, epistaxis, dysphagia and hoarseness, tooth pain  CV: Chest pain, palpitations, HTN, edema and varicosities  RESP: Cough, shortness of breath, wheezing, hemoptysis, snoring and reactive airway disease  : Hematuria, dysuria, frequency, urgency, nocturia and stress urinary incontinence   MS: Weakness, joint pain and arthritis  ENDO: Diabetes, thyroid disease, polyuria, polydipsia, polyphagia, poor wound healing, heat intolerance, cold intolerance  LYMPH/HEME: Anemia, bruising and history of blood transfusions  NEURO: Dizziness, headache, fainting, seizures and BKKXJC [id number] (was on blood thinner but was taken off)   PSYCH: Anxiety and depression       Physical Exam    Visit Vitals  BP (!) 149/93 Comment: pt has not taken b/p meds yet today. Pulse 79   Temp 98.5 °F (36.9 °C) (Oral)   Resp 18   Ht 5' 9\" (1.753 m)   Wt (!) 199.3 kg (439 lb 4.8 oz)   SpO2 96%   BMI 64.87 kg/m²               General: 49 y.o. male in no acute distress. Morbidly obese in abdomen, hips, thighs - mixed pattern  HEENT: Normocephalic, atraumatic, Pupils equal and reactive, nasopharynx clear, oropharynx clear and moist without lesions  NECK: Supple, no lymphadenopathy, thyromegaly, carotid bruits or jugular venous distension. trachea midline  RESP: Clear to auscultation bilaterally, no wheezes, rhonchi, or rales, normal respiratory excursion  CV: Regular rate and rhythm, no murmurs, rubs or gallops. 3+/4 pulses in bilateral dorsalis pedis and posterior tibialis. No distal edema or varicosities.   ABD: Soft, nontender, nondistended, normoactive bowel sounds, no hernias, no hepatosplenomegaly, minimally palpable costal margins, mixed distribution  Extremities: Warm, well perfused, no tenderness or swelling, normal gait/station  Neuro: Sensation and strength grossly intact and symmetrical  Psych: Alert and oriented to person, place, and time. Workup  Labs:hypovitaminosis D, being addressed. EKG:Normal sinus rhythm   Normal ECG   No previous ECGs available   Confirmed by Angel Cervantes MD, Marcela Junior     Assessment/Plan  Benny Wick is a 52 y.o. male who is suffering from obesity with a BMI of Body mass index is 64.87 kg/m². and comorbidities including those outlined above who would benefit from weight loss. ICD-10-CM ICD-9-CM    1. Obesity, morbid ([facility] K31.06 914.31 METABOLIC PANEL, COMPREHENSIVE      URIC ACID   2. History of CVA (cerebrovascular accident) L61.91 Z38.04 METABOLIC PANEL, COMPREHENSIVE      URIC ACID   3. Osteoarthritis of both knees, unspecified osteoarthritis type R40.4 444.99 METABOLIC PANEL, COMPREHENSIVE      URIC ACID   4. Chronic pain of both knees S99.967 202.31 METABOLIC PANEL, COMPREHENSIVE    M25.562 338.29 URIC ACID    G89.29     5. BMI 60.0-69.9, adult ([facility] T02.50 I83.83 METABOLIC PANEL, COMPREHENSIVE      URIC ACID   6. Dyspnea on exertion G26.20 300.33 METABOLIC PANEL, COMPREHENSIVE      URIC ACID   7. Hypertension, unspecified type T96 155.6 METABOLIC PANEL, COMPREHENSIVE      URIC ACID   8. Encounter for weight loss counseling W41.5 N93.9 METABOLIC PANEL, COMPREHENSIVE      URIC ACID       Diet regimen   # of meal replacements prescribed: 4 MR/5 if needed+FF   If modified LCD-nutritional guidelines:    Monthly Goal   12-20#    Medical monitoring schedule:   Weekly BP/Weight checks   Monthly provider appointments              Monthly CMP, uric acid checks      I have reviewed/discussed the above normal BMI with the patient. I have recommended the following interventions: dietary management education, guidance, and counseling .        Errol Reid has a reminder for a \"due or due soon\" health maintenance. I have asked that he contact his primary care provider for follow-up on this health maintenance. Gertrude Monge MS, PA-C    Pt was instructed that prior to surgery he will need to lose 39 lbs to be at or below 400 lbs to move forward with 2 stage approach. As of August 2019, he has not lost any significant weight. Shanice Goodwin

## 2019-08-21 ENCOUNTER — OFFICE VISIT (OUTPATIENT)
Dept: SURGERY | Age: 49
End: 2019-08-21

## 2019-08-21 VITALS
TEMPERATURE: 98.5 F | OXYGEN SATURATION: 96 % | HEART RATE: 79 BPM | DIASTOLIC BLOOD PRESSURE: 93 MMHG | BODY MASS INDEX: 46.65 KG/M2 | RESPIRATION RATE: 18 BRPM | HEIGHT: 69 IN | SYSTOLIC BLOOD PRESSURE: 149 MMHG | WEIGHT: 315 LBS

## 2019-08-21 DIAGNOSIS — I10 HYPERTENSION, UNSPECIFIED TYPE: ICD-10-CM

## 2019-08-21 DIAGNOSIS — G89.29 CHRONIC PAIN OF BOTH KNEES: ICD-10-CM

## 2019-08-21 DIAGNOSIS — M17.0 OSTEOARTHRITIS OF BOTH KNEES, UNSPECIFIED OSTEOARTHRITIS TYPE: ICD-10-CM

## 2019-08-21 DIAGNOSIS — E66.01 OBESITY, MORBID (HCC): Primary | ICD-10-CM

## 2019-08-21 DIAGNOSIS — Z71.3 ENCOUNTER FOR WEIGHT LOSS COUNSELING: ICD-10-CM

## 2019-08-21 DIAGNOSIS — Z86.73 HISTORY OF CVA (CEREBROVASCULAR ACCIDENT): ICD-10-CM

## 2019-08-21 DIAGNOSIS — R06.09 DYSPNEA ON EXERTION: ICD-10-CM

## 2019-08-21 DIAGNOSIS — M25.562 CHRONIC PAIN OF BOTH KNEES: ICD-10-CM

## 2019-08-21 DIAGNOSIS — M25.561 CHRONIC PAIN OF BOTH KNEES: ICD-10-CM

## 2019-08-21 RX ORDER — MELOXICAM 7.5 MG/1
TABLET ORAL
Refills: 1 | COMMUNITY
Start: 2019-06-11 | End: 2019-10-18 | Stop reason: ALTCHOICE

## 2019-08-21 NOTE — PATIENT INSTRUCTIONS
Monthly goal:      4 meal replacements daily, no other food. First one within about 1 hour of waking up to get metabolism started. Don't go more than 6 hours between meals. No more than 1 soup a day, this is too much salt. No more than 1 bar a day, this not enough protein. You are allotted 10 carbs extra a day. Recommendations       - Consume your 4 daily meal replacements equally spaced over the day. Dont go more than 6 hours between each meal. Breakfast is especially important!      - Get the support of family and friends. - Snack-proof your home. - Have strategies for social situations, meetings that run over or vacation.       - If you fall off the plan; just start right back. Reflect on those days into examples of what to change or avoid next time. Program Compliance      We do not expect perfection. However, we do ask for your persistence and your willingness to do the work of growing yourself. You will hit plateaus in your weight loss. You will run into situations that test your will power. You will encounter times when you feel frustrated. How your respond to your slip ups and, the adjustments you make to prevent future slip ups will determine you long-term success. If you find yourself temporarily slipping in the program we will gently nudge you forward and encourage you to do the work of identifying and move beyond your stuck areas. However, if you find yourself wavering in the program for a prolonged time (more than 3 months) we will ask that you take a break from the program. At that time you will have 3 options:       1. Consult with one of our weight loss specialists to explore additional weight loss options. 2. Work with a counselor to do some focused work in order to identify and break the patterns that are holding you back. 3. A combination of both these.       Once you, your counselor and/or your weight loss specialist feel that you have moved past those patterns and want to give the program another chance, we will gladly work with you to determine if you're ready to start back in the program.      When returning after a break, if it has been over 3 months you will required to repeat an orientation and, if greater than 6 months, you will be required to repeat an orientation, labs and an EKG. Homework for [employer]        Exercise:   - Daily starting slow, gradually increase your time by 10- 20 % per week     - To prevent injury, take a recovery week every 4 weeks (reduce your exercise time and intensity by 1/2 during this time)     - Your goal is to work up to 150 min a week; hard enough that you can't whistle or sing. It may take 6 months to work up to this. - Call the Health  at [facility] labs for exercise ideas ([phone])          Diet:   VLCD    See brochure for emergency meal instructions. Free Foods    Objective: Increase antioxidants to assist immune system in the healing process. Plan: Eat 3-5 servings of low carbohydrate vegetables listed below daily. You may season with any of the condiments and seasonings listed below. A serving size is a ½ cup steamed or 1 cup raw.     Vegetables  o Artichoke  or artichoke hearts  o Asparagus  o Beans (green, waxed, or [place])  o Bean Sprouts  o Beets  o Broccoli  o Brussel Sprouts  o Cabbage  o Cauliflower  o Celery  o Cucumbers  o Eggplant  o Green onions or scallions  o Greens (edy, kale, mustard, or turnip)  o Jicama  o Kohlrabi  o Leeks   o Mixed Vegetables (without corn, peas, or pasta)  o Mushrooms  o Okra  o Onions  o Pepper (all varieties)  o Radishes  o [employer] (Endive, Escarole, Lettuce, Fabrizio, Spinach)  o Sauerkraut or borscht  o Salsa (without corn or beans)  o Hot Peppers  o Spinach      o Summer squash or chayote  o Tomato  o Tomato, canned  o Tomato, sauce  o Tomato/vegetable juice  o Turnips  o Water chestnuts  o Watercress (unlimited)  o Zucchini    Condiments  o Horseradish  o Lemon juice  o Lime juice  o Mustard  o Soy sauce  o Vinegar  o Flavoring extracts  o Garlic  o Herbs, fresh or dried  o [address] or hot pepper sauce

## 2019-08-21 NOTE — PROGRESS NOTES
Chief Complaint   Patient presents with    Weight Management     new patient from bariatric program

## 2019-09-12 ENCOUNTER — HOSPITAL ENCOUNTER (OUTPATIENT)
Dept: BARIATRICS/WEIGHT MGMT | Age: 49
Discharge: HOME | End: 2019-09-12

## 2019-09-12 ENCOUNTER — DOCUMENTATION ONLY (OUTPATIENT)
Dept: BARIATRICS/WEIGHT MGMT | Age: 49
End: 2019-09-12

## 2019-09-12 NOTE — PROGRESS NOTES
[address] [place] [facility] [address] [facility], [address]    Patient's Name: Jose David Naranjo   Age: 52 y.o. YOB: 1970   Sex: male    Date:   9/12/2019    Insurance:            Session: 6 of 6  Revision:   Surgeon:  Dr. Jodie Correa    Height: 5 f 9 Weight:    440      Lbs. BMI: 65.1   Pounds Lost since last month: 7               Pounds Gained since last month: 0    Starting Weight: 435   Previous Months Weight: 447  Overall Pounds Lost: 0 Overall Pounds Gained: 5      Do you smoke? None    Alcohol intake:  Number of drinks at a time:  None  Number of times a week: NOne    Class Guidelines    Guidelines are reviewed with patient at the start of every class. 1. Patient understands that weight loss trial classes must be consecutive. Patient understands if they miss a class, it is their responsibility to contact me to reschedule class. I will reach out to patient after their first no show. 2.  Patient understands the expectations that weight maintenance/weight loss is expected during the classes. Failure to demonstrate changes may result in one extra month of weight loss trial, followed by going back to see the surgeon. 3. Patient is also instructed to be doing their labs, blood work, psych visit, support group and any other test that the surgeon has used while they are working on their weight loss trial.    Other Pertinent Information:     Changes Made Since Last Class: Patient states he was instructed to start the MSWL program, but doesn't have the money to do so. Eating Habits and Behaviors      Today we reviewed key diet principles. We talked about ways that patient can follow a low calorie diet. These included: Stopping all liquid calories and patient was given a list of fluid choices that would be appropriate. We talked about carbohydrates.   Patient was educated that all carbohydrates turn to sugar and was encouraged to stick to the complex carbohydrates while in weight loss trials, but aim to keep less than 100 grams during weight loss trials. Patient was given a list of foods to not eat and drink due to high sugar, high fat, or high carbohydrate content. Patient was also given a list of foods and drinks that are high protein, low carbohydrate, and would be appropriate to eat. Patient was given a list of fruit and what a portion size is and how many carbohydrates it contains. Patient was encouraged to keep fruit intake to a minimum. Patient was also given a list of 50 low carbohydrate snack options, but was also cautioned that some of the choices still were high in calories and portion control should be practiced. Patient's current diet habits include: Patient states he is eating 2 meals per day. Patient is eating an egg and sausage sandwich for breakfast.  Lunch is often skipped. Susi Sherwood is a full meal, but he did not specify what he is eating. He is snacking on chips or cookies. He is drinking water and diet soda. Physical Activity/Exercise    Comments:     Currently for exercise, patient is doing very little stating his knees hurt. We talked about activities for patient to do, including walking, swimming, or chair exercises. Behavior Modification       Comments:   Patient was encouraged to food journal. I talked with patient about tracking their daily carbohydrate. One helpful vishal is My Fitness Pal.  Patient was also encouraged to track their daily fluid intake. Discussed with patient the vishal, [employer], that can be helpful in tracking their fluid intake. We also talked about the importance of planning ahead. Lack of willpower is often a lack of planning. Patient finished the 6 month weight loss trial while he did lose 7 pounds since last month, he had overall gained 5 pounds and didn't lose what Dr. Myranda Batista was requiring him to do.   He is currently working with PA and was supposed to start MSWL program, but patient states he can not afford this. Patient will need to lose the weight in order to move forward.       Devendra Garcia [address]  9/12/2019

## 2019-09-17 NOTE — PROGRESS NOTES
[facility] Progress Note:   F/up Provider Visit     CC: Weight Management      Josefa Coleman is a 52 y.o. male who is here for his  f/up medical provider visit for the [facility] Program in an effort to lose weight so that he is able to undergo bariatric surgery. Pt has been unable to fully start program due to financial constraints. Program has provided a 30% discount to members with financial constraints. Discount insures that patient still has \"skin in the game,\" and can also afford to pay for postop protein and vitamins. Pt states he still can't afford the product. Will attempt to get some help. I am concerned that if he can not afford this product, especially at discounted rate, he will not be able to afford protein and vitamins postop. I have discussed this concern with patient and he agrees. States he needs to [employer] weight off, \" so he will work on it. Weight continues to climb.     Weight History    Ideal body weight: 70.7 kg (155 lb 13.8 oz)  Adjusted ideal body weight: 123.5 kg (272 lb 3.8 oz) (Based on [employer] Tables)      Wt Readings from Last 10 Encounters:   09/18/19 (!) 202.7 kg (446 lb 12.8 oz)   09/03/19 (!) 199.1 kg (439 lb)   08/21/19 (!) 199.3 kg (439 lb 4.8 oz)   07/11/19 (!) 201.9 kg (445 lb)   07/08/19 (!) 201.9 kg (445 lb)   07/03/19 (!) 201.2 kg (443 lb 9.6 oz)   06/10/19 (!) 199.9 kg (440 lb 12.8 oz)   05/20/19 (!) 197.3 kg (435 lb)   03/15/19 (!) 197.3 kg (435 lb)   02/23/19 (!) 196.4 kg (433 lb)       Weight Metrics 9/18/2019 9/3/2019 8/21/2019 8/21/2019 7/11/2019 7/8/2019 7/3/2019   Weight 446 lb 12.8 oz 439 lb - 439 lb 4.8 oz 445 lb 445 lb 443 lb 9.6 oz   Waist Measure Inches - - 72 - - - -   BMI 65.98 kg/m2 64.83 kg/m2 - 64.87 kg/m2 65.72 kg/m2 65.72 kg/m2 65.51 kg/m2             History    Past Medical History:   Diagnosis Date    Arthritis     lower back and kness    Hypertension     Knee pain        Past Surgical History:   Procedure Laterality Date    HX CHOLECYSTECTOMY         Current Outpatient Medications   Medication Sig Dispense Refill    acetaminophen (TYLENOL) 500 mg tablet Take 1 Tab by mouth every six (6) hours as needed for Pain. Indications: Pain associated with Arthritis 180 Tab 2    amLODIPine (NORVASC) 5 mg tablet Take 1 Tab by mouth daily. Indications: high blood pressure 90 Tab 3    oxybutynin chloride XL (DITROPAN XL) 10 mg CR tablet Take 1 Tab by mouth daily. 90 Tab 3    meloxicam (MOBIC) 7.5 mg tablet TAKE 1 TABLET BY MOUTH ONCE DAILY  1    methylPREDNISolone (MEDROL DOSEPACK) 4 mg tablet Take per package instructions 1 Dose Pack 0    capsaicin (CAPZASIN-HP) 0.1 % topical cream Apply  to affected area three (3) times daily.  56.6 g 2       No Known Allergies    Social History     Tobacco Use    Smoking status: Never Smoker    Smokeless tobacco: Never Used   Substance Use Topics    Alcohol use: Not Currently     Comment: Socially    Drug use: Not Currently       Family History   Problem Relation Age of Onset    Heart Disease Mother     Diabetes Mother     Stroke Father        Family Status   Relation Name Status    Mother      Father             Review of Systems:   ROS        Review of Systems:  Positive in BOLD  CONST: Fever, weight loss, fatigue or chills  GI: Nausea, vomiting, abdominal pain, change in bowel habits, hematochezia, melena, and GERD   INTEG: Dermatitis, abnormal moles  HEENT: Recent changes in vision, vertigo, epistaxis, dysphagia and hoarseness, tooth pain  CV: Chest pain, palpitations, HTN, edema and varicosities  RESP: Cough, shortness of breath, wheezing, hemoptysis, snoring and reactive airway disease  : Hematuria, dysuria, frequency, urgency, nocturia and stress urinary incontinence   MS: Weakness, joint pain and arthritis  ENDO: Diabetes, thyroid disease, polyuria, polydipsia, polyphagia, poor wound healing, heat intolerance, cold intolerance  LYMPH/HEME: Anemia, bruising and history of blood transfusions  NEURO: Dizziness, headache, fainting, seizures and PLLMNO 5147 (was on blood thinner but was taken off)   PSYCH: Anxiety and depression  Remainder of ROS as per HPI. Since your last visit, have you experienced any complications? no  If yes, please list: just financial constraintes    Are you taking an appetite suppressant? no  If so:  Do you need a refill? no  Are you experiencine any Chest Pain, Palpitations or Dizziness? no    BP Readings from Last 3 Encounters:   09/18/19 (!) 145/96   09/03/19 138/90   08/21/19 (!) 149/93     Pt htn is noted and recommendation has been made to f/u with pcp in next 7 days for evaluation and treatment adjustments as needed. Have you received any other medical care this week? no  If yes, where and for what? Have you discontinued or changed any medicine or dose of your medicine(s) since your last visit with [facility] provider? no    If yes, where and for what? Diet  How many ounces of calorie-free liquids did you consume each day? Not counting oz    How many meal replacements did you take each day? 0    Did you have any problems adhering to the program?  yes   If yes, please explain: Pt has not been attending [facility] weight loss class      Exercise  Minimal if any. Objective  Visit Vitals  BP (!) 145/96   Pulse 70   Temp 98.5 °F (36.9 °C) (Oral)   Resp 18   Ht 5' 9\" (1.753 m)   Wt (!) 202.7 kg (446 lb 12.8 oz)   SpO2 98%   BMI 65.98 kg/m²     No LMP for male patient. Pt htn is noted and recommendation has been made to f/u with pcp in next 7 days for evaluation and treatment adjustments as needed. Physical Exam    General: 49 y.o. male in no acute distress.  Morbidly obese in abdomen, hips, thighs - mixed pattern  HEENT: Normocephalic, atraumatic, Pupils equal and reactive, nasopharynx clear, oropharynx clear and moist without lesions  NECK: Trachea midline  ABD: Soft, nontender, nondistended, super morbidly obese, normoactive bowel sounds, no hernias, no hepatosplenomegaly, minimally palpable costal margins, mixed distribution  Extremities: Warm, well perfused, baseline gait/station, ambulates with cane  Neuro: Sensation and strength appear grossly intact and symmetrical  Psych: Alert and oriented to person, place, and time. Assessment / Plan    Encounter Diagnoses   Name Primary?  Obesity, morbid ([facility] [address] Yes    BMI 60.0-69.9, adult ([facility] [address] [facility]     History of CVA (cerebrovascular accident)     Osteoarthritis of both knees, unspecified osteoarthritis type     Chronic pain of both knees     Dyspnea on exertion     Hypertension, unspecified type     Encounter for weight loss counseling     Pre-diabetes     History of Helicobacter pylori infection     Financial difficulties        1. Weight management    Degree of control: Poor   Progress was reviewed with patient after multiple weeks in the medical weight loss and surgical weight loss program.    The patient has gained another 7 pounds. Patient is unable to tell me how much fluid, exercise, grams of protein or calories he is taking in daily. He is not taking any vitamins. He has not started any of the meal replacements due to financial constraints. I have had a andreas discussion with the patient regarding my concerns that if he cannot afford discounted meal replacements what is his plan with regards to obtaining protein and/or multivitamins postoperatively. He does not have a plan at this time. He would like to attend classes but continues to be \"unsure of the time date or location. \"    I am concerned that this patient has not done anything to demonstrate a desire or even an effort to lose weight. I do not believe he is taking the process serious [place] and fear that he looks at surgery as \"an easy way out. \"  I have told the patient that for the time being he can continue to attend the weekly way ends in classes for the medical weight loss program but that I fear he is not an adequate candidate for bariatric surgery as demonstrated by his behavior and unfortunately has financial constraints. I do believe that at this time is appropriate to sever ties with . Nayan Sawant and wished him the best in his future endeavors. Again, I do not believe him to be an adequate candidate for either the medical weight loss program or the surgical weight loss program.   I will discuss this with Wendi, the medical weight loss coordinator as well as Dr. Nina Cabral but again, I do believe this patient should be sent a letter officially discharging him from the practice. 2.  Labs    Latest results reviewed with patient   Routine monitoring labs ordered  Orders Placed This Encounter    METABOLIC PANEL, COMPREHENSIVE    URIC ACID         I have reviewed/discussed the above normal BMI with the patient. I have recommended the following interventions: dietary management education, guidance, and counseling, encourage exercise, monitor weight and prescribed dietary intake .             >50% of 30 min visit spent counseling     Joya Loredo MS, PA-C

## 2019-09-18 ENCOUNTER — OFFICE VISIT (OUTPATIENT)
Dept: SURGERY | Age: 49
End: 2019-09-18

## 2019-09-18 VITALS
SYSTOLIC BLOOD PRESSURE: 145 MMHG | OXYGEN SATURATION: 98 % | BODY MASS INDEX: 46.65 KG/M2 | DIASTOLIC BLOOD PRESSURE: 96 MMHG | TEMPERATURE: 98.5 F | HEIGHT: 69 IN | HEART RATE: 70 BPM | WEIGHT: 315 LBS | RESPIRATION RATE: 18 BRPM

## 2019-09-18 DIAGNOSIS — Z59.9 FINANCIAL DIFFICULTIES: ICD-10-CM

## 2019-09-18 DIAGNOSIS — Z71.3 ENCOUNTER FOR WEIGHT LOSS COUNSELING: ICD-10-CM

## 2019-09-18 DIAGNOSIS — M17.0 OSTEOARTHRITIS OF BOTH KNEES, UNSPECIFIED OSTEOARTHRITIS TYPE: ICD-10-CM

## 2019-09-18 DIAGNOSIS — Z86.73 HISTORY OF CVA (CEREBROVASCULAR ACCIDENT): ICD-10-CM

## 2019-09-18 DIAGNOSIS — I10 HYPERTENSION, UNSPECIFIED TYPE: ICD-10-CM

## 2019-09-18 DIAGNOSIS — E66.01 OBESITY, MORBID (HCC): Primary | ICD-10-CM

## 2019-09-18 DIAGNOSIS — R73.03 PRE-DIABETES: ICD-10-CM

## 2019-09-18 DIAGNOSIS — R06.09 DYSPNEA ON EXERTION: ICD-10-CM

## 2019-09-18 DIAGNOSIS — M25.561 CHRONIC PAIN OF BOTH KNEES: ICD-10-CM

## 2019-09-18 DIAGNOSIS — G89.29 CHRONIC PAIN OF BOTH KNEES: ICD-10-CM

## 2019-09-18 DIAGNOSIS — Z86.19 HISTORY OF HELICOBACTER PYLORI INFECTION: ICD-10-CM

## 2019-09-18 DIAGNOSIS — M25.562 CHRONIC PAIN OF BOTH KNEES: ICD-10-CM

## 2019-09-18 SDOH — ECONOMIC STABILITY - INCOME SECURITY: PROBLEM RELATED TO HOUSING AND ECONOMIC CIRCUMSTANCES, UNSPECIFIED: Z59.9

## 2019-09-18 NOTE — PROGRESS NOTES
Chief Complaint   Patient presents with    Follow-up     [facility]     Mr. Verlee Severance has been given the following recommendations today due to his elevated BP reading: referred to Alternative/PCP. Pt on BP meds and being monitored PCP for elevated blood pressure. Pt ID confirmed    Weight Loss Metrics 9/18/2019 9/18/2019 9/3/2019 8/21/2019 7/11/2019 7/8/2019 7/8/2019   Pre op / Initial Wt 446.8 - - - - 445 -   Today's Wt - 446 lb 12.8 oz 439 lb 439 lb 4.8 oz 445 lb - 445 lb   BMI - 65.98 kg/m2 64.83 kg/m2 64.87 kg/m2 65.72 kg/m2 - 65.72 kg/m2   Ideal Body Wt 154 - - - - 154 -   Excess Body Wt 292.8 - - - - 291 -   Goal Wt - - - - - - -   Wt loss to date 0 - - - - 0 -   % Wt Loss 0 - - - - 0 -   80% .24 - - - - 232.8 -       Body mass index is 65.98 kg/m².

## 2019-09-23 NOTE — PROGRESS NOTES
[facility]  [address] [facility] [address]  [place], [address]  [phone] fax    Physical Therapy Discharge Summary  Patient name: Tyree Hawkins Start of Care: 7/15/2019   Referral source: Chaparrita Cunningham MD : 1970                Medical Diagnosis: Low back pain [M54.5]  Payor: [facility] MEDICAID / Plan: [address] [place] [address] / Product Type: Managed Care Medicaid /  Onset Date:3 months ago                Treatment Diagnosis: LBP   Prior Hospitalization: see medical history Provider#: [id number]   Medications: Verified on Patient summary List    Comorbidities: arthritis, HBP, BMI > 30   Prior Level of Function: (I) with ADLs, patient has had (B) knee pain that has limited his functional mobility. Visits from Start of Care: 5    Missed Visits: 3  Reporting Period : 7-15-19 to 19      Summary of Care: pt had 3 remaining visits to schedule but did not return to PT. We were unable to fully reassess due due to unexpected discharge. Progress appeared to be limited at the time of his last session attended. Progress towards goals:  Short Term Goals: To be accomplished in 2 weeks:               1. Patient will be compliant with HEP 2x/day in order to maximize therapeutic benefit.  - Performing every other day per pt. 2019               2. Patent will be able to decrease LBP to no more than 4/10 in order to improve ADL tolerance. Not met - averaging around 7/10 at this [address] be accomplished in 4 weeks:               1. Patient will be able to improve (B) glute med strength to 4+/5 in order to improve ease of ADLs.              2. Patient will be able to tolerate standing greater than 10 minutes in order to improve quality of life. - Pt reports being able to stand for ~5 minutes before having to sit down.  2019               3. Patient will be able to improve lumbar AROM to 75 % WNL in order to improve ease of ADLs.               4. Patient will be able to improve FOTO score to 64 in order to demonstrate improvements in functional independence.          ASSESSMENT/RECOMMENDATIONS:  [x]Discontinue therapy: []Patient has reached or is progressing toward set goals      [x]Patient has abdicated      []Due to lack of appreciable progress towards set [address], PT 9/23/2019 9:52 AM    NOTE TO PHYSICIAN:  Please complete the following and fax to: [facility] at [facility] at [phone]  . Retain this original for your records. If you are unable to process this request in   24 hours, please contact our office.      [] I have read the above report and request that my patient continue therapy with the following changes/special instructions:  [] I have read the above report and request that my patient be discharged from therapy    Physician's Signature:____________Date:_________TIME:________    ** Signature, Date and Time must be completed for valid certification **

## 2019-10-18 ENCOUNTER — OFFICE VISIT (OUTPATIENT)
Dept: INTERNAL MEDICINE CLINIC | Age: 49
End: 2019-10-18

## 2019-10-18 VITALS
DIASTOLIC BLOOD PRESSURE: 80 MMHG | HEIGHT: 69 IN | TEMPERATURE: 97 F | RESPIRATION RATE: 16 BRPM | OXYGEN SATURATION: 99 % | HEART RATE: 76 BPM | BODY MASS INDEX: 46.65 KG/M2 | WEIGHT: 315 LBS | SYSTOLIC BLOOD PRESSURE: 140 MMHG

## 2019-10-18 DIAGNOSIS — R39.15 URINARY URGENCY: ICD-10-CM

## 2019-10-18 DIAGNOSIS — R06.83 SNORING: ICD-10-CM

## 2019-10-18 DIAGNOSIS — M54.50 CHRONIC LOW BACK PAIN WITHOUT SCIATICA, UNSPECIFIED BACK PAIN LATERALITY: ICD-10-CM

## 2019-10-18 DIAGNOSIS — I10 ESSENTIAL HYPERTENSION: Primary | ICD-10-CM

## 2019-10-18 DIAGNOSIS — G89.29 CHRONIC LOW BACK PAIN WITHOUT SCIATICA, UNSPECIFIED BACK PAIN LATERALITY: ICD-10-CM

## 2019-10-18 DIAGNOSIS — E66.01 OBESITY, MORBID (HCC): ICD-10-CM

## 2019-10-18 RX ORDER — AMLODIPINE BESYLATE 2.5 MG/1
2.5 TABLET ORAL DAILY
Qty: 90 TAB | Refills: 1 | Status: SHIPPED | OUTPATIENT
Start: 2019-10-18 | End: 2020-01-28 | Stop reason: DRUGHIGH

## 2019-10-18 NOTE — PROGRESS NOTES
Chief Complaint   Patient presents with    LOW BACK PAIN     chronic follow up ROOM  2    Hypertension     follow up     Patient states his blood pressure is always elevated at other specialist appointments. 1. Have you been to the ER, urgent care clinic since your last visit? Hospitalized since your last visit? No    2. Have you seen or consulted any other health care providers outside of the [address] [place] [address] since your last visit? Include any pap smears or colon screening. No    Patient was given a copy of the Advanced Directive and understands to bring it in once completed.   Health Maintenance Due   Topic Date Due    DTaP/Tdap/Td series (1 - Tdap) 01/02/1991

## 2019-10-18 NOTE — PROGRESS NOTES
INTERNISTS OF [facility] in [place]:  10/18/2019, MRN: [id number]      Trung Figueroa is a 52 y.o. male and presents to clinic for LOW BACK PAIN (chronic follow up ROOM  2)    Subjective: The patient is a 70-year-old male with history of hypertension, obesity, CVA, h/o H.pylori infection (treated last year), prediabetes, and chronic bilateral knee pain (followed by Orthopedics). 1. Lower Back Pain: He reports lower back pain since February. There is no history of trauma. In the past, Tylenol did not work well to relieve his symptoms of pain. Walking made his symptoms worse. Pain was localized to his lower back and not associated with weakness or paresthesias. Symptoms are off and on. He was referred to PT at his last appointment. Flexeril was ordered for presumed muscle spasms. Lumbar x-ray series was obtained earlier this summer. Findings are listed below. Today, he reports: Lower back pain off and on, improved with Flexeril. 7/3/19 Lumbar Xray: Chronic degenerative disc disease as described. No evidence of instability with flexion or extension. 2. OAB: He saw Urology in between apts. He was prescribed ditropan. No adverse side effects from this rx. Unfortunately though, this medication did not work well to control his symptoms of urgency. He reports no new urinary symptoms though. He continues to have urinary urgency off and on.    3. HTN: BP is elevated at 145/96 today. He has been gaining weight. He is participating in the medically supervised weight loss program, followed by . He is struggling to adhere to the diet that is recommended and as a result, has not had good success with this program. He ultimately wants weight loss surgery. He is taking amlodipine 5mg daily. Snoring: Yes.      BP Readings from Last 3 Encounters:   10/18/19 140/80   09/18/19 (!) 145/96   09/03/19 138/90     Wt Readings from Last 3 Encounters:   10/18/19 (!) 450 lb (204.1 kg)   09/18/19 (!) 446 lb 12.8 oz (202.7 kg)   09/03/19 (!) 439 lb (199.1 kg)         Patient Active Problem List    Diagnosis Date Noted    Essential hypertension 06/10/2019    Impaired glucose tolerance 06/10/2019    History of Helicobacter pylori infection 06/10/2019    Obesity, morbid ([address] 07/13/2018    History of CVA (cerebrovascular accident) 07/13/2018    Chronic pain of both knees 07/13/2018       Current Outpatient Medications   Medication Sig Dispense Refill    oxybutynin chloride XL (DITROPAN XL) 10 mg CR tablet Take 1 Tab by mouth daily. 90 Tab 3    meloxicam (MOBIC) 7.5 mg tablet TAKE 1 TABLET BY MOUTH ONCE DAILY  1    methylPREDNISolone (MEDROL DOSEPACK) 4 mg tablet Take per package instructions 1 Dose Pack 0    acetaminophen (TYLENOL) 500 mg tablet Take 1 Tab by mouth every six (6) hours as needed for Pain. Indications: Pain associated with Arthritis 180 Tab 2    amLODIPine (NORVASC) 5 mg tablet Take 1 Tab by mouth daily. Indications: high blood pressure 90 Tab 3       No Known Allergies    Past Medical History:   Diagnosis Date    Arthritis     lower back and kness    Hypertension     Knee pain        Past Surgical History:   Procedure Laterality Date    HX CHOLECYSTECTOMY         Family History   Problem Relation Age of Onset    Heart Disease Mother     Diabetes Mother     Stroke Father        Social History     Tobacco Use    Smoking status: Never Smoker    Smokeless tobacco: Never Used   Substance Use Topics    Alcohol use: Not Currently     Comment: Socially       ROS   Review of Systems   Constitutional: Negative for chills and fever. HENT: Negative for ear pain and sore throat. Eyes: Negative for blurred vision and pain. Respiratory: Negative for cough and shortness of breath. Cardiovascular: Negative for chest pain. Gastrointestinal: Negative for abdominal pain, blood in stool and melena. Genitourinary: Negative for dysuria and hematuria. Musculoskeletal: Positive for back pain.  Negative for joint pain and myalgias. Skin: Negative for rash. Neurological: Negative for tingling, focal weakness and headaches. Endo/Heme/Allergies: Does not bruise/bleed easily. Psychiatric/Behavioral: Negative for substance abuse. Objective     There were no vitals filed for this visit. Physical Exam   Constitutional: He is oriented to person, place, and time and well-developed, well-nourished, and in no distress. HENT:   Head: Normocephalic and atraumatic. Right Ear: External ear normal.   Left Ear: External ear normal.   Nose: Nose normal.   Mouth/Throat: Oropharynx is clear and moist. No oropharyngeal exudate. Eyes: Conjunctivae and EOM are normal. Right eye exhibits no discharge. Left eye exhibits no discharge. No scleral icterus. Neck: Neck supple. Cardiovascular: Normal rate, regular rhythm, normal heart sounds and intact distal pulses. Pulmonary/Chest: Effort normal and breath sounds normal.   Abdominal: Soft. Bowel sounds are normal. He exhibits no distension. There is no tenderness. There is no rebound and no guarding. Musculoskeletal: He exhibits no edema or tenderness (BUE). Lymphadenopathy:     He has no cervical adenopathy. Neurological: He is alert and oriented to person, place, and time. He exhibits normal muscle tone. Gait normal.   Skin: Skin is warm and dry. No erythema. Psychiatric: Affect normal.   Nursing note and vitals reviewed.       LABS   Data Review:   Lab Results   Component Value Date/Time    WBC 7.3 07/08/2019 10:18 AM    HGB 12.2 (L) 07/08/2019 10:18 AM    HCT 39.8 07/08/2019 10:18 AM    PLATELET 836 52/64/9907 10:18 AM    MCV 85 07/08/2019 10:18 AM       Lab Results   Component Value Date/Time    Sodium 142 07/08/2019 10:18 AM    Potassium 3.9 07/08/2019 10:18 AM    Chloride 102 07/08/2019 10:18 AM    CO2 28 07/08/2019 10:18 AM    Anion gap 12.0 07/08/2019 10:18 AM    Glucose 92 07/08/2019 10:18 AM    BUN 13 07/08/2019 10:18 AM    Creatinine 0.8 07/08/2019 10:18 AM    BUN/Creatinine ratio 19 02/22/2019 03:26 PM    GFR est AA >60 02/22/2019 03:26 PM    GFR est non-AA >60 02/22/2019 03:26 PM    Calcium 8.3 (L) 07/08/2019 10:18 AM       Lab Results   Component Value Date/Time    Cholesterol, total 146 02/18/2019 09:50 AM    HDL Cholesterol 38 (L) 02/18/2019 09:50 AM    LDL, calculated 95.2 02/18/2019 09:50 AM    VLDL, calculated 12.8 02/18/2019 09:50 AM    Triglyceride 64 02/18/2019 09:50 AM    CHOL/HDL Ratio 3.8 02/18/2019 09:50 AM       Lab Results   Component Value Date/Time    Hemoglobin A1c 5.3 07/03/2019 09:40 AM       Assessment/Plan:   1. Lower Back Pain:   - Activity as tolerated. - Okay to take Flexeril and over-the-counter Rx as needed. 2.  Obesity: He is gaining weight. - I encouraged him to stay in the medically supervised weight loss program.  If he is unable to tolerate a liquid diet, we discussed alternative diets that also will fit his budget. I will have some sample menus for him next week to  in our office. We discussed shopping at the [employer] [facility] for a diet high in leafy greens and protein. I advised him to refrain from consuming all processed foods. I will recheck his weight at his follow-up appointment.  -I encouraged him to follow-up with Dr. Lisandra Hong.  - Weight loss goal of 10 pounds that between now and his follow-up appointment. 3.  OAB: Not responsive to Ditropan.  -I encouraged him to follow-up with Urology for additional recommendations. 4.  Hypertension: BP is borderline.  -We will continue amlodipine but I will increase his dose from 5 mg daily to 7.5 mg daily.  -Return to clinic for BP check.  -Referral to Sleep Medicine to rule out RON. Health Maintenance Due   Topic Date Due    DTaP/Tdap/Td series (1 - Tdap) 01/02/1991     Lab review: labs are reviewed in the EHR    I have discussed the diagnosis with the patient and the intended plan as seen in the above orders.   The patient has received an after-visit summary and questions were answered concerning future plans. I have discussed medication side effects and warnings with the patient as well. I have reviewed the plan of care with the patient, accepted their input and they are in agreement with the treatment goals. All questions were answered. The patient understands the plan of care. Handouts provided today with above information. Pt instructed if symptoms worsen to call the office or report to the ED for continued care. Greater than 50% of the visit time was spent in counseling and/or coordination of care. Voice recognition was used to generate this report, which may have resulted in some phonetic based errors in grammar and contents. Even though attempts were made to correct all the mistakes, some may have been missed, and remained in the body of the document.           Ramona Tejada MD

## 2019-10-18 NOTE — PATIENT INSTRUCTIONS
Health Maintenance Due   Topic Date Due    DTaP/Tdap/Td series (1 - Tdap) 01/02/1991          Body Mass Index: Care Instructions  Your Care Instructions    Body mass index (BMI) can help you see if your weight is raising your risk for health problems. It uses a formula to compare how much you weigh with how tall you are. · A BMI lower than 18.5 is considered underweight. · A BMI between 18.5 and 24.9 is considered healthy. · A BMI between 25 and 29.9 is considered overweight. A BMI of 30 or higher is considered obese. If your BMI is in the normal range, it means that you have a lower risk for weight-related health problems. If your BMI is in the overweight or obese range, you may be at increased risk for weight-related health problems, such as high blood pressure, heart disease, stroke, arthritis or joint pain, and diabetes. If your BMI is in the underweight range, you may be at increased risk for health problems such as fatigue, lower protection (immunity) against illness, muscle loss, bone loss, hair loss, and hormone problems. BMI is just one measure of your risk for weight-related health problems. You may be at higher risk for health problems if you are not active, you eat an unhealthy diet, or you drink too much alcohol or use tobacco products. Follow-up care is a key part of your treatment and safety. Be sure to make and go to all appointments, and call your doctor if you are having problems. It's also a good idea to know your test results and keep a list of the medicines you take. How can you care for yourself at home? · Practice healthy eating habits. This includes eating plenty of fruits, vegetables, whole grains, lean protein, and low-fat dairy. · If your doctor recommends it, get more exercise. Walking is a good choice. Bit by bit, increase the amount you walk every day. Try for at least 30 minutes on most days of the week. · Do not smoke. Smoking can increase your risk for health problems.  If you need help quitting, talk to your doctor about stop-smoking programs and medicines. These can increase your chances of quitting for good. · Limit alcohol to 2 drinks a day for men and 1 drink a day for women. Too much alcohol can cause health problems. If you have a BMI higher than 25  · Your doctor may do other tests to check your risk for weight-related health problems. This may include measuring the distance around your waist. A waist measurement of more than 40 inches in men or 35 inches in women can increase the risk of weight-related health problems. · Talk with your doctor about steps you can take to stay healthy or improve your health. You may need to make lifestyle changes to lose weight and stay healthy, such as changing your diet and getting regular exercise. If you have a BMI lower than 18.5  · Your doctor may do other tests to check your risk for health problems. · Talk with your doctor about steps you can take to stay healthy or improve your health. You may need to make lifestyle changes to gain or maintain weight and stay healthy, such as getting more healthy foods in your diet and doing exercises to build muscle. Where can you learn more? Go to http://valerie-nate.info/. Enter S176 in the search box to learn more about \"Body Mass Index: Care Instructions. \"  Current as of: March 28, 2019  Content Version: 12.2  © 1048-0327 [employer]. Care instructions adapted under license by [employer] (which disclaims liability or warranty for this information). If you have questions about a medical condition or this instruction, always ask your healthcare professional. [place] [address] any warranty or liability for your use of this information.

## 2019-10-19 PROBLEM — M54.50 CHRONIC LOW BACK PAIN WITHOUT SCIATICA: Status: ACTIVE | Noted: 2019-10-19

## 2019-10-19 PROBLEM — G89.29 CHRONIC LOW BACK PAIN WITHOUT SCIATICA: Status: ACTIVE | Noted: 2019-10-19

## 2019-10-29 ENCOUNTER — DOCUMENTATION ONLY (OUTPATIENT)
Dept: SURGERY | Age: 49
End: 2019-10-29

## 2019-10-29 NOTE — PROGRESS NOTES
Pt was a no show for his appt today. Will determine if pt is still enrolled in SW program with staff. If he is, he needs to reschedule, if not no need to reschedule.

## 2019-11-05 ENCOUNTER — OFFICE VISIT (OUTPATIENT)
Dept: SURGERY | Age: 49
End: 2019-11-05

## 2019-11-05 VITALS
SYSTOLIC BLOOD PRESSURE: 138 MMHG | BODY MASS INDEX: 46.65 KG/M2 | HEART RATE: 72 BPM | HEIGHT: 69 IN | OXYGEN SATURATION: 98 % | RESPIRATION RATE: 20 BRPM | TEMPERATURE: 97.5 F | WEIGHT: 315 LBS | DIASTOLIC BLOOD PRESSURE: 90 MMHG

## 2019-11-05 DIAGNOSIS — R73.02 IMPAIRED GLUCOSE TOLERANCE: ICD-10-CM

## 2019-11-05 DIAGNOSIS — E66.01 OBESITY, MORBID (HCC): Primary | ICD-10-CM

## 2019-11-05 DIAGNOSIS — Z86.19 HISTORY OF HELICOBACTER PYLORI INFECTION: ICD-10-CM

## 2019-11-05 DIAGNOSIS — I10 ESSENTIAL HYPERTENSION: ICD-10-CM

## 2019-11-05 DIAGNOSIS — Z71.3 WEIGHT LOSS COUNSELING, ENCOUNTER FOR: ICD-10-CM

## 2019-11-05 NOTE — PROGRESS NOTES
Pt ID confirmed    Weight Loss Metrics 11/5/2019 11/5/2019 10/18/2019 9/18/2019 9/18/2019 9/3/2019 8/21/2019   Pre op / Initial Wt 449 - - 446.8 - - -   Today's Wt - 449 lb 450 lb - 446 lb 12.8 oz 439 lb 439 lb 4.8 oz   BMI - 66.31 kg/m2 66.45 kg/m2 - 65.98 kg/m2 64.83 kg/m2 64.87 kg/m2   Ideal Body Wt 154 - - 154 - - -   Excess Body Wt 295 - - 292.8 - - -   Goal Wt 213 - - - - - -   Wt loss to date 0 - - 0 - - -   % Wt Loss 0 - - 0 - - -   80%  - - 234.24 - - -       Body mass index is 66.31 kg/m².     Mr. Cesar Arce has been given the following recommendations today due to his elevated BP reading: patient has not taken his amlodipine yet today usually takes it in am will take asap

## 2019-11-11 NOTE — PROGRESS NOTES
Bariatric Preoperative Progress Note      Subjective:     Yelena Kumar is a 52 y.o. male who presents today for followup of their candidacy for bariatric surgery. Since last seen, Yelena Kumar has been working through bariatric program towards undecided. Weight continues to creep up. Seeing me today for medically managed weight loss with the goal of SWL. Pt was instructed that prior to surgery he will need to lose 39 lbs to be at or below 400 lbs to move forward with 2 stage approach  Past Medical History:   Diagnosis Date    Ambulates with cane     Arthritis     lower back and kness    Hypertension     Knee pain        Past Surgical History:   Procedure Laterality Date    HX CHOLECYSTECTOMY         Current Outpatient Medications   Medication Sig Dispense Refill    amLODIPine (NORVASC) 2.5 mg tablet Take 1 Tab by mouth daily. Take with 5mg norvasc pill po daily for a total dose of 7.5mg amlodipine daily. 90 Tab 1    acetaminophen (TYLENOL) 500 mg tablet Take 1 Tab by mouth every six (6) hours as needed for Pain. Indications: Pain associated with Arthritis 180 Tab 2    amLODIPine (NORVASC) 5 mg tablet Take 1 Tab by mouth daily. Indications: high blood pressure 90 Tab 3    oxybutynin chloride XL (DITROPAN XL) 10 mg CR tablet Take 1 Tab by mouth daily.  90 Tab 3       No Known Allergies    Review of Systems:  Positive in BOLD  CONST: Fever, weight loss, fatigue or chills  GI: Nausea, vomiting, abdominal pain, change in bowel habits, hematochezia, melena, and GERD   INTEG: Dermatitis, abnormal moles  HEENT: Recent changes in vision, vertigo, epistaxis, dysphagia and hoarseness, tooth pain  CV: Chest pain, palpitations, HTN, edema and varicosities  RESP: Cough, shortness of breath, wheezing, hemoptysis, snoring and reactive airway disease  : Hematuria, dysuria, frequency, urgency, nocturia and stress urinary incontinence   MS: Weakness, joint pain and arthritis  ENDO: Diabetes, thyroid disease, polyuria, polydipsia, polyphagia, poor wound healing, heat intolerance, cold intolerance  LYMPH/HEME: Anemia, bruising and history of blood transfusions  NEURO: Dizziness, headache, fainting, seizures and ZUSUOD [id number] (was on blood thinner but was taken off)   PSYCH: Anxiety and depression    Objective:     Physical Exam:  Visit Vitals  /90   Pulse 72   Temp 97.5 °F (36.4 °C)   Resp 20   Ht 5' 9\" (1.753 m)   Wt (!) 203.7 kg (449 lb)   SpO2 98%   BMI 66.31 kg/m²       Physical Exam   Constitutional: He is oriented to person, place, and time and well-developed, well-nourished, and in no distress. HENT:   Head: Normocephalic. Cardiovascular: Normal rate and normal heart sounds. Pulmonary/Chest: Effort normal and breath sounds normal.   Abdominal: Soft. Bowel sounds are normal. He exhibits no distension and no mass. There is no tenderness. There is no rebound and no guarding. Obese    Musculoskeletal: Normal range of motion. Neurological: He is alert and oriented to person, place, and time. Skin: Skin is warm and dry. Psychiatric: Affect normal.       Studies to date:    Studies to date:     Labs: 7/2019 significant for  H. pylori positive, treated, low D, low Calcium    EKG: WNL      Nutritional evaluation: 6/6, gained      Psychiatric evaluation: Pending     Support Group: Pending     Additional evaluations: PCP pending. Sleep study pending. Assessment:   Melanie Barnes is a 52 y.o. male who is progressing through the bariatric preoperative evaluation. Plan:   -complete remainder of preop evaluation as above   - patient communicates understanding that the expectation is to lose or maintain weight during WLT. Weight gain may result in delay or cancellation of surgery.    - Long detailed discussion of VLCD vs LCD, will pursue VLCD with 1-2 week follow up if unable to afford all shakes will do 3+1, meal instructions given/written, 64 oz fluids, SE discussed -- he is to call if he experiences any SE   -Follow up 1-2 weeks and once has completed entirety of weight loss workup to determine next steps.            >50% of 30 min visit spent counseling     MARLEEN Dillard-BC

## 2019-11-27 ENCOUNTER — OFFICE VISIT (OUTPATIENT)
Dept: SURGERY | Age: 49
End: 2019-11-27

## 2019-11-27 VITALS
SYSTOLIC BLOOD PRESSURE: 152 MMHG | DIASTOLIC BLOOD PRESSURE: 91 MMHG | BODY MASS INDEX: 46.65 KG/M2 | RESPIRATION RATE: 18 BRPM | HEART RATE: 90 BPM | WEIGHT: 315 LBS | TEMPERATURE: 98.3 F | HEIGHT: 69 IN | OXYGEN SATURATION: 97 %

## 2019-11-27 DIAGNOSIS — K59.01 SLOW TRANSIT CONSTIPATION: ICD-10-CM

## 2019-11-27 DIAGNOSIS — E66.01 OBESITY, MORBID (HCC): Primary | ICD-10-CM

## 2019-11-27 RX ORDER — POLYETHYLENE GLYCOL 3350 17 G/17G
17 POWDER, FOR SOLUTION ORAL DAILY
Qty: 90 PACKET | Refills: 3 | Status: SHIPPED | OUTPATIENT
Start: 2019-11-27 | End: 2019-12-14

## 2019-11-27 NOTE — PROGRESS NOTES
Bariatric Preoperative Progress Note      Subjective:     Oliver Martinez is a 52 y.o. male who presents today for followup of their candidacy for bariatric surgery. Presents for weight check, 11lbs down, 38lbs to go to goal of 400lbs. C/o constipation. Past Medical History:   Diagnosis Date    Ambulates with cane     Arthritis     lower back and kness    Hypertension     Knee pain        Past Surgical History:   Procedure Laterality Date    HX CHOLECYSTECTOMY         Current Outpatient Medications   Medication Sig Dispense Refill    polyethylene glycol (MIRALAX) 17 gram packet Take 1 Packet by mouth daily. 90 Packet 3    amLODIPine (NORVASC) 2.5 mg tablet Take 1 Tab by mouth daily. Take with 5mg norvasc pill po daily for a total dose of 7.5mg amlodipine daily. 90 Tab 1    acetaminophen (TYLENOL) 500 mg tablet Take 1 Tab by mouth every six (6) hours as needed for Pain. Indications: Pain associated with Arthritis 180 Tab 2    amLODIPine (NORVASC) 5 mg tablet Take 1 Tab by mouth daily. Indications: high blood pressure 90 Tab 3    oxybutynin chloride XL (DITROPAN XL) 10 mg CR tablet Take 1 Tab by mouth daily.  90 Tab 3       No Known Allergies    Review of Systems:  Positive in BOLD  CONST: Fever, weight loss, fatigue or chills  GI: Nausea, vomiting, abdominal pain, change in bowel habits, hematochezia, melena, and GERD   INTEG: Dermatitis, abnormal moles  HEENT: Recent changes in vision, vertigo, epistaxis, dysphagia and hoarseness, tooth pain  CV: Chest pain, palpitations, HTN, edema and varicosities  RESP: Cough, shortness of breath, wheezing, hemoptysis, snoring and reactive airway disease  : Hematuria, dysuria, frequency, urgency, nocturia and stress urinary incontinence   MS: Weakness, joint pain and arthritis  ENDO: Diabetes, thyroid disease, polyuria, polydipsia, polyphagia, poor wound healing, heat intolerance, cold intolerance  LYMPH/HEME: Anemia, bruising and history of blood transfusions  NEURO: Dizziness, headache, fainting, seizures and [id number] (was on blood thinner but was taken off)   PSYCH: Anxiety and depression    Objective:     Physical Exam:  Visit Vitals  BP (!) 152/91   Pulse 90   Temp 98.3 °F (36.8 °C) (Oral)   Resp 18   Ht 5' 9\" (1.753 m)   Wt (!) 199.1 kg (438 lb 14.4 oz)   SpO2 97%   BMI 64.81 kg/m²       Physical Exam  Appears well     Studies to date:     Studies to date:     Labs: 7/2019 significant for  H. pylori positive, treated, low D, low Calcium    EKG: WNL      Nutritional evaluation: 6/6, gained      Psychiatric evaluation: Pending     Support Group: Pending     Additional evaluations: PCP pending.                                      RDOYG study pending. Assessment:   Darrell Rabago is a 52 y.o. male who is progressing through the bariatric preoperative evaluation.     Plan:     Continue with weight loss, rx miralax   Follow up 4-6 weeks for weight check        >50% of 15 min visit spent counseling     BARBIE Briseno

## 2019-11-27 NOTE — PROGRESS NOTES
Chief Complaint   Patient presents with    Morbid Obesity     weight check to proceed on to mid trial.     Pt ID confirmed    Weight Loss Metrics 11/27/2019 11/5/2019 11/5/2019 10/18/2019 9/18/2019 9/18/2019 9/3/2019   Pre op / Initial Wt 438.9 449 - - 446.8 - -   Today's Wt 438 lb 14.4 oz - 449 lb 450 lb - 446 lb 12.8 oz 439 lb   BMI 64.81 kg/m2 - 66.31 kg/m2 66.45 kg/m2 - 65.98 kg/m2 64.83 kg/m2   Ideal Body Wt 154 154 - - 154 - -   Excess Body Wt 284.9 295 - - 292.8 - -   Goal Wt - 213 - - - - -   Wt loss to date 0 0 - - 0 - -   % Wt Loss 0 0 - - 0 - -   80% .92 236 - - 234.24 - -       Body mass index is 64.81 kg/m². Mr. Daquan Pearson has been given the following recommendations today due to his elevated BP reading: referred to Alternative/PCP. Pt stated he has appt with PCP on Dec. 5th to follow up on elevated blood pressure.

## 2019-12-14 ENCOUNTER — HOSPITAL ENCOUNTER (EMERGENCY)
Age: 49
Discharge: HOME | End: 2019-12-14
Attending: EMERGENCY MEDICINE
Payer: MEDICAID

## 2019-12-14 VITALS
HEIGHT: 69 IN | TEMPERATURE: 99.6 F | SYSTOLIC BLOOD PRESSURE: 160 MMHG | OXYGEN SATURATION: 100 % | WEIGHT: 315 LBS | HEART RATE: 95 BPM | DIASTOLIC BLOOD PRESSURE: 100 MMHG | RESPIRATION RATE: 18 BRPM | BODY MASS INDEX: 46.65 KG/M2

## 2019-12-14 DIAGNOSIS — J11.1 FLU SYNDROME: Primary | ICD-10-CM

## 2019-12-14 PROCEDURE — 99283 EMERGENCY DEPT VISIT LOW MDM: CPT

## 2019-12-14 PROCEDURE — 74011250637 HC RX REV CODE- 250/637: Performed by: PHYSICIAN ASSISTANT

## 2019-12-14 RX ORDER — IBUPROFEN 400 MG/1
800 TABLET ORAL
Status: COMPLETED | OUTPATIENT
Start: 2019-12-14 | End: 2019-12-14

## 2019-12-14 RX ORDER — FLUTICASONE PROPIONATE 50 MCG
2 SPRAY, SUSPENSION (ML) NASAL DAILY
Qty: 1 BOTTLE | Refills: 0 | Status: SHIPPED | OUTPATIENT
Start: 2019-12-14 | End: 2020-02-20

## 2019-12-14 RX ORDER — CODEINE PHOSPHATE AND GUAIFENESIN 10; 100 MG/5ML; MG/5ML
10 SOLUTION ORAL
Qty: 118 ML | Refills: 0 | Status: SHIPPED | OUTPATIENT
Start: 2019-12-14 | End: 2019-12-18

## 2019-12-14 RX ORDER — ACETAMINOPHEN 500 MG
1000 TABLET ORAL
Status: COMPLETED | OUTPATIENT
Start: 2019-12-14 | End: 2019-12-14

## 2019-12-14 RX ADMIN — IBUPROFEN 800 MG: 400 TABLET, FILM COATED ORAL at 10:11

## 2019-12-14 RX ADMIN — ACETAMINOPHEN 1000 MG: 500 TABLET ORAL at 10:11

## 2019-12-14 NOTE — ED NOTES
Current Discharge Medication List      START taking these medications    Details   guaiFENesin-codeine (CHERATUSSIN AC) 100-10 mg/5 mL solution Take 10 mL by mouth three (3) times daily as needed for Cough for up to 4 days. Max Daily Amount: 30 mL. Qty: 118 mL, Refills: 0    Associated Diagnoses: Flu syndrome      fluticasone propionate (FLONASE) 50 mcg/actuation nasal spray 2 Sprays by Both Nostrils route daily. Qty: 1 Bottle, Refills: 0           Patient armband removed and shredded. prescriptions given and reviewed with patient.

## 2019-12-14 NOTE — DISCHARGE INSTRUCTIONS
Patient Education        Influenza (Flu): Care Instructions  Your Care Instructions    Influenza (flu) is an infection in the lungs and breathing passages. It is caused by the influenza virus. There are different strains, or types, of the flu virus from year to year. Unlike the common cold, the flu comes on suddenly and the symptoms, such as a cough, congestion, fever, chills, fatigue, aches, and pains, are more severe. These symptoms may last up to 10 days. Although the flu can make you feel very sick, it usually doesn't cause serious health problems. Home treatment is usually all you need for flu symptoms. But your doctor may prescribe antiviral medicine to prevent other health problems, such as pneumonia, from developing. Older people and those who have a long-term health condition, such as lung disease, are most at risk for having pneumonia or other health problems. Follow-up care is a key part of your treatment and safety. Be sure to make and go to all appointments, and call your doctor if you are having problems. It's also a good idea to know your test results and keep a list of the medicines you take. How can you care for yourself at home? · Get plenty of rest.  · Drink plenty of fluids, enough so that your urine is light yellow or clear like water. If you have kidney, heart, or liver disease and have to limit fluids, talk with your doctor before you increase the amount of fluids you drink. · Take an over-the-counter pain medicine if needed, such as acetaminophen (Tylenol), ibuprofen (Advil, Motrin), or naproxen (Aleve), to relieve fever, headache, and muscle aches. Read and follow all instructions on the label. No one younger than 20 should take aspirin. It has been linked to Reye syndrome, a serious illness. · Do not smoke. Smoking can make the flu worse. If you need help quitting, talk to your doctor about stop-smoking programs and medicines.  These can increase your chances of quitting for good.  · Breathe moist air from a hot shower or from a sink filled with hot water to help clear a stuffy nose. · Before you use cough and cold medicines, check the label. These medicines may not be safe for young children or for people with certain health problems. · If the skin around your nose and lips becomes sore, put some petroleum jelly on the area. · To ease coughing:  ? Drink fluids to soothe a scratchy throat. ? Suck on cough drops or plain hard candy. ? Take an over-the-counter cough medicine that contains dextromethorphan to help you get some sleep. Read and follow all instructions on the label. ? Raise your head at night with an extra pillow. This may help you rest if coughing keeps you awake. · Take any prescribed medicine exactly as directed. Call your doctor if you think you are having a problem with your medicine. To avoid spreading the flu  · Wash your hands regularly, and keep your hands away from your face. · Stay home from school, work, and other public places until you are feeling better and your fever has been gone for at least 24 hours. The fever needs to have gone away on its own without the help of medicine. · Ask people living with you to talk to their doctors about preventing the flu. They may get antiviral medicine to keep from getting the flu from you. · To prevent the flu in the future, get a flu vaccine every fall. Encourage people living with you to get the vaccine. · Cover your mouth when you cough or sneeze. When should you call for help? Call 911 anytime you think you may need emergency care.  For example, call if:    · You have severe trouble breathing.    Call your doctor now or seek immediate medical care if:    · You have new or worse trouble breathing.     · You seem to be getting much sicker.     · You feel very sleepy or confused.     · You have a new or higher fever.     · You get a new rash.    Watch closely for changes in your health, and be sure to contact your doctor if:    · You begin to get better and then get worse.     · You are not getting better after 1 week. Where can you learn more? Go to http://valerie-nate.info/. Enter X357 in the search box to learn more about \"Influenza (Flu): Care Instructions. \"  Current as of: 2019  Content Version: 12.2  © 3702-1847 [employer]. Care instructions adapted under license by [employer] (which disclaims liability or warranty for this information). If you have questions about a medical condition or this instruction, always ask your healthcare professional. [place] [address] any warranty or liability for your use of this information. [employer] Activation    Thank you for requesting access to [employer]. Please follow the instructions below to securely access and download your online medical record. [employer] allows you to send messages to your doctor, view your test results, renew your prescriptions, schedule appointments, and more. How Do I Sign Up? 1. In your internet browser, go to [email or web address]  2. Click on the First Time User? Click Here link in the Sign In box. You will be redirect to the New Member Sign Up page. 3. Enter your Mi-Pay Access Code exactly as it appears below. You will not need to use this code after youve completed the sign-up process. If you do not sign up before the expiration date, you must request a new code. Mi-[employer] Access Code: [email or web address]  Expires: 2020  9:32 AM (This is the date your Mi-Pay access code will )    4. Enter the last four digits of your Social Security Number (xxxx) and Date of Birth (mm/dd/yyyy) as indicated and click Submit. You will be taken to the next sign-up page. 5. Create a Mi-Pay ID. This will be your Mi-Pay login ID and cannot be changed, so think of one that is secure and easy to remember. 6. Create a Mi-Pay password.  You can change your password at any time.  7. Enter your Password Reset Question and Answer. This can be used at a later time if you forget your password. 8. Enter your e-mail address. You will receive e-mail notification when new information is available in [address] 9. Click Sign Up. You can now view and download portions of your medical record. 10. Click the Download Summary menu link to download a portable copy of your medical information. Additional Information    If you have questions, please visit the Frequently Asked Questions section of the [employer] website at [email or web address] Remember, [employer] is NOT to be used for urgent needs. For medical emergencies, dial 911.

## 2019-12-14 NOTE — ED PROVIDER NOTES
EMERGENCY DEPARTMENT HISTORY AND PHYSICAL EXAM    Date: 12/14/2019  Patient Name: Darrell Rabago    History of Presenting Illness     Chief Complaint   Patient presents with    Flu         History Provided By:patient     Chief Complaint: flu like sx  Duration 1 week  Timing: acute  Location: upper resp  Quality aching  Severity:moderate  Modifying Factors: nighttime cough and cold meds have not helped  Associated Symptoms: cough, congestion, sinus pressure/pain, diarrhea, fever, chills, body aches      Additional History (Context): Darrell Rabago is a 52 y.o. male with PMH hypertension who presents with complaints of continued flulike symptoms for a week. Patient was reportedly seen in urgent care clinic a week ago and diagnosed with the flu. Patient states he was told to use Tylenol Motrin for his fever and body aches. Patient states his last dose of Motrin was yesterday. Reports using over-the-counter cough and cold meds with no relief in his symptoms. Patient states this morning he had 2 episodes of diarrhea. He also reports cough, congestion, sinus pressure, fever, chills, and body aches. No other complaints reported at this time. PCP: Kayli Nevarez MD    Current Outpatient Medications   Medication Sig Dispense Refill    guaiFENesin-codeine (CHERATUSSIN AC) 100-10 mg/5 mL solution Take 10 mL by mouth three (3) times daily as needed for Cough for up to 4 days. Max Daily Amount: 30 mL. 118 mL 0    fluticasone propionate (FLONASE) 50 mcg/actuation nasal spray 2 Sprays by Both Nostrils route daily. 1 Bottle 0    amLODIPine (NORVASC) 2.5 mg tablet Take 1 Tab by mouth daily. Take with 5mg norvasc pill po daily for a total dose of 7.5mg amlodipine daily. 90 Tab 1    acetaminophen (TYLENOL) 500 mg tablet Take 1 Tab by mouth every six (6) hours as needed for Pain. Indications: Pain associated with Arthritis 180 Tab 2    amLODIPine (NORVASC) 5 mg tablet Take 1 Tab by mouth daily.  Indications: high blood pressure 90 Tab 3       Past History     Past Medical History:  Past Medical History:   Diagnosis Date    Ambulates with cane     Arthritis     lower back and kness    Hypertension     Knee pain        Past Surgical History:  Past Surgical History:   Procedure Laterality Date    HX CHOLECYSTECTOMY         Family History:  Family History   Problem Relation Age of Onset    Heart Disease Mother     Diabetes Mother     Stroke Father        Social History:  Social History     Tobacco Use    Smoking status: Never Smoker    Smokeless tobacco: Never Used   Substance Use Topics    Alcohol use: Not Currently     Comment: Socially    Drug use: Not Currently       Allergies:  No Known Allergies      Review of Systems   Review of Systems   Constitutional: Positive for chills and fever. HENT: Positive for congestion, rhinorrhea, sinus pressure and sinus pain. Negative for ear pain. Eyes: Negative. Negative for pain and redness. Respiratory: Positive for cough. Negative for shortness of breath, wheezing and stridor. Cardiovascular: Negative. Negative for chest pain and leg swelling. Gastrointestinal: Positive for diarrhea. Negative for abdominal pain, constipation, nausea and vomiting. Genitourinary: Negative. Negative for dysuria and frequency. Musculoskeletal: Positive for myalgias. Negative for back pain and neck pain. Skin: Negative. Negative for rash and wound. Neurological: Negative. Negative for dizziness, seizures, syncope and headaches. All other systems reviewed and are negative. All Other Systems Negative  Physical Exam     Vitals:    12/14/19 0930   BP: (!) 167/102   Pulse: (!) 118   Resp: 18   Temp: 99.6 °F (37.6 °C)   SpO2: 100%   Weight: (!) 202.3 kg (446 lb)   Height: 5' 9\" (1.753 m)     Physical Exam  Vitals signs and nursing note reviewed. Constitutional:       General: He is in acute distress. Appearance: He is well-developed. He is obese.  He is not diaphoretic. Comments: Mildly distressed   HENT:      Head: Normocephalic and atraumatic. Right Ear: Tympanic membrane, ear canal and external ear normal. There is no impacted cerumen. Left Ear: Tympanic membrane, ear canal and external ear normal. There is no impacted cerumen. Nose: Congestion present. No rhinorrhea. Comments: Bilateral nasal congestion noted     Mouth/Throat:      Mouth: Mucous membranes are moist.      Pharynx: Oropharynx is clear. No oropharyngeal exudate or posterior oropharyngeal erythema. Eyes:      General: No scleral icterus. Right eye: No discharge. Left eye: No discharge. Conjunctiva/sclera: Conjunctivae normal.   Neck:      Musculoskeletal: Normal range of motion and neck supple. Cardiovascular:      Rate and Rhythm: Normal rate and regular rhythm. Heart sounds: Normal heart sounds. No murmur. No friction rub. No gallop. Pulmonary:      Effort: Pulmonary effort is normal. No respiratory distress. Breath sounds: Normal breath sounds. No stridor. No wheezing or rales. Abdominal:      General: Bowel sounds are normal. There is no distension. Palpations: Abdomen is soft. There is no mass. Tenderness: There is tenderness. There is no guarding. Comments: Mild diffuse discomfort on palpation, no guarding or peritoneal signs noted   Musculoskeletal: Normal range of motion. Skin:     General: Skin is warm and dry. Findings: No erythema or rash. Neurological:      Mental Status: He is alert and oriented to person, place, and time. Coordination: Coordination normal.      Comments: Gait is steady and patient exhibits no evidence of ataxia. Patient is able to ambulate without difficulty. No focal neurological deficit noted. No facial droop, slurred speech, or evidence of altered mentation noted on exam.     Psychiatric:         Behavior: Behavior normal.         Thought Content:  Thought content normal. Diagnostic Study Results     Labs -   No results found for this or any previous visit (from the past 12 hour(s)). Radiologic Studies -   No orders to display     CT Results  (Last 48 hours)    None        CXR Results  (Last 48 hours)    None            Medical Decision Making   I am the first provider for this patient. I reviewed the vital signs, available nursing notes, past medical history, past surgical history, family history and social history. Vital Signs-Reviewed the patient's vital signs. Records Reviewed: Nathalia Orozco PA-C     Procedures:  Procedures    Provider Notes (Medical Decision Making): Impression:  Flu syndrome    Tylenol and Motrin given in the ED. explained to the patient that he needs to be rotating Tylenol and Motrin every 4-6 hours to keep his fever and body aches controlled. No abdominal guarding or peritoneal signs. No indication for labs or imaging at this time. We will plan to add Flonase nasal spray and Cheratussin for the patient's cough. Patient is recommended to have close PCP follow-up this week. Patient agrees to this plan. MED RECONCILIATION:  No current facility-administered medications for this encounter. Current Outpatient Medications   Medication Sig    guaiFENesin-codeine (CHERATUSSIN AC) 100-10 mg/5 mL solution Take 10 mL by mouth three (3) times daily as needed for Cough for up to 4 days. Max Daily Amount: 30 mL.  fluticasone propionate (FLONASE) 50 mcg/actuation nasal spray 2 Sprays by Both Nostrils route daily.  amLODIPine (NORVASC) 2.5 mg tablet Take 1 Tab by mouth daily. Take with 5mg norvasc pill po daily for a total dose of 7.5mg amlodipine daily.  acetaminophen (TYLENOL) 500 mg tablet Take 1 Tab by mouth every six (6) hours as needed for Pain. Indications: Pain associated with Arthritis    amLODIPine (NORVASC) 5 mg tablet Take 1 Tab by mouth daily.  Indications: high blood pressure       Disposition:  D/c    DISCHARGE NOTE:   Patient is stable for discharge at this time. I have discussed all the findings from today's work up with the patient, including lab results and imaging. I have answered all questions. Rx for cheratussin and flonase  given. Rest and close follow-up with the PCP recommended this week. Return to the ED immediately for any new or worsening symptoms. April Saray Rockwell PA-C     Follow-up Information     Follow up With Specialties Details Why Contact Info    Rina Escobar MD [facility] Schedule an appointment as soon as possible for a visit in 3 days  [address]  [id number] [place] [address]      [id number] [place] [address] EMERGENCY DEPT Emergency Medicine  As needed, If symptoms worsen [address] [place] [address]  [phone]          Current Discharge Medication List      START taking these medications    Details   guaiFENesin-codeine (CHERATUSSIN AC) 100-10 mg/5 mL solution Take 10 mL by mouth three (3) times daily as needed for Cough for up to 4 days. Max Daily Amount: 30 mL. Qty: 118 mL, Refills: 0    Associated Diagnoses: Flu syndrome      fluticasone propionate (FLONASE) 50 mcg/actuation nasal spray 2 Sprays by Both Nostrils route daily. Qty: 1 Bottle, Refills: 0                 Diagnosis     Clinical Impression:   1.  Flu syndrome

## 2020-01-28 ENCOUNTER — OFFICE VISIT (OUTPATIENT)
Dept: INTERNAL MEDICINE CLINIC | Age: 50
End: 2020-01-28

## 2020-01-28 VITALS
WEIGHT: 315 LBS | HEART RATE: 77 BPM | TEMPERATURE: 98.2 F | OXYGEN SATURATION: 97 % | SYSTOLIC BLOOD PRESSURE: 143 MMHG | RESPIRATION RATE: 14 BRPM | HEIGHT: 69 IN | BODY MASS INDEX: 46.65 KG/M2 | DIASTOLIC BLOOD PRESSURE: 94 MMHG

## 2020-01-28 DIAGNOSIS — Z12.11 SCREENING FOR COLON CANCER: ICD-10-CM

## 2020-01-28 DIAGNOSIS — E66.01 OBESITY, MORBID (HCC): ICD-10-CM

## 2020-01-28 DIAGNOSIS — I10 ESSENTIAL HYPERTENSION: Primary | ICD-10-CM

## 2020-01-28 DIAGNOSIS — G89.29 CHRONIC MIDLINE LOW BACK PAIN WITHOUT SCIATICA: ICD-10-CM

## 2020-01-28 DIAGNOSIS — Z23 ENCOUNTER FOR IMMUNIZATION: ICD-10-CM

## 2020-01-28 DIAGNOSIS — M54.50 CHRONIC MIDLINE LOW BACK PAIN WITHOUT SCIATICA: ICD-10-CM

## 2020-01-28 RX ORDER — AMLODIPINE BESYLATE 10 MG/1
10 TABLET ORAL DAILY
Qty: 30 TAB | Refills: 6 | Status: SHIPPED | OUTPATIENT
Start: 2020-01-28 | End: 2020-07-07

## 2020-01-28 RX ORDER — CYCLOBENZAPRINE HCL 5 MG
5 TABLET ORAL
Qty: 30 TAB | Refills: 3 | Status: SHIPPED | OUTPATIENT
Start: 2020-01-28 | End: 2020-02-20

## 2020-01-28 NOTE — PROGRESS NOTES
King Gunter presents today for   Chief Complaint   Patient presents with    Hypertension     follow up  ROOM  12       Is someone accompanying this pt? no    Is the patient using any DME equipment during OV? Yes cane    Depression Screening:  3 most recent PHQ Screens 1/28/2020   PHQ Not Done -   Little interest or pleasure in doing things Not at all   Feeling down, depressed, irritable, or hopeless Not at all   Total Score PHQ 2 0       Learning Assessment:  Learning Assessment 1/28/2020   PRIMARY LEARNER Patient   HIGHEST LEVEL OF EDUCATION - PRIMARY LEARNER  GRADUATED HIGH SCHOOL OR GED   BARRIERS PRIMARY LEARNER NONE   CO-LEARNER CAREGIVER No   PRIMARY LANGUAGE ENGLISH   LEARNER PREFERENCE PRIMARY DEMONSTRATION   ANSWERED BY patient   RELATIONSHIP SELF       Abuse Screening:  Abuse Screening Questionnaire 1/28/2020   Do you ever feel afraid of your partner? N   Are you in a relationship with someone who physically or mentally threatens you? N   Is it safe for you to go home? Y       Fall Risk  Fall Risk Assessment, last 12 mths 5/20/2019   Able to walk? Yes   Fall in past 12 months? No       Health Maintenance reviewed and discussed and ordered per Provider. King Gunter 1970 male   who presents for routine immunizations. Patient denies any symptoms , reactions or allergies that would exclude them from being immunized today. Risks and adverse reactions were discussed and the VIS was given to them. All questions were addressed. Influenza Vaccine  administered per Dr Eugenie Godoy read back. Patient was observed for 20 min post injection. There were no reactions observed. [address] [place] [address] Maintenance Due   Topic Date Due    DTaP/Tdap/Td series (1 - Tdap) 01/02/1981    Shingrix Vaccine Age 50> (1 of 2) 01/02/2020    FOBT Q 1 YEAR AGE 50-75  01/02/2020         Coordination of Care:  1. Have you been to the ER, urgent care clinic since your last visit?  Hospitalized since your last visit? no    2. Have you seen or consulted any other health care providers outside of the [address] [place] [address] since your last visit? Include any pap smears or colon screening.  no

## 2020-01-28 NOTE — PATIENT INSTRUCTIONS
Learning About How to Have a Healthy Back  What causes back pain? Back pain is often caused by overuse, strain, or injury. For example, people often hurt their backs playing sports or working in the yard, being jolted in a car accident, or lifting something too heavy. Aging plays a part too. Your bones and muscles tend to lose strength as you age, which makes injury more likely. The spongy discs between the bones of the spine (vertebrae) may suffer from wear and tear and no longer provide enough cushion between the bones. A disc that bulges or breaks open (herniated disc) can press on nerves, causing back pain. In some people, back pain is the result of arthritis, broken vertebrae caused by bone loss (osteoporosis), illness, or a spine problem. Although most people have back pain at one time or another, there are steps you can take to make it less likely. How can you have a healthy back? Reduce stress on your back through good posture  Slumping or slouching alone may not cause low back pain. But after the back has been strained or injured, bad posture can make pain worse. · Sleep in a position that maintains your back's normal curves and on a mattress that feels comfortable. Sleep on your side with a pillow between your knees, or sleep on your back with a pillow under your knees. These positions can reduce strain on your back. · Stand and sit up straight. \"Good posture\" generally means your ears, shoulders, and hips are in a straight line. · If you must stand for a long time, put one foot on a stool, ledge, or box. Switch feet every now and then. · Sit in a chair that is low enough to let you place both feet flat on the floor with both knees nearly level with your hips. If your chair or desk is too high, use a footrest to raise your knees. Place a small pillow, a rolled-up towel, or a lumbar roll in the curve of your back if you need extra support.   · Try a kneeling chair, which helps tilt your hips forward. This takes pressure off your lower back. · Try sitting on an exercise ball. It can rock from side to side, which helps keep your back loose. · When driving, keep your knees nearly level with your hips. Sit straight, and drive with both hands on the steering wheel. Your arms should be in a slightly bent position. Reduce stress on your back through careful lifting  · Squat down, bending at the hips and knees only. If you need to, put one knee to the floor and extend your other knee in front of you, bent at a right angle (half kneeling). · Press your chest straight forward. This helps keep your upper back straight while keeping a slight arch in your low back. · Hold the load as close to your body as possible, at the level of your belly button (navel). · Use your feet to change direction, taking small steps. · Lead with your hips as you change direction. Keep your shoulders in line with your hips as you move. · Set down your load carefully, squatting with your knees and hips only. Exercise and stretch your back  · Do some exercise on most days of the week, if your doctor says it is okay. You can walk, run, swim, or cycle. · Stretch your back muscles. Here are a few exercises to try:  ? Lie on your back, and gently pull one bent knee to your chest. Put that foot back on the floor, and then pull the other knee to your chest.  ? Do pelvic tilts. Lie on your back with your knees bent. Tighten your stomach muscles. Pull your belly button (navel) in and up toward your ribs. You should feel like your back is pressing to the floor and your hips and pelvis are slightly lifting off the floor. Hold for 6 seconds while breathing smoothly. ? Sit with your back flat against a wall. · Keep your core muscles strong. The muscles of your back, belly (abdomen), and buttocks support your spine. ? Pull in your belly and imagine pulling your navel toward your spine. Hold this for 6 seconds, then relax.  Remember to keep breathing normally as you tense your muscles. ? Do curl-ups. Always do them with your knees bent. Keep your low back on the floor, and curl your shoulders toward your knees using a smooth, slow motion. Keep your arms folded across your chest. If this bothers your neck, try putting your hands behind your neck (not your head), with your elbows spread apart. ? Lie on your back with your knees bent and your feet flat on the floor. Tighten your belly muscles, and then push with your feet and raise your buttocks up a few inches. Hold this position 6 seconds as you continue to breathe normally, then lower yourself slowly to the floor. Repeat 8 to 12 times. ? If you like group exercise, try Pilates or yoga. These classes have poses that strengthen the core muscles. Lead a healthy lifestyle  · Stay at a healthy weight to avoid strain on your back. · Do not smoke. Smoking increases the risk of osteoporosis, which weakens the spine. If you need help quitting, talk to your doctor about stop-smoking programs and medicines. These can increase your chances of quitting for good. Where can you learn more? Go to http://valerieMarket76nate.info/. Enter L315 in the search box to learn more about \"Learning About How to Have a Healthy Back. \"  Current as of: June 26, 2019  Content Version: 12.2  © 4579-7016 [employer]. Care instructions adapted under license by [employer] (which disclaims liability or warranty for this information). If you have questions about a medical condition or this instruction, always ask your healthcare professional. [place] [address] any warranty or liability for your use of this information. Vaccine Information Statement    Influenza (Flu) Vaccine (Inactivated or Recombinant): What You Need to Know    Many Vaccine Information Statements are available in [place] and other languages.  See www.immunize.org/vis  Hojas de información sobre vacunas están disponibles en español y en muchos otros idiomas. Visite www.immunize.org/vis    1. Why get vaccinated? Influenza vaccine can prevent influenza (flu). Flu is a contagious disease that spreads around the [place] every year, usually between October and May. Anyone can get the flu, but it is more dangerous for some people. Infants and young children, people 72years of age and older, pregnant women, and people with certain health conditions or a weakened immune system are at greatest risk of flu complications. Pneumonia, bronchitis, sinus infections and ear infections are examples of flu-related complications. If you have a medical condition, such as heart disease, cancer or diabetes, flu can make it worse. Flu can cause fever and chills, sore throat, muscle aches, fatigue, cough, headache, and runny or stuffy nose. Some people may have vomiting and diarrhea, though this is more common in children than adults. Each year thousands of people in the [facility] die from flu, and many more are hospitalized. Flu vaccine prevents millions of illnesses and flu-related visits to the doctor each year. 2. Influenza vaccines     CDC recommends everyone 10months of age and older get vaccinated every flu season. Children 6 months through 6years of age may need 2 doses during a single flu season. Everyone else needs only 1 dose each flu season. It takes about 2 weeks for protection to develop after vaccination. There are many flu viruses, and they are always changing. Each year a new flu vaccine is made to protect against three or four viruses that are likely to cause disease in the upcoming flu season. Even when the vaccine doesnt exactly match these viruses, it may still provide some protection. Influenza vaccine does not cause flu. Influenza vaccine may be given at the same time as other vaccines.     3. Talk with your health care provider    Tell your vaccine provider if the person getting the vaccine:   Has had an allergic reaction after a previous dose of influenza vaccine, or has any severe, life-threatening allergies.  Has ever had Guillain-Barré Syndrome (also called GBS). In some cases, your health care provider may decide to postpone influenza vaccination to a future visit. People with minor illnesses, such as a cold, may be vaccinated. People who are moderately or severely ill should usually wait until they recover before getting influenza vaccine. Your health care provider can give you more information. 4. Risks of a reaction     Soreness, redness, and swelling where shot is given, fever, muscle aches, and headache can happen after influenza vaccine.  There may be a very small increased risk of Guillain-Barré Syndrome (GBS) after inactivated influenza vaccine (the flu shot). [facility] children who get the flu shot along with pneumococcal vaccine (PCV13), and/or DTaP vaccine at the same time might be slightly more likely to have a seizure caused by fever. Tell your health care provider if a child who is getting flu vaccine has ever had a seizure. People sometimes faint after medical procedures, including vaccination. Tell your provider if you feel dizzy or have vision changes or ringing in the ears. As with any medicine, there is a very remote chance of a vaccine causing a severe allergic reaction, other serious injury, or death. 5. What if there is a serious problem? An allergic reaction could occur after the vaccinated person leaves the clinic. If you see signs of a severe allergic reaction (hives, swelling of the face and throat, difficulty breathing, a fast heartbeat, dizziness, or weakness), call 9-1-1 and get the person to the nearest hospital.    For other signs that concern you, call your health care provider. Adverse reactions should be reported to the Vaccine Adverse Event Reporting System (VAERS).  Your health care provider will usually file this report, or you can do it yourself. Visit the VAERS website at www.vaers. hhs.gov or call 3-833.695.7315. VAERS is only for reporting reactions, and VAERS staff do not give medical advice. 6. The National Vaccine Injury Compensation Program    The [facility] Vaccine Injury Compensation Program (VICP) is a federal program that was created to compensate people who may have been injured by certain vaccines. Visit the VICP website at www.[facility].gov/vaccinecompensation or call 9-744.755.8920 to learn about the program and about filing a claim. There is a time limit to file a claim for compensation. 7. How can I learn more?  Ask your health care provider.  Call your local or state health department.  Contact the Centers for Disease Control and Prevention (CDC):  - Call 8-254.850.8217 (1-800-CDC-INFO) or  - Visit CDCs influenza website at www.cdc.gov/flu    Vaccine Information Statement (Interim)  Inactivated Influenza Vaccine   8/15/2019  [address] Tanya [id number]   Department of Health and Human Services  Centers for Disease Control and Prevention    Office Use Only         Body Mass Index: Care Instructions  Your Care Instructions    Body mass index (BMI) can help you see if your weight is raising your risk for health problems. It uses a formula to compare how much you weigh with how tall you are. · A BMI lower than 18.5 is considered underweight. · A BMI between 18.5 and 24.9 is considered healthy. · A BMI between 25 and 29.9 is considered overweight. A BMI of 30 or higher is considered obese. If your BMI is in the normal range, it means that you have a lower risk for weight-related health problems. If your BMI is in the overweight or obese range, you may be at increased risk for weight-related health problems, such as high blood pressure, heart disease, stroke, arthritis or joint pain, and diabetes.  If your BMI is in the underweight range, you may be at increased risk for health problems such as fatigue, lower protection (immunity) against illness, muscle loss, bone loss, hair loss, and hormone problems. BMI is just one measure of your risk for weight-related health problems. You may be at higher risk for health problems if you are not active, you eat an unhealthy diet, or you drink too much alcohol or use tobacco products. Follow-up care is a key part of your treatment and safety. Be sure to make and go to all appointments, and call your doctor if you are having problems. It's also a good idea to know your test results and keep a list of the medicines you take. How can you care for yourself at home? · Practice healthy eating habits. This includes eating plenty of fruits, vegetables, whole grains, lean protein, and low-fat dairy. · If your doctor recommends it, get more exercise. Walking is a good choice. Bit by bit, increase the amount you walk every day. Try for at least 30 minutes on most days of the week. · Do not smoke. Smoking can increase your risk for health problems. If you need help quitting, talk to your doctor about stop-smoking programs and medicines. These can increase your chances of quitting for good. · Limit alcohol to 2 drinks a day for men and 1 drink a day for women. Too much alcohol can cause health problems. If you have a BMI higher than 25  · Your doctor may do other tests to check your risk for weight-related health problems. This may include measuring the distance around your waist. A waist measurement of more than 40 inches in men or 35 inches in women can increase the risk of weight-related health problems. · Talk with your doctor about steps you can take to stay healthy or improve your health. You may need to make lifestyle changes to lose weight and stay healthy, such as changing your diet and getting regular exercise. If you have a BMI lower than 18.5  · Your doctor may do other tests to check your risk for health problems.   · Talk with your doctor about steps you can take to stay healthy or improve your health. You may need to make lifestyle changes to gain or maintain weight and stay healthy, such as getting more healthy foods in your diet and doing exercises to build muscle. Where can you learn more? Go to http://valerie-nate.info/. Enter S176 in the search box to learn more about \"Body Mass Index: Care Instructions. \"  Current as of: March 28, 2019  Content Version: 12.2  © 0842-7435 [employer]. Care instructions adapted under license by [employer] (which disclaims liability or warranty for this information). If you have questions about a medical condition or this instruction, always ask your healthcare professional. [place] [address] any warranty or liability for your use of this information.   Health Maintenance Due   Topic Date Due    DTaP/Tdap/Td series (1 - Tdap) 01/02/1981    Shingrix Vaccine Age 50> (1 of 2) 01/02/2020    FOBT Q 1 YEAR AGE 50-75  01/02/2020

## 2020-01-28 NOTE — PROGRESS NOTES
INTERNISTS OF [facility]:  1/28/2020, MRN: [id number]      Sierra Almanza is a 48 y.o. male and presents to clinic for Hypertension (follow up  ROOM  12)    Subjective: The patient is a 80-year-old male with history of hypertension, obesity, CVA, h/o H.pylori infection (treated last year), prediabetes, and chronic bilateral knee pain (followed by Orthopedics). 1. Lower Back Pain: Present since February of last year. There is no history of trauma. Tylenol did not work well to relieve his symptoms. In the past, he reported the pain was localized to his lower back and not associated with weakness or paresthesias. Pain is intermittent. He had a lumbar x-ray series last year. Findings are listed below. Today, he reports: continued localized back pain that is not associated with paresthesias/weakness; it responds well to flexeril which was prescribed in the past but he has not refilled this rx. His weight has been increasing over the past several months too.      2. HTN: BP is 151/98. His weight 447 pounds. He was bariatric surgery and is followed by the Bariatric Surgery teams. Diet: he is not always sticking to the recommended diet by his weight loss team but plans to restart it soon. At his last appointment, I increased his amlodipine from 5 mg daily to 7.5 mg daily. He was also referred to Sleep Medicine. Since this last apt, he has yet to meet with Sleep Medicine.         Patient Active Problem List    Diagnosis Date Noted    Chronic low back pain without sciatica 10/19/2019    Essential hypertension 06/10/2019    Impaired glucose tolerance 06/10/2019    History of Helicobacter pylori infection 06/10/2019    Obesity, morbid ([address] 07/13/2018    History of CVA (cerebrovascular accident) 07/13/2018    Chronic pain of both knees 07/13/2018       Current Outpatient Medications   Medication Sig Dispense Refill    fluticasone propionate (FLONASE) 50 mcg/actuation nasal spray 2 Sprays by Both Nostrils route daily. 1 Bottle 0    amLODIPine (NORVASC) 2.5 mg tablet Take 1 Tab by mouth daily. Take with 5mg norvasc pill po daily for a total dose of 7.5mg amlodipine daily. 90 Tab 1    acetaminophen (TYLENOL) 500 mg tablet Take 1 Tab by mouth every six (6) hours as needed for Pain. Indications: Pain associated with Arthritis 180 Tab 2    amLODIPine (NORVASC) 5 mg tablet Take 1 Tab by mouth daily. Indications: high blood pressure 90 Tab 3       No Known Allergies    Past Medical History:   Diagnosis Date    Ambulates with cane     Arthritis     lower back and kness    Hypertension     Knee pain        Past Surgical History:   Procedure Laterality Date    HX CHOLECYSTECTOMY         Family History   Problem Relation Age of Onset    Heart Disease Mother     Diabetes Mother     Stroke Father        Social History     Tobacco Use    Smoking status: Never Smoker    Smokeless tobacco: Never Used   Substance Use Topics    Alcohol use: Not Currently     Comment: Socially       ROS   Review of Systems   Constitutional: Negative for chills and fever. HENT: Negative for ear pain and sore throat. Eyes: Negative for blurred vision and pain. Respiratory: Negative for shortness of breath. Cardiovascular: Negative for chest pain. Gastrointestinal: Negative for abdominal pain, blood in stool and melena. Genitourinary: Negative for dysuria and hematuria. Musculoskeletal: Positive for back pain and joint pain. Negative for myalgias. Skin: Negative for rash. Neurological: Negative for tingling, focal weakness and headaches. Endo/Heme/Allergies: Does not bruise/bleed easily. Psychiatric/Behavioral: Negative for substance abuse.        Objective     Vitals:    01/28/20 0852   BP: (!) 151/98   Pulse: 81   Resp: 20   Temp: 98.2 °F (36.8 °C)   TempSrc: Oral   SpO2: 97%   Weight: (!) 447 lb 3.2 oz (202.8 kg)   Height: 5' 9\" (1.753 m)   PainSc:   7   PainLoc: Back       Physical Exam  Vitals signs and nursing note reviewed. Constitutional:       Appearance: Normal appearance. HENT:      Head: Normocephalic and atraumatic. Right Ear: External ear normal.      Left Ear: External ear normal.      Nose: Nose normal.      Mouth/Throat:      Pharynx: No oropharyngeal exudate or posterior oropharyngeal erythema. Eyes:      Extraocular Movements: Extraocular movements intact. Conjunctiva/sclera: Conjunctivae normal.      Pupils: Pupils are equal, round, and reactive to light. Neck:      Musculoskeletal: Normal range of motion and neck supple. No muscular tenderness. Cardiovascular:      Pulses: Normal pulses. Pulmonary:      Effort: Pulmonary effort is normal.      Breath sounds: Normal breath sounds. Abdominal:      General: Abdomen is flat. Bowel sounds are normal. There is no distension. Palpations: Abdomen is soft. Musculoskeletal:         General: No swelling (Bue) or tenderness (Bue). Comments: His lumbar spinous processes are nontender to palpation. He has lumbar paraspinal muscles that are tender to palpation. Skin:     General: Skin is warm and dry. Findings: No erythema. Neurological:      General: No focal deficit present. Mental Status: He is alert.       Gait: Gait (with cane) normal.   Psychiatric:         Mood and Affect: Mood normal.         LABS   Data Review:   Lab Results   Component Value Date/Time    WBC 7.3 07/08/2019 10:18 AM    HGB 12.2 (L) 07/08/2019 10:18 AM    HCT 39.8 07/08/2019 10:18 AM    PLATELET 685 11/45/9994 10:18 AM    MCV 85 07/08/2019 10:18 AM       Lab Results   Component Value Date/Time    Sodium 142 07/08/2019 10:18 AM    Potassium 3.9 07/08/2019 10:18 AM    Chloride 102 07/08/2019 10:18 AM    CO2 28 07/08/2019 10:18 AM    Anion gap 12.0 07/08/2019 10:18 AM    Glucose 92 07/08/2019 10:18 AM    BUN 13 07/08/2019 10:18 AM    Creatinine 0.8 07/08/2019 10:18 AM    BUN/Creatinine ratio 19 02/22/2019 03:26 PM    GFR est AA >60 02/22/2019 03:26 PM    GFR est non-AA >60 02/22/2019 03:26 PM    Calcium 8.3 (L) 07/08/2019 10:18 AM       Lab Results   Component Value Date/Time    Cholesterol, total 146 02/18/2019 09:50 AM    HDL Cholesterol 38 (L) 02/18/2019 09:50 AM    LDL, calculated 95.2 02/18/2019 09:50 AM    VLDL, calculated 12.8 02/18/2019 09:50 AM    Triglyceride 64 02/18/2019 09:50 AM    CHOL/HDL Ratio 3.8 02/18/2019 09:50 AM       Lab Results   Component Value Date/Time    Hemoglobin A1c 5.3 07/03/2019 09:40 AM       Assessment/Plan:   1. Lower Back Pain: From strain per PE findings. Pain response to Flexeril.  -Flexeril refilled. I encouraged him to  the refill. He is to return to clinic if his symptoms worsen or do not respond to this medication.  -Activity as tolerated.  -We discussed the importance of weight reduction    2. HTN: BP is still elevated. - I encouraged him to schedule an appointment with Sleep Medicine to r/o RON  -Increasing his amlodipine to 10 mg daily. Return to clinic for BP check.  -I encouraged him to participate in the Bariatric Surgery program.  We discussed how if he follows their nutrition plan, he can reduce his weight so that he can qualify for weight reduction surgery. I will recheck his weight up appointment. I encouraged him to adhere to the diet plan they gave him. 3. Health Maintenance:  - Referral to GI for colonoscopy for colon cancer screening      Health Maintenance Due   Topic Date Due    DTaP/Tdap/Td series (1 - Tdap) 01/02/1981    Influenza Age 5 to Adult  08/01/2019    Shingrix Vaccine Age 50> (1 of 2) 01/02/2020    FOBT Q 1 YEAR AGE 50-75  01/02/2020     Lab review: labs are reviewed in the EHR    I have discussed the diagnosis with the patient and the intended plan as seen in the above orders. The patient has received an after-visit summary and questions were answered concerning future plans. I have discussed medication side effects and warnings with the patient as well.  I have reviewed the plan of care with the patient, accepted their input and they are in agreement with the treatment goals. All questions were answered. The patient understands the plan of care. Handouts provided today with above information. Pt instructed if symptoms worsen to call the office or report to the ED for continued care. Greater than 50% of the visit time was spent in counseling and/or coordination of care. Voice recognition was used to generate this report, which may have resulted in some phonetic based errors in grammar and contents. Even though attempts were made to correct all the mistakes, some may have been missed, and remained in the body of the document. Follow-up and Dispositions    · Return in about 7 weeks (around 3/17/2020) for BP check, weight check.          Eri Rivera MD

## 2020-02-17 ENCOUNTER — TELEPHONE (OUTPATIENT)
Dept: INTERNAL MEDICINE CLINIC | Age: 50
End: 2020-02-17

## 2020-02-17 ENCOUNTER — TELEPHONE (OUTPATIENT)
Dept: SURGERY | Age: 50
End: 2020-02-17

## 2020-02-17 DIAGNOSIS — G89.29 CHRONIC LOW BACK PAIN WITHOUT SCIATICA, UNSPECIFIED BACK PAIN LATERALITY: Primary | ICD-10-CM

## 2020-02-17 DIAGNOSIS — M54.50 CHRONIC LOW BACK PAIN WITHOUT SCIATICA, UNSPECIFIED BACK PAIN LATERALITY: Primary | ICD-10-CM

## 2020-02-17 NOTE — TELEPHONE ENCOUNTER
Patient stating he has ongoing back issues and wants to know if he can be referred to a back specialist

## 2020-02-17 NOTE — TELEPHONE ENCOUNTER
PT called stating he has an appt on 3/4/2020 for f/u to [facility] and doesn't know why he needs one. Upon review of providers last note I informed patient the follow up was for a weight check. Pt verbalized understanding and stated he will be at the appointment.

## 2020-02-18 NOTE — TELEPHONE ENCOUNTER
Tried to reach patient to notify him of referral that was placed by DR. Santana Mcmanus. The patient will be contacted to schedule the appointment. The information is below for the patient.    You have been referred to:  [address] [place] [address] Dr Hughes  [address] [place] [id number]  Phone: [phone]  Fax: [phone]

## 2020-02-18 NOTE — TELEPHONE ENCOUNTER
Referral placed as requested.     Dr. Hamida Kim  [facility], [address] [facility], [address]  Phone: [phone]  Fax: [phone]

## 2020-02-20 ENCOUNTER — HOSPITAL ENCOUNTER (EMERGENCY)
Age: 50
Discharge: HOME | End: 2020-02-20
Attending: EMERGENCY MEDICINE
Payer: MEDICAID

## 2020-02-20 VITALS
SYSTOLIC BLOOD PRESSURE: 152 MMHG | WEIGHT: 315 LBS | TEMPERATURE: 98.1 F | OXYGEN SATURATION: 99 % | BODY MASS INDEX: 46.65 KG/M2 | HEART RATE: 66 BPM | RESPIRATION RATE: 16 BRPM | HEIGHT: 69 IN | DIASTOLIC BLOOD PRESSURE: 95 MMHG

## 2020-02-20 DIAGNOSIS — R11.2 NON-INTRACTABLE VOMITING WITH NAUSEA, UNSPECIFIED VOMITING TYPE: Primary | ICD-10-CM

## 2020-02-20 DIAGNOSIS — K52.9 GASTROENTERITIS, ACUTE: ICD-10-CM

## 2020-02-20 DIAGNOSIS — R19.7 DIARRHEA, UNSPECIFIED TYPE: ICD-10-CM

## 2020-02-20 LAB
ALBUMIN SERPL-MCNC: 3.1 G/DL (ref 3.4–5)
ALBUMIN/GLOB SERPL: 0.7 {RATIO} (ref 0.8–1.7)
ALP SERPL-CCNC: 77 U/L (ref 45–117)
ALT SERPL-CCNC: 20 U/L (ref 16–61)
ANION GAP SERPL CALC-SCNC: 1 MMOL/L (ref 3–18)
AST SERPL-CCNC: 23 U/L (ref 10–38)
BASOPHILS # BLD: 0 K/UL (ref 0–0.1)
BASOPHILS NFR BLD: 0 % (ref 0–2)
BILIRUB SERPL-MCNC: 0.3 MG/DL (ref 0.2–1)
BUN SERPL-MCNC: 11 MG/DL (ref 7–18)
BUN/CREAT SERPL: 13 (ref 12–20)
CALCIUM SERPL-MCNC: 8.1 MG/DL (ref 8.5–10.1)
CHLORIDE SERPL-SCNC: 107 MMOL/L (ref 100–111)
CO2 SERPL-SCNC: 32 MMOL/L (ref 21–32)
CREAT SERPL-MCNC: 0.84 MG/DL (ref 0.6–1.3)
DIFFERENTIAL METHOD BLD: ABNORMAL
EOSINOPHIL # BLD: 0.3 K/UL (ref 0–0.4)
EOSINOPHIL NFR BLD: 5 % (ref 0–5)
ERYTHROCYTE [DISTWIDTH] IN BLOOD BY AUTOMATED COUNT: 15.7 % (ref 11.6–14.5)
GLOBULIN SER CALC-MCNC: 4.7 G/DL (ref 2–4)
GLUCOSE SERPL-MCNC: 96 MG/DL (ref 74–99)
HCT VFR BLD AUTO: 40.3 % (ref 36–48)
HGB BLD-MCNC: 12.8 G/DL (ref 13–16)
LIPASE SERPL-CCNC: 74 U/L (ref 73–393)
LYMPHOCYTES # BLD: 1.5 K/UL (ref 0.9–3.6)
LYMPHOCYTES NFR BLD: 30 % (ref 21–52)
MCH RBC QN AUTO: 26.2 PG (ref 24–34)
MCHC RBC AUTO-ENTMCNC: 31.8 G/DL (ref 31–37)
MCV RBC AUTO: 82.4 FL (ref 74–97)
MONOCYTES # BLD: 0.5 K/UL (ref 0.05–1.2)
MONOCYTES NFR BLD: 10 % (ref 3–10)
NEUTS SEG # BLD: 2.7 K/UL (ref 1.8–8)
NEUTS SEG NFR BLD: 55 % (ref 40–73)
PLATELET # BLD AUTO: 196 K/UL (ref 135–420)
PMV BLD AUTO: 10.6 FL (ref 9.2–11.8)
POTASSIUM SERPL-SCNC: 3.7 MMOL/L (ref 3.5–5.5)
PROT SERPL-MCNC: 7.8 G/DL (ref 6.4–8.2)
RBC # BLD AUTO: 4.89 M/UL (ref 4.7–5.5)
SODIUM SERPL-SCNC: 140 MMOL/L (ref 136–145)
WBC # BLD AUTO: 5 K/UL (ref 4.6–13.2)

## 2020-02-20 PROCEDURE — 96361 HYDRATE IV INFUSION ADD-ON: CPT

## 2020-02-20 PROCEDURE — 74011250636 HC RX REV CODE- 250/636: Performed by: EMERGENCY MEDICINE

## 2020-02-20 PROCEDURE — 96374 THER/PROPH/DIAG INJ IV PUSH: CPT

## 2020-02-20 PROCEDURE — 99283 EMERGENCY DEPT VISIT LOW MDM: CPT

## 2020-02-20 PROCEDURE — 80053 COMPREHEN METABOLIC PANEL: CPT

## 2020-02-20 PROCEDURE — 74011250637 HC RX REV CODE- 250/637: Performed by: EMERGENCY MEDICINE

## 2020-02-20 PROCEDURE — 85025 COMPLETE CBC W/AUTO DIFF WBC: CPT

## 2020-02-20 PROCEDURE — 83690 ASSAY OF LIPASE: CPT

## 2020-02-20 RX ORDER — FAMOTIDINE 10 MG/ML
20 INJECTION INTRAVENOUS
Status: COMPLETED | OUTPATIENT
Start: 2020-02-20 | End: 2020-02-20

## 2020-02-20 RX ORDER — LOPERAMIDE HYDROCHLORIDE 2 MG/1
4 CAPSULE ORAL ONCE
Status: COMPLETED | OUTPATIENT
Start: 2020-02-20 | End: 2020-02-20

## 2020-02-20 RX ORDER — LOPERAMIDE HYDROCHLORIDE 2 MG/1
2 CAPSULE ORAL
Qty: 10 CAP | Refills: 0 | Status: SHIPPED | OUTPATIENT
Start: 2020-02-20 | End: 2020-02-23

## 2020-02-20 RX ORDER — ACETAMINOPHEN 325 MG/1
650 TABLET ORAL
Qty: 15 TAB | Refills: 0 | Status: SHIPPED | OUTPATIENT
Start: 2020-02-20 | End: 2020-06-17 | Stop reason: CLARIF

## 2020-02-20 RX ORDER — ONDANSETRON 4 MG/1
4 TABLET, FILM COATED ORAL
Qty: 6 TAB | Refills: 0 | Status: SHIPPED | OUTPATIENT
Start: 2020-02-20 | End: 2020-06-17 | Stop reason: CLARIF

## 2020-02-20 RX ORDER — FAMOTIDINE 20 MG/1
20 TABLET, FILM COATED ORAL 2 TIMES DAILY
Qty: 10 TAB | Refills: 0 | Status: SHIPPED | OUTPATIENT
Start: 2020-02-20 | End: 2020-02-25

## 2020-02-20 RX ADMIN — SODIUM CHLORIDE 1000 ML: 900 INJECTION, SOLUTION INTRAVENOUS at 09:41

## 2020-02-20 RX ADMIN — LOPERAMIDE HYDROCHLORIDE 4 MG: 2 CAPSULE ORAL at 09:42

## 2020-02-20 RX ADMIN — FAMOTIDINE 20 MG: 10 INJECTION, SOLUTION INTRAVENOUS at 09:42

## 2020-02-20 NOTE — LETTER
NOTIFICATION RETURN TO WORK / SCHOOL 
 
2/20/2020 11:26 AM 
 
Mr. Caitie Matson [facility] [id number] To Whom It May Concern: 
 
Caitie Matson is currently under the care of [id number] [facility] EMERGENCY DEPT. He will return to work/school on: 2/22/2020 If there are questions or concerns please have the patient contact our office. Sincerely, 
 
 
Dr. Yoni Rodriguez

## 2020-02-20 NOTE — ED PROVIDER NOTES
EMERGENCY DEPARTMENT HISTORY AND PHYSICAL EXAM    8:43 AM      Date: 2/20/2020  Patient Name: Barbara Rodriguez    History of Presenting Illness     Chief Complaint   Patient presents with    Abdominal Pain    Vomiting    Diarrhea         History Provided By: Patient    Additional History (Context): Barbara Rodriguez is a 48 y.o. male with Past medical history of hypertension, arthritis, cholecystectomy who presents with chief complaint of vomiting and diarrhea for the past 2 days. He states that the vomiting is improving but he continues to have some diarrhea. He also reports mild epigastric discomfort this associated with eating certain foods. No abdominal pain, bloody stool, fever, dysuria, flank pain, sick contacts, has not eaten anything out of the ordinary, and no other complaints. He has been trying Pepto-Bismol with no relief. PCP: Ruy Hernandez MD        Past History     Past Medical History:  Past Medical History:   Diagnosis Date    Ambulates with cane     Arthritis     lower back and kness    Hypertension     Knee pain        Past Surgical History:  Past Surgical History:   Procedure Laterality Date    HX CHOLECYSTECTOMY         Family History:  Family History   Problem Relation Age of Onset    Heart Disease Mother     Diabetes Mother     Stroke Father        Social History:  Social History     Tobacco Use    Smoking status: Never Smoker    Smokeless tobacco: Never Used   Substance Use Topics    Alcohol use: Not Currently     Comment: Socially    Drug use: Not Currently       Allergies:  No Known Allergies      Review of Systems       Review of Systems   Constitutional: Negative for chills and fever. HENT: Negative for congestion, rhinorrhea, sore throat and trouble swallowing. Eyes: Negative for visual disturbance. Respiratory: Negative for cough, shortness of breath and wheezing. Cardiovascular: Negative for chest pain and leg swelling.    Gastrointestinal: Positive for diarrhea, nausea and vomiting. Negative for abdominal distention, abdominal pain and blood in stool. Mild epigastric discomfort associated with eating   Endocrine: Negative for polyuria. Genitourinary: Negative for difficulty urinating and dysuria. Musculoskeletal: Negative for arthralgias and neck stiffness. Skin: Negative for rash. Neurological: Negative for dizziness, weakness, numbness and headaches. Hematological: Does not bruise/bleed easily. Psychiatric/Behavioral: Negative for confusion and dysphoric mood. All other systems reviewed and are negative. Physical Exam     Visit Vitals  BP (!) 152/95 (BP 1 Location: Right arm, BP Patient Position: At rest)   Pulse 66   Temp 98.1 °F (36.7 °C)   Resp 16   Ht 5' 9\" (1.753 m)   Wt (!) 204.1 kg (450 lb)   SpO2 99%   BMI 66.45 kg/m²         Physical Exam  Vitals signs and nursing note reviewed. Constitutional:       General: He is not in acute distress. Appearance: He is well-developed. He is obese. He is not ill-appearing, toxic-appearing or diaphoretic. HENT:      Head: Normocephalic and atraumatic. Nose: Nose normal.      Mouth/Throat:      Mouth: Mucous membranes are dry. Eyes:      General: No scleral icterus. Conjunctiva/sclera: Conjunctivae normal.      Pupils: Pupils are equal, round, and reactive to light. Neck:      Musculoskeletal: Normal range of motion and neck supple. Cardiovascular:      Rate and Rhythm: Normal rate. Heart sounds: Normal heart sounds. Comments: Capillary refill < 3 seconds  Pulmonary:      Effort: Pulmonary effort is normal. No respiratory distress. Breath sounds: Normal breath sounds. No wheezing. Abdominal:      General: Bowel sounds are normal. There is no distension or abdominal bruit. Palpations: Abdomen is soft. Tenderness: There is abdominal tenderness in the epigastric area. There is no right CVA tenderness, left CVA tenderness or guarding. Negative signs include Bunch's sign. Hernia: No hernia is present. Comments: Mild tenderness to palpation of the epigastric region, no guarding or rebound   Musculoskeletal: Normal range of motion. Lymphadenopathy:      Cervical: No cervical adenopathy. Skin:     General: Skin is warm and dry. Coloration: Skin is not jaundiced or pale. Neurological:      Mental Status: He is alert and oriented to person, place, and time. Cranial Nerves: No cranial nerve deficit. Motor: No weakness. Psychiatric:         Mood and Affect: Mood normal.         Thought Content: Thought content normal.           Diagnostic Study Results     Labs -  Recent Results (from the past 12 hour(s))   CBC WITH AUTOMATED DIFF    Collection Time: 02/20/20  9:36 AM   Result Value Ref Range    WBC 5.0 4.6 - 13.2 K/uL    RBC 4.89 4.70 - 5.50 M/uL    HGB 12.8 (L) 13.0 - 16.0 g/dL    HCT 40.3 36.0 - 48.0 %    MCV 82.4 74.0 - 97.0 FL    MCH 26.2 24.0 - 34.0 PG    MCHC 31.8 31.0 - 37.0 g/dL    RDW 15.7 (H) 11.6 - 14.5 %    PLATELET 619 390 - 614 K/uL    MPV 10.6 9.2 - 11.8 FL    NEUTROPHILS 55 40 - 73 %    LYMPHOCYTES 30 21 - 52 %    MONOCYTES 10 3 - 10 %    EOSINOPHILS 5 0 - 5 %    BASOPHILS 0 0 - 2 %    ABS. NEUTROPHILS 2.7 1.8 - 8.0 K/UL    ABS. LYMPHOCYTES 1.5 0.9 - 3.6 K/UL    ABS. MONOCYTES 0.5 0.05 - 1.2 K/UL    ABS. EOSINOPHILS 0.3 0.0 - 0.4 K/UL    ABS.  BASOPHILS 0.0 0.0 - 0.1 K/UL    DF AUTOMATED     METABOLIC PANEL, COMPREHENSIVE    Collection Time: 02/20/20  9:36 AM   Result Value Ref Range    Sodium 140 136 - 145 mmol/L    Potassium 3.7 3.5 - 5.5 mmol/L    Chloride 107 100 - 111 mmol/L    CO2 32 21 - 32 mmol/L    Anion gap 1 (L) 3.0 - 18 mmol/L    Glucose 96 74 - 99 mg/dL    BUN 11 7.0 - 18 MG/DL    Creatinine 0.84 0.6 - 1.3 MG/DL    BUN/Creatinine ratio 13 12 - 20      GFR est AA >60 >60 ml/min/1.73m2    GFR est non-AA >60 >60 ml/min/1.73m2    Calcium 8.1 (L) 8.5 - 10.1 MG/DL    Bilirubin, total 0.3 0.2 - 1.0 MG/DL    ALT (SGPT) 20 16 - 61 U/L    AST (SGOT) 23 10 - 38 U/L    Alk. phosphatase 77 45 - 117 U/L    Protein, total 7.8 6.4 - 8.2 g/dL    Albumin 3.1 (L) 3.4 - 5.0 g/dL    Globulin 4.7 (H) 2.0 - 4.0 g/dL    A-G Ratio 0.7 (L) 0.8 - 1.7     LIPASE    Collection Time: 02/20/20  9:36 AM   Result Value Ref Range    Lipase 74 73 - 393 U/L       Radiologic Studies -   No orders to display         Medical Decision Making   I am the first provider for this patient. I reviewed the vital signs, available nursing notes, past medical history, past surgical history, family history and social history. Vital Signs-Reviewed the patient's vital signs. Records Reviewed: Nursing Notes and Old Medical Records (Time of Review: 8:43 AM)    Provider Notes (Medical Decision Making): DDX: Gastroenteritis, viral syndrome, biliary, pancreatitis, food poisoning, dehydration, metabolic    Labs, labs, IV fluid, Pepcid, loperamide  MDM    Medications   sodium chloride 0.9 % bolus infusion 1,000 mL (0 mL IntraVENous IV Completed 2/20/20 1022)   famotidine (PF) (PEPCID) injection 20 mg (20 mg IntraVENous Given 2/20/20 0942)   loperamide (IMODIUM) capsule 4 mg (4 mg Oral Given 2/20/20 0942)           ED Course: Progress Notes, Reevaluation, and Consults:  No alarming labs  Lipase and LFTs look good    Patient feeling better    Consistent with gastroenteritis    I have reassessed the patient. I have discussed the workup, results and plan with the patient and patient is in agreement. Patient is feeling better. Patient will be prescribed Pepcid, Zofran, loperamide, Tylenol. Patient was discharge in stable condition. Patient was given outpatient follow up. Patient is to return to emergency department if any new or worsening condition. Diagnosis     Clinical Impression:   1. Non-intractable vomiting with nausea, unspecified vomiting type    2. Diarrhea, unspecified type    3.  Gastroenteritis, acute        Disposition: Discharged    Follow-up Information     Follow up With Specialties Details Why Contact Info    Irma Caban MD [facility] Schedule an appointment as soon as possible for a visit in 3 days  [address]      [address] [facility] EMERGENCY DEPT Emergency Medicine  As needed, If symptoms worsen [address] [facility]  [phone]           Patient's Medications   Start Taking    ACETAMINOPHEN (TYLENOL) 325 MG TABLET    Take 2 Tabs by mouth every six (6) hours as needed for Pain or Fever. FAMOTIDINE (PEPCID) 20 MG TABLET    Take 1 Tab by mouth two (2) times a day for 5 days. LOPERAMIDE (IMODIUM) 2 MG CAPSULE    Take 1 Cap by mouth four (4) times daily as needed for Diarrhea for up to 3 days. Indications: diarrhea    ONDANSETRON HCL (ZOFRAN) 4 MG TABLET    Take 1 Tab by mouth every eight (8) hours as needed for Nausea, Vomiting or Nausea or Vomiting. Continue Taking    AMLODIPINE (NORVASC) 10 MG TABLET    Take 1 Tab by mouth daily. These Medications have changed    No medications on file   Stop Taking    ACETAMINOPHEN (TYLENOL) 500 MG TABLET    Take 1 Tab by mouth every six (6) hours as needed for Pain. Indications: Pain associated with Arthritis    CYCLOBENZAPRINE (FLEXERIL) 5 MG TABLET    Take 1 Tab by mouth three (3) times daily as needed for Muscle Spasm(s). FLUTICASONE PROPIONATE (FLONASE) 50 MCG/ACTUATION NASAL SPRAY    2 Sprays by Both Nostrils route daily. Shant Or,     Dragon medical dictation software was used for portions of this report. Unintended transcription errors may occur. My signature above authenticates this document and my orders, the final    diagnosis (es), discharge prescription (s), and instructions in the Epic    record.

## 2020-02-20 NOTE — ED TRIAGE NOTES
Patient c/o generalized pain with vomiting and diarrhea for a couple of days. He states last episode of vomiting was yesterday morning. He states pain is worse after eating.

## 2020-02-20 NOTE — ED NOTES
Patient denies abdominal pain or nausea at present. IV completed. Appears in no apparent distress. Patient denies needs or concerns.

## 2020-02-20 NOTE — DISCHARGE INSTRUCTIONS
Patient Education      If you were prescribed any medication take as directed. Do not drive or use heavy equipment if prescribed narcotics. Follow up with your primary care physician or with specialist as directed. Return to the emergency room with any new or worsening conditions. Gastroenteritis: Care Instructions  Your Care Instructions    Gastroenteritis is an illness that may cause nausea, vomiting, and diarrhea. It is sometimes called \"stomach flu. \" It can be caused by bacteria or a virus. You will probably begin to feel better in 1 to 2 days. In the meantime, get plenty of rest and make sure you do not become dehydrated. Dehydration occurs when your body loses too much fluid. Follow-up care is a key part of your treatment and safety. Be sure to make and go to all appointments, and call your doctor if you are having problems. It's also a good idea to know your test results and keep a list of the medicines you take. How can you care for yourself at home? · If your doctor prescribed antibiotics, take them as directed. Do not stop taking them just because you feel better. You need to take the full course of antibiotics. · Drink plenty of fluids to prevent dehydration, enough so that your urine is light yellow or clear like water. Choose water and other caffeine-free clear liquids until you feel better. If you have kidney, heart, or liver disease and have to limit fluids, talk with your doctor before you increase your fluid intake. · Drink fluids slowly, in frequent, small amounts, because drinking too much too fast can cause vomiting. · Begin eating mild foods, such as dry toast, yogurt, applesauce, bananas, and rice. Avoid spicy, hot, or high-fat foods, and do not drink alcohol or caffeine for a day or two. Do not drink milk or eat ice cream until you are feeling better. How to prevent gastroenteritis  · Keep hot foods hot and cold foods cold.   · Do not eat meats, dressings, salads, or other foods that have been kept at room temperature for more than 2 hours. · Use a thermometer to check your refrigerator. It should be between 34°F and 40°F.  · Defrost meats in the refrigerator or microwave, not on the kitchen counter. · Keep your hands and your kitchen clean. Wash your hands, cutting boards, and countertops with hot soapy water frequently. · Cook meat until it is well done. · Do not eat raw eggs or uncooked sauces made with raw eggs. · Do not take chances. If food looks or tastes spoiled, throw it out. When should you call for help? Call 911 anytime you think you may need emergency care. For example, call if:    · You vomit blood or what looks like coffee grounds.     · You passed out (lost consciousness).     · You pass maroon or very bloody stools.    Call your doctor now or seek immediate medical care if:    · You have severe belly pain.     · You have signs of needing more fluids. You have sunken eyes, a dry mouth, and pass only a little dark urine.     · You feel like you are going to faint.     · You have increased belly pain that does not go away in 1 to 2 days.     · You have new or increased nausea, or you are vomiting.     · You have a new or higher fever.     · Your stools are black and tarlike or have streaks of blood.    Watch closely for changes in your health, and be sure to contact your doctor if:    · You are dizzy or lightheaded.     · You urinate less than usual, or your urine is dark yellow or brown.     · You do not feel better with each day that goes by. Where can you learn more? Go to http://valerie-nate.info/. Enter N142 in the search box to learn more about \"Gastroenteritis: Care Instructions. \"  Current as of: June 9, 2019  Content Version: 12.2  © 4431-7260 [employer], [employer]. Care instructions adapted under license by [employer] (which disclaims liability or warranty for this information).  If you have questions about a medical condition or this instruction, always ask your healthcare professional. [place] [address] any warranty or liability for your use of this information.

## 2020-02-20 NOTE — ED NOTES
Patient resting in bed. No s/sx of distress noted. Patient denies needs or concerns. Awaiting re-evaluation by provider and results.

## 2020-03-13 ENCOUNTER — OFFICE VISIT (OUTPATIENT)
Dept: ORTHOPEDIC SURGERY | Age: 50
End: 2020-03-13

## 2020-03-13 VITALS
DIASTOLIC BLOOD PRESSURE: 84 MMHG | BODY MASS INDEX: 46.65 KG/M2 | SYSTOLIC BLOOD PRESSURE: 135 MMHG | HEART RATE: 74 BPM | RESPIRATION RATE: 16 BRPM | HEIGHT: 69 IN | OXYGEN SATURATION: 96 % | TEMPERATURE: 97.9 F | WEIGHT: 315 LBS

## 2020-03-13 DIAGNOSIS — M51.36 DDD (DEGENERATIVE DISC DISEASE), LUMBAR: ICD-10-CM

## 2020-03-13 DIAGNOSIS — E66.9 OBESITY, UNSPECIFIED CLASSIFICATION, UNSPECIFIED OBESITY TYPE, UNSPECIFIED WHETHER SERIOUS COMORBIDITY PRESENT: Primary | ICD-10-CM

## 2020-03-13 DIAGNOSIS — M47.817 LUMBOSACRAL SPONDYLOSIS WITHOUT MYELOPATHY: ICD-10-CM

## 2020-03-13 RX ORDER — TOPIRAMATE 25 MG/1
TABLET ORAL
Qty: 90 TAB | Refills: 1 | Status: SHIPPED | OUTPATIENT
Start: 2020-03-13 | End: 2020-06-04

## 2020-03-13 NOTE — PROGRESS NOTES
[facility]  [address], [address]  Phone: [phone]  Fax: [phone]        INITIAL CONSULTATION      HISTORY OF PRESENT ILLNESS:  Jaciel Weber is a 48 y.o. male whom is referred from Isiah Ibrahim MD secondary to low back pain x 1 year, progressive x 6 months. He rates his pain 8/10. Pt denies radicular symptoms. His pain is exacerbated with standing, and relieved with sitting. Patient has treated with Tylenol, ibuprofen and MDP without relief. Patient denies previous spinal surgery or injections. Pt previously completed physical therapy and is noncompliant with his HEP. Pt denies change in bowel or bladder habits. Pt denies fever, weight loss, or skin changes. Pt indicates he may undergo gastric bypass in the near future. Pt denies h/o glaucoma. PmHx of cerebrovascular accident, obesity, chronic low back pain, bilateral knee pain, peptic uler disease. Patient is followed by Dr. Alfredo Mckenzie for bilateral knee pain. Patient indicates he was previously followed by the center for chronic [facility] in [place] 6+ months ago for bilateral knee pain. Note [facility] Phuc L[facility] 7/11/19 indicating patient was seen for bilateral knee pain. His pain is exacerbated with activity. Indicated Dr. Alfredo Mckenzie has discussed pt needing bilateral knee replacements, but not until he loses a significant amount of weight. Indicated he had an evaluation with the bariatric team scheduled at that time. Note from Isiah Ibrahim MD dated 1/28/2020 indicating patient was seen with c/o chronic low back pain without sciatica x 2/2019 without trauma. Indicated no relief with Tylenol. His pain wanes and waxes in nature. Indicated he has treated with MDP and PT for his low back. Treated with flexeril and referred to spine. Note from Dr. Alfredo Mckenzie dated 2/21/19 indicating patient was seen with c/o bilateral knee pain.   L Spine XR dated 7/3/2019 films unavailable for review. Per report, Chronic degenerative disc disease as described. No evidence of instability with flexion or extension. The patient is RHD.  reviewed. Body mass index is 65.89 kg/m². PCP: Ally Chavez MD    Past Medical History:   Diagnosis Date    Ambulates with cane     Arthritis     lower back and kness    Hypertension     Knee pain    ess   Past Surgical History:   Procedure Laterality Date    HX CHOLECYSTECTOMY     tobacco: Never Used   Substance Use Topics    Alcohol use: Not Currently     Comment: Socially         Current Outpatient Medications   Medication Sig Dispense Refill    topiramate (TOPAMAX) 25 mg tablet 3 tabs PO QHS 90 Tab 1    ondansetron hcl (ZOFRAN) 4 mg tablet Take 1 Tab by mouth every eight (8) hours as needed for Nausea, Vomiting or Nausea or Vomiting. 6 Tab 0    acetaminophen (TYLENOL) 325 mg tablet Take 2 Tabs by mouth every six (6) hours as needed for Pain or Fever. 15 Tab 0    amLODIPine (NORVASC) 10 mg tablet Take 1 Tab by mouth daily. 27 Tab 6       No Known Allergies         Family History   Problem Relation Age of Onset    Heart Disease Mother     Diabetes Mother     Stroke Father          REVIEW OF SYSTEMS  Constitutional symptoms: Negative  Eyes: Negative  Ears, Nose, Throat, and Mouth: Negative  Cardiovascular: Negative  Respiratory: Negative  Genitourinary: Negative  Integumentary (Skin and/or breast): Negative  Musculoskeletal: Positive for lumbar pain  Extremities: Negative for edema.   Endocrine/Rheumatologic: Negative  Hematologic/Lymphatic: Negative  Allergic/Immunologic: Negative  Psychiatric: Negative       PHYSICAL EXAMINATION  Visit Vitals  /84 (BP 1 Location: Left arm, BP Patient Position: Sitting)   Pulse 74   Temp 97.9 °F (36.6 °C) (Oral)   Resp 16   Ht 5' 9\" (1.753 m)   Wt (!) 446 lb 3.2 oz (202.4 kg)   SpO2 96%   BMI 65.89 kg/m²       CONSTITUTIONAL: NAD, A&O x 3  HEART: Regular rate and rhythm  ABDOMEN: Positive bowel sounds, soft, nontender, and nondistended  LUNGS: Clear to auscultation bilaterally. SKIN: Negative for rash. RANGE OF MOTION: The patient has full passive range of motion in all four extremities. SENSATION:Sensation is intact to light touch throughout. MOTOR:   Straight Leg Raise: Negative, bilateral  Schmidt: Negative, bilateral  Deep tendon reflexes are 0 at the biceps, triceps, and brachioradialis bilaterally. Deep tendon reflexes are 0 at the knees and trace at the ankles bilaterally. Ambulates with a single point cane. Shoulder AB/Flex Elbow Flex Wrist Ext Elbow Ext Wrist Flex Hand Intrin Tone   Right +4/5 +4/5 +4/5 +4/5 +4/5 +4/5 +4/5   Left +4/5 +4/5 +4/5 +4/5 +4/5 +4/5 +4/5              Hip Flex Knee Ext Knee Flex Ankle DF GTE Ankle PF Tone   Right +4/5 +4/5 +4/5 +4/5 +4/5 +4/5 +4/5   Left +4/5 +4/5 +4/5 +4/5 +4/5 +4/5 +4/5     ASSESSMENT   Diagnoses and all orders for this visit:    1. Obesity, unspecified classification, unspecified obesity type, unspecified whether serious comorbidity present  -     topiramate (TOPAMAX) 25 mg tablet; 3 tabs PO QHS  -     REFERRAL TO PHYSICAL THERAPY    2. Lumbosacral spondylosis without myelopathy  -     topiramate (TOPAMAX) 25 mg tablet; 3 tabs PO QHS  -     REFERRAL TO PHYSICAL THERAPY    3. DDD (degenerative disc disease), lumbar  -     topiramate (TOPAMAX) 25 mg tablet; 3 tabs PO QHS  -     REFERRAL TO PHYSICAL THERAPY    IMPRESSIONS/RECOMMENDATIONS:  Patient presents today with c/o low back pain x 1 year, progressive x 6 months. Multiple treatment options were discussed. I will try him on Topamax 75 mg qhs. The risks, benefits, and potential side effects of this medication were discussed. Patient understands and wishes to proceed. Patient advised to call the office if intolerant to new medication. I will refer him to physical therapy with an emphasis on HEP. Patient is neurologically intact.  I will see the patient back in 1 month's time or earlier if needed. Written by Sofi Akers, as dictated by Lis Mccormack MD  I examined the patient, reviewed and agree with the note.

## 2020-03-13 NOTE — LETTER
3/13/20 Patient: Jose Ortega YOB: 1970 Date [facility]: 3/13/2020 [facility] [address] [address] [place] [address] VIA In Basket Dear Katie Coombs MD, Thank you for referring Mr. Olga Carreon to [address] for evaluation. My notes for this consultation are attached. If you have questions, please do not hesitate to call me. I look forward to following your patient along with you. Sincerely, Renee Padilla MD

## 2020-03-24 ENCOUNTER — APPOINTMENT (OUTPATIENT)
Dept: PHYSICAL THERAPY | Age: 50
End: 2020-03-24

## 2020-04-10 ENCOUNTER — VIRTUAL VISIT (OUTPATIENT)
Dept: ORTHOPEDIC SURGERY | Age: 50
End: 2020-04-10

## 2020-04-10 DIAGNOSIS — M47.817 LUMBOSACRAL SPONDYLOSIS WITHOUT MYELOPATHY: ICD-10-CM

## 2020-04-10 DIAGNOSIS — M51.36 DDD (DEGENERATIVE DISC DISEASE), LUMBAR: ICD-10-CM

## 2020-04-10 DIAGNOSIS — E66.9 OBESITY, UNSPECIFIED CLASSIFICATION, UNSPECIFIED OBESITY TYPE, UNSPECIFIED WHETHER SERIOUS COMORBIDITY PRESENT: Primary | ICD-10-CM

## 2020-04-10 RX ORDER — TOPIRAMATE 25 MG/1
TABLET ORAL
Qty: 180 TAB | Refills: 1 | Status: SHIPPED | OUTPATIENT
Start: 2020-04-10 | End: 2020-06-04

## 2020-04-10 NOTE — PROGRESS NOTES
[facility]  [address], [place]   Phone: [phone]  Fax: [phone]        PROGRESS NOTE    CONSENT:  Pursuant to the emergency declaration under the [address] and [facility], [place], 1135 waiver authority and the [employer] Act, this Virtual Visit was conducted, with patient's consent, to reduce the patient's risk of exposure to COVID-19 and provide continuity of care for an established patient. Services were provided through a video synchronous discussion virtually to substitute for in-person appointment. HISTORY OF PRESENT ILLNESS:  The patient is a 48 y.o. male and was seen via [facility] at the [facility] office for follow up of low back pain x 1/2019, progressive x 9/2019. Pt denies radicular symptoms. His pain is exacerbated with standing, and relieved with sitting. Patient has treated with Tylenol, ibuprofen and MDP without relief. Patient denies previous spinal surgery or injections. Pt previously completed physical therapy and is noncompliant with his HEP. Pt denies change in bowel or bladder habits. Pt denies fever, weight loss, or skin changes. Pt indicates he may undergo gastric bypass in the near future. Pt denies h/o glaucoma. PmHx of cerebrovascular accident, obesity, chronic low back pain, bilateral knee pain, peptic uler disease. Patient is followed by Dr. Gem Carter for bilateral knee pain. Patient indicates he was previously followed by the center for chronic pain management in [facility] 6+ months ago for bilateral knee pain. The patient is RHD. Note from [place], [place] at the [facility] dated 7/11/19 indicating patient was seen for bilateral knee pain. His pain is exacerbated with activity. Indicated Dr. Gem Carter has discussed pt needing bilateral knee replacements, but not until he loses a significant amount of weight.  Indicated he had an evaluation with the bariatric team scheduled at that time. Note from Tanya Lara MD dated 1/28/2020 indicating patient was seen with c/o chronic low back pain without sciatica x 2/2019 without trauma. Indicated no relief with Tylenol. His pain wanes and waxes in nature. Indicated he has treated with MDP and PT for his low back. Treated with flexeril and referred to spine. Note from Dr. Cleo Lozano dated 2/21/19 indicating patient was seen with c/o bilateral knee pain. L Spine XR dated 7/3/2019 films unavailable for review. Per report, Chronic degenerative disc disease as described. No evidence of instability with flexion or extension. At his last clinical appointment, I tried him on Topamax 75 mg qhs. I referred him to physical therapy with an emphasis on HEP. At today's video consultation, the patient reports his pain location and distribution remain unchanged. He rates his pain 5-10/10, previously 8/10. He continues to deny radicular symptoms. His pain continues to be exacerbated with standing and walking. Since last office visit, patient's PT was deferred due to [address]. Pt is tolerating the Topamax 75 mg qhs with some benefit. Pt denies change in bowel or bladder habits. Pt denies any signs of weakness.  reviewed. There is no height or weight on file to calculate BMI.     PCP: Tanya Lara MD      Past Medical History:   Diagnosis Date    Ambulates with cane     Arthritis     lower back and kness    Hypertension     Knee pain         Social History     Socioeconomic History    Marital status:      Spouse name: Not on file    Number of children: Not on file    Years of education: Not on file    Highest education level: Not on file   Occupational History    Not on file   Social Needs    Financial resource strain: Not on file    Food insecurity     Worry: Not on file     Inability: Not on file    Transportation needs     Medical: Not on file     Non-medical: Not on file Tobacco Use    Smoking status: Never Smoker    Smokeless tobacco: Never Used   Substance and Sexual Activity    Alcohol use: Not Currently     Comment: Socially    Drug use: Not Currently    Sexual activity: Not Currently   Lifestyle    Physical activity     Days per week: Not on file     Minutes per session: Not on file    Stress: Not on file   Relationships    Social connections     Talks on phone: Not on file     Gets together: Not on file     Attends Jewish service: Not on file     Active member of club or organization: Not on file     Attends meetings of clubs or organizations: Not on file     Relationship status: Not on file    Intimate partner violence     Fear of current or ex partner: Not on file     Emotionally abused: Not on file     Physically abused: Not on file     Forced sexual activity: Not on file   Other Topics Concern    Not on file   Social History Narrative    Not on file       Current Outpatient Medications   Medication Sig Dispense Refill    topiramate (TOPAMAX) 25 mg tablet 3 tabs PO QHS 90 Tab 1    amLODIPine (NORVASC) 10 mg tablet Take 1 Tab by mouth daily. 30 Tab 6    ondansetron hcl (ZOFRAN) 4 mg tablet Take 1 Tab by mouth every eight (8) hours as needed for Nausea, Vomiting or Nausea or Vomiting. 6 Tab 0    acetaminophen (TYLENOL) 325 mg tablet Take 2 Tabs by mouth every six (6) hours as needed for Pain or Fever. 15 Tab 0       No Known Allergies       PHYSICAL EXAMINATION  Unable to perform examination secondary to COVID-19. CONSTITUTIONAL: NAD, A&O x 3    ASSESSMENT   Diagnoses and all orders for this visit:    1. Obesity, unspecified classification, unspecified obesity type, unspecified whether serious comorbidity present    2. Lumbosacral spondylosis without myelopathy    3. DDD (degenerative disc disease), lumbar      IMPRESSION AND PLAN:  The patient consented to the tele health visit and was aware that there would be a charge. During today's [facility] Tele[facility] patient had c/o low back pain. Multiple treatment options were discussed, which are limited at this point due to COVID-19. Pt denies any weakness. I will increase his Topamax from 75 mg qhs to 75 mg BID. Patient advised to call the office if intolerant to higher dose. I will see the patient back in 1 month's time or earlier if needed. Written by Larry Fitzpatrick, as dictated by Richard Hendrickson MD  I examined the patient, reviewed and agree with the note.

## 2020-05-12 ENCOUNTER — VIRTUAL VISIT (OUTPATIENT)
Dept: ORTHOPEDIC SURGERY | Age: 50
End: 2020-05-12

## 2020-05-12 DIAGNOSIS — M47.817 LUMBOSACRAL SPONDYLOSIS WITHOUT MYELOPATHY: ICD-10-CM

## 2020-05-12 DIAGNOSIS — E66.9 OBESITY, UNSPECIFIED CLASSIFICATION, UNSPECIFIED OBESITY TYPE, UNSPECIFIED WHETHER SERIOUS COMORBIDITY PRESENT: ICD-10-CM

## 2020-05-12 DIAGNOSIS — M51.36 DDD (DEGENERATIVE DISC DISEASE), LUMBAR: ICD-10-CM

## 2020-05-12 DIAGNOSIS — M17.0 BILATERAL PRIMARY OSTEOARTHRITIS OF KNEE: Primary | ICD-10-CM

## 2020-05-12 RX ORDER — DULOXETIN HYDROCHLORIDE 30 MG/1
30 CAPSULE, DELAYED RELEASE ORAL DAILY
Qty: 30 CAP | Refills: 1 | Status: SHIPPED | OUTPATIENT
Start: 2020-05-12 | End: 2020-06-22 | Stop reason: SDUPTHER

## 2020-05-12 NOTE — PROGRESS NOTES
[facility]  [address], [place] [address]  Phone: [phone]  Fax: [phone]        PROGRESS NOTE    CONSENT:  Pursuant to the emergency declaration under the [employer] and [facility] [place], 1135 waiver authority and the [employer] and [employer] Act, this Virtual Visit was conducted, with patient's consent, to reduce the patient's risk of exposure to COVID-19 and provide continuity of care for an established patient. Services were unable to be provided through a video synchronous discussion virtually to substitute for in-person appointment. Subsequently, the patient was consulted through a telephone discussion. ENCOUNTER DURATION (minutes): 13      HISTORY OF PRESENT ILLNESS:  The patient is a 48 y.o. male and was consulted via telephone at the [facility] office for follow up of low back pain x 1/2019, progressive x 9/2019. Pt denies radicular symptoms. His pain is exacerbated with standing, and relieved with sitting. Patient has treated with Tylenol, ibuprofen and MDP without relief. Patient denies previous spinal surgery, injections, or recent PT. Pt denies change in bowel or bladder habits. Pt denies fever, weight loss, or skin changes. Pt indicates he may undergo gastric bypass in the near future. Pt denies h/o glaucoma. Patient failed TOPAMAX secondary to no benefit. PmHx of cerebrovascular accident, obesity, chronic low back pain, bilateral knee pain, peptic uler disease. Patient is followed by Dr. Princess Emery for bilateral knee pain. Patient indicates he was previously followed by the center for chronic pain management in [place] 6+ months ago for bilateral knee pain. The patient is RHD. Note from [place] at the [facility] dated 7/11/19 indicating patient was seen for bilateral knee pain. His pain is exacerbated with activity.  Indicated Dr. Princess Emery has discussed pt needing bilateral knee replacements, but not until he loses a significant amount of weight. Indicated he had an evaluation with the bariatric team scheduled at that time. Note from Rony Kirk MD dated 1/28/2020 indicating patient was seen with c/o chronic low back pain without sciatica x 2/2019 without trauma. Indicated no relief with Tylenol. His pain wanes and waxes in nature. Indicated he has treated with MDP and PT for his low back. Treated with flexeril and referred to spine. Note from Dr. Leana Youssef dated 2/21/19 indicating patient was seen with c/o bilateral knee pain.  L Spine XR dated 7/3/2019 films unavailable for review. Per report, Chronic degenerative disc disease as described. No evidence of instability with flexion or extension. At his last clinical appointment, I increased his Topamax from 75 mg qhs to 75 mg BID.     At today's telephone consultation, the patient reports his pain location and distribution remains unchanged. He rates his pain 2-9/10, previously 5-10/10. He is tolerating the increased dose of Topamax 75 mg BID without benefit. Patient denies current use of antidepressants or anticoagulants. Pt denies change in bowel or bladder habits. Pt denies any signs of weakness.  reviewed. There is no height or weight on file to calculate BMI.     PCP: Rony Kirk MD      Past Medical History:   Diagnosis Date    Ambulates with cane     Arthritis     lower back and kness    Hypertension     Knee pain         Social History     Socioeconomic History    Marital status:      Spouse name: Not on file    Number of children: Not on file    Years of education: Not on file    Highest education level: Not on file   Occupational History    Not on file   Social Needs    Financial resource strain: Not on file    Food insecurity     Worry: Not on file     Inability: Not on file    Transportation needs     Medical: Not on file     Non-medical: Not on file   Tobacco Use    Smoking status: Never Smoker    Smokeless tobacco: Never Used   Substance and Sexual Activity    Alcohol use: Not Currently     Comment: Socially    Drug use: Not Currently    Sexual activity: Not Currently   Lifestyle    Physical activity     Days per week: Not on file     Minutes per session: Not on file    Stress: Not on file   Relationships    Social connections     Talks on phone: Not on file     Gets together: Not on file     Attends [employer] service: Not on file     Active member of club or organization: Not on file     Attends meetings of clubs or organizations: Not on file     Relationship status: Not on file    Intimate partner violence     Fear of current or ex partner: Not on file     Emotionally abused: Not on file     Physically abused: Not on file     Forced sexual activity: Not on file   Other Topics Concern    Not on file   Social History Narrative    Not on file       Current Outpatient Medications   Medication Sig Dispense Refill    topiramate (TOPAMAX) 25 mg tablet 3 tabs PO QHS 90 Tab 1    amLODIPine (NORVASC) 10 mg tablet Take 1 Tab by mouth daily. 30 Tab 6    topiramate (TOPAMAX) 25 mg tablet 3 tabs PO  Tab 1    ondansetron hcl (ZOFRAN) 4 mg tablet Take 1 Tab by mouth every eight (8) hours as needed for Nausea, Vomiting or Nausea or Vomiting. 6 Tab 0    acetaminophen (TYLENOL) 325 mg tablet Take 2 Tabs by mouth every six (6) hours as needed for Pain or Fever. 15 Tab 0       No Known Allergies       PHYSICAL EXAMINATION  Unable to perform examination secondary to COVID-19. CONSTITUTIONAL: NAD, A&O x 3    ASSESSMENT   Diagnoses and all orders for this visit:    1. Bilateral primary osteoarthritis of knee    2. Obesity, unspecified classification, unspecified obesity type, unspecified whether serious comorbidity present    3. DDD (degenerative disc disease), lumbar    4.  Lumbosacral spondylosis without myelopathy        IMPRESSION AND PLAN:  The patient consented to the [facility] health visit and was aware that there would be a charge. During today's telephone consultation, the patient had c/o low back pain x 1/2019, progressive x 9/2019. Multiple treatment options were discussed. Pt appears to be neurologically intact. He was not interested in blocks at this time. I will wean him off Topamax 75 mg BID secondary to no benefit. I will try him on Cymbalta 30 mg daily. The risks, benefits, and potential side effects of this medication were discussed. Patient understands and wishes to proceed. Patient advised to call the office if intolerant to new medication. I will refer him to physical therapy with an emphasis on HEP. I will see the patient back in 1 month's time or earlier if needed. Written by Selvin Garcia, as dictated by Miko Rodarte MD  I examined the patient, reviewed and agree with the note.

## 2020-06-04 ENCOUNTER — VIRTUAL VISIT (OUTPATIENT)
Dept: INTERNAL MEDICINE CLINIC | Age: 50
End: 2020-06-04

## 2020-06-04 DIAGNOSIS — G89.29 CHRONIC LOW BACK PAIN WITHOUT SCIATICA, UNSPECIFIED BACK PAIN LATERALITY: ICD-10-CM

## 2020-06-04 DIAGNOSIS — M54.50 CHRONIC LOW BACK PAIN WITHOUT SCIATICA, UNSPECIFIED BACK PAIN LATERALITY: ICD-10-CM

## 2020-06-04 DIAGNOSIS — R73.02 IMPAIRED GLUCOSE TOLERANCE: ICD-10-CM

## 2020-06-04 DIAGNOSIS — I10 ESSENTIAL HYPERTENSION: Primary | ICD-10-CM

## 2020-06-04 DIAGNOSIS — E66.01 OBESITY, MORBID (HCC): ICD-10-CM

## 2020-06-04 DIAGNOSIS — Z12.11 SCREENING FOR COLON CANCER: ICD-10-CM

## 2020-06-04 DIAGNOSIS — I10 ESSENTIAL HYPERTENSION: ICD-10-CM

## 2020-06-04 NOTE — PROGRESS NOTES
Lala Agudelo is a 48 y.o. male who was seen by synchronous (real-time) audio-video technology on 6/4/2020. Assessment & Plan:   1. HTN:   - C/w rx as prescribed. -The patient agreed to get a blood pressure cuff machine. I instructed him to measure his blood pressure regularly. I will follow-up with him in August to assess his measurements at home. - Checking labs in August    2. Prediabetes Hx:   - Checking labs in August    3. Chronic Lower Back Pain: +Lumbar disc disease  - Activity as tolerated. - F/u with Orthopedics for additional rx recommendations. Continue with Rx as prescribed. 4. Health Maintenance:  -I encouraged him to go to his scheduled GI appointment. Lab review: labs are reviewed in the EHR and ordered as mentioned above     I have discussed the diagnosis with the patient and the intended plan as seen in the above orders. I have discussed medication side effects and warnings with the patient as well. I have reviewed the plan of care with the patient, accepted their input and they are in agreement with the treatment goals. All questions were answered. The patient understands the plan of care. Pt instructed if symptoms worsen to call the office or report to the ED for continued care. Greater than 50% of the visit time was spent in counseling and/or coordination of care. Voice recognition was used to generate this report, which may have resulted in some phonetic based errors in grammar and contents. Even though attempts were made to correct all the mistakes, some may have been missed, and remained in the body of the document. Subjective:   Lala Agudelo was seen for   Chief Complaint   Patient presents with    Follow-up     The patient is a 60-year-old male with history of hypertension, obesity, CVA, h/o H.pylori infection (treated last year), prediabetes, lumbar disc disease, and chronic bilateral knee pain (followed by Orthopedics).     1.  HTN: Home BP checks: none; he does not have a machine. At his last appointment, his amlodipine was increased to 10 mg daily. Since then, he reports: no adverse side effects on the higher dose. He has reported interest in bariatric surgery in the past.  There was suspicion that he may have RON and was referred to Sleep Medicine. He has yet to meet with them. \"No one called me.\"     2. Lower Back Pain: Present for over a year. History of trauma. Symptoms improved with use of Flexeril the past.  In the past, lower back pain was localized and did not radiate. Symptoms were not associated with weakness or paresthesias. Symptoms were off and on. He is followed by Bessy Davenport with  Orthopedics. He was initially placed on Topamax but due to poor response to this medication, it was tapered off. The patient was transitioned to Cymbalta instead. Today he reports: Continued lower back pain. Once again, pain is described as localized to his lower back. It is not associated with paresthesias or weakness. He has noticed improvement with taking Cymbalta in place of Topamax. He is still hoping for more improvement in his pain. He has suspected lumbar disc disease per previous x-ray findings. He is scheduled to follow-up with Dr. Alexey Colby later this year. 7/3/19 Lumbar Xray: Chronic degenerative disc disease as described. No evidence of instability with flexion or extension. 3. H/o Prediabetes: He had an A1C in the prediabetic range in the past. His last one was within normal limits though. 4. Health Maintenance:  Harlon Navy is scheduled for an appointment with GI. Prior to Admission medications    Medication Sig Start Date End Date Taking? Authorizing Provider   DULoxetine (CYMBALTA) 30 mg capsule Take 1 Cap by mouth daily.  5/12/20   Denis Shi MD   topiramate (TOPAMAX) 25 mg tablet 3 tabs PO BID 4/10/20 6/4/20  Chilo Shi MD   topiramate (TOPAMAX) 25 mg tablet 3 tabs PO QHS 3/13/20 6/4/20 Demetris Bird MD   ondansetron hcl (ZOFRAN) 4 mg tablet Take 1 Tab by mouth every eight (8) hours as needed for Nausea, Vomiting or Nausea or Vomiting. 2/20/20   Juana Lal DO   acetaminophen (TYLENOL) 325 mg tablet Take 2 Tabs by mouth every six (6) hours as needed for Pain or Fever. 2/20/20   Juana Lal DO   amLODIPine (NORVASC) 10 mg tablet Take 1 Tab by mouth daily. 1/28/20   Tammi Orellana MD     No Known Allergies  Past Medical History:   Diagnosis Date    Ambulates with cane     Arthritis     lower back and kness    Hypertension     Knee pain      Past Surgical History:   Procedure Laterality Date    HX CHOLECYSTECTOMY       Family History   Problem Relation Age of Onset    Heart Disease Mother     Diabetes Mother     Stroke Father      Social History     Socioeconomic History    Marital status:      Spouse name: Not on file    Number of children: Not on file    Years of education: Not on file    Highest education level: Not on file   Tobacco Use    Smoking status: Never Smoker    Smokeless tobacco: Never Used   Substance and Sexual Activity    Alcohol use: Not Currently     Comment: Socially    Drug use: Not Currently    Sexual activity: Not Currently       ROS:  Gen: No fever/chills  HEENT: No sore throat, eye pain, ear pain, or congestion. No HA  CV: No CP  Resp: No cough/SOB  GI: No abdominal pain  : No hematuria/dysuria  Derm: No rash  Neuro: No new paresthesias/weakness  Musc: No new myalgias/jt pain. +Chronic lower back pain  Psych: No depression sx were discussed today.       Objective:     General: alert, cooperative, no distress   Mental  status: mental status: alert, oriented to person, place, and time, normal mood, behavior, speech, dress, motor activity, and thought processes   Resp: resp: normal effort and no respiratory distress   Neuro: neuro: no gross deficits   Skin: skin: no discoloration or lesions of concern on visible areas     LABS:  Lab Results   Component Value Date/Time    Sodium 140 02/20/2020 09:36 AM    Potassium 3.7 02/20/2020 09:36 AM    Chloride 107 02/20/2020 09:36 AM    CO2 32 02/20/2020 09:36 AM    Anion gap 1 (L) 02/20/2020 09:36 AM    Glucose 96 02/20/2020 09:36 AM    BUN 11 02/20/2020 09:36 AM    Creatinine 0.84 02/20/2020 09:36 AM    BUN/Creatinine ratio 13 02/20/2020 09:36 AM    GFR est AA >60 02/20/2020 09:36 AM    GFR est non-AA >60 02/20/2020 09:36 AM    Calcium 8.1 (L) 02/20/2020 09:36 AM       Lab Results   Component Value Date/Time    Cholesterol, total 146 02/18/2019 09:50 AM    HDL Cholesterol 38 (L) 02/18/2019 09:50 AM    LDL, calculated 95.2 02/18/2019 09:50 AM    VLDL, calculated 12.8 02/18/2019 09:50 AM    Triglyceride 64 02/18/2019 09:50 AM    CHOL/HDL Ratio 3.8 02/18/2019 09:50 AM       Lab Results   Component Value Date/Time    WBC 5.0 02/20/2020 09:36 AM    HGB 12.8 (L) 02/20/2020 09:36 AM    HCT 40.3 02/20/2020 09:36 AM    PLATELET 485 91/14/2348 09:36 AM    MCV 82.4 02/20/2020 09:36 AM       Lab Results   Component Value Date/Time    Hemoglobin A1c 5.3 07/03/2019 09:40 AM       Lab Results   Component Value Date/Time    TSH 2.77 07/08/2019 10:18 AM           Due to this being a TeleHealth  evaluation, many elements of the physical examination are unable to be assessed. The pt was seen by synchronous (real-time) audio-video technology, and/or her healthcare decision maker, is aware that this patient-initiated, Telehealth encounter is a billable service, with coverage as determined by her insurance carrier. She is aware that she may receive a bill and has provided verbal consent to proceed: Yes. The pt is being evaluated by a video visit encounter for concerns as above. A caregiver was present when appropriate.  Due to this being a TeleHealth encounter (During PRYUX-19 public health emergency), evaluation of the following organ systems was limited: Vitals/Constitutional/EENT/Resp/CV/GI//MS/Neuro/Skin/Heme-Lymph-Imm. Pursuant to the emergency declaration under the [address] [place] [address] waiver authority and the [employer] and [employer] Act, this Virtual  Visit was conducted, with patient's (and/or legal guardian's) consent, to reduce the patient's risk of exposure to COVID-19 and provide necessary medical care. Services were provided through a video synchronous discussion virtually to substitute for in-person clinic visit. Patient and provider were located at their individual homes. We discussed the expected course, resolution and complications of the diagnosis(es) in detail. Medication risks, benefits, costs, interactions, and alternatives were discussed as indicated. I advised him to contact the office if his condition worsens, changes or fails to improve as anticipated. He expressed understanding with the diagnosis(es) and plan.      Radha Conway MD

## 2020-06-18 ENCOUNTER — ANESTHESIA EVENT (OUTPATIENT)
Dept: ENDOSCOPY | Age: 50
End: 2020-06-18
Payer: MEDICAID

## 2020-06-18 NOTE — PROGRESS NOTES
[facility]  [address], [place]   Phone: [phone]  Fax: [phone]        PROGRESS NOTE      HISTORY OF PRESENT ILLNESS:  The patient is a 48 y.o. male and was seen today for follow up of low back pain x 1/2019, progressive x 9/2019. Pt denies radicular symptoms. His pain is exacerbated with standing, and relieved with sitting. Patient has treated with Tylenol, ibuprofen and MDP without relief. Patient denies previous spinal surgery, injections, or recent PT. Pt denies change in bowel or bladder habits. Pt denies fever, weight loss, or skin changes. Pt indicates he may undergo gastric bypass in the near future. Pt denies h/o glaucoma. Patient failed TOPAMAX secondary to no benefit. PmHx of cerebrovascular accident, obesity, chronic low back pain, bilateral knee pain, peptic uler disease. Patient is followed by Dr. Leana Youssef for bilateral knee pain. Patient indicates he was previously followed by the center for chronic pain management in [place] 6+ months ago for bilateral knee pain. The patient is RHD. Note from Phuc Ernandez at [facility] dated 7/11/19 indicating patient was seen for bilateral knee pain. His pain is exacerbated with activity. Indicated Dr. Leana Youssef has discussed pt needing bilateral knee replacements, but not until he loses a significant amount of weight. Indicated he had an evaluation with the bariatric team scheduled at that time. Note from Rony Kirk MD dated 1/28/2020 indicating patient was seen with c/o chronic low back pain without sciatica x 2/2019 without trauma. Indicated no relief with Tylenol. His pain wanes and waxes in nature. Indicated he has treated with MDP and PT for his low back. Treated with flexeril and referred to spine. Note from Dr. Leana Youssef dated 2/21/19 indicating patient was seen with c/o bilateral knee pain.  L Spine XR dated 7/3/2019 films unavailable for review.  Per report, Chronic degenerative disc disease as described. No evidence of instability with flexion or extension. At his last clinical appointment, he was not interested in blocks at the time. I weaned him off Topamax 75 mg BID secondary to no benefit. I tried him on Cymbalta 30 mg daily. I referred him to physical therapy with an emphasis on HEP.     The patient returns today with pain location and distribution remains unchanged. He rates his pain 6/10, previously 2-9/10. He is tolerating Cymbalta 30 mg daily. He did not go to PT. Pt denies change in bowel or bladder habits. Pt has previously discussed weight loss surgery with a doctor but has no future appointments scheduled. Note from Dr. Delfina Reyna dated 6/4/2020 indicating patient has h/o HTN, obesity, CVA H. Pylori infection (treated last year) and chronic bilateral knee pain.  reviewed. Body mass index is 68.08 kg/m².     PCP: Louis Maria MD      Past Medical History:   Diagnosis Date    Ambulates with cane     Arthritis     lower back and kness    Hypertension     Knee pain     Morbid obesity ([facility] [address]         Social History     Socioeconomic History    Marital status:      Spouse name: Not on file    Number of children: Not on file    Years of education: Not on file    Highest education level: Not on file   Occupational History    Not on file   Social Needs    Financial resource strain: Not on file    Food insecurity     Worry: Not on file     Inability: Not on file    Transportation needs     Medical: Not on file     Non-medical: Not on file   Tobacco Use    Smoking status: Never Smoker    Smokeless tobacco: Never Used   Substance and Sexual Activity    Alcohol use: Not Currently     Comment: 2 drinks weekly (liquor)    Drug use: Not Currently    Sexual activity: Not Currently   Lifestyle    Physical activity     Days per week: Not on file     Minutes per session: Not on file    Stress: Not on file   Relationships    Social connections     Talks on phone: Not on file     Gets together: Not on file     Attends Restoration service: Not on file     Active member of club or organization: Not on file     Attends meetings of clubs or organizations: Not on file     Relationship status: Not on file    Intimate partner violence     Fear of current or ex partner: Not on file     Emotionally abused: Not on file     Physically abused: Not on file     Forced sexual activity: Not on file   Other Topics Concern    Not on file   Social History Narrative    Not on file       Current Outpatient Medications   Medication Sig Dispense Refill    amLODIPine (NORVASC) 10 mg tablet Take 1 Tab by mouth daily. 30 Tab 6       No Known Allergies       PHYSICAL EXAMINATION    Visit Vitals  /84 (BP 1 Location: Left arm, BP Patient Position: Sitting)   Pulse 77   Temp 97 °F (36.1 °C) (Oral)   Ht 5' 9\" (1.753 m)   Wt (!) 461 lb (209.1 kg)   SpO2 94%   BMI 68.08 kg/m²       CONSTITUTIONAL: NAD, A&O x 3  SENSATION: Intact to light touch throughout  RANGE OF MOTION: The patient has full passive range of motion in all four extremities. MOTOR:  Straight Leg Raise: Negative, bilateral    Ambulates with a single point cane. Hip Flex Knee Ext Knee Flex Ankle DF GTE Ankle PF Tone   Right +4/5 +4/5 +4/5 +4/5 +4/5 +4/5 +4/5   Left +4/5 +4/5 +4/5 +4/5 +4/5 +4/5 +4/5       ASSESSMENT   Diagnoses and all orders for this visit:    1. Bilateral primary osteoarthritis of knee    2. Obesity, unspecified classification, unspecified obesity type, unspecified whether serious comorbidity present    3. DDD (degenerative disc disease), lumbar    4. Lumbosacral spondylosis without myelopathy    Other orders  -     DULoxetine (CYMBALTA) 60 mg capsule; Take 1 Cap by mouth daily.  -     REFERRAL TO PHYSICAL THERAPY          IMPRESSION AND PLAN:  Patient returns to the office today with c/o low back pain. Multiple treatment options were discussed.  I will increase his Cymbalta from 30 mg daily to 60 mg daily. Patient advised to call the office if intolerant to higher dose. Again, I will refer him to physical therapy with an emphasis on HEP. Patient is neurologically intact. I will see the patient back in 1 month's time or earlier if needed. Written by Young Vance, as dictated by Cheyanne Lund MD  I examined the patient, reviewed and agree with the note.

## 2020-06-19 ENCOUNTER — HOSPITAL ENCOUNTER (OUTPATIENT)
Age: 50
Setting detail: OUTPATIENT SURGERY
Discharge: HOME | End: 2020-06-19
Attending: INTERNAL MEDICINE | Admitting: INTERNAL MEDICINE
Payer: MEDICAID

## 2020-06-19 ENCOUNTER — ANESTHESIA (OUTPATIENT)
Dept: ENDOSCOPY | Age: 50
End: 2020-06-19
Payer: MEDICAID

## 2020-06-19 VITALS
RESPIRATION RATE: 17 BRPM | HEART RATE: 63 BPM | HEIGHT: 69 IN | OXYGEN SATURATION: 99 % | WEIGHT: 315 LBS | BODY MASS INDEX: 46.65 KG/M2 | TEMPERATURE: 97.4 F | DIASTOLIC BLOOD PRESSURE: 89 MMHG | SYSTOLIC BLOOD PRESSURE: 147 MMHG

## 2020-06-19 PROCEDURE — 77030029384 HC SNR POLYP CAPTVR II BSC -B: Performed by: INTERNAL MEDICINE

## 2020-06-19 PROCEDURE — 74011250636 HC RX REV CODE- 250/636: Performed by: NURSE ANESTHETIST, CERTIFIED REGISTERED

## 2020-06-19 PROCEDURE — 74011250637 HC RX REV CODE- 250/637: Performed by: INTERNAL MEDICINE

## 2020-06-19 PROCEDURE — 88305 TISSUE EXAM BY PATHOLOGIST: CPT

## 2020-06-19 PROCEDURE — 77030018846 HC SOL IRR STRL H20 ICUM -A: Performed by: INTERNAL MEDICINE

## 2020-06-19 PROCEDURE — 76060000031 HC ANESTHESIA FIRST 0.5 HR: Performed by: INTERNAL MEDICINE

## 2020-06-19 PROCEDURE — 77030008565 HC TBNG SUC IRR ERBE -B: Performed by: INTERNAL MEDICINE

## 2020-06-19 PROCEDURE — 74011000250 HC RX REV CODE- 250: Performed by: NURSE ANESTHETIST, CERTIFIED REGISTERED

## 2020-06-19 PROCEDURE — 76040000019: Performed by: INTERNAL MEDICINE

## 2020-06-19 RX ORDER — SODIUM CHLORIDE, SODIUM LACTATE, POTASSIUM CHLORIDE, CALCIUM CHLORIDE 600; 310; 30; 20 MG/100ML; MG/100ML; MG/100ML; MG/100ML
75 INJECTION, SOLUTION INTRAVENOUS CONTINUOUS
Status: DISCONTINUED | OUTPATIENT
Start: 2020-06-19 | End: 2020-06-19 | Stop reason: HOSPADM

## 2020-06-19 RX ORDER — DEXTROMETHORPHAN/PSEUDOEPHED 2.5-7.5/.8
DROPS ORAL AS NEEDED
Status: DISCONTINUED | OUTPATIENT
Start: 2020-06-19 | End: 2020-06-19 | Stop reason: HOSPADM

## 2020-06-19 RX ORDER — FAMOTIDINE 10 MG/ML
INJECTION INTRAVENOUS
Status: DISCONTINUED
Start: 2020-06-19 | End: 2020-06-19 | Stop reason: HOSPADM

## 2020-06-19 RX ORDER — SODIUM CHLORIDE 0.9 % (FLUSH) 0.9 %
5-40 SYRINGE (ML) INJECTION EVERY 8 HOURS
Status: DISCONTINUED | OUTPATIENT
Start: 2020-06-19 | End: 2020-06-19 | Stop reason: HOSPADM

## 2020-06-19 RX ORDER — PROPOFOL 10 MG/ML
INJECTION, EMULSION INTRAVENOUS AS NEEDED
Status: DISCONTINUED | OUTPATIENT
Start: 2020-06-19 | End: 2020-06-19 | Stop reason: HOSPADM

## 2020-06-19 RX ORDER — LIDOCAINE HYDROCHLORIDE 20 MG/ML
INJECTION, SOLUTION EPIDURAL; INFILTRATION; INTRACAUDAL; PERINEURAL AS NEEDED
Status: DISCONTINUED | OUTPATIENT
Start: 2020-06-19 | End: 2020-06-19 | Stop reason: HOSPADM

## 2020-06-19 RX ORDER — SODIUM CHLORIDE 0.9 % (FLUSH) 0.9 %
5-40 SYRINGE (ML) INJECTION AS NEEDED
Status: DISCONTINUED | OUTPATIENT
Start: 2020-06-19 | End: 2020-06-19 | Stop reason: HOSPADM

## 2020-06-19 RX ADMIN — SODIUM CHLORIDE, SODIUM LACTATE, POTASSIUM CHLORIDE, AND CALCIUM CHLORIDE 75 ML/HR: 600; 310; 30; 20 INJECTION, SOLUTION INTRAVENOUS at 13:06

## 2020-06-19 RX ADMIN — PROPOFOL 200 MG: 10 INJECTION, EMULSION INTRAVENOUS at 14:30

## 2020-06-19 RX ADMIN — LIDOCAINE HYDROCHLORIDE 100 MG: 20 INJECTION, SOLUTION EPIDURAL; INFILTRATION; INTRACAUDAL; PERINEURAL at 14:30

## 2020-06-19 RX ADMIN — PROPOFOL 50 MG: 10 INJECTION, EMULSION INTRAVENOUS at 14:34

## 2020-06-19 RX ADMIN — FAMOTIDINE 20 MG: 10 INJECTION, SOLUTION INTRAVENOUS at 13:06

## 2020-06-19 RX ADMIN — Medication 10 ML: at 13:06

## 2020-06-19 RX ADMIN — PROPOFOL 50 MG: 10 INJECTION, EMULSION INTRAVENOUS at 14:38

## 2020-06-19 NOTE — DISCHARGE INSTRUCTIONS
DISCHARGE SUMMARY from Nurse    PATIENT INSTRUCTIONS:    After general anesthesia or intravenous sedation, for 24 hours or while taking prescription Narcotics:  · Limit your activities  · Do not drive and operate hazardous machinery  · Do not make important personal or business decisions  · Do  not drink alcoholic beverages  · If you have not urinated within 8 hours after discharge, please contact your surgeon on call. Report the following to your surgeon:  · Excessive pain, swelling, redness or odor of or around the surgical area  · Temperature over 100.5  · Nausea and vomiting lasting longer than 4 hours or if unable to take medications  · Any signs of decreased circulation or nerve impairment to extremity: change in color, persistent  numbness, tingling, coldness or increase pain  · Any questions    What to do at Home:  Recommended activity: Activity as tolerated and no driving for today. *  Please give a list of your current medications to your Primary Care Provider. *  Please update this list whenever your medications are discontinued, doses are      changed, or new medications (including over-the-counter products) are added. *  Please carry medication information at all times in case of emergency situations. These are general instructions for a healthy lifestyle:    No smoking/ No tobacco products/ Avoid exposure to second hand smoke  Surgeon General's Warning:  Quitting smoking now greatly reduces serious risk to your health. Obesity, smoking, and sedentary lifestyle greatly increases your risk for illness    A healthy diet, regular physical exercise & weight monitoring are important for maintaining a healthy lifestyle    You may be retaining fluid if you have a history of heart failure or if you experience any of the following symptoms:  Weight gain of 3 pounds or more overnight or 5 pounds in a week, increased swelling in our hands or feet or shortness of breath while lying flat in bed. Please call your doctor as soon as you notice any of these symptoms; do not wait until your next office visit. The discharge information has been reviewed with the patient. The patient verbalized understanding. Discharge medications reviewed with the patient and appropriate educational materials and side effects teaching were provided. Patient Education        Colonoscopy: What to Expect at [address] [place] [address]  After a colonoscopy, you'll stay at the clinic for 1 to 2 hours until the medicines wear off. Then you can go home. But you'll need to arrange for a ride. Your doctor will tell you when you can eat and do your other usual activities. Your doctor will talk to you about when you'll need your next colonoscopy. Your doctor can help you decide how often you need to be checked. This will depend on the results of your test and your risk for colorectal cancer. After the test, you may be bloated or have gas pains. You may need to pass gas. If a biopsy was done or a polyp was removed, you may have streaks of blood in your stool (feces) for a few days. Problems such as heavy rectal bleeding may not occur until several weeks after the test. This isn't common. But it can happen after polyps are removed. This care sheet gives you a general idea about how long it will take for you to recover. But each person recovers at a different pace. Follow the steps below to get better as quickly as possible. How can you care for yourself at home? Activity  · Rest when you feel tired. · You can do your normal activities when it feels okay to do so. Diet  · Follow your doctor's directions for eating. · Unless your doctor has told you not to, drink plenty of fluids. This helps to replace the fluids that were lost during the colon prep. · Do not drink alcohol. Medicines  · Your doctor will tell you if and when you can restart your medicines. He or she will also give you instructions about taking any new medicines.   · If you take aspirin or some other blood thinner, ask your doctor if and when to start taking it again. Make sure that you understand exactly what your doctor wants you to do. · If polyps were removed or a biopsy was done during the test, your doctor may tell you not to take aspirin or other anti-inflammatory medicines for a few days. These include ibuprofen (Advil, Motrin) and naproxen (Aleve). Other instructions  · For your safety, do not drive or operate machinery until the medicine wears off and you can think clearly. Your doctor may tell you not to drive or operate machinery until the day after your test.  · Do not sign legal documents or make major decisions until the medicine wears off and you can think clearly. The anesthesia can make it hard for you to fully understand what you are agreeing to. Follow-up care is a key part of your treatment and safety. Be sure to make and go to all appointments, and call your doctor if you are having problems. It's also a good idea to know your test results and keep a list of the medicines you take. When should you call for help? [id number] anytime you think you may need emergency care. For example, call if:  · You passed out (lost consciousness). · You pass maroon or bloody stools. · You have trouble breathing. Call your doctor now or seek immediate medical care if:  · You have pain that does not get better after you take pain medicine. · You are sick to your stomach or cannot drink fluids. · You have new or worse belly pain. · You have blood in your stools. · You have a fever. · You cannot pass stools or gas. Watch closely for changes in your health, and be sure to contact your doctor if you have any problems. Where can you learn more? Go to http://valerie-nate.info/  Enter E264 in the search box to learn more about \"Colonoscopy: What to Expect at Home. \"  Current as of: August 22, 2019               Content Version: 12.5  © 0043-4993 Healthwise, [employer]. Care instructions adapted under license by [employer] (which disclaims liability or warranty for this information). If you have questions about a medical condition or this instruction, always ask your healthcare professional. [address] any warranty or liability for your use of this information.        _________________________________________________________________________________________________________________________________

## 2020-06-19 NOTE — PROCEDURES
[facility], [address]      Brief Procedure Note    Karol Hay [facility]  1970  [id number]    Date of Procedure: 6/19/2020    Preoperative diagnosis: Colon cancer Screening:   V76.51 - Z12.11    Postoperative diagnosis:  ascending polyp    Type of Anesthesia: MAC (monitered anesthesia care)    Description of Findings: same as post op dx    Procedure: Procedure(s):  COLONOSCOPY with polypectomy    :  Dr. Reyna Mckeon MD    Assistant(s): [unfilled]    Type of Anesthesia:MAC     EBL:None, no implants.     Specimens:   ID Type Source Tests Collected by Time Destination   1 : ascending polyp Preservative   Mariluz Steiner MD 6/19/2020 1437 Pathology       Findings: See printed and scanned procedure note    Complications: None    Dr. Reyna Mckeon MD  6/19/2020  2:42 PM

## 2020-06-19 NOTE — ANESTHESIA POSTPROCEDURE EVALUATION
Procedure(s):  COLONOSCOPY with polypectomy. MAC    Anesthesia Post Evaluation      Multimodal analgesia: multimodal analgesia used between 6 hours prior to anesthesia start to PACU discharge  Patient location during evaluation: PACU  Patient participation: complete - patient participated  Level of consciousness: awake and alert  Pain management: adequate  Airway patency: patent  Anesthetic complications: no  Cardiovascular status: acceptable  Respiratory status: acceptable  Hydration status: acceptable  Post anesthesia nausea and vomiting:  controlled  Final Post Anesthesia Temperature Assessment:  Normothermia (36.0-37.5 degrees C)      INITIAL Post-op Vital signs:   Vitals Value Taken Time   /89 6/19/2020  3:06 PM   Temp 36.3 °C (97.4 °F) 6/19/2020  2:48 PM   Pulse 62 6/19/2020  3:09 PM   Resp 16 6/19/2020  3:09 PM   SpO2 100 % 6/19/2020  3:09 PM   Vitals shown include unvalidated device data.

## 2020-06-19 NOTE — H&P
History and Physical reviewed; I have examined the patient and there are no pertinent changes. Von Ortega MD, MD   2:22 PM 6/19/2020  [facility], [facility]  [email or web address]. [facility]

## 2020-06-22 ENCOUNTER — OFFICE VISIT (OUTPATIENT)
Dept: ORTHOPEDIC SURGERY | Age: 50
End: 2020-06-22

## 2020-06-22 VITALS
TEMPERATURE: 97 F | HEIGHT: 69 IN | HEART RATE: 77 BPM | WEIGHT: 315 LBS | SYSTOLIC BLOOD PRESSURE: 143 MMHG | DIASTOLIC BLOOD PRESSURE: 84 MMHG | BODY MASS INDEX: 46.65 KG/M2 | OXYGEN SATURATION: 94 %

## 2020-06-22 DIAGNOSIS — M51.36 DDD (DEGENERATIVE DISC DISEASE), LUMBAR: ICD-10-CM

## 2020-06-22 DIAGNOSIS — M47.817 LUMBOSACRAL SPONDYLOSIS WITHOUT MYELOPATHY: ICD-10-CM

## 2020-06-22 DIAGNOSIS — M17.0 BILATERAL PRIMARY OSTEOARTHRITIS OF KNEE: Primary | ICD-10-CM

## 2020-06-22 DIAGNOSIS — E66.9 OBESITY, UNSPECIFIED CLASSIFICATION, UNSPECIFIED OBESITY TYPE, UNSPECIFIED WHETHER SERIOUS COMORBIDITY PRESENT: ICD-10-CM

## 2020-06-22 RX ORDER — DULOXETIN HYDROCHLORIDE 60 MG/1
60 CAPSULE, DELAYED RELEASE ORAL DAILY
Qty: 30 CAP | Refills: 1 | Status: SHIPPED | OUTPATIENT
Start: 2020-06-22 | End: 2020-08-13 | Stop reason: SDUPTHER

## 2020-06-22 NOTE — LETTER
6/22/20 Patient: Tiana Stevens YOB: 1970 Date of Visit: 6/22/2020 [facility] [address] [address] [place] [address] VIA In Basket Dear Matthew Gates MD, Thank you for referring Mr. Randy Jones to [address] for evaluation. My notes for this consultation are attached. If you have questions, please do not hesitate to call me. I look forward to following your patient along with you. Sincerely, Alaina Duncan MD

## 2020-06-26 ENCOUNTER — APPOINTMENT (OUTPATIENT)
Dept: PHYSICAL THERAPY | Age: 50
End: 2020-06-26

## 2020-07-01 ENCOUNTER — HOSPITAL ENCOUNTER (OUTPATIENT)
Dept: PHYSICAL THERAPY | Age: 50
Discharge: HOME | End: 2020-07-01
Payer: MEDICAID

## 2020-07-01 PROCEDURE — 97162 PT EVAL MOD COMPLEX 30 MIN: CPT

## 2020-07-01 NOTE — PROGRESS NOTES
[facility]  [address] [place] [address]  [phone] fax    Plan of Care/ Statement of Necessity for Physical Therapy Services    Patient name: Jamie Landa Start of Care: 2020   Referral source: Marcy Mercado MD : 1970    Medical Diagnosis: Pain in right knee [M25.561]  Pain in left knee [M25.562]  Other intervertebral disc degeneration, lumbar region [M51.36]  Spondylosis without myelopathy or radiculopathy, lumbosacral region [M47.817]  Payor: [employer] / Plan: [address] [place] [address] [address] / Product Type: Managed Care Medicaid /  Onset Date:chronic with exacerbation 2020   Treatment Diagnosis: LBP, right and left knee pain    Prior Hospitalization: see medical history Provider#: [id number]   Medications: Verified on Patient summary List    Comorbidities: BMI, HTN, OA, DDD   Prior Level of Function: mod (I) with SPC     The Plan of Care and following information is based on the information from the initial evaluation. Assessment/ key information:   Mr. Mari Sherwood is a 52yo male who presents to PT with signs and symptoms consistent with mechanical LBP resulting from DJD. Pt demonstrates decreased AROM, strength, flexibility, and poor core endurance. Pt examination today was limited by difficulty with positioning and mobility. Pt deficits impair his ability to tolerate standing and walking at PLOF. Pt will benefit from skilled PT in order to address listed deficits, decrease pain, and maximize functional potential. Pt may benefit from transition to aquatic therapy when services become available as this would provide optimal mobility for the pt.     Evaluation Complexity History HIGH Complexity :3+ comorbidities / personal factors will impact the outcome/ POC ; Examination MEDIUM Complexity : 3 Standardized tests and measures addressing body structure, function, activity limitation and / or participation in recreation  ;Presentation MEDIUM Complexity : Evolving with changing characteristics  ; Clinical Decision Making MEDIUM Complexity : FOTO score of 26-74  Overall Complexity Rating: MEDIUM  Problem List: pain affecting function, decrease ROM, decrease strength, impaired gait/ balance, decrease ADL/ functional abilitiies, decrease activity tolerance, decrease flexibility/ joint mobility and decrease transfer abilities   Treatment Plan may include any combination of the following: Therapeutic exercise, Therapeutic activities, Neuromuscular re-education, Physical agent/modality, Gait/balance training, Manual therapy, Aquatic therapy, Patient education, Self Care training, Functional mobility training, Home safety training and Stair training  Patient / Family readiness to learn indicated by: asking questions, trying to perform skills and interest  Persons(s) to be included in education: patient (P)  Barriers to Learning/Limitations: None  Patient Goal (s): to get some pain relief  Patient Self Reported Health Status: poor  Rehabilitation Potential: fair    Short Term Goals: To be accomplished in 2 weeks:  1. Pt will report compliance with HEP in order to supplement PT treatment   Eval = established   2. Pt will report max pain 7/10 in order to increase participation in therapy   Eval = 10/10    Long Term Goals: To be accomplished in 9 treatments:  1. Pt will score at least 50 on FOTO in order to improve overall function, decrease pain, and facilitate return to PLOF. Eval = 38  2. Pt will demonstrate bilateral hip strength 5/5 in order to increased ambulation distances in the community   Eval: flexion bilaterally = 4/5, abduction bilaterally = 4+/5, extension bilaterally = 4+/5  3.    Pt will report 50% improvement in symptoms when walking in order to improve ability to negotiate the community    Eval = constant when upright    Frequency / Duration: Patient to be seen 2 times per week for 9 treatments. Patient/ CarPatient/ Caregiver education and instruction: Diagnosis, prognosis, self care, activity modification and exercises   [x]  Plan of care has been reviewed with PTA    Amara Chang, PT 7/1/2020 2:35 PM    ________________________________________________________________________    I certify that the above Therapy Services are being furnished while the patient is under my care. I agree with the treatment plan and certify that this therapy is necessary.     Physician's Signature:____________Date:_________TIME:________    ** Signature, Date and Time must be completed for valid certification **    Please sign and return to [facility]  [address] [place] [address]  [phone] fax

## 2020-07-01 NOTE — PROGRESS NOTES
PT DAILY TREATMENT NOTE/LUMBAR EVAL 10-18    Patient Name: Martina Gibbs  Date:2020  : 1970  [x]  Patient  Verified  Payor: [employer] / Plan: [facility] [employer] / Product Type: Managed Care Medicaid /    In time:234  Out time:301  Total Treatment Time (min): 27  Visit #: 1 of 8    Treatment Area: Pain in right knee [M25.561]  Pain in left knee [M25.562]  Other intervertebral disc degeneration, lumbar region [M51.36]  Spondylosis without myelopathy or radiculopathy, lumbosacral region [M47.817]  SUBJECTIVE  Pain Level (0-10 scale): current = 7/10, least = 4/10, worst = 10/10  []constant []intermittent []improving []worsening []no change since onset    Any medication changes, allergies to medications, adverse drug reactions, diagnosis change, or new procedure performed?: [x] No    [] Yes (see summary sheet for update)  Subjective functional status/changes:     HPI: Pt reports to PT with reports of insidious onset LBp. Pain has been on and off for the past year. Pain increases with standing and walking. Pain decreases with sitting. Pt denies numbness and tingling. Pt ambulates with SPC due to knee pain.      PMHx/Surgical Hx: HTN, OA  Work Hx: not currently working, applied for disability   Living Situation: no stairs at the home  Pt Goals: \"to get some pain relief\"   Barriers: [x]pain []financial []time []transportation [x]other: co morbidities    OBJECTIVE/EXAMINATION    27 min [x]Eval                  []Re-Eval     explanation, demonstration, performance and distribution of HEP             With   [] TE   [] TA   [] neuro   [] other: Patient Education: [x] Review HEP    [] Progressed/Changed HEP based on:   [] positioning   [] body mechanics   [] transfers   [] heat/ice application    [] other:      Physical Therapy Evaluation - Lumbar Spine     OBJECTIVE    Gait: SPC with increased bilateral sway     Dural Mobility:   Right Left Conditions   Slump negative negative    SLR negative negative     NOTES:     Palpation: no significant findings, however, full exam limited due to difficulty with positioning    Strength                            Strength (1-5/5)                    Right Left   Hip Flexion 4 4    Abduction 4+ 4+    Extension 4+ 4+   Knee Extension 5 5    Flexion 5 5   Ankle Dorsiflexion 5 5    Plantarflexion 5 5    Hallux Extension 5 5   NOTES:  Hip extension taken in standing with elbows on table    Other:   - poor recruitment and endurance of transverse abdominis; substitutes with valsalva     Pain Level (0-10 scale) post treatment: 5    ASSESSMENT/Changes in Function: see POC    Patient will continue to benefit from skilled PT services to modify and progress therapeutic interventions, address functional mobility deficits, address ROM deficits, address strength deficits, analyze and address soft tissue restrictions, analyze and cue movement patterns, analyze and modify body mechanics/ergonomics, assess and modify postural abnormalities, address imbalance/dizziness and instruct in home and community integration to attain remaining goals. [x]  See Plan of Care  []  See progress note/recertification  []  See Discharge Summary         Progress towards goals / Updated goals:  Short Term Goals: To be accomplished in 2 weeks:  1. Pt will report compliance with HEP in order to supplement PT treatment   Eval = established   2. Pt will report max pain 7/10 in order to increase participation in therapy   Eval = 10/10    Long Term Goals: To be accomplished in 9 treatments:  1. Pt will score at least 50 on FOTO in order to improve overall function, decrease pain, and facilitate return to PLOF. Eval = 38  2. Pt will demonstrate bilateral hip strength 5/5 in order to increased ambulation distances in the community   Eval: flexion bilaterally = 4/5, abduction bilaterally = 4+/5, extension bilaterally = 4+/5  3.    Pt will report 50% improvement in symptoms when walking in order to improve ability to negotiate the community    Eval = constant when upright    PLAN  [x]  Upgrade activities as tolerated     [x]  Continue plan of care  []  Update interventions per flow sheet       []  Discharge due to:_  []  Other:_      Gabby Villalobos, PT 7/1/2020  2:35 PM

## 2020-07-06 ENCOUNTER — HOSPITAL ENCOUNTER (OUTPATIENT)
Dept: PHYSICAL THERAPY | Age: 50
Discharge: HOME | End: 2020-07-06
Payer: MEDICAID

## 2020-07-06 PROCEDURE — 97110 THERAPEUTIC EXERCISES: CPT

## 2020-07-06 PROCEDURE — 97112 NEUROMUSCULAR REEDUCATION: CPT

## 2020-07-06 NOTE — PROGRESS NOTES
PT DAILY TREATMENT NOTE 10-18    Patient Name: Jeannette Blanco  Date:2020  : 1970  [x]  Patient  Verified  Payor: Adrián Nguyen / Plan: [address] [place] [address] / Product Type: Managed Care Medicaid /    In time:3:33  Out time:4:12  Total Treatment Time (min): 39  Visit #: 2 of 9    Medicare/BCBS Only   Total Timed Codes (min):   1:1 Treatment Time:         Treatment Area: Pain in right knee [M25.561]  Pain in left knee [M25.562]  Other intervertebral disc degeneration, lumbar region [M51.36]  Spondylosis without myelopathy or radiculopathy, lumbosacral region [M47.817]    SUBJECTIVE  Pain Level (0-10 scale): 4  Any medication changes, allergies to medications, adverse drug reactions, diagnosis change, or new procedure performed?: [x] No    [] Yes (see summary sheet for update)  Subjective functional status/changes:   [] No changes reported  \"I have been doing my exercises at home, they have been helping a bit, I have less pain now\"    OBJECTIVE    25 min Therapeutic Exercise:  [x] See flow sheet :   Rationale: increase ROM and increase strength to improve LE strength/mobility and  lumbar mobility to improve ease of ADLs and gait. 14 min Neuromuscular Re-education:  [x]  See flow sheet :   Rationale: increase strength, improve coordination, improve balance and increase proprioception  to improve the patients ability to perform functional tasks with improved abdominal brace utilization, improved control, and decreased risk for falls.           With   [] TE   [] TA   [] neuro   [] other: Patient Education: [x] Review HEP    [] Progressed/Changed HEP based on:   [] positioning   [] body mechanics   [] transfers   [] heat/ice application    [] other:      Other Objective/Functional Measures: Initiated treatment per flowsheet     Pain Level (0-10 scale) post treatment: 4    ASSESSMENT/Changes in Function: Patient is an active participant in today's therapy session, responding well to verbal and visual cuing for correct technique with all newly introduced interventions during today's session. His knee pain may limit standing interventions to address his back pain. Standing tolerance approximately 6 minutes during today's session. Patient will continue to benefit from skilled PT services to modify and progress therapeutic interventions, address functional mobility deficits, address ROM deficits, address strength deficits, analyze and address soft tissue restrictions, analyze and cue movement patterns, analyze and modify body mechanics/ergonomics, assess and modify postural abnormalities and address imbalance/dizziness to attain remaining goals. []  See Plan of Care  []  See progress note/recertification  []  See Discharge Summary         Progress towards goals / Updated goals:  Short Term Goals: To be accomplished in 2 weeks:  1. Pt will report compliance with HEP in order to supplement PT treatment               Eval = established    Current: met, pt reports compliance           2. Pt will report max pain 7/10 in order to increase participation in therapy               Eval = 10/10     Long Term Goals: To be accomplished in 9 treatments:  1. Pt will score at least 50 on FOTO in order to improve overall function, decrease pain, and facilitate return to PLOF. Eval = 38  2. Pt will demonstrate bilateral hip strength 5/5 in order to increased ambulation distances in the community               Eval: flexion bilaterally = 4/5, abduction bilaterally = 4+/5, extension bilaterally = 4+/5  3.    Pt will report 50% improvement in symptoms when walking in order to improve ability to negotiate the community                Eval = constant when upright    PLAN  [x]  Upgrade activities as tolerated     [x]  Continue plan of care  []  Update interventions per flow sheet       []  Discharge due to:_  []  Other:_      Christen Fraction 7/6/2020  3:41 PM    Future Appointments   Date Time Provider [place]   7/7/2020  8:30 AM Sarah Faust MD [address]   7/8/2020  3:30 PM Annabelle Davidson [facility]   7/10/2020  1:00 PM Abran Levy NP [facility]   7/13/2020  3:30 PM Annabelle Davidson [facility]   7/15/2020  3:30 PM Annabelle Davidson [facility]   7/20/2020  3:30 PM Javed Iba, PT [facility]   7/22/2020  2:10 PM Marcy Mercado MD [facility]   7/23/2020  3:30 PM Shan [facility]   7/27/2020  3:30 PM Javed Iba, PT [facility]   7/29/2020  3:30 PM Javed Iba, PT [facility]   8/20/2020  9:30 AM Sarah Faust MD [facility]

## 2020-07-07 ENCOUNTER — VIRTUAL VISIT (OUTPATIENT)
Dept: INTERNAL MEDICINE CLINIC | Age: 50
End: 2020-07-07

## 2020-07-07 DIAGNOSIS — I10 ESSENTIAL HYPERTENSION: Primary | ICD-10-CM

## 2020-07-07 DIAGNOSIS — R22.43 LOCALIZED SWELLING OF BOTH LOWER LEGS: ICD-10-CM

## 2020-07-07 RX ORDER — HYDROCHLOROTHIAZIDE 25 MG/1
25 TABLET ORAL DAILY
Qty: 30 TAB | Refills: 5 | Status: SHIPPED | OUTPATIENT
Start: 2020-07-07 | End: 2022-06-16 | Stop reason: SDUPTHER

## 2020-07-07 NOTE — PROGRESS NOTES
Asiya Bourne is a 48 y.o. male who was seen by synchronous (real-time) audio-video technology on 7/7/2020. Assessment & Plan:   Swelling: +HTN. From norvasc? We will stop Norvasc and and hydrochlorothiazide in its place.  I encouraged the patient to purchase a BP machine into monitor his blood pressure   I will have him schedule for labs to assess his electrolytes  I will follow-up with him in 3 weeks to assess his lab results and his response to hydrochlorothiazide, along with his BPs at home.  I instructed him to notify me if he develops asymmetrical swelling and/or shortness of breath symptoms. Lab review: labs are reviewed in the EHR     I have discussed the diagnosis with the patient and the intended plan as seen in the above orders. I have discussed medication side effects and warnings with the patient as well. I have reviewed the plan of care with the patient, accepted their input and they are in agreement with the treatment goals. All questions were answered. The patient understands the plan of care. Pt instructed if symptoms worsen to call the office or report to the ED for continued care. Greater than 50% of the visit time was spent in counseling and/or coordination of care. Voice recognition was used to generate this report, which may have resulted in some phonetic based errors in grammar and contents. Even though attempts were made to correct all the mistakes, some may have been missed, and remained in the body of the document. Subjective:   Asiya Bourne was seen for   Chief Complaint   Patient presents with    Swelling     The patient is a 51-year-old male with history of hypertension, obesity, CVA, h/o H.pylori infection (treated last year), prediabetes, lumbar disc disease, and chronic bilateral knee pain (followed by Orthopedics).     Swelling: He reports feet and ankle swelling x 1 wk. Triggers: none. No travel hx. No pain.  Alleviating factors: none known. SOB: none. He has underlying hypertension, taking amlodipine 10 mg daily. He never purchased a BP machine. Swelling is symmetrical.           Prior to Admission medications    Medication Sig Start Date End Date Taking? Authorizing Provider   DULoxetine (CYMBALTA) 60 mg capsule Take 1 Cap by mouth daily. 6/22/20   Chilo Shi MD   amLODIPine (NORVASC) 10 mg tablet Take 1 Tab by mouth daily. 1/28/20   Kat Hanna MD     No Known Allergies  Past Medical History:   Diagnosis Date    Ambulates with cane     Arthritis     lower back and kness    Hypertension     Knee pain     Morbid obesity ([facility] [address]      Past Surgical History:   Procedure Laterality Date    COLONOSCOPY N/A 6/19/2020    COLONOSCOPY with polypectomy performed by Caden Freeman MD at [facility] ENDOSCOPY    HX CHOLECYSTECTOMY       Family History   Problem Relation Age of Onset    Heart Disease Mother     Diabetes Mother     Stroke Father      Social History     Socioeconomic History    Marital status:      Spouse name: Not on file    Number of children: Not on file    Years of education: Not on file    Highest education level: Not on file   Tobacco Use    Smoking status: Never Smoker    Smokeless tobacco: Never Used   Substance and Sexual Activity    Alcohol use: Not Currently     Comment: 2 drinks weekly (liquor)    Drug use: Not Currently    Sexual activity: Not Currently       ROS:  Gen: No fever/chills  HEENT: No sore throat, eye pain, ear pain, or congestion.  No HA  CV: No CP  Resp: No cough/SOB  GI: No abdominal pain  : No hematuria/dysuria  Derm: No rash  Neuro: No new paresthesias/weakness  Musc: No new myalgias/jt pain  Psych: No depression sx      Objective:     General: alert, cooperative, no distress   Mental  status: mental status: alert, oriented to person, place, and time, normal mood, behavior, speech, dress, motor activity, and thought processes   Resp: resp: normal effort and no respiratory distress Neuro: neuro: no gross deficits   Skin: skin: no discoloration or lesions of concern on visible areas     LABS:  Lab Results   Component Value Date/Time    Sodium 140 02/20/2020 09:36 AM    Potassium 3.7 02/20/2020 09:36 AM    Chloride 107 02/20/2020 09:36 AM    CO2 32 02/20/2020 09:36 AM    Anion gap 1 (L) 02/20/2020 09:36 AM    Glucose 96 02/20/2020 09:36 AM    BUN 11 02/20/2020 09:36 AM    Creatinine 0.84 02/20/2020 09:36 AM    BUN/Creatinine ratio 13 02/20/2020 09:36 AM    GFR est AA >60 02/20/2020 09:36 AM    GFR est non-AA >60 02/20/2020 09:36 AM    Calcium 8.1 (L) 02/20/2020 09:36 AM       Lab Results   Component Value Date/Time    Cholesterol, total 146 02/18/2019 09:50 AM    HDL Cholesterol 38 (L) 02/18/2019 09:50 AM    LDL, calculated 95.2 02/18/2019 09:50 AM    VLDL, calculated 12.8 02/18/2019 09:50 AM    Triglyceride 64 02/18/2019 09:50 AM    CHOL/HDL Ratio 3.8 02/18/2019 09:50 AM       Lab Results   Component Value Date/Time    WBC 5.0 02/20/2020 09:36 AM    HGB 12.8 (L) 02/20/2020 09:36 AM    HCT 40.3 02/20/2020 09:36 AM    PLATELET 843 39/02/3340 09:36 AM    MCV 82.4 02/20/2020 09:36 AM       Lab Results   Component Value Date/Time    Hemoglobin A1c 5.3 07/03/2019 09:40 AM       Lab Results   Component Value Date/Time    TSH 2.77 07/08/2019 10:18 AM           Due to this being a TeleHealth  evaluation, many elements of the physical examination are unable to be assessed. The pt was seen by synchronous (real-time) audio-video technology, and/or her healthcare decision maker, is aware that this patient-initiated, Telehealth encounter is a billable service, with coverage as determined by her insurance carrier. She is aware that she may receive a bill and has provided verbal consent to proceed: Yes. The pt is being evaluated by a video visit encounter for concerns as above. A caregiver was present when appropriate.  Due to this being a TeleHealth encounter (During VYTEW-69 public health emergency), evaluation of the following organ systems was limited: Vitals/Constitutional/EENT/Resp/CV/GI//MS/Neuro/Skin/Heme-Lymph-Imm. Pursuant to the emergency declaration under the [facility], [facility] waiver authority and the [employer] Act, this Virtual  Visit was conducted, with patient's (and/or legal guardian's) consent, to reduce the patient's risk of exposure to COVID-19 and provide necessary medical care. Services were provided through a video synchronous discussion virtually to substitute for in-person clinic visit. Patient and provider were located at their individual homes. We discussed the expected course, resolution and complications of the diagnosis(es) in detail. Medication risks, benefits, costs, interactions, and alternatives were discussed as indicated. I advised him to contact the office if his condition worsens, changes or fails to improve as anticipated. He expressed understanding with the diagnosis(es) and plan.      Abraham Carrillo MD

## 2020-07-08 ENCOUNTER — APPOINTMENT (OUTPATIENT)
Dept: PHYSICAL THERAPY | Age: 50
End: 2020-07-08
Payer: MEDICAID

## 2020-07-10 ENCOUNTER — TELEPHONE (OUTPATIENT)
Dept: PHYSICAL THERAPY | Age: 50
End: 2020-07-10

## 2020-07-10 ENCOUNTER — TELEPHONE (OUTPATIENT)
Dept: INTERNAL MEDICINE CLINIC | Age: 50
End: 2020-07-10

## 2020-07-10 ENCOUNTER — OFFICE VISIT (OUTPATIENT)
Dept: SURGERY | Age: 50
End: 2020-07-10

## 2020-07-10 VITALS
SYSTOLIC BLOOD PRESSURE: 132 MMHG | HEART RATE: 76 BPM | OXYGEN SATURATION: 97 % | WEIGHT: 315 LBS | TEMPERATURE: 98.2 F | RESPIRATION RATE: 18 BRPM | DIASTOLIC BLOOD PRESSURE: 96 MMHG | HEIGHT: 69 IN | BODY MASS INDEX: 46.65 KG/M2

## 2020-07-10 DIAGNOSIS — I10 ESSENTIAL HYPERTENSION: ICD-10-CM

## 2020-07-10 DIAGNOSIS — E66.01 OBESITY, MORBID (HCC): Primary | ICD-10-CM

## 2020-07-10 DIAGNOSIS — R73.02 IMPAIRED GLUCOSE TOLERANCE: ICD-10-CM

## 2020-07-10 NOTE — TELEPHONE ENCOUNTER
----- Message from Donna Rock MD sent at 7/7/2020  8:48 AM EDT -----  Regarding: Apts needed  Please schedule patient for a follow-up 30-minute appointment with me in 3 weeks. Please schedule him for labs in 2 weeks.     Thanks,  Dr. Mei Penn  [facility], [address] [place] [address], [address] [place] [address].  Phone: [phone]  Fax: [phone]

## 2020-07-10 NOTE — TELEPHONE ENCOUNTER
appt sched.  Pt said his DMV Placard card is about to  asking if the DR would renew it , he said he was told last time she was going to make a copy of application so next time he needs to renew it he can pick it up

## 2020-07-10 NOTE — PROGRESS NOTES
Citlalli Hughes is a 48 y.o. male  Chief Complaint   Patient presents with    Morbid Obesity     follow up for possible gastric surgery. Pt ID confirmed    Weight Loss Metrics 7/10/2020 7/10/2020 6/22/2020 6/19/2020 3/13/2020 2/20/2020 1/28/2020   Pre op / Initial Wt 458.6 - - - - - -   Today's Wt - 458 lb 9.6 oz 461 lb 457 lb 9.6 oz 446 lb 3.2 oz 450 lb 447 lb 3.2 oz   BMI - 67.72 kg/m2 68.08 kg/m2 67.58 kg/m2 65.89 kg/m2 66.45 kg/m2 66.04 kg/m2   Ideal Body Wt 154 - - - - - -   Excess Body Wt 304.6 - - - - - -   Goal Wt 215 - - - - - -   Wt loss to date 0 - - - - - -   % Wt Loss 0 - - - - - -   80% .68 - - - - - -       Body mass index is 67.72 kg/m². Mr. Danielle Benoit has been given the following recommendations today due to his elevated BP reading: referred to Alternative/PCP.

## 2020-07-10 NOTE — Clinical Note
He needs to start WLT over as he will not be ready in the next 2 months when his wlt expires, def to dr Joss Machado for final weight loss goal but I recommend ~400lbs prior to sx.

## 2020-07-13 ENCOUNTER — HOSPITAL ENCOUNTER (OUTPATIENT)
Dept: PHYSICAL THERAPY | Age: 50
Discharge: HOME | End: 2020-07-13
Payer: MEDICAID

## 2020-07-13 PROCEDURE — 97110 THERAPEUTIC EXERCISES: CPT

## 2020-07-13 PROCEDURE — 97112 NEUROMUSCULAR REEDUCATION: CPT

## 2020-07-13 PROCEDURE — 97530 THERAPEUTIC ACTIVITIES: CPT

## 2020-07-13 NOTE — PROGRESS NOTES
PT DAILY TREATMENT NOTE 10-18    Patient Name: Jolanta Ramsey  Date:2020  : 1970  [x]  Patient  Verified  Payor: Ricardo Hendrix / Plan: [address] [place] [address] / Product Type: Managed Care Medicaid /    In time:3:33  Out time: 4:11  Total Treatment Time (min): 38  Visit #: 3 of 9     Medicare/BCBS Only   Total Timed Codes (min):   1:1 Treatment Time:         Treatment Area: Pain in right knee [M25.561]  Pain in left knee [M25.562]  Other intervertebral disc degeneration, lumbar region [M51.36]  Spondylosis without myelopathy or radiculopathy, lumbosacral region [M47.817]    SUBJECTIVE  Pain Level (0-10 scale): 7  Any medication changes, allergies to medications, adverse drug reactions, diagnosis change, or new procedure performed?: [x] No    [] Yes (see summary sheet for update)  Subjective functional status/changes:   [] No changes reported  \"My back hurts more today, I am not sure why though, it was not anything that I have done\"    OBJECTIVE    14 min Therapeutic Exercise:  [x] See flow sheet :   Rationale: increase ROM and increase strength to improve LE strength/mobility and  lumbar mobility to improve ease of ADLs and gait. 8 min Therapeutic Activity:  [x]  See flow sheet :   Pt education: aquatic therapy contraindication screening, benefits of aquatic therapy, expectations for aquatic therapy, transitioning clinics when aquatic therapy becomes available in afternoons     16 min Neuromuscular Re-education:  [x]  See flow sheet :   Rationale: increase strength, improve coordination, improve balance and increase proprioception  to improve the patients ability to perform functional tasks with improved abdominal brace utilization, improved control, and decreased risk for falls.             With   [] TE   [] TA   [] neuro   [] other: Patient Education: [x] Review HEP    [] Progressed/Changed HEP based on:   [] positioning   [] body mechanics   [] transfers   [] heat/ice application [] other:      Other Objective/Functional Measures: -Screened for contraindications for aquatic therapy    -Introduced lumbar stretching in seated position for pain relief    Pain Level (0-10 scale) post treatment: 4    ASSESSMENT/Changes in Function: Patient is screened and cleared for contraindications for aquatic therapy during today's session. We will proceed with current land therapy interventions to establish improved core control in supine position with expected return to aquatic therapy to improve tolerance for standing exercise once aquatic therapy is available at [facility] in the afternoon. Patient will continue to benefit from skilled PT services to modify and progress therapeutic interventions, address functional mobility deficits, address ROM deficits, address strength deficits, analyze and address soft tissue restrictions, analyze and cue movement patterns, analyze and modify body mechanics/ergonomics, assess and modify postural abnormalities and address imbalance/dizziness to attain remaining goals. []  See Plan of Care  []  See progress note/recertification  []  See Discharge Summary         Progress towards goals / Updated goals:  Short Term Goals: To be accomplished in 2 weeks: 1.   Pt will report compliance with HEP in order to supplement PT treatment               Eval = established               Current: met, pt reports compliance           2.   Pt will report max pain 7/10 in order to increase participation in therapy               Eval = 10/10       Long Term Goals: To be accomplished in 9 treatments:  1.   Pt will score at least 50 on FOTO in order to improve overall function, decrease pain, and facilitate return to PLOF.                            Eval = 38    Current: reassess at MD note  2.   Pt will demonstrate bilateral hip strength 5/5 in order to increased ambulation distances in the community               Eval: flexion bilaterally = 4/5, abduction bilaterally = 4+/5, extension bilaterally = 4+/5    Current: reassess closer to MD note  3.   Pt will report 50% improvement in symptoms when walking in order to improve ability to negotiate the community                Eval = constant when upright    PLAN  [x]  Upgrade activities as tolerated     [x]  Continue plan of care  []  Update interventions per flow sheet       []  Discharge due to:_  []  Other:_      Phylicia Jo 7/13/2020  9:33 AM    Future Appointments   Date Time Provider [place] Red[place]   7/13/2020  3:30 PM Rock  [facility]   7/15/2020  3:30 PM Rock  [facility]   7/20/2020  3:30 PM Larwence Pacer, PT [facility]   7/21/2020  9:00 AM IOC NURSE VISIT [facility]   7/22/2020  2:10 PM Antonino Pickard MD [facility]   7/23/2020  3:30 PM Larwence Pacer, PT [facility]   7/27/2020  3:30 PM Larwence Pacer, PT [facility]   7/29/2020 10:00 AM Richie Ramirez MD [facility]   7/29/2020  3:30 PM Larwence Pacer, PT [facility]   8/20/2020  9:30 AM Richie Ramirez MD [facility]

## 2020-07-20 ENCOUNTER — HOSPITAL ENCOUNTER (OUTPATIENT)
Dept: PHYSICAL THERAPY | Age: 50
Discharge: HOME | End: 2020-07-20
Payer: MEDICAID

## 2020-07-20 PROCEDURE — 97112 NEUROMUSCULAR REEDUCATION: CPT

## 2020-07-20 PROCEDURE — 97530 THERAPEUTIC ACTIVITIES: CPT

## 2020-07-20 PROCEDURE — 97110 THERAPEUTIC EXERCISES: CPT

## 2020-07-20 NOTE — PROGRESS NOTES
Consultation for Bariatric Surgery ([place])  Oumou Morgan is a 48 y.o. male who comes into the office today for initial consultation for the surgical options for the treatment of morbid obesity. The patient initially identified obesity at the age of 15 and at age 25 weighed 36lbs. He has tried a variety of unsupervised weight-loss attempts including self imposed, registered dietician and exercise, but has yet to meet with lasting success. Maximum weight lost on a diet is about 200  lbs, but that the weight loss always seems to return. Today, the patient is  Height: 5' 9\" (175.3 cm) tall, Weight: (!) 208 kg (458 lb 9.6 oz) lbs for a Body mass index is 67.72 kg/m². It is due to the patient's severe obesity, which is further complicated by hypertension and weight related arthopathies  that the patient is now seeking out bariatric surgery, specifically, likely LGBP but is currently undecided. He communicates understanding that he has entered our program numerous times and has yes to complete all the requirement, he notes he is desperate to lose weight and improve his health. He indicates he is very serious about moving forward with his WLT and our SWL program.    Past Medical History:   Diagnosis Date    Ambulates with cane     Arthritis     lower back and kness    Hypertension     Knee pain     Morbid obesity ([address]        Past Surgical History:   Procedure Laterality Date    COLONOSCOPY N/A 6/19/2020    COLONOSCOPY with polypectomy performed by Tuyet Hahn MD at [address] HX CHOLECYSTECTOMY         Current Outpatient Medications   Medication Sig Dispense Refill    hydroCHLOROthiazide (HYDRODIURIL) 25 mg tablet Take 1 Tab by mouth daily. 30 Tab 5    DULoxetine (CYMBALTA) 60 mg capsule Take 1 Cap by mouth daily.  30 Cap 1       No Known Allergies    Social History     Tobacco Use    Smoking status: Never Smoker    Smokeless tobacco: Never Used   Substance Use Topics    Alcohol use: Not Currently     Comment: 2 drinks weekly (liquor)    Drug use: Not Currently       Family History   Problem Relation Age of Onset    Heart Disease Mother     Diabetes Mother     Stroke Father        Family Status   Relation Name Status    Mother      Father         Review of Systems:  Positive in BOLD  CONST: Fever, weight loss, fatigue or chills  GI: Nausea, vomiting, abdominal pain, change in bowel habits, hematochezia, melena, and GERD   INTEG: Dermatitis, abnormal moles  HEENT: Recent changes in vision, vertigo, epistaxis, dysphagia and hoarseness, tooth pain  CV: Chest pain, palpitations, HTN, edema and varicosities  RESP: Cough, shortness of breath, wheezing, hemoptysis, snoring and reactive airway disease  : Hematuria, dysuria, frequency, urgency, nocturia and stress urinary incontinence   MS: Weakness, joint pain and arthritis  ENDO: Diabetes, thyroid disease, polyuria, polydipsia, polyphagia, poor wound healing, heat intolerance, cold intolerance  LYMPH/HEME: Anemia, bruising and history of blood transfusions  NEURO: Dizziness, headache, fainting, seizures and ZLOQXD 3831 (was on blood thinner but was taken off)   PSYCH: Anxiety and depression      Physical Exam    Visit Vitals  BP (!) 132/96 (BP 1 Location: Left arm, BP Patient Position: Sitting) Comment: manual b/p   Pulse 76   Temp 98.2 °F (36.8 °C) (Oral)   Resp 18   Ht 5' 9\" (1.753 m)   Wt (!) 208 kg (458 lb 9.6 oz)   SpO2 97%   BMI 67.72 kg/m²       Pre op weight: 458.6  EBW: 304.6  Wt loss to date: 0       Physical Exam  Vitals signs and nursing note reviewed. Constitutional:       Appearance: He is obese. HENT:      Head: Normocephalic and atraumatic. Cardiovascular:      Rate and Rhythm: Normal rate. Heart sounds: Normal heart sounds. Pulmonary:      Effort: Pulmonary effort is normal.      Breath sounds: Normal breath sounds. Abdominal:      General: Bowel sounds are normal. There is no distension. Palpations: Abdomen is soft. Tenderness: There is no abdominal tenderness. Musculoskeletal:         General: Swelling and tenderness present. Right lower leg: Edema present. Left lower leg: Edema present. Skin:     General: Skin is warm and dry. Neurological:      General: No focal deficit present. Mental Status: He is alert and oriented to person, place, and time. Psychiatric:         Mood and Affect: Mood normal.       Impression:    David Green is a 48 y.o. male who is suffering from morbid obesity with a BMI of 67  and comorbidities including hypertension and weight related arthopathies  who would benefit from bariatric surgery. We have had an extensive discussion with regard to the risks, benefits and likely outcomes of the operation. We've discussed the restrictive and malabsorptive nature of the gastric bypass and compared and contrasted with the sleeve gastrectomy. The patient understands the likelihood of losing approximately 80% of their excess weight in 12 to 18 months. He also understands the risks including but not limited to bleeding, infection, need for reoperation, ulcers, leaks and strictures, bowel obstruction secondary to adhesions and internal hernias, DVT, PE, heart attack, stroke, and death. Patient also understands risks of inadequate weight loss, excess weight loss, vitamin insufficiency, protein malnutrition, excess skin, and loss of hair. We have reviewed the components of a successful postoperative course including requirement for a high protein, low carbohydrate diet, 60 oz a day of zero calorie liquids, daily vitamin supplementation, daily exercise, regular follow-up, and participation in support groups.  At this time we will enroll the patient in our bariatric program, undertake routine laboratory evaluation, chest X-ray, EKG, possible UGI and evaluation by  nutritionist as well as psychologist and pending their satisfactory completion of the preop evaluation, plan to perform a gastric bypass if able to lose weight in the WLT. ( recommend under 400 lbs but final requirment will be determined by surgeon). If unable to lose the weight, 2 stage is preferred. The patient clearly understands that they have to complete the duration of WLT consecutively and if they miss a month restart will be required.  Additionally, no showing the RD appointments may result in removal from the surgical weight loss program.     Follow up with surgeon [facility]     Follow up with PCP asap within 1 week for BP marv

## 2020-07-20 NOTE — PROGRESS NOTES
PT DAILY TREATMENT NOTE 10-18    Patient Name: Bekah Henriquez  Date:2020  : 1970  [x]  Patient  Verified  Payor: Crystal Schaffer / Plan: [address] [place] [address] 60 / Product Type: Managed Care Medicaid /    In time: 3:30  Out time: 4:08  Total Treatment Time (min): 38  Visit #: 4 of 9    Treatment Area: Pain in right knee [M25.561]  Pain in left knee [M25.562]  Other intervertebral disc degeneration, lumbar region [M51.36]  Spondylosis without myelopathy or radiculopathy, lumbosacral region [M47.817]    SUBJECTIVE  Pain Level (0-10 scale):  5/10  Any medication changes, allergies to medications, adverse drug reactions, diagnosis change, or new procedure performed?: [x] No    [] Yes (see summary sheet for update)  Subjective functional status/changes:   [] No changes reported  Pt. Reports having less pain than he did last visit. OBJECTIVE    20 min Therapeutic Exercise:  [x] See flow sheet :   Rationale: increase ROM and increase strength to improve the patients ability to increase ease of ADLs    8 min Therapeutic Activity:  [x]  See flow sheet : standing exercises to improve standing tolerance   Rationale: increase strength and improve coordination  to improve the patients ability to increase ease of ambulation     10 min Neuromuscular Re-education:  [x]  See flow sheet : core stability exercises sitting on edge of bed   Rationale: increase strength, improve coordination and improve balance  to improve the patients ability to increase ease of ADLs           With   [x] TE   [] TA   [] neuro   [] other: Patient Education: [x] Review HEP    [] Progressed/Changed HEP based on:   [] positioning   [] body mechanics   [] transfers   [] heat/ice application    [] other:      Other Objective/Functional Measures:   Pt.  Was challenged with standing activities  He was challenged with marches on pillows   He was provided with theraband for HEP    Pain Level (0-10 scale) post treatment: 4/10    ASSESSMENT/Changes in Function:  Pt. Is progressing slowly towards goals. He continues to have decreased core stability and decreased LE strength. He also has decreased standing tolerance. He does have decreased pain with exercises. Patient will continue to benefit from skilled PT services to modify and progress therapeutic interventions, address functional mobility deficits, address ROM deficits, address strength deficits, analyze and address soft tissue restrictions and analyze and cue movement patterns to attain remaining goals. Progress towards goals / Updated goals:  Short Term Goals: To be accomplished in 2 weeks: 1.   Pt will report compliance with HEP in order to supplement PT treatment               Eval = established               ARJIOWK: met, pt reports compliance           2.   Pt will report max pain 7/10 in order to increase participation in therapy               Eval = 10/10  Not met: 5/10 (7/20/20)        Long Term Goals: To be accomplished in 9 treatments:  1.   Pt will score at least 50 on FOTO in order to improve overall function, decrease pain, and facilitate return to PLOF.                            Eval = 38               Current: reassess at MD note  2.   Pt will demonstrate bilateral hip strength 5/5 in order to increased ambulation distances in the community               Eval: flexion bilaterally = 4/5, abduction bilaterally = 4+/5, extension bilaterally = 4+/5               Current: reassess closer to MD note  3.   Pt will report 50% improvement in symptoms when walking in order to improve ability to negotiate the community                Eval = constant when upright    PLAN  []  Upgrade activities as tolerated     [x]  Continue plan of care  []  Update interventions per flow sheet       []  Discharge due to:_  []  Other:_      Vijaya Cummings, PT 7/20/2020  7:49 AM    Future Appointments   Date Time Provider Michelle Reddy   7/20/2020  3:30 PM [place] Sujata Leif [address]   7/21/2020  9:00 AM [facility] NURSE VISIT [facility]   7/22/2020  2:10 PM MD Damian Dodd   7/23/2020  3:30 PM Chelsi Yoder, PT [facility] [address]   7/27/2020  3:30 PM Chelsi Yoder, PT [facility] [address]   7/29/2020 10:00 AM Balaji Goldstein MD [facility]   7/29/2020  3:30 PM Chelsi Yoder, PT [facility] [address]   8/20/2020  9:30 AM Balaji Goldstein MD [facility]

## 2020-07-21 ENCOUNTER — APPOINTMENT (OUTPATIENT)
Dept: INTERNAL MEDICINE CLINIC | Age: 50
End: 2020-07-21

## 2020-07-21 ENCOUNTER — TELEPHONE (OUTPATIENT)
Dept: INTERNAL MEDICINE CLINIC | Age: 50
End: 2020-07-21

## 2020-07-22 LAB
A-G RATIO,AGRAT: 1.1 RATIO (ref 1.1–2.6)
ALBUMIN SERPL-MCNC: 3.8 G/DL (ref 3.5–5)
ALP SERPL-CCNC: 66 U/L (ref 25–115)
ALT SERPL-CCNC: 15 U/L (ref 5–40)
ANION GAP SERPL CALC-SCNC: 15 MMOL/L (ref 3–15)
AST SERPL W P-5'-P-CCNC: 16 U/L (ref 10–37)
BILIRUB SERPL-MCNC: 0.2 MG/DL (ref 0.2–1.2)
BUN SERPL-MCNC: 13 MG/DL (ref 6–22)
CALCIUM SERPL-MCNC: 8.6 MG/DL (ref 8.4–10.5)
CHLORIDE SERPL-SCNC: 104 MMOL/L (ref 98–110)
CHOLEST SERPL-MCNC: 160 MG/DL (ref 110–200)
CO2 SERPL-SCNC: 26 MMOL/L (ref 20–32)
CREAT SERPL-MCNC: 0.9 MG/DL (ref 0.5–1.2)
GFRAA, 66117: >60
GFRNA, 66118: >60
GLOBULIN,GLOB: 3.4 G/DL (ref 2–4)
GLUCOSE SERPL-MCNC: 106 MG/DL (ref 70–99)
HDLC SERPL-MCNC: 36 MG/DL
HDLC SERPL-MCNC: 4.4 MG/DL (ref 0–5)
LDL/HDL RATIO,LDHD: 2.8
LDLC SERPL CALC-MCNC: 103 MG/DL (ref 50–99)
NON-HDL CHOLESTEROL, 011976: 124 MG/DL
POTASSIUM SERPL-SCNC: 3.9 MMOL/L (ref 3.5–5.5)
PROT SERPL-MCNC: 7.2 G/DL (ref 6.4–8.3)
SODIUM SERPL-SCNC: 145 MMOL/L (ref 133–145)
TRIGL SERPL-MCNC: 104 MG/DL (ref 40–149)
VLDLC SERPL CALC-MCNC: 21 MG/DL (ref 8–30)

## 2020-07-23 ENCOUNTER — HOSPITAL ENCOUNTER (OUTPATIENT)
Dept: PHYSICAL THERAPY | Age: 50
Discharge: HOME | End: 2020-07-23
Payer: MEDICAID

## 2020-07-23 LAB
AVG GLU, 10930: 111 MG/DL (ref 91–123)
CREATININE, URINE: 207 MG/DL
HBA1C MFR BLD HPLC: 5.5 % (ref 4.8–5.6)
MICROALB/CREAT RATIO, 140286: NORMAL
MICROALBUMIN,URINE RANDOM 140054: <12 MG/L (ref 0.1–17)

## 2020-07-23 NOTE — TELEPHONE ENCOUNTER
I will let him know his upcoming appointment that his A1c is normal at 5.5. There is no microalbuminuria. His triglycerides are 104. HDL is 36. His total cholesterol is 160. His LDL is 103. Meanwhile, kidney and liver function labs are within normal limits.       Dr. Sharon Peralta  [facility], [address] [place] [address]  [employer], [address] [place] [address] Str.  Phone: [phone]  Fax: [phone] g

## 2020-07-27 ENCOUNTER — HOSPITAL ENCOUNTER (OUTPATIENT)
Dept: PHYSICAL THERAPY | Age: 50
Discharge: HOME | End: 2020-07-27
Payer: MEDICAID

## 2020-07-27 PROCEDURE — 97112 NEUROMUSCULAR REEDUCATION: CPT

## 2020-07-27 PROCEDURE — 97110 THERAPEUTIC EXERCISES: CPT

## 2020-07-27 PROCEDURE — 97530 THERAPEUTIC ACTIVITIES: CPT

## 2020-07-27 NOTE — PROGRESS NOTES
PT DAILY TREATMENT NOTE 10-18    Patient Name: Isela Arvizu  Date:2020  : 1970  [x]  Patient  Verified  Payor: Rosalio Farnsworth / Plan: [address] [place] [address] / Product Type: Managed Care Medicaid /    In time: 3:41  Out time:4:19  Total Treatment Time (min): 38  Visit #: 5 of 9    Treatment Area: Pain in right knee [M25.561]  Pain in left knee [M25.562]  Other intervertebral disc degeneration, lumbar region [M51.36]  Spondylosis without myelopathy or radiculopathy, lumbosacral region [M47.817]    SUBJECTIVE  Pain Level (0-10 scale): 5/10  Any medication changes, allergies to medications, adverse drug reactions, diagnosis change, or new procedure performed?: [x] No    [] Yes (see summary sheet for update)  Subjective functional status/changes:   [x] No changes reported  Pt reported 50% compliance with HEP, stating that he completed the exercises every other day. Instructed to increase compliance. OBJECTIVE    20 min Therapeutic Exercise:  [x] See flow sheet :   Rationale: increase ROM and increase strength to improve the patients ability to increase ease of ALDs    8 min Therapeutic Activity:  [x]  See flow sheet : standing exercises in parallel bars to improve standing tolerance and dynamic stability    Rationale: increase strength and improve coordination  to improve the patients ability to increase ease of ambulation      10 Min Neuromuscular Re-education:  [x]  See flow sheet : core stability and balance exercises on EOB   Rationale: increase strength, improve coordination and improve balance  to improve the patients ability to increase ease of ADLs          With   [x] TE   [] TA   [] neuro   [] other: Patient Education: [x] Review HEP    [] Progressed/Changed HEP based on:   [] positioning   [] body mechanics   [] transfers   [] heat/ice application    [] other:      Other Objective/Functional Measures:   Pt.  Tolerated exercises well and was an active participant in today's session. He showed improvement with pillow and standing activities, progress standing exercises as tolerated. He was provided with Pillows March exercise handout for HEP, instructed to increase compliance. Pain Level (0-10 scale) post treatment: 0/10    ASSESSMENT/Changes in Function: Pt is demonstrated significant improvement in progress from last session. He continues to have decreased core stability and decreased LE strength. He also has decreased standing tolerance. Pt had decreased pain with exercises and seemed motivated to complete Therex. Patient will continue to benefit from skilled PT services to address functional mobility deficits, address ROM deficits, address strength deficits, analyze and address soft tissue restrictions and analyze and cue movement patterns to attain remaining goals. Progress towards goals / Updated goals:  Short Term Goals: To be accomplished in 2 weeks: 1.   Pt will report compliance with HEP in order to supplement PT treatment               Eval = established               AEDRYJD: met, pt reports 50% compliance           2.   Pt will report max pain 7/10 in order to increase participation in therapy               Eval = 10/10  Met: 5/10 (7/27/20)     Long Term Goals: To be accomplished in 9 treatments:  1.   Pt will score at least 50 on FOTO in order to improve overall function, decrease pain, and facilitate return to PLOF.                            Eval = 38               Current: reassess at MD note  2.   Pt will demonstrate bilateral hip strength 5/5 in order to increased ambulation distances in the community               Eval: flexion bilaterally = 4/5, abduction bilaterally = 4+/5, extension bilaterally = 4+/5               Current: reassess closer to MD note  3.   Pt will report 50% improvement in symptoms when walking in order to improve ability to negotiate the community                Eval = constant when upright  PLAN  []?   Upgrade activities as tolerated     [x]? Continue plan of care  []? Update interventions per flow sheet       []? Discharge due to:_  []?   Other:_      Keisha Hector Butler 7/27/2020  3:35 PM    Future Appointments   Date Time Provider Michelle Reddy   7/29/2020 10:00 AM Prabha Solis MD [facility]   7/29/2020  3:30 PM Genny David, PT [facility]   8/3/2020  3:45 PM Genny David, PT [facility]   8/12/2020  3:30 PM Dayron Ross MD [facility]   8/20/2020  9:30 AM Prabha Solis MD [facility]

## 2020-07-28 NOTE — PROGRESS NOTES
Gregg Cabrera is a 48 y.o. male who was seen by synchronous (real-time) audio-video technology on 7/29/2020. Assessment & Plan:   HTN and H/o Swelling: He is swelling most likely from Norvasc  - C/w HCTZ for now  -I will have him scheduled for an office blood pressure check. If his blood pressure is under 140/90, I will follow-up with him in March 2021. Adding Norvasc as a medication intolerance to his chart. Lab review: labs are reviewed in the EHR     I have discussed the diagnosis with the patient and the intended plan as seen in the above orders. I have discussed medication side effects and warnings with the patient as well. I have reviewed the plan of care with the patient, accepted their input and they are in agreement with the treatment goals. All questions were answered. The patient understands the plan of care. Pt instructed if symptoms worsen to call the office or report to the ED for continued care. Greater than 50% of the visit time was spent in counseling and/or coordination of care. Voice recognition was used to generate this report, which may have resulted in some phonetic based errors in grammar and contents. Even though attempts were made to correct all the mistakes, some may have been missed, and remained in the body of the document. Subjective:   Gregg Cabrera was seen for   Chief Complaint   Patient presents with    Follow-up     The patient is a 51-year-old male with history of hypertension, obesity, CVA, h/o H.pylori infection (treated last year), prediabetes, lumbar disc disease, and chronic bilateral knee pain (followed by Orthopedics). HTN and H/o Swelling: At his last apt, he reported a 1 wk h/o b/l foot/ankle swelling. He was taking norvasc at that time. He denied SOB sx. As a result, I stopped his Norvasc and added HCTZ in its place. Since then, his home BP checks: none.  F/u labs were also ordered to assess his creatinine and his electrolytes. His labs last month showed: No microalbuminuria. His renal function and electrolytes were WNL. His A1C was also WNL at 5.5. His triglycerides are 104.  HDL is 36.  His total cholesterol is 160.  His LDL is 103.  Meanwhile, kidney and liver function labs are within normal limits.   He has yet to purchase a blood pressure machine and has not checked his blood pressure since starting the HCTZ in place of Norvasc. Additionally, he reports no adverse effects of taking HCTZ. His swelling has resolved since taking HCTZ and stopping his Norvasc. Prior to Admission medications    Medication Sig Start Date End Date Taking? Authorizing Provider   hydroCHLOROthiazide (HYDRODIURIL) 25 mg tablet Take 1 Tab by mouth daily. 7/7/20   Juana Mcginnis MD   DULoxetine (CYMBALTA) 60 mg capsule Take 1 Cap by mouth daily. 6/22/20   Nyla Conti MD     No Known Allergies  Past Medical History:   Diagnosis Date    Ambulates with cane     Arthritis     lower back and kness    Hypertension     Knee pain     Morbid obesity ([address]      Past Surgical History:   Procedure Laterality Date    COLONOSCOPY N/A 6/19/2020    COLONOSCOPY with polypectomy performed by Lalo Vaca MD at [facility] ENDOSCOPY    HX CHOLECYSTECTOMY       Family History   Problem Relation Age of Onset    Heart Disease Mother     Diabetes Mother     Stroke Father      Social History     Socioeconomic History    Marital status:      Spouse name: Not on file    Number of children: Not on file    Years of education: Not on file    Highest education level: Not on file   Tobacco Use    Smoking status: Never Smoker    Smokeless tobacco: Never Used   Substance and Sexual Activity    Alcohol use: Not Currently     Comment: 2 drinks weekly (liquor)    Drug use: Not Currently    Sexual activity: Not Currently       ROS:  Gen: No fever/chills  HEENT: No sore throat, eye pain, ear pain, or congestion.  No HA  CV: No CP  Resp: No cough/SOB  GI: No abdominal pain  : No hematuria/dysuria  Derm: No rash  Neuro: No new paresthesias/weakness  Musc: No new myalgias/jt pain  Psych: No depression sx      Objective:     General: alert, cooperative, no distress   Mental  status: mental status: alert, oriented to person, place, and time, normal mood, behavior, speech, dress, motor activity, and thought processes   Resp: resp: normal effort and no respiratory distress   Neuro: neuro: no gross deficits   Skin: skin: no discoloration or lesions of concern on visible areas     LABS:  Lab Results   Component Value Date/Time    Sodium 145 07/21/2020 09:17 AM    Potassium 3.9 07/21/2020 09:17 AM    Chloride 104 07/21/2020 09:17 AM    CO2 26 07/21/2020 09:17 AM    Anion gap 15.0 07/21/2020 09:17 AM    Glucose 106 (H) 07/21/2020 09:17 AM    BUN 13 07/21/2020 09:17 AM    Creatinine 0.9 07/21/2020 09:17 AM    BUN/Creatinine ratio 13 02/20/2020 09:36 AM    GFR est AA >60 02/20/2020 09:36 AM    GFR est non-AA >60 02/20/2020 09:36 AM    Calcium 8.6 07/21/2020 09:17 AM       Lab Results   Component Value Date/Time    Cholesterol, total 160 07/21/2020 09:17 AM    HDL Cholesterol 36 (L) 07/21/2020 09:17 AM    LDL, calculated 103 (H) 07/21/2020 09:17 AM    VLDL, calculated 21 07/21/2020 09:17 AM    Triglyceride 104 07/21/2020 09:17 AM    CHOL/HDL Ratio 3.8 02/18/2019 09:50 AM       Lab Results   Component Value Date/Time    WBC 5.0 02/20/2020 09:36 AM    HGB 12.8 (L) 02/20/2020 09:36 AM    HCT 40.3 02/20/2020 09:36 AM    PLATELET 963 10/48/4375 09:36 AM    MCV 82.4 02/20/2020 09:36 AM       Lab Results   Component Value Date/Time    Hemoglobin A1c 5.5 07/21/2020 09:17 AM       Lab Results   Component Value Date/Time    TSH 2.77 07/08/2019 10:18 AM           Due to this being a TeleHealth  evaluation, many elements of the physical examination are unable to be assessed.      The pt was seen by synchronous (real-time) audio-video technology, and/or her healthcare decision maker, is aware that this patient-initiated, Telehealth encounter is a billable service, with coverage as determined by her insurance carrier. She is aware that she may receive a bill and has provided verbal consent to proceed: Yes. The pt is being evaluated by a video visit encounter for concerns as above. A caregiver was present when appropriate. Due to this being a TeleHealth encounter (During CQIYA-74 public health emergency), evaluation of the following organ systems was limited: Vitals/Constitutional/EENT/Resp/CV/GI//MS/Neuro/Skin/Heme-Lymph-Imm. Pursuant to the emergency declaration under the [facility], 1135 waiver authority and the [employer] Act, this Virtual  Visit was conducted, with patient's (and/or legal guardian's) consent, to reduce the patient's risk of exposure to COVID-19 and provide necessary medical care. Services were provided through a video synchronous discussion virtually to substitute for in-person clinic visit. Patient and provider were located at their individual homes. We discussed the expected course, resolution and complications of the diagnosis(es) in detail. Medication risks, benefits, costs, interactions, and alternatives were discussed as indicated. I advised him to contact the office if his condition worsens, changes or fails to improve as anticipated. He expressed understanding with the diagnosis(es) and plan.      North Hoang MD

## 2020-07-29 ENCOUNTER — VIRTUAL VISIT (OUTPATIENT)
Dept: INTERNAL MEDICINE CLINIC | Age: 50
End: 2020-07-29

## 2020-07-29 ENCOUNTER — HOSPITAL ENCOUNTER (OUTPATIENT)
Dept: PHYSICAL THERAPY | Age: 50
Discharge: HOME | End: 2020-07-29
Payer: MEDICAID

## 2020-07-29 DIAGNOSIS — M79.89 LEG SWELLING: ICD-10-CM

## 2020-07-29 DIAGNOSIS — I10 ESSENTIAL HYPERTENSION: Primary | ICD-10-CM

## 2020-07-29 PROCEDURE — 97530 THERAPEUTIC ACTIVITIES: CPT

## 2020-07-29 PROCEDURE — 97110 THERAPEUTIC EXERCISES: CPT

## 2020-07-29 PROCEDURE — 97112 NEUROMUSCULAR REEDUCATION: CPT

## 2020-07-29 NOTE — PROGRESS NOTES
[facility] - Simone Alejandro  [facility]  [phone] fax    Physical Therapy Progress Note  Patient name: Yasir Sullivan Start of Care: 2020   Referral source: Iram Nixon MD : 1970                Medical Diagnosis: Pain in right knee [M25.561]  Pain in left knee [M25.562]  Other intervertebral disc degeneration, lumbar region [M51.36]  Spondylosis without myelopathy or radiculopathy, lumbosacral region [M47.817]  Payor: [employer] / Plan: [address] [place] [address] [address] / Product Type: Managed Care Medicaid /  Onset Date:chronic with exacerbation 2020   Treatment Diagnosis: LBP, right and left knee pain    Prior Hospitalization: see medical history Provider#: [id number]   Medications: Verified on Patient summary List    Comorbidities: BMI, HTN, OA, DDD   Prior Level of Function: mod (I) with SPC             Visits from Start of Care: 6    Missed Visits: 2    Established Goals:         Excellent           Good         Limited           None  [x] Increased ROM   []  []  [x]  []  [x] Increased Strength  []  []  [x]  []  [x] Increased Mobility  []  []  [x]  []   [x] Decreased Pain   []  []  [x]  []  [] Decreased Swelling  []  []  []  []    Key Functional Changes:  Pt. Is progressing slowly towards goals. He continues to have significant pain in back. Overall pain has been decreasing but it was increased today from taking a long drive to [place] yesterday. FOTO score did improved to 44 points. He reports a 50% improvement in symptoms since [facility]. Hip flexion MMT is 4/5 bilaterally. He continues to demonstrate decreased core stability, decreased flexibility, and decreased hip flexibility. Skilled PT is medically necessary in order to improve core stability and strength for increased ease of ambulation and improved quality of life. Updated Goals: to be achieved in 4 weeks:  1.  Patient will improve FOTO score by 12 points in order to demonstrate a significant improvement in function. 2. Patient will report a 75% improvement in symptoms since [facility] in order to improve quality of life. 3. Patient will improve B hip flexion MMT to 4+/5 in order to increase ease of ADLs. ASSESSMENT/RECOMMENDATIONS:  [x]Continue therapy per initial plan/protocol at a frequency of  2 x per week for 4 weeks  []Continue therapy with the following recommended changes:_____________________      _____________________________________________________________________  []Discontinue therapy progressing towards or have reached established goals  []Discontinue therapy due to lack of appreciable progress towards goals  []Discontinue therapy due to lack of attendance or compliance  []Await Physician's recommendations/decisions regarding therapy  []Other:________________________________________________________________    Thank you for this referral.   Brayden Malagon, PT 7/29/2020 3:48 PM      NOTE TO PHYSICIAN:  PLEASE COMPLETE THE ORDERS BELOW AND   FAX TO [facility]: [phone]  If you are unable to process this request in 24 hours please contact our office: [phone] I have read the above report and request that my patient continue as recommended. ? I have read the above report and request that my patient continue therapy with the following changes/special instructions:____________________________________  ? I have read the above report and request that my patient be discharged from therapy.     Physicians signature: ______________________________Date: ______Time:______

## 2020-07-29 NOTE — PROGRESS NOTES
PT DAILY TREATMENT NOTE 10-18    Patient Name: Carin Mireles  Date:2020  : 1970  [x]  Patient  Verified  Payor: Hermilo Slice / Plan: [address] [place] [address] [address] / Product Type: Managed Care Medicaid /    In time: 3:30  Out time: 4:15  Total Treatment Time (min): 45  Visit #: 6 of 9    Medicare/BCBS Only   Total Timed Codes (min):  45 1:1 Treatment Time:  45       Treatment Area: Pain in right knee [M25.561]  Pain in left knee [M25.562]  Other intervertebral disc degeneration, lumbar region [M51.36]  Spondylosis without myelopathy or radiculopathy, lumbosacral region [M47.817]    SUBJECTIVE  Pain Level (0-10 scale):  810  Any medication changes, allergies to medications, adverse drug reactions, diagnosis change, or new procedure performed?: [x] No    [] Yes (see summary sheet for update)  Subjective functional status/changes:   [] No changes reported  Pt. Reports his back is more sore today from doing a lot of driving. (drove to [place])    OBJECTIVE    25 min Therapeutic Exercise:  [x] See flow sheet :   Rationale: increase ROM and increase strength to improve the patients ability to increase ease of ADLs    10 min Therapeutic Activity:  [x]  See flow sheet : standing balance activities, re-assessment    Rationale: increase ROM and increase strength  to improve the patients ability to increase ease of ambulation      10 min Neuromuscular Re-education:  [x]  See flow sheet : core stability and balance exercises on EOB   Rationale: increase ROM and increase strength  to improve the patients ability to increase ease of ADLs          With   [x] TE   [] TA   [] neuro   [] other: Patient Education: [x] Review HEP    [] Progressed/Changed HEP based on:   [] positioning   [] body mechanics   [] transfers   [] heat/ice application    [] other:      Other Objective/Functional Measures:    FOTO: 44  Hip MMT: flex: right: 4/5 left: 4/5  % improvement in symptoms: 50%     Pain Level (0-10 scale) post treatment: 4/10    ASSESSMENT/Changes in Function:      []  See Plan of Care  [x]  See progress note/recertification  []  See Discharge Summary         Progress towards goals / Updated goals:  See progress note    PLAN  []  Upgrade activities as tolerated     [x]  Continue plan of care  []  Update interventions per flow sheet       []  Discharge due to:_  []  Other:_      Jose Cruz Mtz, PT 7/29/2020  7:48 AM    Future Appointments   Date Time Provider Michelle Reddy   7/29/2020 10:00 AM Abdon Renteria MD [facility]   7/29/2020  3:30 PM Jayna Glover, PT [facility]   8/3/2020  3:45 PM Jayna Glover PT [facility]   8/12/2020  3:30 PM Marissa Nicholas 13   8/20/2020  9:30 AM Abdon Renteria MD [facility]

## 2020-07-31 NOTE — TELEPHONE ENCOUNTER
----- Message from Karon Cronin MD sent at 7/29/2020 10:22 AM EDT -----  Regarding: F/u apts needed  Please schedule the patient for an in office blood pressure check (nurse visit) within the next week. Please schedule him for a 30-minute follow-up appointment with me in March 2021.     Thanks,  Dr. Joe Peralta  [facility] [address], [address] [place] [address].  Phone: [phone]  Fax: [phone]

## 2020-08-03 ENCOUNTER — HOSPITAL ENCOUNTER (OUTPATIENT)
Dept: PHYSICAL THERAPY | Age: 50
Discharge: HOME | End: 2020-08-03
Payer: MEDICAID

## 2020-08-03 PROCEDURE — 97112 NEUROMUSCULAR REEDUCATION: CPT

## 2020-08-03 PROCEDURE — 97530 THERAPEUTIC ACTIVITIES: CPT

## 2020-08-03 PROCEDURE — 97110 THERAPEUTIC EXERCISES: CPT

## 2020-08-03 NOTE — PROGRESS NOTES
PT DAILY TREATMENT NOTE 10-18    Patient Name: Oscar Blank  Date:8/3/2020  : 1970  [x]  Patient  Verified  Payor: Ame Danger / Plan: [address] [place] [address] [address] / Product Type: Managed Care Medicaid /    In time: 3:46  Out time: 4:30   Total Treatment Time (min): 44  Visit #: 7 of 9    Treatment Area: Pain in right knee [M25.561]  Pain in left knee [M25.562]  Other intervertebral disc degeneration, lumbar region [M51.36]  Spondylosis without myelopathy or radiculopathy, lumbosacral region [M47.817]    SUBJECTIVE  Pain Level (0-10 scale): 7/10  Any medication changes, allergies to medications, adverse drug reactions, diagnosis change, or new procedure performed?: [x] No    [] Yes (see summary sheet for update)  Subjective functional status/changes:   [] No changes reported  Pt reported that he was doing well, but his stomach was upset. He also reports less than satisfactory compliance with HEP.     OBJECTIVE    24 min Therapeutic Exercise:  [x] See flow sheet :   Rationale: increase ROM and increase strength to improve the patients ability to increase ease of ADLs     8 min Therapeutic Activity:  -  See flow sheet : standing balance activities   Rationale: increase strength, improve coordination and improve balance  to improve the patients ability to increase ease of ambulation     12 min Neuromuscular Re-education:  -  See flow sheet : core stability and balance exercises on EOB   Rationale: increase ROM, increase strength and improve balance  to improve the patients ability to increase ease of ADLs           With   [x] TE   [] TA   [] neuro   [] other: Patient Education: [x] Review HEP    [] Progressed/Changed HEP based on:   [] positioning   [] body mechanics   [] transfers   [] heat/ice application    [] other:      Other Objective/Functional Measures:   Pt tolerated most of today's exercises very well and stated that he would like to continue implementing the new activities  Upset stomach was worsened with incline position, so we avoided completing those exercises (see flow chart)  Pt progressed to pink theraband on lateral walks and using therabar #3 for UE lifts on EOB  Pt reported knee pain with 4in step ups, especially when leading with left leg    Pain Level (0-10 scale) post treatment: 0/10    ASSESSMENT/Changes in Function:     Pt is slowly progressing towards his goals. Pt consistently reports less than 5/10 pain after sessions, but still has significant pain at the beginning of sessions. Lately, the pt was been more engaged in sessions and is more willing to increase the intensity of the POC. However, he still exhibits decreased core stability, decreased balance, and decreased LE strength. Patient will continue to benefit from skilled PT services to modify and progress therapeutic interventions, address functional mobility deficits, address ROM deficits, address strength deficits, analyze and cue movement patterns and analyze and modify body mechanics/ergonomics to attain remaining goals. Progress towards goals / Updated goals:  Short Term Goals: To be accomplished in 2 weeks: 1.   Pt will report compliance with HEP in order to supplement PT treatment               Eval = established               QNJRJUM: met, pt reports 50% compliance           2.   Pt will report max pain 7/10 in order to increase participation in therapy               Eval = 10/10  Met: 5/10 (7/27/20)     Long Term Goals: To be accomplished in 4 weeks:  1. Patient will improve FOTO score by 12 points in order to demonstrate a significant improvement in function. 2. Patient will report a 75% improvement in symptoms since [facility] in order to improve quality of life. 3. Patient will improve B hip flexion MMT to 4+/5 in order to increase ease of ADLs.      PLAN  []  Upgrade activities as tolerated     [x]  Continue plan of care  []  Update interventions per flow sheet       []  Discharge due to:_  []  Other:_ Merrill Ward 8/3/2020  6:02 PM    Future Appointments   Date Time Provider Department Center   8/12/2020  3:30 PM MD ANJEL Gray [facility]   8/20/2020  9:30 AM Jason Lagos MD [address]   3/22/2021  2:30 PM Jason Lagos MD [address]

## 2020-08-11 NOTE — PROGRESS NOTES
[facility]  [address]  [address], [address]   Phone: [phone]  Fax: [phone]        PROGRESS NOTE    CONSENT:  Pursuant to the emergency declaration under the [address] and [facility], [facility] waiver authority and the [employer] Act, this Virtual Visit was conducted, with patient's consent, to reduce the patient's risk of exposure to COVID-19 and provide continuity of care for an established patient. Services were provided through a video synchronous discussion virtually to substitute for in-person appointment. ENCOUNTER (minutes): 12    HISTORY OF PRESENT ILLNESS:  The patient is a 48 y.o. male and is being seen via [facility] TeleVisit at the [facility] office for follow up of low back pain x 1/2019, progressive x 9/2019. Pt denies radicular symptoms. His pain is exacerbated with standing, and relieved with sitting. Patient has treated with Tylenol, ibuprofen and MDP without relief. Patient denies previous spinal surgery, injections, or recent PT. Pt denies change in bowel or bladder habits. Pt denies fever, weight loss, or skin changes. Pt indicates he may undergo gastric bypass in the near future. Pt denies h/o glaucoma. Patient failed TOPAMAX secondary to no benefit. PmHx of cerebrovascular accident, obesity, chronic low back pain, bilateral knee pain, peptic uler disease. Patient is followed by Dr. Felecia Tsang for bilateral knee pain. Patient indicates he was previously followed by the center for chronic pain management in [place] 6+ months ago for bilateral knee pain. The patient is RHD. Note from [place] [facility] dated 7/11/19 indicating patient was seen for bilateral knee pain. His pain is exacerbated with activity. Indicated Dr. Felecia Tsang has discussed pt needing bilateral knee replacements, but not until he loses a significant amount of weight. Indicated he had an evaluation with the bariatric team scheduled at that time. Note from Michael Babb MD dated 1/28/2020 indicating patient was seen with c/o chronic low back pain without sciatica x 2/2019 without trauma. Indicated no relief with Tylenol. His pain wanes and waxes in nature. Indicated he has treated with MDP and PT for his low back. Treated with flexeril and referred to spine. Note from Dr. Rubén Jaime dated 2/21/19 indicating patient was seen with c/o bilateral knee pain. Note from Dr. Rajendra Herndon dated 6/4/2020 indicating patient has h/o HTN, obesity, CVA H. Pylori infection (treated last year) and chronic bilateral knee pain. L Spine XR dated 7/3/2019 films unavailable for review. Per report, Chronic degenerative disc disease as described. No evidence of instability with flexion or extension. At his last clinical appointment, I increased his Cymbalta from 30 mg daily to 60 mg daily. Again, I referred him to physical therapy with an emphasis on HEP. At today's video consultation, the patient reports pain location and distribution remains unchanged. He rates his pain 5/10, previously 6/10. His reports his pain is more tolerable. He is tolerating the increased dose of Cymbalta 60 mg daily with some benefit. Therapy note dated 8/3/2020 reviewed. Pt completed PT. He is compliant with his HEP. Pt denies change in bowel or bladder habits. Pt denies any signs of weakness. Note from Michael Babb MD dated 7/29/2020 indicating patient was seen for HTN. His A1c was 5.5- date unknown.  reviewed. There is no height or weight on file to calculate BMI.     PCP: Michael Babb MD      Past Medical History:   Diagnosis Date    Ambulates with cane     Arthritis     lower back and kness    Hypertension     Knee pain     Morbid obesity ([address]         Social History     Socioeconomic History    Marital status:      Spouse name: Not on file    Number of children: Not on file    Years of education: Not on file    Highest education level: Not on file   Occupational History    Not on file   Social Needs    Financial resource strain: Not on file    Food insecurity     Worry: Not on file     Inability: Not on file    Transportation needs     Medical: Not on file     Non-medical: Not on file   Tobacco Use    Smoking status: Never Smoker    Smokeless tobacco: Never Used   Substance and Sexual Activity    Alcohol use: Not Currently     Comment: 2 drinks weekly (liquor)    Drug use: Not Currently    Sexual activity: Not Currently   Lifestyle    Physical activity     Days per week: Not on file     Minutes per session: Not on file    Stress: Not on file   Relationships    Social connections     Talks on phone: Not on file     Gets together: Not on file     Attends Shinto service: Not on file     Active member of club or organization: Not on file     Attends meetings of clubs or organizations: Not on file     Relationship status: Not on file    Intimate partner violence     Fear of current or ex partner: Not on file     Emotionally abused: Not on file     Physically abused: Not on file     Forced sexual activity: Not on file   Other Topics Concern    Not on file   Social History Narrative    Not on file       Current Outpatient Medications   Medication Sig Dispense Refill    hydroCHLOROthiazide (HYDRODIURIL) 25 mg tablet Take 1 Tab by mouth daily. 30 Tab 5    DULoxetine (CYMBALTA) 60 mg capsule Take 1 Cap by mouth daily. 30 Cap 1       Allergies   Allergen Reactions    Norvasc [Amlodipine] Swelling     BLE Edema          PHYSICAL EXAMINATION  Unable to perform examination secondary to COVID-19. CONSTITUTIONAL: NAD, A&O x 3    ASSESSMENT   Diagnoses and all orders for this visit:    1. Bilateral primary osteoarthritis of knee    2. Obesity, unspecified classification, unspecified obesity type, unspecified whether serious comorbidity present    3.  DDD (degenerative disc disease), lumbar    4. Lumbosacral spondylosis without myelopathy      IMPRESSION AND PLAN:  The patient consented to the tele health visit and was aware that there would be a charge. During today's Doxy[facility] Me TeleVisit patient had c/o low back pain. Multiple treatment options were discussed. I offered blocks, pt declined. I provided him refills of Cymbalta 60 mg daily. I will try him on Neurontin 300 mg TID. The risks, benefits, and potential side effects of this medication were discussed. Patient understands and wishes to proceed. Patient advised to call the office if intolerant to new medication. I encouraged him to continue to perform his daily HEP. Pt appears to be neurologically intact. I will see the patient back in 1 month's time or earlier if needed. Written by Nasreen Ibarra, as dictated by Merlin Singer, MD  I examined the patient, reviewed and agree with the note.

## 2020-08-12 ENCOUNTER — VIRTUAL VISIT (OUTPATIENT)
Dept: ORTHOPEDIC SURGERY | Age: 50
End: 2020-08-12

## 2020-08-12 DIAGNOSIS — M47.817 LUMBOSACRAL SPONDYLOSIS WITHOUT MYELOPATHY: ICD-10-CM

## 2020-08-12 DIAGNOSIS — M51.36 DDD (DEGENERATIVE DISC DISEASE), LUMBAR: ICD-10-CM

## 2020-08-12 DIAGNOSIS — M17.0 BILATERAL PRIMARY OSTEOARTHRITIS OF KNEE: Primary | ICD-10-CM

## 2020-08-12 DIAGNOSIS — E66.9 OBESITY, UNSPECIFIED CLASSIFICATION, UNSPECIFIED OBESITY TYPE, UNSPECIFIED WHETHER SERIOUS COMORBIDITY PRESENT: ICD-10-CM

## 2020-08-13 ENCOUNTER — TELEPHONE (OUTPATIENT)
Dept: ORTHOPEDIC SURGERY | Age: 50
End: 2020-08-13

## 2020-08-13 DIAGNOSIS — M47.817 LUMBOSACRAL SPONDYLOSIS WITHOUT MYELOPATHY: Primary | ICD-10-CM

## 2020-08-13 RX ORDER — DULOXETIN HYDROCHLORIDE 60 MG/1
60 CAPSULE, DELAYED RELEASE ORAL DAILY
Qty: 30 CAP | Refills: 1 | Status: SHIPPED | OUTPATIENT
Start: 2020-08-13 | End: 2021-03-22

## 2020-08-13 RX ORDER — GABAPENTIN 300 MG/1
300 CAPSULE ORAL 3 TIMES DAILY
Qty: 90 CAP | Refills: 1 | Status: SHIPPED | OUTPATIENT
Start: 2020-08-13 | End: 2020-10-27 | Stop reason: SDUPTHER

## 2020-08-13 NOTE — TELEPHONE ENCOUNTER
Patient states the prescriptions for Cymbalta 60 mg daily and Neurontin 300 mg TID have not arrived to his pharmacy yet. Please advise.       Sharla Rico 3  Patient [phone]

## 2020-08-18 ENCOUNTER — TELEPHONE (OUTPATIENT)
Dept: INTERNAL MEDICINE CLINIC | Age: 50
End: 2020-08-18

## 2020-08-18 NOTE — TELEPHONE ENCOUNTER
Please let him know that per CDC recommendations, he should self quarantine. If he develops any sx, he needs to notify me. Due the shortage of tests, I would recommend simple self quarantine at this point and not testing him. I will f/u with him at his apt later this wk to assess how he is doing. If he develops sx, I would test him and arrange for this testing on Friday.     Dr. Lula Choi  [facility] [address], [address] [place] [address] Str[address]  Phone: [phone]  Fax: [phone]

## 2020-08-20 ENCOUNTER — VIRTUAL VISIT (OUTPATIENT)
Dept: INTERNAL MEDICINE CLINIC | Age: 50
End: 2020-08-20

## 2020-08-20 DIAGNOSIS — Z20.822 CLOSE EXPOSURE TO COVID-19 VIRUS: ICD-10-CM

## 2020-08-20 DIAGNOSIS — I10 ESSENTIAL HYPERTENSION: Primary | ICD-10-CM

## 2020-08-20 RX ORDER — CARVEDILOL 3.12 MG/1
3.12 TABLET ORAL 2 TIMES DAILY WITH MEALS
Qty: 60 TAB | Refills: 5 | Status: SHIPPED | OUTPATIENT
Start: 2020-08-20 | End: 2021-03-22

## 2020-08-20 NOTE — PROGRESS NOTES
Geneva Youssef is a 48 y.o. male who was seen by synchronous (real-time) audio-video technology on 8/20/2020. Assessment & Plan:   1. HTN: Blood pressure is borderline elevated. Adding low-dose carvedilol for better blood pressure control. Continue with HCTZ. I will follow-up with him in March 2021    2. Covid-19 Exposure: He is scheduled to be tested at [facility] tomorrow. We discussed CDC recommendations and guidance regarding what to do when someone has been exposed to a COVID-19 positive individual.  I am okay with him getting tested tomorrow at his [facility] pharmacy. Nevertheless, I explained the need for him to get follow-up testing if he develops symptoms, even if the test tomorrow is negative for infection. All questions were answered. Lab review: labs are reviewed in the EHR     I have discussed the diagnosis with the patient and the intended plan as seen in the above orders. I have discussed medication side effects and warnings with the patient as well. I have reviewed the plan of care with the patient, accepted their input and they are in agreement with the treatment goals. All questions were answered. The patient understands the plan of care. Pt instructed if symptoms worsen to call the office or report to the ED for continued care. Greater than 50% of the visit time was spent in counseling and/or coordination of care. Voice recognition was used to generate this report, which may have resulted in some phonetic based errors in grammar and contents. Even though attempts were made to correct all the mistakes, some may have been missed, and remained in the body of the document.       Subjective:   Geneva Youssef was seen for   Chief Complaint   Patient presents with    Follow-up     The patient is a 62-year-old male with history of hypertension, obesity, CVA, h/o H.pylori infection (treated last year), prediabetes, lumbar disc disease, and chronic bilateral knee pain (followed by Orthopedics). 1.  Hypertension: He has intolerance to Norvasc. He was taking it earlier this year and developed swelling in his legs. The swelling resolved with stopping his medication and adding hydrochlorothiazide. Since then, he had labs that did not show any evidence of CKD on this medicine. His blood pressure was checked and it was 132/96, borderline. He denies adverse effects with taking HCTZ. He does not have the means to check his blood pressure at home. He does not have a machine at home. BP Readings from Last 3 Encounters:   07/10/20 (!) 132/96   06/22/20 143/84   06/19/20 147/89     2. Covid Exposure: The patient is scheduled tomorrow to be tested for COVID-19 at a local [facility] pharmacy. Recently, he came in contact with an individual who ended up later tested positive for COVID-19. The patient is presently asymptomatic. Prior to Admission medications    Medication Sig Start Date End Date Taking? Authorizing Provider   DULoxetine (CYMBALTA) 60 mg capsule Take 1 Cap by mouth daily. 8/13/20   Mendy SCHROEDER NP   gabapentin (NEURONTIN) 300 mg capsule Take 1 Cap by mouth three (3) times daily. Max Daily Amount: 900 mg. 8/13/20   Mendy SCHROEDER NP   hydroCHLOROthiazide (HYDRODIURIL) 25 mg tablet Take 1 Tab by mouth daily.  7/7/20   Gay Atkinson MD     Allergies   Allergen Reactions    Norvasc [Amlodipine] Swelling     BLE Edema     Past Medical History:   Diagnosis Date    Ambulates with cane     Arthritis     lower back and kness    Hypertension     Knee pain     Morbid obesity ([address]      Past Surgical History:   Procedure Laterality Date    COLONOSCOPY N/A 6/19/2020    COLONOSCOPY with polypectomy performed by Freya Yuan MD at [facility]    HX CHOLECYSTECTOMY       Family History   Problem Relation Age of Onset    Heart Disease Mother     Diabetes Mother     Stroke Father      Social History     Socioeconomic History    Marital status:  Spouse name: Not on file    Number of children: Not on file    Years of education: Not on file    Highest education level: Not on file   Tobacco Use    Smoking status: Never Smoker    Smokeless tobacco: Never Used   Substance and Sexual Activity    Alcohol use: Not Currently     Comment: 2 drinks weekly (liquor)    Drug use: Not Currently    Sexual activity: Not Currently       ROS:  Gen: No fever/chills  HEENT: No sore throat, eye pain, ear pain, or congestion.  No HA  CV: No CP  Resp: No cough/SOB  GI: No abdominal pain  : No hematuria/dysuria  Derm: No rash  Neuro: No new paresthesias/weakness  Musc: No new myalgias/jt pain  Psych: No depression sx      Objective:     General: alert, cooperative, no distress   Mental  status: mental status: alert, oriented to person, place, and time, normal mood, behavior, speech, dress, motor activity, and thought processes   Resp: resp: normal effort and no respiratory distress   Neuro: neuro: no gross deficits   Skin: skin: no discoloration or lesions of concern on visible areas     LABS:  Lab Results   Component Value Date/Time    Sodium 145 07/21/2020 09:17 AM    Potassium 3.9 07/21/2020 09:17 AM    Chloride 104 07/21/2020 09:17 AM    CO2 26 07/21/2020 09:17 AM    Anion gap 15.0 07/21/2020 09:17 AM    Glucose 106 (H) 07/21/2020 09:17 AM    BUN 13 07/21/2020 09:17 AM    Creatinine 0.9 07/21/2020 09:17 AM    BUN/Creatinine ratio 13 02/20/2020 09:36 AM    GFR est AA >60 02/20/2020 09:36 AM    GFR est non-AA >60 02/20/2020 09:36 AM    Calcium 8.6 07/21/2020 09:17 AM       Lab Results   Component Value Date/Time    Cholesterol, total 160 07/21/2020 09:17 AM    HDL Cholesterol 36 (L) 07/21/2020 09:17 AM    LDL, calculated 103 (H) 07/21/2020 09:17 AM    VLDL, calculated 21 07/21/2020 09:17 AM    Triglyceride 104 07/21/2020 09:17 AM    CHOL/HDL Ratio 3.8 02/18/2019 09:50 AM       Lab Results   Component Value Date/Time    WBC 5.0 02/20/2020 09:36 AM    HGB 12.8 (L) 02/20/2020 09:36 AM    HCT 40.3 02/20/2020 09:36 AM    PLATELET 038 18/27/0812 09:36 AM    MCV 82.4 02/20/2020 09:36 AM       Lab Results   Component Value Date/Time    Hemoglobin A1c 5.5 07/21/2020 09:17 AM       Lab Results   Component Value Date/Time    TSH 2.77 07/08/2019 10:18 AM           Due to this being a TeleHealth  evaluation, many elements of the physical examination are unable to be assessed. The pt was seen by synchronous (real-time) audio-video technology, and/or her healthcare decision maker, is aware that this patient-initiated, Telehealth encounter is a billable service, with coverage as determined by her insurance carrier. She is aware that she may receive a bill and has provided verbal consent to proceed: Yes. The pt is being evaluated by a video visit encounter for concerns as above. A caregiver was present when appropriate. Due to this being a TeleHealth encounter (During [address] emergency), evaluation of the following organ systems was limited: Vitals/Constitutional/EENT/Resp/CV/GI//MS/Neuro/Skin/Heme-Lymph-Imm. Pursuant to the emergency declaration under the [facility] waiver authority and the [employer] and [employer] Act, this Virtual  Visit was conducted, with patient's (and/or legal guardian's) consent, to reduce the patient's risk of exposure to COVID-19 and provide necessary medical care. Services were provided through a video synchronous discussion virtually to substitute for in-person clinic visit. Patient and provider were located at their individual homes. We discussed the expected course, resolution and complications of the diagnosis(es) in detail. Medication risks, benefits, costs, interactions, and alternatives were discussed as indicated. I advised him to contact the office if his condition worsens, changes or fails to improve as anticipated.  He expressed understanding with the diagnosis(es) and plan.      Gisella Smith MD

## 2020-08-24 ENCOUNTER — TELEPHONE (OUTPATIENT)
Dept: INTERNAL MEDICINE CLINIC | Age: 50
End: 2020-08-24

## 2020-08-24 NOTE — TELEPHONE ENCOUNTER
Patient had a virtual with Dr. Tisha Krabbe on the 20th. He was tested for COVID at [facility] on the 21st. Stating he was told my Dr. Tisha Krabbe to call us with the result and that we would schedule him to be tested as well. He tested positive.

## 2020-08-24 NOTE — TELEPHONE ENCOUNTER
Please let him know that since he tested positive for Covid, he needs to self quarantine for 14 days. He should not and his quarantine after that time unless he has been fever free for 24 hours and is symptomatically improving. A follow-up COVID-19 test is unnecessary per CDC recommendations at this point, since he tested positive. Please place a COVID positive test alert in his chart.      Dr. Marvin Guzman  [facility] [address]  Phone: [phone]  Fax: [phone]

## 2020-09-18 NOTE — PROGRESS NOTES
[facility] - [facility] [employer] [facility] [employer]  [address] [facility]  [phone] fax    Physical Therapy Discharge Summary    Patient name: [facility] [place] Start of Care: 7/1/2020   Referral Ambika Cosme MD MXQ: 1/1/5941                Aneita Croft in right knee [M25.561]  Pain in left knee [M25.562]  Other intervertebral disc degeneration, lumbar region [M51.36]  Spondylosis without myelopathy or radiculopathy, lumbosacral region [M47.817]  Payor: [employer] / Plan: [address] [place] [address] [address] / Product Type: Managed Care Medicaid /  Onset Date:chronic with exacerbation 6/22/2020   Treatment Diagnosis: LBP, right and left knee pain    Prior Hospitalization: see medical history Provider#: [id number]   Medications: Verified on Patient summary List    Comorbidities: BMI, HTN, OA, DDD   Prior Level of Function: mod (I) with SPC       Visits from Start of Care: 7    Missed Visits: 3    Reporting Period : 7/29/20 to 8/3/20    Summary of Care:  Goal:  Patient will improve FOTO score by 12 points in order to demonstrate a significant improvement in function. Status at last note/certification: 45  Status at discharge: not met    Goal:  Patient will report a 75% improvement in symptoms since [facility] in order to improve quality of life. Status at last note/certification: 90%  Status at discharge: not met    Goal: Patient will improve B hip flexion MMT to 4+/5 in order to increase ease of ADLs. Status at last note/certification: B: 4/5  Status at discharge: not met    Pt. Was seen for 1 visit after last progress note and then did not return to PT. Per last progress note \"Pt. Is progressing slowly towards goals. He continues to have significant pain in back. Overall pain has been decreasing but it was increased today from taking a long drive to [place] yesterday. FOTO score did improved to 44 points.  He reports a 50% improvement in symptoms since SOC. Hip flexion MMT is 4/5 bilaterally. He continues to demonstrate decreased core stability, decreased flexibility, and decreased hip flexibility. \"    ASSESSMENT/RECOMMENDATIONS:  [x]Discontinue therapy: []Patient has reached or is progressing toward set goals      [x]Patient is non-compliant or has abdicated      []Due to lack of appreciable progress towards set goals    Brayden Malagon, PT 9/18/2020 9:37 AM

## 2020-10-15 ENCOUNTER — TELEPHONE (OUTPATIENT)
Dept: INTERNAL MEDICINE CLINIC | Age: 50
End: 2020-10-15

## 2020-10-15 NOTE — TELEPHONE ENCOUNTER
Pt called wanting to make an appt he said after he eats he vomits , it has been happening for the past 2 days NO FEVER

## 2020-10-16 ENCOUNTER — HOSPITAL ENCOUNTER (EMERGENCY)
Age: 50
Discharge: HOME | End: 2020-10-16
Attending: EMERGENCY MEDICINE
Payer: MEDICAID

## 2020-10-16 VITALS
OXYGEN SATURATION: 98 % | SYSTOLIC BLOOD PRESSURE: 155 MMHG | BODY MASS INDEX: 46.65 KG/M2 | TEMPERATURE: 98.2 F | DIASTOLIC BLOOD PRESSURE: 96 MMHG | HEART RATE: 60 BPM | WEIGHT: 315 LBS | HEIGHT: 69 IN | RESPIRATION RATE: 18 BRPM

## 2020-10-16 DIAGNOSIS — R11.2 NON-INTRACTABLE VOMITING WITH NAUSEA, UNSPECIFIED VOMITING TYPE: Primary | ICD-10-CM

## 2020-10-16 LAB
ALBUMIN SERPL-MCNC: 3 G/DL (ref 3.4–5)
ALBUMIN/GLOB SERPL: 0.7 {RATIO} (ref 0.8–1.7)
ALP SERPL-CCNC: 67 U/L (ref 45–117)
ALT SERPL-CCNC: 17 U/L (ref 16–61)
ANION GAP SERPL CALC-SCNC: 5 MMOL/L (ref 3–18)
APPEARANCE UR: CLEAR
AST SERPL-CCNC: 17 U/L (ref 10–38)
BASOPHILS # BLD: 0 K/UL (ref 0–0.1)
BASOPHILS NFR BLD: 1 % (ref 0–2)
BILIRUB SERPL-MCNC: 0.3 MG/DL (ref 0.2–1)
BILIRUB UR QL: NEGATIVE
BUN SERPL-MCNC: 13 MG/DL (ref 7–18)
BUN/CREAT SERPL: 12 (ref 12–20)
CALCIUM SERPL-MCNC: 8.1 MG/DL (ref 8.5–10.1)
CHLORIDE SERPL-SCNC: 110 MMOL/L (ref 100–111)
CO2 SERPL-SCNC: 28 MMOL/L (ref 21–32)
COLOR UR: YELLOW
CREAT SERPL-MCNC: 1.06 MG/DL (ref 0.6–1.3)
DIFFERENTIAL METHOD BLD: ABNORMAL
EOSINOPHIL # BLD: 0.2 K/UL (ref 0–0.4)
EOSINOPHIL NFR BLD: 3 % (ref 0–5)
ERYTHROCYTE [DISTWIDTH] IN BLOOD BY AUTOMATED COUNT: 14.9 % (ref 11.6–14.5)
GLOBULIN SER CALC-MCNC: 4.6 G/DL (ref 2–4)
GLUCOSE SERPL-MCNC: 96 MG/DL (ref 74–99)
GLUCOSE UR STRIP.AUTO-MCNC: NEGATIVE MG/DL
HCT VFR BLD AUTO: 39.5 % (ref 36–48)
HGB BLD-MCNC: 12.6 G/DL (ref 13–16)
HGB UR QL STRIP: NEGATIVE
KETONES UR QL STRIP.AUTO: NEGATIVE MG/DL
LEUKOCYTE ESTERASE UR QL STRIP.AUTO: NEGATIVE
LIPASE SERPL-CCNC: 90 U/L (ref 73–393)
LYMPHOCYTES # BLD: 2.3 K/UL (ref 0.9–3.6)
LYMPHOCYTES NFR BLD: 35 % (ref 21–52)
MCH RBC QN AUTO: 26.4 PG (ref 24–34)
MCHC RBC AUTO-ENTMCNC: 31.9 G/DL (ref 31–37)
MCV RBC AUTO: 82.6 FL (ref 74–97)
MONOCYTES # BLD: 0.5 K/UL (ref 0.05–1.2)
MONOCYTES NFR BLD: 8 % (ref 3–10)
NEUTS SEG # BLD: 3.5 K/UL (ref 1.8–8)
NEUTS SEG NFR BLD: 53 % (ref 40–73)
NITRITE UR QL STRIP.AUTO: NEGATIVE
PH UR STRIP: 5 [PH] (ref 5–8)
PLATELET # BLD AUTO: 202 K/UL (ref 135–420)
PMV BLD AUTO: 10.6 FL (ref 9.2–11.8)
POTASSIUM SERPL-SCNC: 3.6 MMOL/L (ref 3.5–5.5)
PROT SERPL-MCNC: 7.6 G/DL (ref 6.4–8.2)
PROT UR STRIP-MCNC: NEGATIVE MG/DL
RBC # BLD AUTO: 4.78 M/UL (ref 4.7–5.5)
SODIUM SERPL-SCNC: 143 MMOL/L (ref 136–145)
SP GR UR REFRACTOMETRY: 1.02 (ref 1–1.03)
UROBILINOGEN UR QL STRIP.AUTO: 0.2 EU/DL (ref 0.2–1)
WBC # BLD AUTO: 6.6 K/UL (ref 4.6–13.2)

## 2020-10-16 PROCEDURE — 83690 ASSAY OF LIPASE: CPT

## 2020-10-16 PROCEDURE — 81003 URINALYSIS AUTO W/O SCOPE: CPT

## 2020-10-16 PROCEDURE — 80053 COMPREHEN METABOLIC PANEL: CPT

## 2020-10-16 PROCEDURE — 85025 COMPLETE CBC W/AUTO DIFF WBC: CPT

## 2020-10-16 PROCEDURE — 74011250636 HC RX REV CODE- 250/636: Performed by: EMERGENCY MEDICINE

## 2020-10-16 PROCEDURE — 96374 THER/PROPH/DIAG INJ IV PUSH: CPT

## 2020-10-16 PROCEDURE — 99283 EMERGENCY DEPT VISIT LOW MDM: CPT

## 2020-10-16 RX ORDER — ONDANSETRON 2 MG/ML
4 INJECTION INTRAMUSCULAR; INTRAVENOUS
Status: COMPLETED | OUTPATIENT
Start: 2020-10-16 | End: 2020-10-16

## 2020-10-16 RX ADMIN — SODIUM CHLORIDE 1000 ML: 900 INJECTION, SOLUTION INTRAVENOUS at 07:20

## 2020-10-16 RX ADMIN — ONDANSETRON 4 MG: 2 INJECTION INTRAMUSCULAR; INTRAVENOUS at 07:20

## 2020-10-16 NOTE — DISCHARGE INSTRUCTIONS
Patient Education     If you were prescribed any medication take as directed. Do not drive or use heavy equipment if prescribed narcotics. Follow up with your primary care physician or with specialist as directed. Return to the emergency room with any new or worsening conditions. Nausea and Vomiting: Care Instructions  Your Care Instructions     When you are nauseated, you may feel weak and sweaty and notice a lot of saliva in your mouth. Nausea often leads to vomiting. Most of the time you do not need to worry about nausea and vomiting, but they can be signs of other illnesses. Two common causes of nausea and vomiting are stomach flu and food poisoning. Nausea and vomiting from viral stomach flu will usually start to improve within 24 hours. Nausea and vomiting from food poisoning may last from 12 to 48 hours. The doctor has checked you carefully, but problems can develop later. If you notice any problems or new symptoms, get medical treatment right away. Follow-up care is a key part of your treatment and safety. Be sure to make and go to all appointments, and call your doctor if you are having problems. It's also a good idea to know your test results and keep a list of the medicines you take. How can you care for yourself at home? · To prevent dehydration, drink plenty of fluids, enough so that your urine is light yellow or clear like water. Choose water and other caffeine-free clear liquids until you feel better. If you have kidney, heart, or liver disease and have to limit fluids, talk with your doctor before you increase the amount of fluids you drink. · Rest in bed until you feel better. · When you are able to eat, try clear soups, mild foods, and liquids until all symptoms are gone for 12 to 48 hours. Other good choices include dry toast, crackers, cooked cereal, and gelatin dessert, such as Jell-O. When should you call for help? Call 911 anytime you think you may need emergency care.  For example, call if:    · You passed out (lost consciousness). Call your doctor now or seek immediate medical care if:    · You have symptoms of dehydration, such as:  ? Dry eyes and a dry mouth. ? Passing only a little dark urine. ? Feeling thirstier than usual.     · You have new or worsening belly pain.     · You have a new or higher fever.     · You vomit blood or what looks like coffee grounds. Watch closely for changes in your health, and be sure to contact your doctor if:    · You have ongoing nausea and vomiting.     · Your vomiting is getting worse.     · Your vomiting lasts longer than 2 days.     · You are not getting better as expected. Where can you learn more? Go to http://www.[employer].com.com/  Enter H591 in the search box to learn more about \"Nausea and Vomiting: Care Instructions. \"  Current as of: June 26, 2019               Content Version: 12.6  © 5879-7845 Healthwise, [employer]. Care instructions adapted under license by [employer] (which disclaims liability or warranty for this information). If you have questions about a medical condition or this instruction, always ask your healthcare professional. [address] any warranty or liability for your use of this information.

## 2020-10-16 NOTE — ED TRIAGE NOTES
Patient c/o mild abdominal pain and vomiting after meals since Wednesday. Patient denies diarrhea or exposure to others with any illness.

## 2020-10-16 NOTE — ED PROVIDER NOTES
EMERGENCY DEPARTMENT HISTORY AND PHYSICAL EXAM    7:10 AM      Date: 10/16/2020  Patient Name: Leif Rojas    History of Presenting Illness     Chief Complaint   Patient presents with    Abdominal Pain    Vomiting         History Provided By: Patient    Additional History (Context): Leif Rojas is a 48 y.o. male with hypertension who presents with Angie Rodriguez with nausea and nonbilious vomiting that started 2 days ago eating some pizza. Associated symptom is minimal abdominal discomfort which improves after vomiting episode. He states that on Wednesday he had one episode of vomiting, then one other episode of vomiting on Thursday. He reports that no one else at home is sick from eating the same pizza. He has not had any vomiting today. He denies sick contacts, fever, diarrhea, body aches, sore throat, chest pain, shortness of breath, dizziness, constipation, cough, shortness of breath, no exposure to anyone with coronavirus to his knowledge, and no other complaint. He states he did have a negative coronavirus test last month. PCP: Graham Huber MD        Past History     Past Medical History:  Past Medical History:   Diagnosis Date    Ambulates with cane     Arthritis     lower back and kness    Hypertension     Knee pain     Morbid obesity ([address]        Past Surgical History:  Past Surgical History:   Procedure Laterality Date    COLONOSCOPY N/A 6/19/2020    COLONOSCOPY with polypectomy performed by Angelia Gant MD at [facility]    HX CHOLECYSTECTOMY         Family History:  Family History   Problem Relation Age of Onset    Heart Disease Mother     Diabetes Mother     Stroke Father        Social History:  Social History     Tobacco Use    Smoking status: Never Smoker    Smokeless tobacco: Never Used   Substance Use Topics    Alcohol use: Yes    Drug use: Not Currently       Allergies:   Allergies   Allergen Reactions    Norvasc [Amlodipine] Swelling     BLE Edema Review of Systems       Review of Systems   Constitutional: Negative for chills and fever. HENT: Negative for congestion, rhinorrhea, sore throat and trouble swallowing. Respiratory: Negative for cough and shortness of breath. Cardiovascular: Negative for chest pain. Gastrointestinal: Positive for abdominal pain (Minimal but resolved with vomiting), nausea and vomiting. Negative for constipation and diarrhea. Genitourinary: Negative for difficulty urinating, dysuria, flank pain, frequency and hematuria. Musculoskeletal: Negative for arthralgias, myalgias and neck stiffness. Skin: Negative for rash. Allergic/Immunologic: Negative for food allergies. Neurological: Negative for dizziness, weakness, numbness and headaches. Hematological: Does not bruise/bleed easily. Psychiatric/Behavioral: Negative for confusion and dysphoric mood. All other systems reviewed and are negative. Physical Exam     Visit Vitals  /87 (BP 1 Location: Left arm, BP Patient Position: At rest)   Pulse 65   Temp 98.2 °F (36.8 °C)   Resp 18   Ht 5' 9\" (1.753 m)   Wt (!) 208.7 kg (460 lb)   SpO2 98%   BMI 67.93 kg/m²         Physical Exam  Vitals signs and nursing note reviewed. Constitutional:       General: He is not in acute distress. Appearance: He is well-developed. He is obese. He is not ill-appearing, toxic-appearing or diaphoretic. HENT:      Head: Normocephalic and atraumatic. Eyes:      General: No scleral icterus. Conjunctiva/sclera: Conjunctivae normal.      Pupils: Pupils are equal, round, and reactive to light. Neck:      Musculoskeletal: Normal range of motion and neck supple. Cardiovascular:      Rate and Rhythm: Normal rate. Heart sounds: Normal heart sounds. No gallop. Comments: Capillary refill < 3 seconds  Pulmonary:      Effort: Pulmonary effort is normal. No respiratory distress. Breath sounds: Normal breath sounds. No wheezing.    Abdominal: General: Bowel sounds are normal. There is no distension or abdominal bruit. Palpations: Abdomen is soft. There is no fluid wave, hepatomegaly, splenomegaly, mass or pulsatile mass. Tenderness: There is no abdominal tenderness. Hernia: No hernia is present. Musculoskeletal: Normal range of motion. Lymphadenopathy:      Cervical: No cervical adenopathy. Skin:     General: Skin is warm and dry. Coloration: Skin is not jaundiced. Neurological:      General: No focal deficit present. Mental Status: He is alert and oriented to person, place, and time. Cranial Nerves: No cranial nerve deficit. Motor: No weakness. Psychiatric:         Mood and Affect: Mood normal.           Diagnostic Study Results     Labs -  Recent Results (from the past 12 hour(s))   CBC WITH AUTOMATED DIFF    Collection Time: 10/16/20  7:25 AM   Result Value Ref Range    WBC 6.6 4.6 - 13.2 K/uL    RBC 4.78 4.70 - 5.50 M/uL    HGB 12.6 (L) 13.0 - 16.0 g/dL    HCT 39.5 36.0 - 48.0 %    MCV 82.6 74.0 - 97.0 FL    MCH 26.4 24.0 - 34.0 PG    MCHC 31.9 31.0 - 37.0 g/dL    RDW 14.9 (H) 11.6 - 14.5 %    PLATELET 988 238 - 971 K/uL    MPV 10.6 9.2 - 11.8 FL    NEUTROPHILS 53 40 - 73 %    LYMPHOCYTES 35 21 - 52 %    MONOCYTES 8 3 - 10 %    EOSINOPHILS 3 0 - 5 %    BASOPHILS 1 0 - 2 %    ABS. NEUTROPHILS 3.5 1.8 - 8.0 K/UL    ABS. LYMPHOCYTES 2.3 0.9 - 3.6 K/UL    ABS. MONOCYTES 0.5 0.05 - 1.2 K/UL    ABS. EOSINOPHILS 0.2 0.0 - 0.4 K/UL    ABS.  BASOPHILS 0.0 0.0 - 0.1 K/UL    DF AUTOMATED     METABOLIC PANEL, COMPREHENSIVE    Collection Time: 10/16/20  7:25 AM   Result Value Ref Range    Sodium 143 136 - 145 mmol/L    Potassium 3.6 3.5 - 5.5 mmol/L    Chloride 110 100 - 111 mmol/L    CO2 28 21 - 32 mmol/L    Anion gap 5 3.0 - 18 mmol/L    Glucose 96 74 - 99 mg/dL    BUN 13 7.0 - 18 MG/DL    Creatinine 1.06 0.6 - 1.3 MG/DL    BUN/Creatinine ratio 12 12 - 20      GFR est AA >60 >60 ml/min/1.73m2    GFR est non-AA >60 >60 ml/min/1.73m2    Calcium 8.1 (L) 8.5 - 10.1 MG/DL    Bilirubin, total 0.3 0.2 - 1.0 MG/DL    ALT (SGPT) 17 16 - 61 U/L    AST (SGOT) 17 10 - 38 U/L    Alk. phosphatase 67 45 - 117 U/L    Protein, total 7.6 6.4 - 8.2 g/dL    Albumin 3.0 (L) 3.4 - 5.0 g/dL    Globulin 4.6 (H) 2.0 - 4.0 g/dL    A-G Ratio 0.7 (L) 0.8 - 1.7     LIPASE    Collection Time: 10/16/20  7:25 AM   Result Value Ref Range    Lipase 90 73 - 393 U/L   URINALYSIS W/ RFLX MICROSCOPIC    Collection Time: 10/16/20  7:25 AM   Result Value Ref Range    Color YELLOW      Appearance CLEAR      Specific gravity 1.018 1.005 - 1.030      pH (UA) 5.0 5.0 - 8.0      Protein Negative NEG mg/dL    Glucose Negative NEG mg/dL    Ketone Negative NEG mg/dL    Bilirubin Negative NEG      Blood Negative NEG      Urobilinogen 0.2 0.2 - 1.0 EU/dL    Nitrites Negative NEG      Leukocyte Esterase Negative NEG         Radiologic Studies -   No orders to display         Medical Decision Making   I am the first provider for this patient. I reviewed the vital signs, available nursing notes, past medical history, past surgical history, family history and social history. Vital Signs-Reviewed the patient's vital signs. Records Reviewed: Nursing Notes and Old Medical Records (Time of Review: 7:10 AM)    Provider Notes (Medical Decision Making): DDx: Viral syndrome, gastroenteritis, metabolic, food poison, dehydration, GERD, biliary, pancreatitis, appendicitis, constipation, COVID-19    On exam abdomen is obese but soft and nontender with good bowel sounds, doubt appendicitis      We will check labs, give IV fluid, Zofran    I discussed potential for COVID-19 and offered to do COVID-19 test but patient refused.      MDM    Medications   ondansetron (ZOFRAN) injection 4 mg (4 mg IntraVENous Given 10/16/20 0720)   sodium chloride 0.9 % bolus infusion 1,000 mL (1,000 mL IntraVENous New Bag 10/16/20 0720)         ED Course: Progress Notes, Reevaluation, and Consults:  WBC within normal limits  Creatinine normal  Potassium normal  ALT, AST, bilirubin, lipase normal    Told patient if he has any worsening symptoms such as fever chest pain shortness of breath that needs to be reassessed because could be COVID-19. He understood and agreed. Diagnosis     Clinical Impression:   1. Non-intractable vomiting with nausea, unspecified vomiting type        Disposition: Discharged    Follow-up Information     Follow up With Specialties Details Why Contact Info    Michael Calvo MD Family Medicine Schedule an appointment as soon as possible for a visit in 3 days  [facility]  [address]      [id number] [facility] EMERGENCY DEPT Emergency Medicine  As needed, If symptoms worsen [address] [facility]  [phone]           Patient's Medications   Start Taking    No medications on file   Continue Taking    CARVEDILOL (COREG) 3.125 MG TABLET    Take 1 Tab by mouth two (2) times daily (with meals). DULOXETINE (CYMBALTA) 60 MG CAPSULE    Take 1 Cap by mouth daily. GABAPENTIN (NEURONTIN) 300 MG CAPSULE    Take 1 Cap by mouth three (3) times daily. Max Daily Amount: 900 mg. HYDROCHLOROTHIAZIDE (HYDRODIURIL) 25 MG TABLET    Take 1 Tab by mouth daily. These Medications have changed    No medications on file   Stop Taking    No medications on file         DO Rodney Simms medical dictation software was used for portions of this report. Unintended transcription errors may occur. My signature above authenticates this document and my orders, the final    diagnosis (es), discharge prescription (s), and instructions in the Epic    record.

## 2020-10-26 ENCOUNTER — OFFICE VISIT (OUTPATIENT)
Dept: ORTHOPEDIC SURGERY | Age: 50
End: 2020-10-26
Payer: MEDICAID

## 2020-10-26 VITALS
WEIGHT: 315 LBS | OXYGEN SATURATION: 97 % | TEMPERATURE: 96.9 F | HEART RATE: 72 BPM | BODY MASS INDEX: 67.63 KG/M2 | DIASTOLIC BLOOD PRESSURE: 99 MMHG | SYSTOLIC BLOOD PRESSURE: 150 MMHG

## 2020-10-26 DIAGNOSIS — M51.36 DDD (DEGENERATIVE DISC DISEASE), LUMBAR: ICD-10-CM

## 2020-10-26 DIAGNOSIS — M47.817 LUMBOSACRAL SPONDYLOSIS WITHOUT MYELOPATHY: ICD-10-CM

## 2020-10-26 DIAGNOSIS — E66.9 OBESITY, UNSPECIFIED CLASSIFICATION, UNSPECIFIED OBESITY TYPE, UNSPECIFIED WHETHER SERIOUS COMORBIDITY PRESENT: ICD-10-CM

## 2020-10-26 DIAGNOSIS — M17.0 BILATERAL PRIMARY OSTEOARTHRITIS OF KNEE: Primary | ICD-10-CM

## 2020-10-26 PROCEDURE — 99213 OFFICE O/P EST LOW 20 MIN: CPT | Performed by: PHYSICAL MEDICINE & REHABILITATION

## 2020-10-26 NOTE — PROGRESS NOTES
[facility]  [address], [place]   Phone: [phone]  Fax: [phone]        PROGRESS NOTE      HISTORY OF PRESENT ILLNESS:  The patient is a 48 y.o. male and was seen today for follow up of low back pain x 1/2019, progressive x 9/2019. Pt denies radicular symptoms. His pain is exacerbated with standing, and relieved with sitting. Patient has treated with Tylenol, ibuprofen and MDP without relief. Pt completed PT. He is compliant with his HEP. Patient denies previous spinal surgery or injections. Pt denies change in bowel or bladder habits. Pt denies fever, weight loss, or skin changes. Pt indicates he may undergo gastric bypass in the near future. Pt denies h/o glaucoma. Patient failed TOPAMAX secondary to no benefit. PmHx of cerebrovascular accident, obesity, chronic low back pain, bilateral knee pain, peptic uler disease. Patient is followed by Dr. Nan Gonzalez for bilateral knee pain. Patient indicates he was previously followed by the center for chronic pain management in [place] 6+ months ago for bilateral knee pain. The patient is RHD. Note from Phuc Hopper at [facility] dated 7/11/19 indicating patient was seen for bilateral knee pain. His pain is exacerbated with activity. Indicated Dr. Nan Gonzalez has discussed pt needing bilateral knee replacements, but not until he loses a significant amount of weight. Indicated he had an evaluation with the bariatric team scheduled at that time. Note from Melba Araya MD dated 1/28/2020 indicating patient was seen with c/o chronic low back pain without sciatica x 2/2019 without trauma. Indicated no relief with Tylenol. His pain wanes and waxes in nature. Indicated he has treated with MDP and PT for his low back. Treated with flexeril and referred to spine. Note from Dr. Nan Gonzalez dated 2/21/19 indicating patient was seen with c/o bilateral knee pain.  Note from Dr. Suzy Brewster 6/4/2020 indicating patient has h/o HTN, obesity, CVA H. Pylori infection (treated last year) and chronic bilateral knee pain. Note from Ana Claudio MD dated 7/29/2020 indicating patient was seen for HTN. His A1c was 5.5- date unknown. L Spine XR dated 7/3/2019 films unavailable for review. Per report, Chronic degenerative disc disease as described. No evidence of instability with flexion or extension. At his last clinical appointment, I offered blocks, pt declined. I provided him refills of Cymbalta 60 mg daily. I tried him on Neurontin 300 mg TID. I encouraged him to continue to perform his daily HEP.     The patient returns today and reports pain location and distribution remains unchanged. He rates his pain 8/10, previously 5/10. Pt reports he did not start the Neurontin 300 mg TID due to reasons that are unclear. My office contacted his pharmacy and was informed that he picked up the prescription on 8/13/2020. Pt reports he self-discontinued the Cymbalta 60 mg daily secondary to no benefit. He is compliant with his HEP. Pt denies change in bowel or bladder habits. Pt has been evaluated by a bariatric team and reports he is waiting to be contacted in regards to further treatment.  reviewed. Body mass index is 67.63 kg/m².     PCP: Ana Claudio MD      Past Medical History:   Diagnosis Date    Ambulates with cane     Arthritis     lower back and kness    Hypertension     Knee pain     Morbid [address] [address]         Social History     Socioeconomic History    Marital status:      Spouse name: Not on file    Number of children: Not on file    Years of education: Not on file    Highest education level: Not on file   Occupational History    Not on file   Social Needs    Financial resource strain: Not on file    Food insecurity     Worry: Not on file     Inability: Not on file    Transportation needs     Medical: Not on file     Non-medical: Not on file   Tobacco Use    Smoking status: Never Smoker    Smokeless tobacco: Never Used   Substance and Sexual Activity    Alcohol use: Yes    Drug use: Not Currently    Sexual activity: Not Currently   Lifestyle    Physical activity     Days per week: Not on file     Minutes per session: Not on file    Stress: Not on file   Relationships    Social connections     Talks on phone: Not on file     Gets together: Not on file     Attends Voodoo service: Not on file     Active member of club or organization: Not on file     Attends meetings of clubs or organizations: Not on file     Relationship status: Not on file    Intimate partner violence     Fear of current or ex partner: Not on file     Emotionally abused: Not on file     Physically abused: Not on file     Forced sexual activity: Not on file   Other Topics Concern    Not on file   Social History Narrative    Not on file       Current Outpatient Medications   Medication Sig Dispense Refill    hydroCHLOROthiazide (HYDRODIURIL) 25 mg tablet Take 1 Tab by mouth daily. 30 Tab 5    carvediloL (COREG) 3.125 mg tablet Take 1 Tab by mouth two (2) times daily (with meals). 60 Tab 5    DULoxetine (CYMBALTA) 60 mg capsule Take 1 Cap by mouth daily. 30 Cap 1    gabapentin (NEURONTIN) 300 mg capsule Take 1 Cap by mouth three (3) times daily. Max Daily Amount: 900 mg. 90 Cap 1       Allergies   Allergen Reactions    Norvasc [Amlodipine] Swelling     BLE Edema          PHYSICAL EXAMINATION    Visit Vitals  BP (!) 150/99 (BP 1 Location: Left arm, BP Patient Position: Sitting)   Pulse 72   Temp 96.9 °F (36.1 °C) (Temporal)   Wt (!) 458 lb (207.7 kg)   SpO2 97%   BMI 67.63 kg/m²       CONSTITUTIONAL: NAD, A&O x 3  SENSATION: Intact to light touch throughout  RANGE OF MOTION: The patient has full passive range of motion in all four extremities. MOTOR:  Straight Leg Raise: Negative, bilateral     Ambulates with a single point cane.                Hip Flex Knee Ext Knee Flex Ankle DF GTE Ankle PF Tone Right +4/5 +4/5 +4/5 +4/5 +4/5 +4/5 +4/5   Left +4/5 +4/5 +4/5 +4/5 +4/5 +4/5 +4/5       ASSESSMENT   Diagnoses and all orders for this visit:    1. Bilateral primary osteoarthritis of knee    2. Obesity, unspecified classification, unspecified obesity type, unspecified whether serious comorbidity present    3. DDD (degenerative disc disease), lumbar    4. Lumbosacral spondylosis without myelopathy        IMPRESSION AND PLAN:  Patient returns to the office today with c/o low back pain. Multiple treatment options were discussed. I will restart him on Neurontin 300 mg TID. Patient advised to call the office if intolerant to medication. Patient is neurologically intact. I will see the patient back in 1 month's time or earlier if needed. Written by Love Nelson, as dictated by Shawnee Givens MD  I examined the patient, reviewed and agree with the note.

## 2020-10-27 ENCOUNTER — TELEPHONE (OUTPATIENT)
Dept: ORTHOPEDIC SURGERY | Age: 50
End: 2020-10-27

## 2020-10-27 DIAGNOSIS — M47.817 LUMBOSACRAL SPONDYLOSIS WITHOUT MYELOPATHY: ICD-10-CM

## 2020-10-27 RX ORDER — GABAPENTIN 300 MG/1
300 CAPSULE ORAL 3 TIMES DAILY
Qty: 90 CAP | Refills: 1 | Status: SHIPPED | OUTPATIENT
Start: 2020-10-27 | End: 2021-03-22

## 2020-10-27 NOTE — TELEPHONE ENCOUNTER
Patient is requesting we send rx Neurontin 300 mg to [place] [id number] on file as discussed at yesterday's office visit.

## 2020-10-28 ENCOUNTER — OFFICE VISIT (OUTPATIENT)
Dept: INTERNAL MEDICINE CLINIC | Age: 50
End: 2020-10-28

## 2020-10-28 DIAGNOSIS — Z23 NEEDS FLU SHOT: Primary | ICD-10-CM

## 2020-10-28 PROCEDURE — 90471 IMMUNIZATION ADMIN: CPT | Performed by: INTERNAL MEDICINE

## 2020-10-28 PROCEDURE — 90686 IIV4 VACC NO PRSV 0.5 ML IM: CPT | Performed by: INTERNAL MEDICINE

## 2020-11-23 ENCOUNTER — TELEPHONE (OUTPATIENT)
Dept: INTERNAL MEDICINE CLINIC | Age: 50
End: 2020-11-23

## 2020-11-23 DIAGNOSIS — K62.89 RECTAL PAIN: Primary | ICD-10-CM

## 2020-11-23 NOTE — TELEPHONE ENCOUNTER
Please have him referred to /Colorectal Surgery. He is overdue for a colonoscopy and needs an exam given his sx.     Dr. Laurel Granda  [facility], [address] [place] [address], [address] [place] [address] Str.  Phone: [phone]  Fax: [phone]

## 2020-11-23 NOTE — TELEPHONE ENCOUNTER
Patient reached, denies any blood in the stool or constipation. Patient reports having a history of hemorrhoids. Patient reports the rectal pain when having a bowel movement only. Do you want to have the patient schedule an appointment?

## 2020-11-24 NOTE — TELEPHONE ENCOUNTER
Patient aware of referral, states he see's DR. Benavides and had a colonoscopy this year where polyps were removed. A referral has been faxed and patient given number to schedule the appointment.

## 2020-11-30 NOTE — PROGRESS NOTES
[facility]  [address], [address]  Phone: [phone]  Fax: [phone]        PROGRESS NOTE      HISTORY OF PRESENT ILLNESS:  The patient is a 48 y.o. male and was seen today for follow up of low back pain x 1/2019, progressive x 9/2019. Pt denies radicular symptoms. His pain is exacerbated with standing, and relieved with sitting. Patient has treated with Tylenol, ibuprofen and MDP without relief. Pt completed PT. He is compliant with his HEP. Patient denies previous spinal surgery or injections. Pt denies change in bowel or bladder habits. Pt denies fever, weight loss, or skin changes. Pt indicates he may undergo gastric bypass in the near future. Pt denies h/o glaucoma. Patient failed TOPAMAX secondary to no benefit. Pt self-discontinued Cymbalta 60 mg daily secondary to no benefit. PmHx of cerebrovascular accident, obesity, chronic low back pain, bilateral knee pain, peptic uler disease. Patient is followed by Dr. Sydnee Reyes for bilateral knee pain. Patient indicates he was previously followed by the center for chronic pain management in [place] 6+ months ago for bilateral knee pain. The patient is RHD. Note from Phuc Ceron at [facility] dated 7/11/19 indicating patient was seen for bilateral knee pain. His pain is exacerbated with activity. Indicated Dr. Sydnee Reyes has discussed pt needing bilateral knee replacements, but not until he loses a significant amount of weight. Indicated he had an evaluation with the bariatric team scheduled at that time. Note from Stacey Campo MD dated 1/28/2020 indicating patient was seen with c/o chronic low back pain without sciatica x 2/2019 without trauma. Indicated no relief with Tylenol. His pain wanes and waxes in nature. Indicated he has treated with MDP and PT for his low back. Treated with flexeril and referred to spine.  Note from Dr. Sydnee Reyes dated 2/21/19 indicating patient was seen with c/o bilateral knee pain. Note from Dr. Gladys Espino 6/4/2020 indicating patient has h/o HTN, obesity, CVA H. Pylori infection (treated last year) and chronic bilateral knee pain. Note from Ban Estrada MD dated 7/29/2020 indicating patient was seen for HTN. His A1c was 5.5- date unknown. L Spine XR dated 7/3/2019 films unavailable for review. Per report, Chronic degenerative disc disease as described. No evidence of instability with flexion or extension. At his last clinical appointment, I restarted him on Neurontin 300 mg TID. The patient returns today and reports pain location and distribution remains unchanged. He rates his pain 7/10, previously 8/10. He is tolerating the Neurontin 300 mg TID without benefit. He performs his HEP 3 x/week. Pt denies change in bowel or bladder habits. The patient has a history of DM and reports blood sugars are well controlled, consistently remaining below 200. Pt is still waiting to be contacted by the bariatric center in regards to further treatment.  reviewed. Body mass index is 68.2 kg/m². PCP: Isela Farr MD      Past Medical History:   Diagnosis Date    Ambulates with cane     Arthritis     lower back and kness    Hypertension     Knee pain     Morbid obesity ([address]         Social History     Socioeconomic History    Marital status:      Spouse name: Not on file    Number of children: Not on file    Years of education: Not on file    Highest education level: Not on file   Occupational History    Not on file   Social Needs    Financial resource strain: Not on file    Food insecurity     Worry: Not on file     Inability: Not on file    Transportation needs     Medical: Not on file     Non-medical: Not on file   Tobacco Use    Smoking status: Never Smoker    Smokeless tobacco: Never Used   Substance and Sexual Activity    Alcohol use:  Yes    Drug use: Not Currently    Sexual activity: Not Currently   Lifestyle    Physical activity     Days per week: Not on file     Minutes per session: Not on file    Stress: Not on file   Relationships    Social connections     Talks on phone: Not on file     Gets together: Not on file     Attends Ep[employer] service: Not on file     Active member of club or organization: Not on file     Attends meetings of clubs or organizations: Not on file     Relationship status: Not on file    Intimate partner violence     Fear of current or ex partner: Not on file     Emotionally abused: Not on file     Physically abused: Not on file     Forced sexual activity: Not on file   Other Topics Concern    Not on file   Social History Narrative    Not on file       Current Outpatient Medications   Medication Sig Dispense Refill    gabapentin (NEURONTIN) 300 mg capsule Take 1 Cap by mouth three (3) times daily. Max Daily Amount: 900 mg. 90 Cap 1    carvediloL (COREG) 3.125 mg tablet Take 1 Tab by mouth two (2) times daily (with meals). 60 Tab 5    DULoxetine (CYMBALTA) 60 mg capsule Take 1 Cap by mouth daily. 30 Cap 1    hydroCHLOROthiazide (HYDRODIURIL) 25 mg tablet Take 1 Tab by mouth daily. 30 Tab 5       Allergies   Allergen Reactions    Norvasc [Amlodipine] Swelling     BLE Edema          PHYSICAL EXAMINATION    Visit Vitals  BP (!) 149/102 (BP 1 Location: Left arm, BP Patient Position: Sitting) Comment: pt asymptomatic, have not taken bp med   Pulse 72   Temp 98.4 °F (36.9 °C)   Resp 20   Ht 5' 9\" (1.753 m)   Wt (!) 461 lb 12.8 oz (209.5 kg)   SpO2 97%   BMI 68.20 kg/m²       CONSTITUTIONAL: NAD, A&O x 3  SENSATION: Intact to light touch throughout  RANGE OF MOTION: The patient has full passive range of motion in all four extremities. MOTOR:  Straight Leg Raise: Negative, bilateral    Ambulates with a single point cane.                Hip Flex Knee Ext Knee Flex Ankle DF GTE Ankle PF Tone   Right +4/5 +4/5 +4/5 +4/5 +4/5 +4/5 +4/5   Left +4/5 +4/5 +4/5 +4/5 +4/5 +4/5 +4/5       ASSESSMENT Diagnoses and all orders for this visit:    1. Bilateral primary osteoarthritis of knee  -     gabapentin (Neurontin) 600 mg tablet; Take 1 Tab by mouth three (3) times daily. Max Daily Amount: 1,800 mg.    2. Obesity, unspecified classification, unspecified obesity type, unspecified whether serious comorbidity present  -     gabapentin (Neurontin) 600 mg tablet; Take 1 Tab by mouth three (3) times daily. Max Daily Amount: 1,800 mg.    3. DDD (degenerative disc disease), lumbar  -     gabapentin (Neurontin) 600 mg tablet; Take 1 Tab by mouth three (3) times daily. Max Daily Amount: 1,800 mg.    4. Lumbosacral spondylosis without myelopathy  -     gabapentin (Neurontin) 600 mg tablet; Take 1 Tab by mouth three (3) times daily. Max Daily Amount: 1,800 mg. IMPRESSION AND PLAN:  Patient returns to the office today with c/o low back pain. Multiple treatment options were discussed. Pt is not interested in blocks at this time. I will increase his Neurontin from 300 mg TID to 600 mg TID. Patient advised to call the office if intolerant to higher dose. I recommended he increase the frequency of HEP to daily. I recommend he continue reaching out to the weight loss center. Patient is neurologically intact. I will see the patient back in 1 month's time or earlier if needed. Written by Rosangela Mehta, as dictated by Maximo Matt MD  I examined the patient, reviewed and agree with the note.

## 2020-12-01 ENCOUNTER — TELEPHONE (OUTPATIENT)
Dept: ORTHOPEDIC SURGERY | Age: 50
End: 2020-12-01

## 2020-12-01 ENCOUNTER — OFFICE VISIT (OUTPATIENT)
Dept: ORTHOPEDIC SURGERY | Age: 50
End: 2020-12-01
Payer: MEDICAID

## 2020-12-01 VITALS
TEMPERATURE: 98.4 F | WEIGHT: 315 LBS | DIASTOLIC BLOOD PRESSURE: 102 MMHG | HEART RATE: 72 BPM | HEIGHT: 69 IN | SYSTOLIC BLOOD PRESSURE: 149 MMHG | OXYGEN SATURATION: 97 % | RESPIRATION RATE: 20 BRPM | BODY MASS INDEX: 46.65 KG/M2

## 2020-12-01 DIAGNOSIS — E66.9 OBESITY, UNSPECIFIED CLASSIFICATION, UNSPECIFIED OBESITY TYPE, UNSPECIFIED WHETHER SERIOUS COMORBIDITY PRESENT: ICD-10-CM

## 2020-12-01 DIAGNOSIS — M51.36 DDD (DEGENERATIVE DISC DISEASE), LUMBAR: ICD-10-CM

## 2020-12-01 DIAGNOSIS — M47.817 LUMBOSACRAL SPONDYLOSIS WITHOUT MYELOPATHY: ICD-10-CM

## 2020-12-01 DIAGNOSIS — M17.0 BILATERAL PRIMARY OSTEOARTHRITIS OF KNEE: Primary | ICD-10-CM

## 2020-12-01 PROCEDURE — 99214 OFFICE O/P EST MOD 30 MIN: CPT | Performed by: PHYSICAL MEDICINE & REHABILITATION

## 2020-12-01 RX ORDER — GABAPENTIN 600 MG/1
600 TABLET ORAL 3 TIMES DAILY
Qty: 90 TAB | Refills: 1 | Status: SHIPPED | OUTPATIENT
Start: 2020-12-01 | End: 2021-03-22

## 2020-12-01 NOTE — TELEPHONE ENCOUNTER
Can we do a PA or good Rx, Gabapentin is likely cheaper than Lyrica or Gabitril and he already failed Topamax

## 2020-12-01 NOTE — TELEPHONE ENCOUNTER
Patient called stating that his Gabapentin is not covered with his insurance and would like to know if there is anything else he can possibly take.  Please advise patient at [phone]

## 2020-12-01 NOTE — LETTER
12/1/20 Patient: Eric Wilson YOB: 1970 Date of Visit: 12/1/2020 [address] [address] [place] [address] VIA In Basket Dear Shelby Nuñez MD, Thank you for referring Mr. Prabha Bansal to [address] for evaluation. My notes for this consultation are attached. If you have questions, please do not hesitate to call me. I look forward to following your patient along with you. Sincerely, Missy Moreno MD

## 2020-12-02 NOTE — TELEPHONE ENCOUNTER
Called pharmacy. Patient has already picked up medication from [place]. No further action needed.

## 2021-03-19 NOTE — PROGRESS NOTES
Lobo Markham presents today for   Chief Complaint   Patient presents with   • Follow-up   • Hypertension   • Wrist Pain     patient reports left wrist pain x3-4 days. Patient denies any injury or swelling.               Coordination of Care:  1. Have you been to the ER, urgent care clinic since your last visit?   Hospitalized since your last visit? NO    2. Have you seen or consulted any other health care providers outside of the [facility] since your last visit?Include any pap smears or colon screening. NO

## 2021-03-22 ENCOUNTER — OFFICE VISIT (OUTPATIENT)
Dept: INTERNAL MEDICINE CLINIC | Age: 51
End: 2021-03-22
Payer: MEDICAID

## 2021-03-22 VITALS
HEART RATE: 88 BPM | RESPIRATION RATE: 20 BRPM | OXYGEN SATURATION: 97 % | WEIGHT: 315 LBS | TEMPERATURE: 98.6 F | HEIGHT: 69 IN | SYSTOLIC BLOOD PRESSURE: 150 MMHG | BODY MASS INDEX: 46.65 KG/M2 | DIASTOLIC BLOOD PRESSURE: 90 MMHG

## 2021-03-22 DIAGNOSIS — M25.532 LEFT WRIST PAIN: ICD-10-CM

## 2021-03-22 DIAGNOSIS — E66.01 OBESITY, MORBID (HCC): ICD-10-CM

## 2021-03-22 DIAGNOSIS — I10 ESSENTIAL HYPERTENSION: ICD-10-CM

## 2021-03-22 DIAGNOSIS — M25.561 CHRONIC PAIN OF BOTH KNEES: ICD-10-CM

## 2021-03-22 DIAGNOSIS — R73.02 IMPAIRED GLUCOSE TOLERANCE: ICD-10-CM

## 2021-03-22 DIAGNOSIS — M25.532 LEFT WRIST PAIN: Primary | ICD-10-CM

## 2021-03-22 DIAGNOSIS — G89.29 CHRONIC PAIN OF BOTH KNEES: ICD-10-CM

## 2021-03-22 DIAGNOSIS — M25.562 CHRONIC PAIN OF BOTH KNEES: ICD-10-CM

## 2021-03-22 DIAGNOSIS — M54.50 LOW BACK PAIN, UNSPECIFIED BACK PAIN LATERALITY, UNSPECIFIED CHRONICITY, UNSPECIFIED WHETHER SCIATICA PRESENT: ICD-10-CM

## 2021-03-22 PROCEDURE — 99214 OFFICE O/P EST MOD 30 MIN: CPT | Performed by: INTERNAL MEDICINE

## 2021-03-22 RX ORDER — METHYLPREDNISOLONE 4 MG/1
TABLET ORAL
Qty: 1 DOSE PACK | Refills: 0 | Status: SHIPPED | OUTPATIENT
Start: 2021-03-22 | End: 2021-06-18

## 2021-03-22 RX ORDER — HYDROCODONE BITARTRATE AND ACETAMINOPHEN 5; 325 MG/1; MG/1
1 TABLET ORAL
Qty: 28 TAB | Refills: 0 | Status: SHIPPED | OUTPATIENT
Start: 2021-03-22 | End: 2021-03-29

## 2021-03-22 RX ORDER — LOSARTAN POTASSIUM 100 MG/1
100 TABLET ORAL DAILY
Qty: 30 TAB | Refills: 6 | Status: SHIPPED | OUTPATIENT
Start: 2021-03-22 | End: 2022-06-16 | Stop reason: SDUPTHER

## 2021-03-22 NOTE — PATIENT INSTRUCTIONS
Home Blood Pressure Test: About This Test  What is it? A home blood pressure test allows you to keep track of your blood pressure at home. Blood pressure is a measure of the force of blood against the walls of your arteries. Blood pressure readings include two numbers, such as 130/80 (say \"130 over 80\"). The first number is the systolic pressure. The second number is the diastolic pressure. Why is this test done? You may do this test at home to:  · Find out if you have high blood pressure. · Track your blood pressure if you have high blood pressure. · Track how well medicine is working to reduce high blood pressure. · Check how lifestyle changes, such as weight loss and exercise, are affecting blood pressure. How do you prepare for the test?  For at least 30 minutes before you take your blood pressure, don't exercise or use caffeine, tobacco, or medicines that raise blood pressure. Take your blood pressure while you feel comfortable and relaxed. Sit quietly with both feet on the floor for at least 5 minutes before the test.  How is the test done? · Sit with your arm slightly bent and resting on a table so that your upper arm is at the same level as your heart. · Roll up your sleeve or take off your shirt to expose your upper arm. · Wrap the blood pressure cuff around your upper arm so that the lower edge of the cuff is about 1 inch above the bend of your elbow. Proceed with the following steps depending on if you are using an automatic or manual pressure monitor. Automatic blood pressure monitors  · Press the on/off button on the automatic monitor and wait until the ready-to-measure \"heart\" symbol appears next to zero in the display window. · Press the start button. The cuff will inflate and deflate by itself. · Your blood pressure numbers will appear on the screen. · Write your numbers in your log book, along with the date and time.   Manual blood pressure monitors  · Place the earpieces of a stethoscope in your ears, and place the bell of the stethoscope over the artery, just below the cuff. · Close the valve on the rubber inflating bulb. · Squeeze the bulb rapidly with your opposite hand to inflate the cuff until the dial or column of mercury reads about 30 mm Hg higher than your usual systolic pressure. If you do not know your usual pressure, inflate the cuff to 210 mm Hg or until the pulse at your wrist disappears. · Open the pressure valve just slightly by twisting or pressing the valve on the bulb. · As you watch the pressure slowly fall, note the level on the dial at which you first start to hear a pulsing or tapping sound through the stethoscope. This is your systolic blood pressure. · Continue letting the air out slowly. The sounds will become muffled and will finally disappear. Note the pressure when the sounds completely disappear. This is your diastolic blood pressure. Let out all the remaining air. · Write your numbers in your log book, along with the date and time. Follow-up care is a key part of your treatment and safety. Be sure to make and go to all appointments, and call your doctor if you are having problems. It's also a good idea to keep a list of the medicines you take. Where can you learn more? Go to http://www.[employer].com/  Enter C427 in the search box to learn more about \"Home Blood Pressure Test: About This Test.\"  Current as of: December 16, 2019               Content Version: 12.6  © 4129-6386 [employer]wise, Incorporated. Care instructions adapted under license by [employer] (which disclaims liability or warranty for this information). If you have questions about a medical condition or this instruction, always ask your healthcare professional. [place] [address] any warranty or liability for your use of this information.

## 2021-03-22 NOTE — PROGRESS NOTES
[facility]:  3/22/2021, MRN: [id number]      Amiel Severance is a 46 y.o. male and presents to clinic for Follow-up, Hypertension (Patient has been taking the coreg ), and Wrist Pain (patient reports left wrist pain x3-4 days. Patient denies any injury or swelling. )      Subjective: The patient is a 52-year-old male with history of hypertension, obesity, CVA, h/o H.pylori infection (treated last year), prediabetes, lumbar disc disease, and chronic bilateral knee pain (followed by Orthopedics). 1. Left Wrist Pain: He has had 2-3 days of left wrist pain. Sx are similar to pain he has had in the past. No relief of pain with OTC topical rx. No h/o trauma. 2. B/l Knee Pain: Present >6 months. His knees don't give out on him. No falls. Using a cane. His right knee hurts more than his left knee. No h/o trauma. 3. Lower Back Pain: Pain is 8/10. No paresthesias. Pain does not radiate. Pain has been present for >6 months. 4. HTN//HLD/Prediabetes: Taking HCTZ. Coreg was ordered but he is not taking this. No adverse side effects. +Weight gain. Today, he took HCTZ. He snacks a lot with cakes and chips. +H/o HLD and prediabetes. BP Readings from Last 3 Encounters:   03/22/21 (!) 150/90   12/01/20 (!) 149/102   10/26/20 (!) 150/99           Patient Active Problem List    Diagnosis Date Noted    Chronic low back pain without sciatica 10/19/2019    Essential hypertension 06/10/2019    Impaired glucose tolerance 06/10/2019    History of Helicobacter pylori infection 06/10/2019    Obesity, morbid ([address] 07/13/2018    History of CVA (cerebrovascular accident) 07/13/2018    Chronic pain of both knees 07/13/2018       Current Outpatient Medications   Medication Sig Dispense Refill    hydroCHLOROthiazide (HYDRODIURIL) 25 mg tablet Take 1 Tab by mouth daily. 30 Tab 5    gabapentin (Neurontin) 600 mg tablet Take 1 Tab by mouth three (3) times daily.  Max Daily Amount: 1,800 mg. 90 Tab 1    gabapentin (NEURONTIN) 300 mg capsule Take 1 Cap by mouth three (3) times daily. Max Daily Amount: 900 mg. 90 Cap 1    carvediloL (COREG) 3.125 mg tablet Take 1 Tab by mouth two (2) times daily (with meals). 60 Tab 5    DULoxetine (CYMBALTA) 60 mg capsule Take 1 Cap by mouth daily. 30 Cap 1       Allergies   Allergen Reactions    Norvasc [Amlodipine] Swelling     BLE Edema       Past Medical History:   Diagnosis Date    Ambulates with cane     Arthritis     lower back and kness    Hypertension     Knee pain     Morbid obesity ([address]        Past Surgical History:   Procedure Laterality Date    COLONOSCOPY N/A 6/19/2020    COLONOSCOPY with polypectomy performed by Mariluz Steiner MD at [facility]    HX CHOLECYSTECTOMY         Family History   Problem Relation Age of Onset    Heart Disease Mother     Diabetes Mother     Stroke Father        Social History     Tobacco Use    Smoking status: Never Smoker    Smokeless tobacco: Never Used   Substance Use Topics    Alcohol use: Yes       ROS   Review of Systems   Constitutional: Negative for chills, fever and weight loss. HENT: Negative for ear pain and sore throat. Eyes: Negative for blurred vision and pain. Respiratory: Negative for shortness of breath. Cardiovascular: Negative for chest pain. Gastrointestinal: Negative for abdominal pain. Genitourinary: Negative for dysuria and urgency. Musculoskeletal: Positive for back pain and joint pain. Negative for myalgias. Skin: Negative for rash. Neurological: Negative for tingling, focal weakness and headaches. Endo/Heme/Allergies: Does not bruise/bleed easily. Psychiatric/Behavioral: Negative for substance abuse.        Objective     Vitals:    03/22/21 1432 03/22/21 1438   BP: (!) 149/97 (!) 150/90   Pulse: 88    Resp: 20    Temp: 98.6 °F (37 °C)    TempSrc: Temporal    SpO2: 97%    Weight: (!) 471 lb (213.6 kg)    Height: 5' 9\" (1.753 m)    PainSc:   8    PainLoc: Generalized Physical Exam  Nursing note reviewed. Constitutional:       Appearance: Normal appearance. HENT:      Head: Normocephalic and atraumatic. Right Ear: External ear normal.      Left Ear: External ear normal.   Eyes:      Conjunctiva/sclera: Conjunctivae normal.   Neck:      Musculoskeletal: Neck supple. Cardiovascular:      Rate and Rhythm: Normal rate and regular rhythm. Pulses: Normal pulses. Heart sounds: Normal heart sounds. Pulmonary:      Effort: Pulmonary effort is normal.      Breath sounds: Normal breath sounds. Abdominal:      General: Abdomen is flat. Bowel sounds are normal. There is no distension. Palpations: Abdomen is soft. Tenderness: There is no abdominal tenderness. Musculoskeletal:         General: No swelling (BUE) or tenderness (BUE except along his left wrist along the extensor surface, which is slightly swollen but w/o erythema). Comments: His back exam is NTTP. He has adequate ROM along b/l knee jts but there is pain with ROM exercises and crepitus present   Lymphadenopathy:      Cervical: No cervical adenopathy. Skin:     General: Skin is warm and dry. Findings: No erythema. Neurological:      Mental Status: He is alert.       Gait: Gait normal.   Psychiatric:         Mood and Affect: Mood normal.         LABS   Data Review:   Lab Results   Component Value Date/Time    WBC 6.6 10/16/2020 07:25 AM    HGB 12.6 (L) 10/16/2020 07:25 AM    HCT 39.5 10/16/2020 07:25 AM    PLATELET 859 85/30/7238 07:25 AM    MCV 82.6 10/16/2020 07:25 AM       Lab Results   Component Value Date/Time    Sodium 143 10/16/2020 07:25 AM    Potassium 3.6 10/16/2020 07:25 AM    Chloride 110 10/16/2020 07:25 AM    CO2 28 10/16/2020 07:25 AM    Anion gap 5 10/16/2020 07:25 AM    Glucose 96 10/16/2020 07:25 AM    BUN 13 10/16/2020 07:25 AM    Creatinine 1.06 10/16/2020 07:25 AM    BUN/Creatinine ratio 12 10/16/2020 07:25 AM    GFR est AA >60 10/16/2020 07:25 AM    GFR est non-AA >60 10/16/2020 07:25 AM    Calcium 8.1 (L) 10/16/2020 07:25 AM       Lab Results   Component Value Date/Time    Cholesterol, total 160 07/21/2020 09:17 AM    HDL Cholesterol 36 (L) 07/21/2020 09:17 AM    LDL, calculated 103 (H) 07/21/2020 09:17 AM    VLDL, calculated 21 07/21/2020 09:17 AM    Triglyceride 104 07/21/2020 09:17 AM    CHOL/HDL Ratio 3.8 02/18/2019 09:50 AM       Lab Results   Component Value Date/Time    Hemoglobin A1c 5.5 07/21/2020 09:17 AM       Assessment/Plan:   1. Essential hypertension: +Prediabetes and HLD. +Gaining weight. Weight is 471lbs. - Referral back to Bariatric Surgery for assistance with nonsurgical and surgical weight loss options.  - Ordering losartan. C/w rx as prescribed. RTC to reassess his BP    ORDERS:  - REFERRAL TO BARIATRIC SURGERY  - losartan (COZAAR) 100 mg tablet; Take 1 Tab by mouth daily. Dispense: 30 Tab; Refill: 6  - HEMOGLOBIN A1C W/O EAG; Future    2. Left wrist pain: From OA? Inflammatory arthritis-   - Checking labs. - Medrol dose pack prescribed. - Notify me if sx do not resolve/improve  - Activity as tolerated. - Left wrist xray ordered  - Norco for short term pain relief. We discussed how this is not meant to be refilled. ORDERS:  - C REACTIVE PROTEIN, QT; Future  - methylPREDNISolone (MEDROL DOSEPACK) 4 mg tablet; Take per package instructions. Dispense: 1 Dose Pack; Refill: 0  - HYDROcodone-acetaminophen (NORCO) 5-325 mg per tablet; Take 1 Tab by mouth every six (6) hours as needed for Pain for up to 7 days. Max Daily Amount: 4 Tabs. Dispense: 28 Tab; Refill: 0  - XR WRIST LT AP/LAT/OBL MIN 3V; Future    3. Chronic pain of both knees: From OA.  - Weight reduction discussed. - I encouraged him to f/u w/ Orthopedics. 4. Low back pain:   - Activity as tolerated. - F/u with Orthopedics for rx recommendations.         Health Maintenance Due   Topic Date Due    COVID-19 Vaccine (1) Never done    Shingrix Vaccine Age 50> (1 of 2) Never done Lab review: labs are reviewed in the EHR and ordered as mentioned above    I have discussed the diagnosis with the patient and the intended plan as seen in the above orders. The patient has received an after-visit summary and questions were answered concerning future plans. I have discussed medication side effects and warnings with the patient as well. I have reviewed the plan of care with the patient, accepted their input and they are in agreement with the treatment goals. All questions were answered. The patient understands the plan of care. Handouts provided today with above information. Pt instructed if symptoms worsen to call the office or report to the ED for continued care. Greater than 50% of the visit time was spent in counseling and/or coordination of care. Voice recognition was used to generate this report, which may have resulted in some phonetic based errors in grammar and contents. Even though attempts were made to correct all the mistakes, some may have been missed, and remained in the body of the document.           Cory Bojorquez MD

## 2021-03-29 ENCOUNTER — APPOINTMENT (OUTPATIENT)
Dept: INTERNAL MEDICINE CLINIC | Age: 51
End: 2021-03-29

## 2021-03-30 DIAGNOSIS — M79.643 PAIN OF HAND, UNSPECIFIED LATERALITY: ICD-10-CM

## 2021-03-30 DIAGNOSIS — R79.82 ELEVATED C-REACTIVE PROTEIN (CRP): Primary | ICD-10-CM

## 2021-03-30 DIAGNOSIS — R79.82 ELEVATED C-REACTIVE PROTEIN (CRP): ICD-10-CM

## 2021-03-30 LAB
AVG GLU, 10930: 113 MG/DL (ref 91–123)
C-REACTIVE PROTEIN, QT, 006627: 3 MG/DL (ref 0–0.5)
HBA1C MFR BLD HPLC: 5.6 % (ref 4.8–5.6)

## 2021-03-30 NOTE — PROGRESS NOTES
Please let him know that his A1C is 5.6. CRP is up at 3 indicating ongoing inflammation. I am ordering another set of labs to screen him for rheumatoid arthritis. Please have him get labs.     Dr. Mely Mancera  [facility], [address] [place] [address]  [place], [address] [place] [address] Str.  Phone: [phone]  Fax: [phone]

## 2021-03-31 ENCOUNTER — TELEPHONE (OUTPATIENT)
Dept: INTERNAL MEDICINE CLINIC | Age: 51
End: 2021-03-31

## 2021-03-31 NOTE — TELEPHONE ENCOUNTER
Unable to leave a voicemail, mailbox is full. Will try to reach the patient again at a later time about results and new lab order.

## 2021-03-31 NOTE — TELEPHONE ENCOUNTER
----- Message from Puja Hunter MD sent at 3/30/2021  6:24 PM EDT -----  Please let him know that his A1C is 5.6. CRP is up at 3 indicating ongoing inflammation. I am ordering another set of labs to screen him for rheumatoid arthritis. Please have him get labs.     Dr. Ann Robison  [facility], [address] [place] [address], [address] [place] [address] Str.  Phone: [phone]  Fax: [phone]

## 2021-04-02 NOTE — TELEPHONE ENCOUNTER
Patient reached,and is aware of results. Patient scheduled for labs next week. Patient will also  his DMV form at that time.

## 2021-04-06 ENCOUNTER — APPOINTMENT (OUTPATIENT)
Dept: INTERNAL MEDICINE CLINIC | Age: 51
End: 2021-04-06

## 2021-04-06 DIAGNOSIS — M79.643 PAIN OF HAND, UNSPECIFIED LATERALITY: ICD-10-CM

## 2021-04-06 DIAGNOSIS — R79.82 ELEVATED C-REACTIVE PROTEIN (CRP): ICD-10-CM

## 2021-04-08 LAB
ANTI-DNA (DS) AB QN, 1189: <1 IU/ML
CENTROMERE B ANTIBODY, 601143: <0.2 AI
CHROMATIN ANTIBODY: <0.2 AI
ENA SS-A AB SER-ACNC: <0.2 AI
ENA SS-B AB SER-ACNC: <0.2 AI
JO1 ANTIBODY, 8107: <0.2 AI
RNP ABS, 016354: <0.2 AI
SCLERODERMA AB (SCL-70), 601116: <0.2 AI
SMITH ABS, 016362: <0.2 AI

## 2021-04-12 NOTE — PROGRESS NOTES
I will let him know that there is no evidence of inflammatory arthritis  At his upcoming appointment.     Dr. Emily Abdi  [facility], [facility], [address] [place] [address] Str.  Phone: [phone]  Fax: [phone]

## 2021-04-19 LAB
14.3.3 ETA, RHEUM. ARTHRITIS: <0.2 NG/ML
CCP ANTIBODIES IGG/IGA, 164915: <20 UNITS
RHEUMATOID FACT SERPL-ACNC: 29.3 UNITS/ML

## 2021-04-21 NOTE — PROGRESS NOTES
Please let him know that his labs are highly suggestive that he may have rheumatoid arthritis. His RF is elevated along with his CRP. Please place a referral to Rheumatology for evaluation of suspected RA - for me to sign.      Dr. Alina Jensen  [facility] of [address]  Phone: [phone]  Fax: [phone]

## 2021-04-22 ENCOUNTER — TELEPHONE (OUTPATIENT)
Dept: INTERNAL MEDICINE CLINIC | Age: 51
End: 2021-04-22

## 2021-04-22 NOTE — TELEPHONE ENCOUNTER
----- Message from Melissa Lyon MD sent at 4/20/2021  9:42 PM EDT -----  Please let him know that his labs are highly suggestive that he may have rheumatoid arthritis. His RF is elevated along with his CRP. Please place a referral to Rheumatology for evaluation of suspected RA - for me to sign.      Dr. Eliana Rapp  [facility], [address] [place] [address], [address] [place] [address] Str.  Phone: [phone]  Fax: [phone]

## 2021-04-30 NOTE — TELEPHONE ENCOUNTER
Called the patient at home to give the lab results per Dr. Bertrand Roman. Patient has agreed to go to the rheumatoid doctor. Patient wait for information to be sent over for an appt. NEED THE REFERRAL GENERATED TO COMPLETE THE MESSAGE.

## 2021-05-03 ENCOUNTER — TELEPHONE (OUTPATIENT)
Dept: INTERNAL MEDICINE CLINIC | Age: 51
End: 2021-05-03

## 2021-05-03 NOTE — TELEPHONE ENCOUNTER
Patient returned call and reports his BP reading was 162/99, but states he just took his losartan 100 mg after taking the Bp. Patient advised to call back in a hour or two to see what his readings are after the medication.

## 2021-05-03 NOTE — TELEPHONE ENCOUNTER
Patient reached about missing appointment today for BP recheck. Patient advised to take his BP today and provide any readings he may have. Patient will call back.

## 2021-05-04 DIAGNOSIS — I10 ESSENTIAL HYPERTENSION: Primary | ICD-10-CM

## 2021-05-04 RX ORDER — CARVEDILOL 12.5 MG/1
12.5 TABLET ORAL 2 TIMES DAILY WITH MEALS
Qty: 69 TAB | Refills: 5 | Status: SHIPPED | OUTPATIENT
Start: 2021-05-04 | End: 2022-06-16 | Stop reason: SDUPTHER

## 2021-05-04 NOTE — TELEPHONE ENCOUNTER
Please let him know that I am adding carvedilol for better BP control. Please schedule him for a 15-minute and office visit with me in 4 to 6 weeks to assess his BP response.     Dr. Lj Nielson  [facility] [address]  Phone: [phone]  Fax: [phone]

## 2021-06-02 ENCOUNTER — TELEPHONE (OUTPATIENT)
Dept: INTERNAL MEDICINE CLINIC | Age: 51
End: 2021-06-02

## 2021-06-17 NOTE — PROGRESS NOTES
Bernardino Bullock presents today for No chief complaint on file. Follow up  HTN       Coordination of Care:  1. Have you been to the ER, urgent care clinic since your last visit? Hospitalized since your last visit? NO    2. Have you seen or consulted any other health care providers outside of the [address] [place] [address] since your last visit? Include any pap smears or colon screening.  YES

## 2021-06-18 ENCOUNTER — OFFICE VISIT (OUTPATIENT)
Dept: INTERNAL MEDICINE CLINIC | Age: 51
End: 2021-06-18
Payer: MEDICAID

## 2021-06-18 VITALS
HEART RATE: 94 BPM | TEMPERATURE: 98 F | RESPIRATION RATE: 18 BRPM | OXYGEN SATURATION: 96 % | SYSTOLIC BLOOD PRESSURE: 140 MMHG | DIASTOLIC BLOOD PRESSURE: 87 MMHG | HEIGHT: 69 IN | WEIGHT: 315 LBS | BODY MASS INDEX: 46.65 KG/M2

## 2021-06-18 DIAGNOSIS — R76.8 ELEVATED RHEUMATOID FACTOR: ICD-10-CM

## 2021-06-18 DIAGNOSIS — I10 ESSENTIAL HYPERTENSION: Primary | ICD-10-CM

## 2021-06-18 DIAGNOSIS — R73.02 IMPAIRED GLUCOSE TOLERANCE: ICD-10-CM

## 2021-06-18 DIAGNOSIS — E66.01 OBESITY, MORBID (HCC): ICD-10-CM

## 2021-06-18 DIAGNOSIS — R79.82 ELEVATED C-REACTIVE PROTEIN (CRP): Primary | ICD-10-CM

## 2021-06-18 DIAGNOSIS — M25.50 ARTHRALGIA, UNSPECIFIED JOINT: ICD-10-CM

## 2021-06-18 PROCEDURE — 99214 OFFICE O/P EST MOD 30 MIN: CPT | Performed by: INTERNAL MEDICINE

## 2021-06-18 NOTE — PROGRESS NOTES
Jael Leon presents today for   Chief Complaint   Patient presents with    Follow-up    Hypertension              Coordination of Care:  1. Have you been to the ER, urgent care clinic since your last visit? Hospitalized since your last visit? NO    2. Have you seen or consulted any other health care providers outside of the [address] [place] [address] since your last visit? Include any pap smears or colon screening.  YES

## 2021-06-18 NOTE — PROGRESS NOTES
[facility]:  6/18/2021, MRN: [id number]      Erich Tristan is a 46 y.o. male and presents to clinic for Follow-up and Hypertension    Subjective: The patient is a 63-year-old male with history of hypertension, obesity, CVA, h/o H.pylori infection (treated last year), prediabetes, lumbar disc disease, and chronic bilateral knee pain (followed by Orthopedics). 1. HTN: BP is 140/87 today. Diet: \"not too good. \" +Gaining weight. Weight is 480lbs. He ate a breakfast sandwich at [employer] today. He takes coreg, losartan, and HCTZ. 2. Health Maintenance:  - He is not vaccinated for Covid-19. 3. Prediabetes: His last A1C was 5.6 in March despite history of prediabetes. 4. Arthralgias: Within the past year, he reported worsening arthralgias. He has chronic knee pain and uses a cane with ambulation. Labs were obtained earlier this year and are listed below. Due to these findings, he was referred to Rheumatology but never scheduled an apt. He is not in pain today. He has no stiffness. He has no knee pain. Results for Jayde Holman (MRN [id number]) as of 6/18/2021 13:01   Ref. Range 3/29/2021 11:22 4/7/2021 18:33   C-Reactive Protein, Qt Latest Ref Range: 0.0 - 0.5 mg/dL 3.0 (H)    C REACTIVE PROTEIN, QT Unknown Rpt (A)    Sjogren's Anti-SS-A Latest Ref Range: <1.0 AI  <0.2   Sjogren's Anti-SS-B Latest Ref Range: <1.0 AI  <0.2   14.3.3 ETA, Rheum.  Arthritis Latest Units: ng/mL  <0.20   CHROMATIN ANTIBODY Latest Ref Range: <1.0 AI  <0.2   RHEUMASSURE Unknown  Rpt (A)   Rheumatoid Arthritis Factor Latest Units: Units/mL  29.3 (H)         Patient Active Problem List    Diagnosis Date Noted    Chronic low back pain without sciatica 10/19/2019    Essential hypertension 06/10/2019    Impaired glucose tolerance 06/10/2019    History of Helicobacter pylori infection 06/10/2019    Obesity, morbid ([address] 07/13/2018    History of CVA (cerebrovascular accident) 07/13/2018    Chronic pain of both knees 07/13/2018       Current Outpatient Medications   Medication Sig Dispense Refill    carvediloL (COREG) 12.5 mg tablet Take 1 Tab by mouth two (2) times daily (with meals). 69 Tab 5    losartan (COZAAR) 100 mg tablet Take 1 Tab by mouth daily. 30 Tab 6    hydroCHLOROthiazide (HYDRODIURIL) 25 mg tablet Take 1 Tab by mouth daily. 30 Tab 5    methylPREDNISolone (MEDROL DOSEPACK) 4 mg tablet Take per package instructions. (Patient not taking: Reported on 6/18/2021) 1 Dose Pack 0       Allergies   Allergen Reactions    Norvasc [Amlodipine] Swelling     BLE Edema       Past Medical History:   Diagnosis Date    Ambulates with cane     Arthritis     lower back and kness    Hypertension     Knee pain     Morbid obesity ([address]        Past Surgical History:   Procedure Laterality Date    COLONOSCOPY N/A 6/19/2020    COLONOSCOPY with polypectomy performed by Ryley Taylor MD at [facility]    HX CHOLECYSTECTOMY         Family History   Problem Relation Age of Onset    Heart Disease Mother     Diabetes Mother     Stroke Father        Social History     Tobacco Use    Smoking status: Never Smoker    Smokeless tobacco: Never Used   Substance Use Topics    Alcohol use: Yes       ROS   Review of Systems   Constitutional: Negative for chills and fever. HENT: Negative for ear pain and sore throat. Eyes: Negative for blurred vision and pain. Respiratory: Negative for cough and shortness of breath. Cardiovascular: Negative for chest pain. Gastrointestinal: Negative for abdominal pain, blood in stool and melena. Genitourinary: Negative for dysuria and hematuria. Musculoskeletal: Negative for joint pain and myalgias. Skin: Negative for rash. Neurological: Negative for tingling, focal weakness and headaches. Endo/Heme/Allergies: Does not bruise/bleed easily. Psychiatric/Behavioral: Negative for substance abuse.        Objective     Vitals:    06/18/21 1213 06/18/21 1216   BP: (!) 144/98 (!) 140/87   Pulse: 94    Resp: 18    Temp: 98 °F (36.7 °C)    TempSrc: Temporal    SpO2: 96%    Weight: (!) 480 lb (217.7 kg)    Height: 5' 9\" (1.753 m)    PainSc:   0 - No pain        Physical Exam  Vitals and nursing note reviewed. Constitutional:       Appearance: Normal appearance. HENT:      Head: Normocephalic and atraumatic. Right Ear: External ear normal.      Left Ear: External ear normal.   Eyes:      Conjunctiva/sclera: Conjunctivae normal.   Cardiovascular:      Rate and Rhythm: Normal rate and regular rhythm. Pulses: Normal pulses. Heart sounds: Normal heart sounds. Pulmonary:      Effort: Pulmonary effort is normal.      Breath sounds: Normal breath sounds. Abdominal:      General: Abdomen is flat. Bowel sounds are normal. There is no distension. Palpations: Abdomen is soft. Tenderness: There is no abdominal tenderness. Musculoskeletal:         General: No swelling or tenderness. Cervical back: Neck supple. Lymphadenopathy:      Cervical: No cervical adenopathy. Skin:     General: Skin is warm and dry. Neurological:      General: No focal deficit present. Mental Status: He is alert and oriented to person, place, and time.       Gait: Gait normal.   Psychiatric:         Mood and Affect: Mood normal.         LABS   Data Review:   Lab Results   Component Value Date/Time    WBC 6.6 10/16/2020 07:25 AM    HGB 12.6 (L) 10/16/2020 07:25 AM    HCT 39.5 10/16/2020 07:25 AM    PLATELET 763 94/77/5130 07:25 AM    MCV 82.6 10/16/2020 07:25 AM       Lab Results   Component Value Date/Time    Sodium 143 10/16/2020 07:25 AM    Potassium 3.6 10/16/2020 07:25 AM    Chloride 110 10/16/2020 07:25 AM    CO2 28 10/16/2020 07:25 AM    Anion gap 5 10/16/2020 07:25 AM    Glucose 96 10/16/2020 07:25 AM    BUN 13 10/16/2020 07:25 AM    Creatinine 1.06 10/16/2020 07:25 AM    BUN/Creatinine ratio 12 10/16/2020 07:25 AM    GFR est AA >60 10/16/2020 07:25 AM    GFR est non-AA >60 10/16/2020 07:25 AM    Calcium 8.1 (L) 10/16/2020 07:25 AM       Lab Results   Component Value Date/Time    Cholesterol, total 160 07/21/2020 09:17 AM    HDL Cholesterol 36 (L) 07/21/2020 09:17 AM    LDL, calculated 103 (H) 07/21/2020 09:17 AM    VLDL, calculated 21 07/21/2020 09:17 AM    Triglyceride 104 07/21/2020 09:17 AM    CHOL/HDL Ratio 3.8 02/18/2019 09:50 AM       Lab Results   Component Value Date/Time    Hemoglobin A1c 5.6 03/29/2021 11:22 AM       Assessment/Plan:   1. Hypertension: Blood pressure is borderline today.  -Continue medication as prescribed. -Return to clinic later this year for a blood pressure check.  -Checking labs later this year. 2.  History of prediabetes:  -I encouraged him to reduce his caloric intake. We discussed the importance of weight reduction. He is interested in surgical weight loss. I am referring him back to Bariatric Surgery.  -I will check labs later this year. 3.  Arthralgia history:  -I encouraged him to schedule an appointment with Rheumatology. **I encouraged him to get vaccinated against COVID-19**    Health Maintenance Due   Topic Date Due    COVID-19 Vaccine (1) Never done    Shingrix Vaccine Age 50> (1 of 2) Never done     Lab review: labs are reviewed in the EHR and ordered as mentioned above. I have discussed the diagnosis with the patient and the intended plan as seen in the above orders. The patient has received an after-visit summary and questions were answered concerning future plans. I have discussed medication side effects and warnings with the patient as well. I have reviewed the plan of care with the patient, accepted their input and they are in agreement with the treatment goals. All questions were answered. The patient understands the plan of care. Handouts provided today with above information. Pt instructed if symptoms worsen to call the office or report to the ED for continued care.   Greater than 50% of the visit time was spent in counseling and/or coordination of care. Voice recognition was used to generate this report, which may have resulted in some phonetic based errors in grammar and contents. Even though attempts were made to correct all the mistakes, some may have been missed, and remained in the body of the document.           Alo Altamirano MD

## 2021-06-18 NOTE — PATIENT INSTRUCTIONS
Body Mass Index: Care Instructions  Your Care Instructions     Body mass index (BMI) can help you see if your weight is raising your risk for health problems. It uses a formula to compare how much you weigh with how tall you are. · A BMI lower than 18.5 is considered underweight. · A BMI between 18.5 and 24.9 is considered healthy. · A BMI between 25 and 29.9 is considered overweight. A BMI of 30 or higher is considered obese. If your BMI is in the normal range, it means that you have a lower risk for weight-related health problems. If your BMI is in the overweight or obese range, you may be at increased risk for weight-related health problems, such as high blood pressure, heart disease, stroke, arthritis or joint pain, and diabetes. If your BMI is in the underweight range, you may be at increased risk for health problems such as fatigue, lower protection (immunity) against illness, muscle loss, bone loss, hair loss, and hormone problems. BMI is just one measure of your risk for weight-related health problems. You may be at higher risk for health problems if you are not active, you eat an unhealthy diet, or you drink too much alcohol or use tobacco products. Follow-up care is a key part of your treatment and safety. Be sure to make and go to all appointments, and call your doctor if you are having problems. It's also a good idea to know your test results and keep a list of the medicines you take. How can you care for yourself at home? · Practice healthy eating habits. This includes eating plenty of fruits, vegetables, whole grains, lean protein, and low-fat dairy. · If your doctor recommends it, get more exercise. Walking is a good choice. Bit by bit, increase the amount you walk every day. Try for at least 30 minutes on most days of the week. · Do not smoke. Smoking can increase your risk for health problems. If you need help quitting, talk to your doctor about stop-smoking programs and medicines. These can increase your chances of quitting for good. · Limit alcohol to 2 drinks a day for men and 1 drink a day for women. Too much alcohol can cause health problems. If you have a BMI higher than 25  · Your doctor may do other tests to check your risk for weight-related health problems. This may include measuring the distance around your waist. A waist measurement of more than 40 inches in men or 35 inches in women can increase the risk of weight-related health problems. · Talk with your doctor about steps you can take to stay healthy or improve your health. You may need to make lifestyle changes to lose weight and stay healthy, such as changing your diet and getting regular exercise. If you have a BMI lower than 18.5  · Your doctor may do other tests to check your risk for health problems. · Talk with your doctor about steps you can take to stay healthy or improve your health. You may need to make lifestyle changes to gain or maintain weight and stay healthy, such as getting more healthy foods in your diet and doing exercises to build muscle. Where can you learn more? Go to http://www.[employer].com/  Enter S176 in the search box to learn more about \"Body Mass Index: Care Instructions. \"  Current as of: September 23, 2020               Content Version: 12.8  © 2006-2021 Healthwise, Inc[employer]. Care instructions adapted under license by [employer] (which disclaims liability or warranty for this information). If you have questions about a medical condition or this instruction, always ask your healthcare professional. [place] [address] any warranty or liability for your use of this information.

## 2021-08-12 ENCOUNTER — OFFICE VISIT (OUTPATIENT)
Dept: INTERNAL MEDICINE CLINIC | Age: 51
End: 2021-08-12
Payer: MEDICAID

## 2021-08-12 ENCOUNTER — HOSPITAL ENCOUNTER (OUTPATIENT)
Dept: LAB | Age: 51
Discharge: HOME | End: 2021-08-12

## 2021-08-12 VITALS
BODY MASS INDEX: 46.65 KG/M2 | WEIGHT: 315 LBS | TEMPERATURE: 97.5 F | HEIGHT: 69 IN | HEART RATE: 71 BPM | RESPIRATION RATE: 20 BRPM | OXYGEN SATURATION: 98 % | DIASTOLIC BLOOD PRESSURE: 85 MMHG | SYSTOLIC BLOOD PRESSURE: 139 MMHG

## 2021-08-12 DIAGNOSIS — M79.672 LEFT FOOT PAIN: Primary | ICD-10-CM

## 2021-08-12 DIAGNOSIS — E66.01 OBESITY, MORBID (HCC): ICD-10-CM

## 2021-08-12 DIAGNOSIS — R73.02 IMPAIRED GLUCOSE TOLERANCE: ICD-10-CM

## 2021-08-12 DIAGNOSIS — I10 ESSENTIAL HYPERTENSION: ICD-10-CM

## 2021-08-12 DIAGNOSIS — M79.672 LEFT FOOT PAIN: ICD-10-CM

## 2021-08-12 LAB — SENTARA SPECIMEN COL,SENBCF: NORMAL

## 2021-08-12 PROCEDURE — 99214 OFFICE O/P EST MOD 30 MIN: CPT | Performed by: INTERNAL MEDICINE

## 2021-08-12 PROCEDURE — 99001 SPECIMEN HANDLING PT-LAB: CPT

## 2021-08-12 RX ORDER — METHYLPREDNISOLONE 4 MG/1
TABLET ORAL
Qty: 1 DOSE PACK | Refills: 0 | Status: SHIPPED | OUTPATIENT
Start: 2021-08-12 | End: 2021-09-22 | Stop reason: ALTCHOICE

## 2021-08-12 RX ORDER — AMOXICILLIN 500 MG/1
1000 CAPSULE ORAL 2 TIMES DAILY
COMMUNITY
Start: 2021-08-10 | End: 2021-08-12

## 2021-08-12 RX ORDER — OMEPRAZOLE 20 MG/1
CAPSULE, DELAYED RELEASE ORAL
COMMUNITY
Start: 2021-08-10 | End: 2022-01-19

## 2021-08-12 RX ORDER — CLARITHROMYCIN 500 MG/1
500 TABLET, FILM COATED, EXTENDED RELEASE ORAL DAILY
COMMUNITY
End: 2021-09-22 | Stop reason: ALTCHOICE

## 2021-08-12 RX ORDER — AMOXICILLIN 500 MG/1
1000 CAPSULE ORAL 2 TIMES DAILY
COMMUNITY
End: 2021-09-22 | Stop reason: ALTCHOICE

## 2021-08-12 RX ORDER — CLARITHROMYCIN 500 MG/1
TABLET, FILM COATED ORAL
COMMUNITY
Start: 2021-08-10 | End: 2021-08-12

## 2021-08-12 NOTE — PATIENT INSTRUCTIONS
Foot Pain: Care Instructions  Your Care Instructions     Foot injuries that cause pain and swelling are fairly common. Almost all sports or home repair projects can cause a misstep that ends up as foot pain. Normal wear and tear, especially as you get older, also can cause foot pain. Most minor foot injuries will heal on their own, and home treatment is usually all you need to do. If you have a severe injury, you may need tests and treatment. Follow-up care is a key part of your treatment and safety. Be sure to make and go to all appointments, and call your doctor if you are having problems. It's also a good idea to know your test results and keep a list of the medicines you take. How can you care for yourself at home? · Take pain medicines exactly as directed. ? If the doctor gave you a prescription medicine for pain, take it as prescribed. ? If you are not taking a prescription pain medicine, ask your doctor if you can take an over-the-counter medicine. · Rest and protect your foot. Take a break from any activity that may cause pain. · Put ice or a cold pack on your foot for 10 to 20 minutes at a time. Put a thin cloth between the ice and your skin. · Prop up the sore foot on a pillow when you ice it or anytime you sit or lie down during the next 3 days. Try to keep it above the level of your heart. This will help reduce swelling. · Your doctor may recommend that you wrap your foot with an elastic bandage. Keep your foot wrapped for as long as your doctor advises. · If your doctor recommends crutches, use them as directed. · Wear roomy footwear. · As soon as pain and swelling end, begin gentle exercises of your foot. Your doctor can tell you which exercises will help. When should you call for help? Call 911 anytime you think you may need emergency care. For example, call if:    · Your foot turns pale, white, blue, or cold.    Call your doctor now or seek immediate medical care if:    · You cannot move or stand on your foot.     · Your foot looks twisted or out of its normal position.     · Your foot is not stable when you step down.     · You have signs of infection, such as:  ? Increased pain, swelling, warmth, or redness. ? Red streaks leading from the sore area. ? Pus draining from a place on your foot. ? A fever.     · Your foot is numb or tingly. Watch closely for changes in your health, and be sure to contact your doctor if:    · You do not get better as expected.     · You have bruises from an injury that last longer than 2 weeks. Where can you learn more? Go to http://www.cabello.com/  Enter D999 in the search box to learn more about \"Foot Pain: Care Instructions. \"  Current as of: November 16, 2020               Content Version: 12.8  © 3442-9184 Healthwise, Incorporated. Care instructions adapted under license by [employer] (which disclaims liability or warranty for this information). If you have questions about a medical condition or this instruction, always ask your healthcare professional. [address] any warranty or liability for your use of this information.

## 2021-08-12 NOTE — PROGRESS NOTES
INTERNISTS OF [facility]:  8/12/2021, MRN: [id number]      Benny Thompson is a 46 y.o. male and presents to clinic for Foot Swelling (Patient reports left foot swelling x 1 day. Patient denies any injury. )    Subjective: The patient is a 52-year-old male with history of hypertension, obesity, CVA, h/o H.pylori infection (treated last year), prediabetes, lumbar disc disease, and chronic bilateral knee pain (followed by Orthopedics). 1. Left Foot Swelling: Present x 1 day. No injury. Location on foot: lateral edge. Paresthesias/weakness: none. Incidentally, patient has reported worsening arthralgias over the past year. He has chronic knee pain. He was previously referred to rheumatology but never scheduled an appointment after his CRP was found to be elevated at 3 and he tested positive for rheumatoid factor. Today the patient reports: he never scheduled with Rheumatology. No relief with aleve. 2. Obesity: Since his March appointment, the patient has gained 23 pounds. Labs earlier this year showed a hemoglobin A1c of 5.6. +H/o prediabetes/impaired glucose tolerance. He is planning to get weight loss surgery. Wt Readings from Last 3 Encounters:   08/12/21 (!) 494 lb (224.1 kg)   06/18/21 (!) 480 lb (217.7 kg)   03/22/21 (!) 471 lb (213.6 kg)     **He is not vaccinated against COVID-19**    **He is on abx for recurrent H. Pylori**      Patient Active Problem List    Diagnosis Date Noted    Chronic low back pain without sciatica 10/19/2019    Essential hypertension 06/10/2019    Impaired glucose tolerance 06/10/2019    History of Helicobacter pylori infection 06/10/2019    Obesity, morbid ([facility] [address] 07/13/2018    History of CVA (cerebrovascular accident) 07/13/2018    Chronic pain of both knees 07/13/2018       Current Outpatient Medications   Medication Sig Dispense Refill    amoxicillin (AMOXIL) 500 mg capsule Take 1,000 mg by mouth two (2) times a day.       clarithromycin (BIAXIN) 500 mg tablet       omeprazole (PRILOSEC) 20 mg capsule       carvediloL (COREG) 12.5 mg tablet Take 1 Tab by mouth two (2) times daily (with meals). 69 Tab 5    hydroCHLOROthiazide (HYDRODIURIL) 25 mg tablet Take 1 Tab by mouth daily. 30 Tab 5    losartan (COZAAR) 100 mg tablet Take 1 Tab by mouth daily. 30 Tab 6       Allergies   Allergen Reactions    Norvasc [Amlodipine] Swelling     BLE Edema       Past Medical History:   Diagnosis Date    Ambulates with cane     Arthritis     lower back and kness    Hypertension     Knee pain     Morbid obesity ([address]        Past Surgical History:   Procedure Laterality Date    COLONOSCOPY N/A 6/19/2020    COLONOSCOPY with polypectomy performed by Cindy Griffin MD at [facility]    HX CHOLECYSTECTOMY         Family History   Problem Relation Age of Onset    Heart Disease Mother     Diabetes Mother     Stroke Father        Social History     Tobacco Use    Smoking status: Never Smoker    Smokeless tobacco: Never Used   Substance Use Topics    Alcohol use: Yes       ROS   Review of Systems   Constitutional: Negative for chills and fever. HENT: Negative for ear pain and sore throat. Eyes: Negative for blurred vision and pain. Respiratory: Negative for cough and shortness of breath. Cardiovascular: Negative for chest pain. Gastrointestinal: Negative for abdominal pain, blood in stool and melena. Genitourinary: Negative for dysuria and hematuria. Musculoskeletal: Positive for back pain and joint pain. Negative for myalgias. Skin: Negative for rash. Neurological: Negative for tingling, focal weakness and headaches. Endo/Heme/Allergies: Does not bruise/bleed easily. Psychiatric/Behavioral: Negative for substance abuse.        Objective     Vitals:    08/12/21 0757   BP: 139/85   Pulse: 71   Resp: 20   Temp: 97.5 °F (36.4 °C)   TempSrc: Temporal   SpO2: 98%   Weight: (!) 494 lb (224.1 kg)   Height: 5' 9\" (1.753 m)   PainSc:   8   PainLoc: Foot Physical Exam  Vitals and nursing note reviewed. Constitutional:       Appearance: Normal appearance. HENT:      Head: Normocephalic and atraumatic. Right Ear: External ear normal.      Left Ear: External ear normal.   Eyes:      Conjunctiva/sclera: Conjunctivae normal.   Cardiovascular:      Rate and Rhythm: Normal rate and regular rhythm. Pulses: Normal pulses. Heart sounds: Normal heart sounds. Pulmonary:      Effort: Pulmonary effort is normal.      Breath sounds: Normal breath sounds. Abdominal:      General: Abdomen is flat. Bowel sounds are normal.      Palpations: Abdomen is soft. Musculoskeletal:         General: No swelling (BUE) or tenderness. Cervical back: Neck supple. Comments: His left foot is TTP along the lateral area; there is swelling and increased warmth along this affected area. No erythema though   Lymphadenopathy:      Cervical: No cervical adenopathy. Skin:     General: Skin is dry. Findings: No erythema. Neurological:      Mental Status: He is alert. Mental status is at baseline.    Psychiatric:         Mood and Affect: Mood normal.         LABS   Data Review:   Lab Results   Component Value Date/Time    WBC 6.6 10/16/2020 07:25 AM    HGB 12.6 (L) 10/16/2020 07:25 AM    HCT 39.5 10/16/2020 07:25 AM    PLATELET 155 89/14/5198 07:25 AM    MCV 82.6 10/16/2020 07:25 AM       Lab Results   Component Value Date/Time    Sodium 143 10/16/2020 07:25 AM    Potassium 3.6 10/16/2020 07:25 AM    Chloride 110 10/16/2020 07:25 AM    CO2 28 10/16/2020 07:25 AM    Anion gap 5 10/16/2020 07:25 AM    Glucose 96 10/16/2020 07:25 AM    BUN 13 10/16/2020 07:25 AM    Creatinine 1.06 10/16/2020 07:25 AM    BUN/Creatinine ratio 12 10/16/2020 07:25 AM    GFR est AA >60 10/16/2020 07:25 AM    GFR est non-AA >60 10/16/2020 07:25 AM    Calcium 8.1 (L) 10/16/2020 07:25 AM       Lab Results   Component Value Date/Time    Cholesterol, total 160 07/21/2020 09:17 AM    HDL Cholesterol 36 (L) 07/21/2020 09:17 AM    LDL, calculated 103 (H) 07/21/2020 09:17 AM    VLDL, calculated 21 07/21/2020 09:17 AM    Triglyceride 104 07/21/2020 09:17 AM    CHOL/HDL Ratio 3.8 02/18/2019 09:50 AM       Lab Results   Component Value Date/Time    Hemoglobin A1c 5.6 03/29/2021 11:22 AM       Assessment/Plan:   1. Left Foot Pain/Swelling: Inflammatory vs infectious etiology. No h/o trauma.  -Checking labs to rule out inflammatory arthritis. -I encouraged the patient to schedule an appointment with Rheumatology  -Ordering a Medrol Dosepak.  -Notify me if symptoms do not improve. 2. Obesity: Weight is 494 pounds.  -We will check labs to rule out diabetes. - He can follow-up with Bariatrics for ongoing medical care. **I strongly advised him to get vaccinated against COVID-19**    Health Maintenance Due   Topic Date Due    COVID-19 Vaccine (1) Never done    Shingrix Vaccine Age 50> (1 of 2) Never done         Lab review: labs are reviewed in the EHR and ordered as mentioned above. I have discussed the diagnosis with the patient and the intended plan as seen in the above orders. The patient has received an after-visit summary and questions were answered concerning future plans. I have discussed medication side effects and warnings with the patient as well. I have reviewed the plan of care with the patient, accepted their input and they are in agreement with the treatment goals. All questions were answered. The patient understands the plan of care. Handouts provided today with above information. Pt instructed if symptoms worsen to call the office or report to the ED for continued care. Greater than 50% of the visit time was spent in counseling and/or coordination of care. Voice recognition was used to generate this report, which may have resulted in some phonetic based errors in grammar and contents.  Even though attempts were made to correct all the mistakes, some may have been missed, and remained in the body of the document.           Iglesia Auguste MD

## 2021-08-13 LAB
ABSOLUTE LYMPHOCYTE COUNT, 10803: 1.9 K/UL (ref 1–4.8)
AVG GLU, 10930: 119 MG/DL (ref 91–123)
BASOPHILS # BLD: 0 K/UL (ref 0–0.2)
BASOPHILS NFR BLD: 1 % (ref 0–2)
C-REACTIVE PROTEIN, QT, 006627: 2.7 MG/DL (ref 0–0.5)
EOSINOPHIL # BLD: 0.2 K/UL (ref 0–0.5)
EOSINOPHIL NFR BLD: 4 % (ref 0–6)
ERYTHROCYTE [DISTWIDTH] IN BLOOD BY AUTOMATED COUNT: 15.9 % (ref 10–15.5)
GRANULOCYTES,GRANS: 56 % (ref 40–75)
HBA1C MFR BLD HPLC: 5.8 % (ref 4.8–5.6)
HCT VFR BLD AUTO: 42 % (ref 39.3–51.6)
HGB BLD-MCNC: 12.5 G/DL (ref 13.1–17.2)
LYMPHOCYTES, LYMLT: 30 % (ref 20–45)
MCH RBC QN AUTO: 26 PG (ref 26–34)
MCHC RBC AUTO-ENTMCNC: 30 G/DL (ref 31–36)
MCV RBC AUTO: 88 FL (ref 80–95)
MONOCYTES # BLD: 0.6 K/UL (ref 0.1–1)
MONOCYTES NFR BLD: 9 % (ref 3–12)
NEUTROPHILS # BLD AUTO: 3.6 K/UL (ref 1.8–7.7)
PLATELET # BLD AUTO: 210 K/UL (ref 140–440)
PMV BLD AUTO: 11.7 FL (ref 9–13)
RBC # BLD AUTO: 4.78 M/UL (ref 3.8–5.8)
URATE SERPL-MCNC: 8 MG/DL (ref 3.9–9)
WBC # BLD AUTO: 6.4 K/UL (ref 4–11)

## 2021-08-21 LAB
14.3.3 ETA, RHEUM. ARTHRITIS: <0.2 NG/ML
CCP ANTIBODIES IGG/IGA, 164915: <20 UNITS
RHEUMATOID FACT SERPL-ACNC: 31.3 UNITS/ML

## 2021-08-31 NOTE — PROGRESS NOTES
Please let him know that he is still positive for a rheumatoid factor blood test.  His CRP is still elevated at 2.7. His inflammatory marker can be elevated in conditions such as rheumatoid arthritis. Meanwhile, his A1c is 5.8. His uric acid level is elevated at 8. He may have underlying gout. Additionally, his CBC is unremarkable except for a mildly low hemoglobin of 12.5, which is at his baseline. MCV is normal.  RDW is mildly elevated at 15.9. Please make sure he is scheduled to see a rheumatologist for evaluation.     Dr. Ania Murrieta  [facility], [address] [place] [address], [address] [place] [address] Str.  Phone: [phone]  Fax: [phone]

## 2021-09-07 NOTE — PROGRESS NOTES
Patient contacted, patient identified with two identifiers (Name & ). Patient aware of results per DR. Estrada and verbalizes understanding. Patient given number to rheumatology.

## 2021-09-18 DIAGNOSIS — I10 ESSENTIAL HYPERTENSION: ICD-10-CM

## 2021-09-18 DIAGNOSIS — R73.02 IMPAIRED GLUCOSE TOLERANCE: ICD-10-CM

## 2021-09-22 ENCOUNTER — OFFICE VISIT (OUTPATIENT)
Dept: CARDIOLOGY CLINIC | Age: 51
End: 2021-09-22
Payer: MEDICAID

## 2021-09-22 VITALS
HEIGHT: 69 IN | WEIGHT: 315 LBS | DIASTOLIC BLOOD PRESSURE: 96 MMHG | HEART RATE: 71 BPM | BODY MASS INDEX: 46.65 KG/M2 | SYSTOLIC BLOOD PRESSURE: 138 MMHG

## 2021-09-22 DIAGNOSIS — E66.01 MORBID OBESITY (HCC): ICD-10-CM

## 2021-09-22 DIAGNOSIS — Z01.818 PRE-OP EVALUATION: ICD-10-CM

## 2021-09-22 DIAGNOSIS — I10 ESSENTIAL HYPERTENSION: ICD-10-CM

## 2021-09-22 DIAGNOSIS — R06.02 SOB (SHORTNESS OF BREATH): Primary | ICD-10-CM

## 2021-09-22 PROCEDURE — 99204 OFFICE O/P NEW MOD 45 MIN: CPT | Performed by: INTERNAL MEDICINE

## 2021-09-22 NOTE — PROGRESS NOTES
HISTORY OF PRESENT ILLNESS  Waqas Gonsalez is a 46 y.o. male. New Patient  The history is provided by the patient. This is a chronic problem. The problem occurs daily. The problem has not changed since onset. Associated symptoms include shortness of breath. Pertinent negatives include no chest pain, no abdominal pain and no headaches. Shortness of Breath  The history is provided by the patient. This is a chronic problem. The problem occurs frequently. The problem has not changed since onset. Pertinent negatives include no fever, no headaches, no ear pain, no neck pain, no cough, no sputum production, no hemoptysis, no wheezing, no PND, no orthopnea, no chest pain, no vomiting, no abdominal pain, no rash, no leg swelling and no claudication. Review of Systems   Constitutional: Negative for chills, diaphoresis, fever and weight loss. HENT: Negative for ear pain and hearing loss. Eyes: Negative for blurred vision. Respiratory: Positive for shortness of breath. Negative for cough, hemoptysis, sputum production, wheezing and stridor. Cardiovascular: Negative for chest pain, palpitations, orthopnea, claudication, leg swelling and PND. Gastrointestinal: Negative for abdominal pain, heartburn, nausea and vomiting. Musculoskeletal: Negative for myalgias and neck pain. Skin: Negative for rash. Neurological: Negative for dizziness, tingling, tremors, focal weakness, loss of consciousness, weakness and headaches. Psychiatric/Behavioral: Negative for depression and suicidal ideas.      Family History   Problem Relation Age of Onset    Heart Disease Mother     Diabetes Mother     Stroke Father        Past Medical History:   Diagnosis Date    Ambulates with cane     Arthritis     lower back and kness    Hypertension     Knee pain     Morbid obesity ([address]        Past Surgical History:   Procedure Laterality Date    COLONOSCOPY N/A 6/19/2020    COLONOSCOPY with polypectomy performed by Mir Ferrera MD at [facility]    HX CHOLECYSTECTOMY         Social History     Tobacco Use    Smoking status: Never Smoker    Smokeless tobacco: Never Used   Substance Use Topics    Alcohol use: Yes     Alcohol/week: 1.0 standard drinks     Types: 1 Cans of beer per week       Allergies   Allergen Reactions    Norvasc [Amlodipine] Swelling     BLE Edema       Outpatient Medications Marked as Taking for the 9/22/21 encounter (Office Visit) with Stacy Martinez MD   Medication Sig Dispense Refill    losartan (COZAAR) 100 mg tablet Take 1 Tab by mouth daily. 30 Tab 6    hydroCHLOROthiazide (HYDRODIURIL) 25 mg tablet Take 1 Tab by mouth daily. 30 Tab 5        Visit Vitals  BP (!) 138/96   Pulse 71   Ht 5' 9\" (1.753 m)   Wt (!) 215 kg (474 lb)   BMI 70.00 kg/m²       Physical Exam  Constitutional:       Appearance: He is well-developed. Comments: Super obese male   HENT:      Head: Normocephalic and atraumatic. Eyes:      General: No scleral icterus. Conjunctiva/sclera: Conjunctivae normal.   Neck:      Vascular: No JVD. Cardiovascular:      Rate and Rhythm: Normal rate and regular rhythm. Heart sounds: Normal heart sounds. No murmur heard. No friction rub. No gallop. Pulmonary:      Effort: Pulmonary effort is normal. No respiratory distress. Breath sounds: Normal breath sounds. No stridor. No wheezing or rales. Chest:      Chest wall: No tenderness. Abdominal:      General: There is no distension. Tenderness: There is no abdominal tenderness. Musculoskeletal:         General: No tenderness. Normal range of motion. Cervical back: Normal range of motion and neck supple. Lymphadenopathy:      Cervical: No cervical adenopathy. Skin:     Findings: No erythema or rash. Neurological:      Mental Status: He is alert and oriented to person, place, and time. Cranial Nerves: No cranial nerve deficit.    Psychiatric:         Behavior: Behavior normal.     ekg sinus rhythm with no acute st-t changes    ASSESSMENT and PLAN    ICD-10-CM ICD-9-CM    1. SOB (shortness of breath)  R06.02 786.05 ECHO ADULT COMPLETE   2. Essential hypertension  I10 401.9    3. Morbid obesity ([address]  E66.01 278.01    4. Pre-op evaluation  Z01.818 V72.84      Orders Placed This Encounter    ECHO ADULT COMPLETE     Standing Status:   Future     Standing Expiration Date:   3/25/2022     Order Specific Question:   Contrast Enhancement (Bubble Study, Definity, Optison) may be used if criteria listed in established evidence-based protocol has been identified. Answer:   Yes     Follow-up and Dispositions    · Return in about 1 month (around 10/22/2021). current treatment plan is effective, no change in therapy  reviewed diet, exercise and weight control. Patient is a 59-year-old male superobese with history of hypertension seen for preop evaluation prior to weight loss surgery. Complains of dyspnea likely multifactorial secondary to obesity and poorly controlled hypertension. Patient  did not take his meds today. EKG revealed sinus rhythm with no acute ST-T changes. We will obtain echocardiogram to assess LV function. Will consider adding hydralazine in the next appointment if blood pressure remains high. Follow-up in 1 month.

## 2021-11-02 ENCOUNTER — TELEPHONE (OUTPATIENT)
Dept: CARDIOLOGY CLINIC | Age: 51
End: 2021-11-02

## 2021-11-02 DIAGNOSIS — R06.02 SOB (SHORTNESS OF BREATH): Primary | ICD-10-CM

## 2021-11-02 NOTE — TELEPHONE ENCOUNTER
Patient is over table limit for Echocardiogram in office will need to be done at hospital, please sign order. Called and left message for patient to call office to reschedule.

## 2021-11-11 ENCOUNTER — APPOINTMENT (OUTPATIENT)
Dept: INTERNAL MEDICINE CLINIC | Age: 51
End: 2021-11-11

## 2021-11-11 NOTE — PROGRESS NOTES
Berenice Khoury presents today for   Chief Complaint   Patient presents with    Follow-up                  1. \"Have you been to the ER, urgent care clinic since your last visit? Hospitalized since your last visit? \" {NO    2. \"Have you seen or consulted any other health care providers outside of the [address] [place] [address] since your last visit? \" YES     3. For patients aged 39-70: Has the patient had a colonoscopy?  YES

## 2021-11-12 LAB
A-G RATIO,AGRAT: 1.1 RATIO (ref 1.1–2.6)
ALBUMIN SERPL-MCNC: 3.9 G/DL (ref 3.5–5)
ALP SERPL-CCNC: 65 U/L (ref 25–115)
ALT SERPL-CCNC: 18 U/L (ref 5–40)
ANION GAP SERPL CALC-SCNC: 11 MMOL/L (ref 3–15)
AST SERPL W P-5'-P-CCNC: 20 U/L (ref 10–37)
AVG GLU, 10930: 123 MG/DL (ref 91–123)
BILIRUB SERPL-MCNC: 0.3 MG/DL (ref 0.2–1.2)
BUN SERPL-MCNC: 11 MG/DL (ref 6–22)
CALCIUM SERPL-MCNC: 8.6 MG/DL (ref 8.4–10.5)
CHLORIDE SERPL-SCNC: 104 MMOL/L (ref 98–110)
CHOLEST SERPL-MCNC: 170 MG/DL (ref 110–200)
CO2 SERPL-SCNC: 28 MMOL/L (ref 20–32)
CREAT SERPL-MCNC: 0.7 MG/DL (ref 0.5–1.2)
GFRAA, 66117: >60
GFRNA, 66118: >60
GLOBULIN,GLOB: 3.5 G/DL (ref 2–4)
GLUCOSE SERPL-MCNC: 97 MG/DL (ref 70–99)
HBA1C MFR BLD HPLC: 5.9 % (ref 4.8–5.6)
HDLC SERPL-MCNC: 37 MG/DL
HDLC SERPL-MCNC: 4.6 MG/DL (ref 0–5)
LDL/HDL RATIO,LDHD: 3.3
LDLC SERPL CALC-MCNC: 120 MG/DL (ref 50–99)
NON-HDL CHOLESTEROL, 011976: 133 MG/DL
POTASSIUM SERPL-SCNC: 4 MMOL/L (ref 3.5–5.5)
PROT SERPL-MCNC: 7.4 G/DL (ref 6.4–8.3)
SODIUM SERPL-SCNC: 143 MMOL/L (ref 133–145)
TRIGL SERPL-MCNC: 68 MG/DL (ref 40–149)
VLDLC SERPL CALC-MCNC: 14 MG/DL (ref 8–30)

## 2021-11-15 ENCOUNTER — TELEPHONE (OUTPATIENT)
Dept: INTERNAL MEDICINE CLINIC | Age: 51
End: 2021-11-15

## 2021-11-15 ENCOUNTER — OFFICE VISIT (OUTPATIENT)
Dept: INTERNAL MEDICINE CLINIC | Age: 51
End: 2021-11-15
Payer: MEDICAID

## 2021-11-15 VITALS
OXYGEN SATURATION: 97 % | RESPIRATION RATE: 18 BRPM | HEIGHT: 69 IN | WEIGHT: 315 LBS | SYSTOLIC BLOOD PRESSURE: 158 MMHG | BODY MASS INDEX: 46.65 KG/M2 | TEMPERATURE: 98 F | HEART RATE: 86 BPM | DIASTOLIC BLOOD PRESSURE: 96 MMHG

## 2021-11-15 DIAGNOSIS — M79.672 LEFT FOOT PAIN: Primary | ICD-10-CM

## 2021-11-15 DIAGNOSIS — M79.672 LEFT FOOT PAIN: ICD-10-CM

## 2021-11-15 DIAGNOSIS — R73.02 IMPAIRED GLUCOSE TOLERANCE: ICD-10-CM

## 2021-11-15 DIAGNOSIS — E66.01 OBESITY, MORBID (HCC): ICD-10-CM

## 2021-11-15 DIAGNOSIS — I10 ESSENTIAL HYPERTENSION: ICD-10-CM

## 2021-11-15 PROCEDURE — 99214 OFFICE O/P EST MOD 30 MIN: CPT | Performed by: INTERNAL MEDICINE

## 2021-11-15 RX ORDER — DULAGLUTIDE 0.75 MG/.5ML
0.75 INJECTION, SOLUTION SUBCUTANEOUS
Qty: 4 PEN | Refills: 1 | Status: SHIPPED | OUTPATIENT
Start: 2021-11-15 | End: 2022-01-19 | Stop reason: SDUPTHER

## 2021-11-15 RX ORDER — METHYLPREDNISOLONE 4 MG/1
TABLET ORAL
Qty: 1 DOSE PACK | Refills: 0 | Status: SHIPPED | OUTPATIENT
Start: 2021-11-15 | End: 2022-01-19

## 2021-11-15 NOTE — PROGRESS NOTES
INTERNISTS OF [facility]:  11/15/2021, MRN: [id number]      Alyssia Corona is a 46 y.o. male and presents to clinic for Follow-up and Labs (11-11-21)      Subjective: The patient is a 52-year-old male with history of hypertension, obesity, CVA, h/o H.pylori infection (treated last year), prediabetes, lumbar disc disease, and chronic bilateral knee pain (followed by Orthopedics). 1. Left Foot Pain: Present off and on since his last appointment. Pain is along the lateral edge of his left foot. Today he reports: Symptoms are not present x 1 wk. No h/o trauma. No paresthesias. No relief with tylenol. Pain worsens with walking. He had similar sx at the time of his last apt. A medrol dose pack was prescribed. Note that the patient was referred to Rheumatology for suggestibility no needed CRP and a positive rheumatoid factor level. 2. Prediabetes: He is eating more processed food. He ate a breakfast sandwich and orange juice. His weight is increasing. His weight is 44 pounds. Body mass index is 71. Results for Eusebia Toledo (MRN [id number]) as of 11/15/2021 09:59   Ref. Range 8/12/2021 09:01 8/12/2021 09:37 11/11/2021 10:56   Hemoglobin A1c, (calculated) Latest Ref Range: 4.8 - 5.6 % 5.8 (H)  5.9 (H)     3. HTN: He has yet to take his BP meds today. He takes two meds but needs refills. He does not know the name. He has not run out of his rx. He met with  in September. Cardiogram was recommended. Patient has not had this done. 4. General:   S/p 2 pfizer doses.         Patient Active Problem List    Diagnosis Date Noted    Chronic low back pain without sciatica 10/19/2019    Essential hypertension 06/10/2019    Impaired glucose tolerance 06/10/2019    History of Helicobacter pylori infection 06/10/2019    Obesity, morbid ([address] 07/13/2018    History of CVA (cerebrovascular accident) 07/13/2018    Chronic pain of both knees 07/13/2018       Current Outpatient Medications   Medication Sig Dispense Refill    losartan (COZAAR) 100 mg tablet Take 1 Tab by mouth daily. 30 Tab 6    hydroCHLOROthiazide (HYDRODIURIL) 25 mg tablet Take 1 Tab by mouth daily. 30 Tab 5    omeprazole (PRILOSEC) 20 mg capsule  (Patient not taking: Reported on 9/22/2021)      carvediloL (COREG) 12.5 mg tablet Take 1 Tab by mouth two (2) times daily (with meals). (Patient not taking: Reported on 9/22/2021) 69 Tab 5       Allergies   Allergen Reactions    Norvasc [Amlodipine] Swelling     BLE Edema       Past Medical History:   Diagnosis Date    Ambulates with cane     Arthritis     lower back and kness    Hypertension     Knee pain     Morbid obesity ([address]        Past Surgical History:   Procedure Laterality Date    COLONOSCOPY N/A 6/19/2020    COLONOSCOPY with polypectomy performed by Beth Poe MD at [facility]    HX CHOLECYSTECTOMY         Family History   Problem Relation Age of Onset    Heart Disease Mother     Diabetes Mother     Stroke Father        Social History     Tobacco Use    Smoking status: Never Smoker    Smokeless tobacco: Never Used   Substance Use Topics    Alcohol use: Yes     Alcohol/week: 1.0 standard drink     Types: 1 Cans of beer per week       ROS   Review of Systems   Constitutional: Negative for chills and fever. HENT: Negative for ear pain and sore throat. Eyes: Negative for blurred vision and pain. Respiratory: Negative for cough and shortness of breath. Cardiovascular: Negative for chest pain. Gastrointestinal: Negative for abdominal pain, blood in stool and melena. Genitourinary: Negative for dysuria and hematuria. Musculoskeletal: Positive for back pain (sciatica - unchanged) and joint pain (chronic b/l knee pain, declining surgical intervention). Negative for myalgias. +Left foot pain   Skin: Negative for rash. Neurological: Negative for headaches. Endo/Heme/Allergies: Does not bruise/bleed easily. Psychiatric/Behavioral: Negative for substance abuse. Objective     Vitals:    11/15/21 0919 11/15/21 0943   BP: (!) 161/103 (!) 158/96   Pulse: 86    Resp: 18    Temp: 98 °F (36.7 °C)    TempSrc: Temporal    SpO2: 97%    Weight: (!) 484 lb (219.5 kg)    Height: 5' 9\" (1.753 m)    PainSc:   5    PainLoc: Generalized        Physical Exam  Vitals and nursing note reviewed. Constitutional:       Appearance: Normal appearance. HENT:      Head: Normocephalic and atraumatic. Right Ear: External ear normal.      Left Ear: External ear normal.   Eyes:      Conjunctiva/sclera: Conjunctivae normal.   Cardiovascular:      Rate and Rhythm: Normal rate and regular rhythm. Pulses: Normal pulses. Heart sounds: Normal heart sounds. Pulmonary:      Effort: Pulmonary effort is normal.      Breath sounds: Normal breath sounds. Abdominal:      General: Abdomen is flat. Bowel sounds are normal. There is no distension. Palpations: Abdomen is soft. Tenderness: There is no abdominal tenderness. Musculoskeletal:         General: No swelling (BUE) or tenderness (BUE). Cervical back: Neck supple. Comments: His left foot is NTTP with adequate ROM and no effusions present   Lymphadenopathy:      Cervical: No cervical adenopathy. Skin:     General: Skin is warm and dry. Findings: No erythema. Neurological:      Mental Status: He is alert. Mental status is at baseline.       Gait: Gait (with a cane) normal.   Psychiatric:         Mood and Affect: Mood normal.         LABS   Data Review:   Lab Results   Component Value Date/Time    WBC 6.4 08/12/2021 09:01 AM    HGB 12.5 (L) 08/12/2021 09:01 AM    HCT 42.0 08/12/2021 09:01 AM    PLATELET 040 84/91/6510 09:01 AM    MCV 88 08/12/2021 09:01 AM       Lab Results   Component Value Date/Time    Sodium 143 11/11/2021 10:56 AM    Potassium 4.0 11/11/2021 10:56 AM    Chloride 104 11/11/2021 10:56 AM    CO2 28 11/11/2021 10:56 AM    Anion gap 11.0 11/11/2021 10:56 AM    Glucose 97 11/11/2021 10:56 AM    BUN 11 11/11/2021 10:56 AM    Creatinine 0.7 11/11/2021 10:56 AM    BUN/Creatinine ratio 12 10/16/2020 07:25 AM    GFR est AA >60 10/16/2020 07:25 AM    GFR est non-AA >60 10/16/2020 07:25 AM    Calcium 8.6 11/11/2021 10:56 AM       Lab Results   Component Value Date/Time    Cholesterol, total 170 11/11/2021 10:56 AM    HDL Cholesterol 37 (L) 11/11/2021 10:56 AM    LDL, calculated 120 (H) 11/11/2021 10:56 AM    VLDL, calculated 14 11/11/2021 10:56 AM    Triglyceride 68 11/11/2021 10:56 AM    CHOL/HDL Ratio 3.8 02/18/2019 09:50 AM       Lab Results   Component Value Date/Time    Hemoglobin A1c 5.9 (H) 11/11/2021 10:56 AM       Assessment/Plan:   1. Left foot pain: Could be from inflammatory arthritis given his history of an elevated CRP antibiotic related factor.  -He was given the contact information to Rheumatology and encouraged to schedule an appointment. We discussed how he can continue to take steroid therapy off and on for flareups-as it increases his chance of developing diabetes and helps to promote weight gain.  -Ordering a Medrol Dosepak.  -Ordering a left foot x-ray. ORDERS:  - methylPREDNISolone (MEDROL DOSEPACK) 4 mg tablet; Take per package instructions  Dispense: 1 Dose Pack; Refill: 0  - XR FOOT LT MIN 3 V; Future    2. Essential hypertension: BP is high but he has not taken his BP meds yet. He does not know which ones he is actually taking.  -Placing a referral to our clinical pharmacist for medication reconciliation.  -I will need to refill his blood pressure medications before he runs out.  -I will have her return to clinic to assess his blood pressure once he is on his medication. ORDERS:  - REFERRAL TO PHARMACIST    3. Impaired glucose tolerance and Obesity: Weight is increasing. His A1c is increasing and is now 5.9.  -The patient is interested in weight loss medication.   He is interested in trying Ozempic.  -Referral placed to our clinical pharmacy team for assistance.  -We will need to recheck an A1c in 6 months. This was discussed with him today.  -I encouraged him to reduce his carb intake. We discussed the importance of reducing his processed food intake. He is not take in liquid carbs (in the form of soda, tea, lemonade, or juice) if his meal includes a carbohydrate (solid food). ORDERS:  - REFERRAL TO PHARMACIST        Health Maintenance Due   Topic Date Due    Shingrix Vaccine Age 50> (1 of 2) Never done         Lab review: labs are reviewed in the EHR    I have discussed the diagnosis with the patient and the intended plan as seen in the above orders. The patient has received an after-visit summary and questions were answered concerning future plans. I have discussed medication side effects and warnings with the patient as well. I have reviewed the plan of care with the patient, accepted their input and they are in agreement with the treatment goals. All questions were answered. The patient understands the plan of care. Handouts provided today with above information. Pt instructed if symptoms worsen to call the office or report to the ED for continued care. Greater than 50% of the visit time was spent in counseling and/or coordination of care. Voice recognition was used to generate this report, which may have resulted in some phonetic based errors in grammar and contents. Even though attempts were made to correct all the mistakes, some may have been missed, and remained in the body of the document.           Sujit Mckenna MD

## 2021-11-15 NOTE — PATIENT INSTRUCTIONS
Eating Healthy Foods: Care Instructions  Your Care Instructions     Eating healthy foods can help lower your risk for disease. Healthy food gives you energy and keeps your heart strong, your brain active, your muscles working, and your bones strong. A healthy diet includes a variety of foods from the basic food groups: grains, vegetables, fruits, milk and milk products, and meat and beans. Some people may eat more of their favorite foods from only one food group and, as a result, miss getting the nutrients they need. So, it is important to pay attention not only to what you eat but also to what you are missing from your diet. You can eat a healthy, balanced diet by making a few small changes. Follow-up care is a key part of your treatment and safety. Be sure to make and go to all appointments, and call your doctor if you are having problems. It's also a good idea to know your test results and keep a list of the medicines you take. How can you care for yourself at home? Look at what you eat  · Keep a food diary for a week or two and record everything you eat or drink. Track the number of servings you eat from each food group. · For a balanced diet every day, eat a variety of:  ? 6 or more ounce-equivalents of grains, such as cereals, breads, crackers, rice, or pasta, every day. An ounce-equivalent is 1 slice of bread, 1 cup of ready-to-eat cereal, or ½ cup of cooked rice, cooked pasta, or cooked cereal.  ? 2½ cups of vegetables, especially:  § Dark-green vegetables such as broccoli and spinach. § Orange vegetables such as carrots and sweet potatoes. § Dry beans (such as holm and kidney beans) and peas (such as lentils). ? 2 cups of fresh, frozen, or canned fruit. A small apple or 1 banana or orange equals 1 cup. ? 3 cups of nonfat or low-fat milk, yogurt, or other milk products. ? 5½ ounces of meat and beans, such as chicken, fish, lean meat, beans, nuts, and seeds.  One egg, 1 tablespoon of peanut butter, ½ ounce nuts or seeds, or ¼ cup of cooked beans equals 1 ounce of meat. · Learn how to read food labels for serving sizes and ingredients. Fast-food and convenience-food meals often contain few or no fruits or vegetables. Make sure you eat some fruits and vegetables to make the meal more nutritious. · Look at your food diary. For each food group, add up what you have eaten and then divide the total by the number of days. This will give you an idea of how much you are eating from each food group. See if you can find some ways to change your diet to make it more healthy. Start small  · Do not try to make dramatic changes to your diet all at once. You might feel that you are missing out on your favorite foods and then be more likely to fail. · Start slowly, and gradually change your habits. Try some of the following:  ? Use whole wheat bread instead of white bread. ? Use nonfat or low-fat milk instead of whole milk. ? Eat brown rice instead of white rice, and eat whole wheat pasta instead of white-flour pasta. ? Try low-fat cheeses and low-fat yogurt. ? Add more fruits and vegetables to meals and have them for snacks. ? Add lettuce, tomato, cucumber, and onion to sandwiches. ? Add fruit to yogurt and cereal.  Enjoy food  · You can still eat your favorite foods. You just may need to eat less of them. If your favorite foods are high in fat, salt, and sugar, limit how often you eat them, but do not cut them out entirely. · Eat a wide variety of foods. Make healthy choices when eating out  · The type of restaurant you choose can help you make healthy choices. Even fast-food chains are now offering more low-fat or healthier choices on the menu. · Choose smaller portions, or take half of your meal home. · When eating out, try:  ? A veggie pizza with a whole wheat crust or grilled chicken (instead of sausage or pepperoni).   ? Pasta with roasted vegetables, grilled chicken, or marinara sauce instead of cream sauce. ? A vegetable wrap or grilled chicken wrap. ? Broiled or poached food instead of fried or breaded items. Make healthy choices easy  · Buy packaged, prewashed, ready-to-eat fresh vegetables and fruits, such as baby carrots, salad mixes, and chopped or shredded broccoli and cauliflower. · Buy packaged, presliced fruits, such as melon or pineapple. · Choose 100% fruit or vegetable juice instead of soda. Limit juice intake to 4 to 6 oz (½ to ¾ cup) a day. · Blend low-fat yogurt, fruit juice, and canned or frozen fruit to make a smoothie for breakfast or a snack. Where can you learn more? Go to http://www.cabello.com/  Enter T756 in the search box to learn more about \"Eating Healthy Foods: Care Instructions. \"  Current as of: December 17, 2020               Content Version: 13.0  © 2006-2021 [employer]. Care instructions adapted under license by [employer] (which disclaims liability or warranty for this information). If you have questions about a medical condition or this instruction, always ask your healthcare professional. [place] [address] any warranty or liability for your use of this information.

## 2021-11-16 LAB
CREATININE, URINE: 241 MG/DL
MICROALB/CREAT RATIO, 140286: NORMAL
MICROALBUMIN,URINE RANDOM 140054: <12 MG/L (ref 0.1–17)

## 2021-11-16 NOTE — TELEPHONE ENCOUNTER
Saw patient briefly after PCP visit. Decided to start Trulicity per discussion with PCP for assistance with weight loss and prevention of diabetes progression. Provided education on Trulicity with patient in office today and he will return in 1 month for diet/nutrition education. Thank you,  Smiley Ruiz. SIDNEY Lombardo        For Pharmacy Admin Tracking Only     CPA in place:  Yes   Recommendation Provided To: Patient/Caregiver: 2 via In person   Intervention Detail: Device Training and New Rx: 1, reason: Needs Additional Therapy   Gap Closed?: No   Intervention Accepted By: Patient/Caregiver: 2   Time Spent (min): 30

## 2022-01-12 ENCOUNTER — VIRTUAL VISIT (OUTPATIENT)
Dept: INTERNAL MEDICINE CLINIC | Age: 52
End: 2022-01-12
Payer: MEDICAID

## 2022-01-12 ENCOUNTER — TELEPHONE (OUTPATIENT)
Dept: INTERNAL MEDICINE CLINIC | Age: 52
End: 2022-01-12

## 2022-01-12 DIAGNOSIS — K59.09 CHRONIC CONSTIPATION: Primary | ICD-10-CM

## 2022-01-12 DIAGNOSIS — E66.01 OBESITY, MORBID (HCC): ICD-10-CM

## 2022-01-12 DIAGNOSIS — M79.672 LEFT FOOT PAIN: ICD-10-CM

## 2022-01-12 DIAGNOSIS — R73.02 IMPAIRED GLUCOSE TOLERANCE: ICD-10-CM

## 2022-01-12 PROCEDURE — 99442 PR PHYS/QHP TELEPHONE EVALUATION 11-20 MIN: CPT | Performed by: INTERNAL MEDICINE

## 2022-01-12 RX ORDER — GLYCERIN ADULT
1 SUPPOSITORY, RECTAL RECTAL
Qty: 7 SUPPOSITORY | Refills: 0 | Status: SHIPPED | OUTPATIENT
Start: 2022-01-12 | End: 2022-06-22

## 2022-01-12 RX ORDER — DOCUSATE SODIUM 100 MG/1
100 CAPSULE, LIQUID FILLED ORAL 2 TIMES DAILY
Qty: 60 CAPSULE | Refills: 5 | Status: SHIPPED | OUTPATIENT
Start: 2022-01-12 | End: 2022-06-16 | Stop reason: SDUPTHER

## 2022-01-12 NOTE — TELEPHONE ENCOUNTER
Pt forgot at virtual appt today to discuss paperwork needed for Social Security disability .     He will be dropping off form sometime soon

## 2022-01-12 NOTE — PROGRESS NOTES
Lluvia Peter is a 46 y.o. male who was seen by synchronous (real-time) audio-phone only technology on 1/12/2022. Assessment & Plan:   1. Chronic constipation:   Placing a referral to GI for evaluation.  Ordering a glycerin suppository and docusate to take with MiraLAX over-the-counter. I encouraged him to increase his vegetable intake, fiber intake, and hydration with water    ORDERS:  - REFERRAL TO GASTROENTEROLOGY  - glycerin, adult, suppository; Insert 1 Suppository into rectum daily as needed (Constipation). Dispense: 7 Suppository; Refill: 0  - docusate sodium (COLACE) 100 mg capsule; Take 1 Capsule by mouth two (2) times a day. Dispense: 60 Capsule; Refill: 5    2. Left foot pain: Resolved. He may have rheumatoid arthritis per labs and history. I stressed the importance of having him scheduled to see a rheumatologist.  I will have my office contact him with their contact information    3. Impaired glucose tolerance and Obesity:   Placing a referral to discuss surgical weight loss. Marcella Marie continue with Trulicity. ORDERS:  - REFERRAL TO BARIATRIC SURGERY    Lab review: labs are reviewed in the EHR     I have discussed the diagnosis with the patient and the intended plan as seen in the above orders. I have discussed medication side effects and warnings with the patient as well. I have reviewed the plan of care with the patient, accepted their input and they are in agreement with the treatment goals. All questions were answered. The patient understands the plan of care. Pt instructed if symptoms worsen to call the office or report to the ED for continued care. Greater than 50% of the visit time was spent in counseling and/or coordination of care. I spent 13 min with the patient for this phone only encounter. Voice recognition was used to generate this report, which may have resulted in some phonetic based errors in grammar and contents.  Even though attempts were made to correct all the mistakes, some may have been missed, and remained in the body of the document. Subjective:   Cadence Collins was seen for   Chief Complaint   Patient presents with    Follow-up     The patient is a 48-year-old male with history of hypertension, obesity, CVA, h/o H.pylori infection (treated last year), prediabetes, lumbar disc disease, and chronic bilateral knee pain (followed by Orthopedics). 1. Left Foot Pain: He reported left foot pain off and on over the past several weeks at his last appointment. There is no history of trauma or paresthesias. There is no relieving factors. Pain did not improve with Tylenol. Pain worsens with walking. The patient has a history of a positive rheumatoid factor. He was referred to Rheumatology for evaluation. A Medrol Dosepak and x-ray were ordered at his last visit. Since then, he reports that his left foot pain has improved. He has not scheduled an appointment with Rheumatology. 2. Prediabetes and Obesity: Trulicity was ordered for prediabetes and weight reduction. He has not noticed a difference with weight reduction since starting Trulicity. His diet is unchanged since his last visit. 3.  Constipation: Chronic. He strains with having a bowel movement. He does not consume vegetables. He has some bloating but no abdominal pain. Prior to Admission medications    Medication Sig Start Date End Date Taking? Authorizing Provider   methylPREDNISolone (MEDROL DOSEPACK) 4 mg tablet Take per package instructions 11/15/21   Noni Lopez MD   dulaglutide (Trulicity) 2.64 EU/4.2 mL sub-q pen 0.5 mL by SubCUTAneous route every seven (7) days. 11/15/21   Noni Lopez MD   omeprazole (PRILOSEC) 20 mg capsule  8/10/21   Provider, Historical   carvediloL (COREG) 12.5 mg tablet Take 1 Tab by mouth two (2) times daily (with meals).   Patient not taking: Reported on 9/22/2021 5/4/21   Noni Lopez MD   losartan (COZAAR) 100 mg tablet Take 1 Tab by mouth daily. 3/22/21   Kvng Taylor MD   hydroCHLOROthiazide (HYDRODIURIL) 25 mg tablet Take 1 Tab by mouth daily. 7/7/20   Kvng Taylor MD     Allergies   Allergen Reactions    Norvasc [Amlodipine] Swelling     BLE Edema     Past Medical History:   Diagnosis Date    Ambulates with cane     Arthritis     lower back and kness    Hypertension     Knee pain     Morbid obesity ([address]      Past Surgical History:   Procedure Laterality Date    COLONOSCOPY N/A 6/19/2020    COLONOSCOPY with polypectomy performed by Altagracia Merchant MD at [facility]    HX CHOLECYSTECTOMY       Family History   Problem Relation Age of Onset    Heart Disease Mother     Diabetes Mother     Stroke Father      Social History     Socioeconomic History    Marital status:    Tobacco Use    Smoking status: Never Smoker    Smokeless tobacco: Never Used   Substance and Sexual Activity    Alcohol use: Yes     Alcohol/week: 1.0 standard drink     Types: 1 Cans of beer per week    Drug use: Not Currently    Sexual activity: Not Currently       ROS:  Gen: No fever/chills  HEENT: No sore throat, eye pain, ear pain, or congestion.  No HA  CV: No CP  Resp: No cough/SOB  GI: No abdominal pain  : No hematuria/dysuria  Derm: No rash  Neuro: No new paresthesias/weakness  Musc: No new myalgias/jt pain  Psych: No depression sx      LABS:  Lab Results   Component Value Date/Time    Sodium 143 11/11/2021 10:56 AM    Potassium 4.0 11/11/2021 10:56 AM    Chloride 104 11/11/2021 10:56 AM    CO2 28 11/11/2021 10:56 AM    Anion gap 11.0 11/11/2021 10:56 AM    Glucose 97 11/11/2021 10:56 AM    BUN 11 11/11/2021 10:56 AM    Creatinine 0.7 11/11/2021 10:56 AM    BUN/Creatinine ratio 12 10/16/2020 07:25 AM    GFR est AA >60 10/16/2020 07:25 AM    GFR est non-AA >60 10/16/2020 07:25 AM    Calcium 8.6 11/11/2021 10:56 AM       Lab Results   Component Value Date/Time    Cholesterol, total 170 11/11/2021 10:56 AM    HDL Cholesterol 37 (L) 11/11/2021 10:56 AM    LDL, calculated 120 (H) 11/11/2021 10:56 AM    VLDL, calculated 14 11/11/2021 10:56 AM    Triglyceride 68 11/11/2021 10:56 AM    CHOL/HDL Ratio 3.8 02/18/2019 09:50 AM       Lab Results   Component Value Date/Time    WBC 6.4 08/12/2021 09:01 AM    HGB 12.5 (L) 08/12/2021 09:01 AM    HCT 42.0 08/12/2021 09:01 AM    PLATELET 108 75/57/5658 09:01 AM    MCV 88 08/12/2021 09:01 AM       Lab Results   Component Value Date/Time    Hemoglobin A1c 5.9 (H) 11/11/2021 10:56 AM       Lab Results   Component Value Date/Time    TSH 2.77 07/08/2019 10:18 AM           Due to this being a TeleHealth phone only evaluation, many elements of the physical examination are unable to be assessed. The pt was seen by synchronous (real-time) audio-phone only technology, and/or her healthcare decision maker, is aware that this patient-initiated, Telehealth encounter is a billable service, with coverage as determined by her insurance carrier. She is aware that she may receive a bill and has provided verbal consent to proceed: Yes. The pt is being evaluated by a phone only visit encounter for concerns as above. A caregiver was present when appropriate. Due to this being a TeleHealth encounter (During JNGUU-51 public health emergency), evaluation of the following organ systems was limited: Vitals/Constitutional/EENT/Resp/CV/GI//MS/Neuro/Skin/Heme-Lymph-Imm. Pursuant to the emergency declaration under the [facility], [address] [place] [address] authority and the [employer] and [employer] Act, this Virtual phone only visit was conducted, with patient's (and/or legal guardian's) consent, to reduce the patient's risk of exposure to COVID-19 and provide necessary medical care. Services were provided through a phone only synchronous discussion virtually to substitute for in-person clinic visit.  Patient and provider were located at their individual home/office respectively. We discussed the expected course, resolution and complications of the diagnosis(es) in detail. Medication risks, benefits, costs, interactions, and alternatives were discussed as indicated. I advised him to contact the office if his condition worsens, changes or fails to improve as anticipated. He expressed understanding with the diagnosis(es) and plan.      Lelia Chappell MD

## 2022-01-19 ENCOUNTER — TELEPHONE (OUTPATIENT)
Dept: INTERNAL MEDICINE CLINIC | Age: 52
End: 2022-01-19

## 2022-01-19 DIAGNOSIS — R73.02 IMPAIRED GLUCOSE TOLERANCE: ICD-10-CM

## 2022-01-19 RX ORDER — DULAGLUTIDE 0.75 MG/.5ML
0.75 INJECTION, SOLUTION SUBCUTANEOUS
Qty: 4 PEN | Refills: 1 | Status: SHIPPED | OUTPATIENT
Start: 2022-01-19 | End: 2022-06-16 | Stop reason: SDUPTHER

## 2022-01-19 NOTE — TELEPHONE ENCOUNTER
----- Message from Jeannette Mcbride MD sent at 1/19/2022  5:33 AM EST -----  Regarding: F/u apt in April or May  Please schedule him for a follow-up 30-minute in office visit with me in April or May.     Thanks,  Dr. Tracy Lee  [facility] [address], [address] [place] [address].  Phone: [phone]  Fax: [phone]

## 2022-01-19 NOTE — LETTER
1/20/2022 8:15 AM        Mr. Leanna Larsen  [address] [place] [id number]      Dear Mr. Lillian Orellana:    Alma Kumari tried to reach you. Please call our office at [phone] and schedule a follow up appointment in April or May for your continued care.         Sincerely,      Manuela Main MD

## 2022-01-19 NOTE — TELEPHONE ENCOUNTER
Last Visit: 22 with MD Ariadna Dominguez  Next Appointment: none  Previous Refill Encounter(s): 11/15/21 #4 with 1 refill    Requested Prescriptions     Pending Prescriptions Disp Refills    dulaglutide (Trulicity) 5.14 CZ/3.3 mL sub-q pen 4 Pen 1     Si.5 mL by SubCUTAneous route every seven (7) days.

## 2022-02-13 ENCOUNTER — HOSPITAL ENCOUNTER (EMERGENCY)
Age: 52
Discharge: HOME | End: 2022-02-13
Attending: EMERGENCY MEDICINE
Payer: MEDICAID

## 2022-02-13 VITALS
OXYGEN SATURATION: 95 % | DIASTOLIC BLOOD PRESSURE: 101 MMHG | HEIGHT: 69 IN | TEMPERATURE: 97.9 F | HEART RATE: 98 BPM | WEIGHT: 315 LBS | RESPIRATION RATE: 16 BRPM | SYSTOLIC BLOOD PRESSURE: 179 MMHG | BODY MASS INDEX: 46.65 KG/M2

## 2022-02-13 DIAGNOSIS — K59.00 CONSTIPATION, UNSPECIFIED CONSTIPATION TYPE: Primary | ICD-10-CM

## 2022-02-13 PROCEDURE — 99282 EMERGENCY DEPT VISIT SF MDM: CPT

## 2022-02-13 RX ORDER — POLYETHYLENE GLYCOL 3350 17 G/17G
17 POWDER, FOR SOLUTION ORAL DAILY
Qty: 170 G | Refills: 0 | Status: SHIPPED | OUTPATIENT
Start: 2022-02-13 | End: 2022-02-23

## 2022-02-13 RX ORDER — MAGNESIUM CITRATE
SOLUTION, ORAL ORAL
Qty: 300 ML | Refills: 0 | Status: SHIPPED | OUTPATIENT
Start: 2022-02-13 | End: 2022-06-22

## 2022-02-13 NOTE — ED PROVIDER NOTES
EMERGENCY DEPARTMENT HISTORY AND PHYSICAL EXAM    12:19 PM  Date: (Not on file)  Patient Name: Melina Guardado    History of Presenting Illness       History Provided By:     HPI: Melina Guardado is a 46 y.o. male with past medical history as below, morbid obesity presents with constipation. Patient has been having bowel movements daily but hard and  small amount. Denies any abdominal pain. No nausea vomiting. No fever or chills. Patient used metamucil with no improvement. PCP: Lupe Bates MD    Past History     Past Medical History:  Past Medical History:   Diagnosis Date    Ambulates with cane     Arthritis     lower back and kness    Hypertension     Knee pain     Morbid obesity ([address]        Past Surgical History:  Past Surgical History:   Procedure Laterality Date    COLONOSCOPY N/A 6/19/2020    COLONOSCOPY with polypectomy performed by Ishaan Singer MD at [facility]    HX CHOLECYSTECTOMY         Family History:  Family History   Problem Relation Age of Onset    Heart Disease Mother     Diabetes Mother     Stroke Father        Social History:  Social History     Tobacco Use    Smoking status: Never Smoker    Smokeless tobacco: Never Used   Substance Use Topics    Alcohol use: Yes     Alcohol/week: 1.0 standard drink     Types: 1 Cans of beer per week    Drug use: Not Currently       Allergies: Allergies   Allergen Reactions    Norvasc [Amlodipine] Swelling     BLE Edema       Review of Systems   Review of Systems   Constitutional: Negative for activity change, appetite change and chills. HENT: Negative for congestion, ear discharge, ear pain and sore throat. Eyes: Negative for photophobia and pain. Respiratory: Negative for cough and choking. Cardiovascular: Negative for palpitations and leg swelling. Gastrointestinal: Positive for constipation. Negative for anal bleeding and rectal pain. Endocrine: Negative for polydipsia and polyuria. Genitourinary: Negative for genital sores and urgency. Musculoskeletal: Negative for arthralgias and myalgias. Neurological: Negative for dizziness, seizures and speech difficulty. Psychiatric/Behavioral: Negative for hallucinations, self-injury and suicidal ideas. Physical Exam     Patient Vitals for the past 12 hrs:   Temp Pulse Resp BP SpO2   02/13/22 1217 97.9 °F (36.6 °C) 98 16 (!) 179/101 95 %       Physical Exam  Vitals and nursing note reviewed. Constitutional:       Appearance: He is well-developed. He is obese. HENT:      Head: Normocephalic and atraumatic. Eyes:      General:         Right eye: No discharge. Left eye: No discharge. Cardiovascular:      Rate and Rhythm: Normal rate and regular rhythm. Heart sounds: Normal heart sounds. No murmur heard. Pulmonary:      Effort: Pulmonary effort is normal. No respiratory distress. Breath sounds: Normal breath sounds. No stridor. No wheezing or rales. Chest:      Chest wall: No tenderness. Abdominal:      General: Bowel sounds are normal. There is no distension. Palpations: Abdomen is soft. Tenderness: There is no abdominal tenderness. There is no guarding or rebound. Musculoskeletal:         General: Normal range of motion. Cervical back: Normal range of motion and neck supple. Skin:     General: Skin is warm and dry. Neurological:      Mental Status: He is alert and oriented to person, place, and time. Diagnostic Study Results     Labs -  No results found for this or any previous visit (from the past 12 hour(s)). Radiologic Studies -   No results found. Medical Decision Making     ED Course: Progress Notes, Reevaluation, and Consults:    12:19 PM Initial assessment performed. The patients presenting problems have been discussed, and they/their family are in agreement with the care plan formulated and outlined with them.   I have encouraged them to ask questions as they arise throughout their visit. Provider Notes (Medical Decision Making):   Patient presents with constipation  Normal vitals apart from hypertension  No abdominal pain  No abdominal tenderness on exam  Patient passes small amount of hard stool daily. Low suspicion for obstruction. Patient prefers not to have further work-up and have a trial of magnesium citrate and stool softeners  Advised to return if constipation persists            Vital Signs-Reviewed the patient's vital signs. Reviewed pt's pulse ox reading. Records Reviewed: old medical records  -I am the first provider for this patient.  -I reviewed the vital signs, available nursing notes, past medical history, past surgical history, family history and social history. Current Outpatient Medications   Medication Sig Dispense Refill    dulaglutide (Trulicity) 2.52 WM/2.0 mL sub-q pen 0.5 mL by SubCUTAneous route every seven (7) days. 4 Pen 1    glycerin, adult, suppository Insert 1 Suppository into rectum daily as needed (Constipation). 7 Suppository 0    docusate sodium (COLACE) 100 mg capsule Take 1 Capsule by mouth two (2) times a day. 60 Capsule 5    carvediloL (COREG) 12.5 mg tablet Take 1 Tab by mouth two (2) times daily (with meals). (Patient not taking: Reported on 9/22/2021) 69 Tab 5    losartan (COZAAR) 100 mg tablet Take 1 Tab by mouth daily. 30 Tab 6    hydroCHLOROthiazide (HYDRODIURIL) 25 mg tablet Take 1 Tab by mouth daily. 30 Tab 5        Clinical Impression     Clinical Impression: No diagnosis found. Disposition: This note was dictated utilizing voice recognition software which may lead to typographical errors. I apologize in advance if the situation occurs. If questions arise please do not hesitate to contact me or call our department.     Angelo Live MD  12:19 PM

## 2022-02-13 NOTE — ED NOTES
Pt discharged at this time. This RN reviewed discharge instructions at this time with the pt, & pt does not have any questions. Pt ambulatory upon discharge, & in stable condition. Pt armband removed & shredded. Pt given written prescriptions.

## 2022-02-13 NOTE — ED TRIAGE NOTES
Constipation for 5 days. Having small BMs but no large BM. Patient has taken metamucil at home. MD would like to room patient.

## 2022-03-18 PROBLEM — E66.01 OBESITY, MORBID (HCC): Status: ACTIVE | Noted: 2018-07-13

## 2022-03-19 PROBLEM — M25.561 CHRONIC PAIN OF BOTH KNEES: Status: ACTIVE | Noted: 2018-07-13

## 2022-03-19 PROBLEM — Z86.19 HISTORY OF HELICOBACTER PYLORI INFECTION: Status: ACTIVE | Noted: 2019-06-10

## 2022-03-19 PROBLEM — G89.29 CHRONIC PAIN OF BOTH KNEES: Status: ACTIVE | Noted: 2018-07-13

## 2022-03-19 PROBLEM — Z86.73 HISTORY OF CVA (CEREBROVASCULAR ACCIDENT): Status: ACTIVE | Noted: 2018-07-13

## 2022-03-19 PROBLEM — I10 ESSENTIAL HYPERTENSION: Status: ACTIVE | Noted: 2019-06-10

## 2022-03-19 PROBLEM — M25.562 CHRONIC PAIN OF BOTH KNEES: Status: ACTIVE | Noted: 2018-07-13

## 2022-03-20 PROBLEM — G89.29 CHRONIC LOW BACK PAIN WITHOUT SCIATICA: Status: ACTIVE | Noted: 2019-10-19

## 2022-03-20 PROBLEM — M54.50 CHRONIC LOW BACK PAIN WITHOUT SCIATICA: Status: ACTIVE | Noted: 2019-10-19

## 2022-03-20 PROBLEM — R73.02 IMPAIRED GLUCOSE TOLERANCE: Status: ACTIVE | Noted: 2019-06-10

## 2022-04-11 NOTE — LETTER
10/26/20 Patient: Gosia Madsen YOB: 1970 Date of Visit: 10/26/2020 [address] [id number] [place] [id number] VIA In Basket Dear Rj Prater MD, Thank you for referring Mr. Charlotte Dunlap to [address] for evaluation. My notes for this consultation are attached. If you have questions, please do not hesitate to call me. I look forward to following your patient along with you. Sincerely, Aaliyah Barrera MD 
 
 Walk in / drive in

## 2022-04-27 ENCOUNTER — TELEPHONE (OUTPATIENT)
Dept: INTERNAL MEDICINE CLINIC | Age: 52
End: 2022-04-27

## 2022-04-27 NOTE — TELEPHONE ENCOUNTER
Patient is requesting to be seen because his feet are swelling. He already has an appt scheduled for 6/1 but wants to be seen sooner. Schedule is full/blocked. Please advise.

## 2022-04-28 ENCOUNTER — OFFICE VISIT (OUTPATIENT)
Dept: INTERNAL MEDICINE CLINIC | Age: 52
End: 2022-04-28
Payer: MEDICAID

## 2022-04-28 VITALS
TEMPERATURE: 97.2 F | DIASTOLIC BLOOD PRESSURE: 84 MMHG | HEIGHT: 69 IN | HEART RATE: 85 BPM | SYSTOLIC BLOOD PRESSURE: 143 MMHG | RESPIRATION RATE: 16 BRPM | OXYGEN SATURATION: 94 % | WEIGHT: 315 LBS | BODY MASS INDEX: 46.65 KG/M2

## 2022-04-28 DIAGNOSIS — E78.5 HYPERLIPIDEMIA, UNSPECIFIED HYPERLIPIDEMIA TYPE: ICD-10-CM

## 2022-04-28 DIAGNOSIS — M25.572 ACUTE LEFT ANKLE PAIN: ICD-10-CM

## 2022-04-28 DIAGNOSIS — I10 ESSENTIAL HYPERTENSION: ICD-10-CM

## 2022-04-28 DIAGNOSIS — R60.0 LEG EDEMA, LEFT: ICD-10-CM

## 2022-04-28 DIAGNOSIS — R73.02 IMPAIRED GLUCOSE TOLERANCE: ICD-10-CM

## 2022-04-28 DIAGNOSIS — M25.572 ACUTE LEFT ANKLE PAIN: Primary | ICD-10-CM

## 2022-04-28 DIAGNOSIS — R06.09 DOE (DYSPNEA ON EXERTION): ICD-10-CM

## 2022-04-28 PROCEDURE — 99214 OFFICE O/P EST MOD 30 MIN: CPT | Performed by: INTERNAL MEDICINE

## 2022-04-28 RX ORDER — PREDNISONE 20 MG/1
40 TABLET ORAL
Qty: 10 TABLET | Refills: 0 | Status: SHIPPED | OUTPATIENT
Start: 2022-04-28 | End: 2022-06-16 | Stop reason: SDUPTHER

## 2022-04-28 NOTE — PROGRESS NOTES
Anitha Smith presents today for No chief complaint on file. bilateral edema    1. \"Have you been to the ER, urgent care clinic since your last visit? Hospitalized since your last visit? \" ***no    2. \"Have you seen or consulted any other health care providers outside of the [address] [place] [address] since your last visit? \" no     3. For patients aged 39-70: Has the patient had a colonoscopy / FIT/ Cologuard? Yes - no Care Gap present      If the patient is female:    4. For patients aged 41-77: Has the patient had a mammogram within the past 2 years? NA - based on age or sex  See top three    5. For patients aged 21-65: Has the patient had a pap smear?  NA - based on age or sex

## 2022-04-28 NOTE — PROGRESS NOTES
[facility] OF [facility]:  5/4/2022, MRN: [id number]      Teresa Glover is a 46 y.o. male and presents to clinic for Ankle Pain      Subjective: The patient is a 48-year-old male with history of hypertension, obesity, CVA, h/o H.pylori infection (treated last year), prediabetes, suspected rheumatoid arthritis, suspected gout, lumbar disc disease, and chronic bilateral knee pain (followed by Orthopedics). 1. LLE Pain/Swelling: Present x 1 wk. Tylenol temporarily helps to relieve his pain. No fever, chest pain, and palpitations. No recent travel. He has pain off/on along the top left area of his left foot. Pain is associated with edema. Note that at his last appointment, he reported left foot pain for several weeks. There is no history of trauma or paresthesias. Pain did not respond to Tylenol and was worse with walking. He was referred to a rheumatologist for evaluation given his history of a positive rheumatoid factor in the setting of an elevated CRP. He has yet to schedule an appointment with Rheumatology. Results for Antonia Flores (MRN [id number]) as of 5/4/2022 04:46   Ref. Range 4/7/2021 18:33 8/12/2021 09:01 4/29/2022 10:30   C-Reactive Protein, Qt Latest Ref Range: <=0.5 mg/dL  2.7 (H) 6.0 (H)   C REACTIVE PROTEIN, QT Unknown  Rpt (A) Rpt (A)   Rheumatoid Arthritis Factor Latest Units: Units/mL 29.3 (H) 31.3 (H)    14.3.3 ETA, Rheum. Arthritis Latest Units: ng/mL <0.20 <0.20        2. LEWIS: Present x 1 month. No URI sx. No sick contacts. 3.  Hypertension: His blood pressure is mildly elevated today at 143/84. BMI is 73. Weight is 497 pounds. He takes carvedilol, losartan, and hydrochlorothiazide. He is also taking Trulicity given his history of prediabetes and to help with weight reduction.       Patient Active Problem List    Diagnosis Date Noted    Gout 05/04/2022    Chronic low back pain without sciatica 10/19/2019    Essential hypertension 06/10/2019    Impaired glucose tolerance 06/10/2019    History of Helicobacter pylori infection 06/10/2019    Obesity, morbid ([address] 07/13/2018    History of CVA (cerebrovascular accident) 07/13/2018    Chronic pain of both knees 07/13/2018       Current Outpatient Medications   Medication Sig Dispense Refill    predniSONE (DELTASONE) 20 mg tablet Take 40 mg by mouth daily (with breakfast). 10 Tablet 0    magnesium citrate solution Take 1/3 of bottle every 8 hours until finished or until you have a bowel movement. 300 mL 0    docusate sodium (COLACE) 100 mg capsule Take 1 Capsule by mouth two (2) times a day. 60 Capsule 5    carvediloL (COREG) 12.5 mg tablet Take 1 Tab by mouth two (2) times daily (with meals). 69 Tab 5    losartan (COZAAR) 100 mg tablet Take 1 Tab by mouth daily. 30 Tab 6    hydroCHLOROthiazide (HYDRODIURIL) 25 mg tablet Take 1 Tab by mouth daily. 30 Tab 5    dulaglutide (Trulicity) 8.39 UE/1.9 mL sub-q pen 0.5 mL by SubCUTAneous route every seven (7) days. (Patient not taking: Reported on 4/28/2022) 4 Pen 1    glycerin, adult, suppository Insert 1 Suppository into rectum daily as needed (Constipation). (Patient not taking: Reported on 4/28/2022) 7 Suppository 0       Allergies   Allergen Reactions    Norvasc [Amlodipine] Swelling     BLE Edema       Past Medical History:   Diagnosis Date    Ambulates with cane     Arthritis     lower back and kness    Hypertension     Knee pain     Morbid obesity ([address]        Past Surgical History:   Procedure Laterality Date    COLONOSCOPY N/A 6/19/2020    COLONOSCOPY with polypectomy performed by Enrrique De Santiago MD at [facility]COPY    HX CHOLECYSTECTOMY         Family History   Problem Relation Age of Onset    Heart Disease Mother     Diabetes Mother     Stroke Father        Social History     Tobacco Use    Smoking status: Never Smoker    Smokeless tobacco: Never Used   Substance Use Topics    Alcohol use:  Yes     Alcohol/week: 1.0 standard drink     Types: 1 Cans of beer per week       ROS   Review of Systems   Constitutional: Negative for chills and fever. HENT: Negative for ear pain and sore throat. Eyes: Negative for blurred vision and pain. Respiratory: Positive for shortness of breath (LEWIS). Negative for cough. Cardiovascular: Negative for chest pain. Gastrointestinal: Negative for abdominal pain, blood in stool and melena. Genitourinary: Negative for dysuria and hematuria. Musculoskeletal: Positive for back pain and joint pain. Negative for myalgias. Skin: Negative for rash. Neurological: Negative for headaches. Endo/Heme/Allergies: Does not bruise/bleed easily. Psychiatric/Behavioral: Negative for substance abuse. Objective     Vitals:    04/28/22 1411 04/28/22 1417   BP: (!) 172/101 (!) 143/84   Pulse: 85    Resp: 16    Temp: 97.2 °F (36.2 °C)    TempSrc: Temporal    SpO2: 94%    Weight: (!) 497 lb (225.4 kg)    Height: 5' 9\" (1.753 m)    PainSc:   0 - No pain        Physical Exam  Vitals and nursing note reviewed. Constitutional:       Appearance: Normal appearance. He is obese. HENT:      Head: Normocephalic and atraumatic. Right Ear: External ear normal.      Left Ear: External ear normal.   Eyes:      Extraocular Movements: Extraocular movements intact. Conjunctiva/sclera: Conjunctivae normal.   Cardiovascular:      Rate and Rhythm: Normal rate and regular rhythm. Pulses: Normal pulses. Heart sounds: Normal heart sounds. Pulmonary:      Effort: Pulmonary effort is normal.      Breath sounds: Normal breath sounds. Abdominal:      General: Abdomen is flat. Bowel sounds are normal. There is no distension. Palpations: Abdomen is soft. Tenderness: There is no abdominal tenderness. Musculoskeletal:         General: No swelling (BUE) or tenderness (BUE). Cervical back: Neck supple.       Comments: He has tenderness palpation along the lateral left ankle effusion but there is no erythema or increased warmth along this affected area. Lymphadenopathy:      Cervical: No cervical adenopathy. Skin:     General: Skin is warm and dry. Findings: No erythema. Neurological:      Mental Status: He is alert. Mental status is at baseline. Gait: Gait normal.   Psychiatric:         Mood and Affect: Mood normal.         LABS   Data Review:   Lab Results   Component Value Date/Time    WBC 7.1 04/29/2022 10:30 AM    HGB 12.0 (L) 04/29/2022 10:30 AM    HCT 40.4 04/29/2022 10:30 AM    PLATELET 460 76/35/1288 10:30 AM    MCV 89 04/29/2022 10:30 AM       Lab Results   Component Value Date/Time    Sodium 140 04/29/2022 10:30 AM    Potassium 4.1 04/29/2022 10:30 AM    Chloride 105 04/29/2022 10:30 AM    CO2 26 04/29/2022 10:30 AM    Anion gap 9.0 04/29/2022 10:30 AM    Glucose 99 04/29/2022 10:30 AM    BUN 13 04/29/2022 10:30 AM    Creatinine 0.8 04/29/2022 10:30 AM    BUN/Creatinine ratio 12 10/16/2020 07:25 AM    GFR est AA >60 10/16/2020 07:25 AM    GFR est non-AA >60 10/16/2020 07:25 AM    Calcium 8.5 04/29/2022 10:30 AM       Lab Results   Component Value Date/Time    Cholesterol, total 157 04/29/2022 10:30 AM    HDL Cholesterol 41 04/29/2022 10:30 AM    LDL, calculated 104 (H) 04/29/2022 10:30 AM    VLDL, calculated 12 04/29/2022 10:30 AM    Triglyceride 60 04/29/2022 10:30 AM    CHOL/HDL Ratio 3.8 02/18/2019 09:50 AM       Lab Results   Component Value Date/Time    Hemoglobin A1c 5.6 04/29/2022 10:30 AM       Assessment/Plan:   1. Acute left ankle pain: From inflammatory arthritis like gout? RA? -In the setting of left lower extremity edema.  -I encouraged him to schedule an appointment with Rheumatology for evaluation.  - We will check a uric acid level and CRP. - Ordering prednisone x 5 days.  -Ordering a left lower extremity PVL study to rule out DVT. ORDERS:  - URIC ACID; Future  - C REACTIVE PROTEIN, QT; Future  - predniSONE (DELTASONE) 20 mg tablet; Take 40 mg by mouth daily (with breakfast). Dispense: 10 Tablet; Refill: 0  - DUPLEX LOWER EXT VENOUS LEFT; Future    2. LEWIS (dyspnea on exertion): From deconditioning?  -Checking labs. - We will order an EKG as well. ORDERS:  - D DIMER; Future  - CBC WITH AUTOMATED DIFF; Future  - METABOLIC PANEL, COMPREHENSIVE; Future  - EKG, 12 LEAD, INITIAL; Future    3. Impaired glucose tolerance, HTN, and HLD: BP is mildly elevated at 143/84. Falsely elevated from pain?  -Return to clinic for BP check. - We will check labs to screen for diabetes and hyperlipidemia. ORDERS:  - METABOLIC PANEL, COMPREHENSIVE; Future  - HEMOGLOBIN A1C WITH EAG; Future  - LIPID PANEL; Future      Health Maintenance Due   Topic Date Due    DTaP/Tdap/Td series (1 - Tdap) Never done    Shingrix Vaccine Age 50> (1 of 2) Never done    COVID-19 Vaccine (3 - Booster for Pfizer series) 02/15/2022     Lab review: labs are reviewed in the EHR and ordered mention above. I have discussed the diagnosis with the patient and the intended plan as seen in the above orders. The patient has received an after-visit summary and questions were answered concerning future plans. I have discussed medication side effects and warnings with the patient as well. I have reviewed the plan of care with the patient, accepted their input and they are in agreement with the treatment goals. All questions were answered. The patient understands the plan of care. Handouts provided today with above information. Pt instructed if symptoms worsen to call the office or report to the ED for continued care. Greater than 50% of the visit time was spent in counseling and/or coordination of care. Voice recognition was used to generate this report, which may have resulted in some phonetic based errors in grammar and contents. Even though attempts were made to correct all the mistakes, some may have been missed, and remained in the body of the document.           Michelle Sullivan MD

## 2022-04-28 NOTE — PATIENT INSTRUCTIONS
Body Mass Index: Care Instructions  Your Care Instructions     Body mass index (BMI) can help you see if your weight is raising your risk for health problems. It uses a formula to compare how much you weigh with how tall you are. · A BMI lower than 18.5 is considered underweight. · A BMI between 18.5 and 24.9 is considered healthy. · A BMI between 25 and 29.9 is considered overweight. A BMI of 30 or higher is considered obese. If your BMI is in the normal range, it means that you have a lower risk for weight-related health problems. If your BMI is in the overweight or obese range, you may be at increased risk for weight-related health problems, such as high blood pressure, heart disease, stroke, arthritis or joint pain, and diabetes. If your BMI is in the underweight range, you may be at increased risk for health problems such as fatigue, lower protection (immunity) against illness, muscle loss, bone loss, hair loss, and hormone problems. BMI is just one measure of your risk for weight-related health problems. You may be at higher risk for health problems if you are not active, you eat an unhealthy diet, or you drink too much alcohol or use tobacco products. Follow-up care is a key part of your treatment and safety. Be sure to make and go to all appointments, and call your doctor if you are having problems. It's also a good idea to know your test results and keep a list of the medicines you take. How can you care for yourself at home? · Practice healthy eating habits. This includes eating plenty of fruits, vegetables, whole grains, lean protein, and low-fat dairy. · If your doctor recommends it, get more exercise. Walking is a good choice. Bit by bit, increase the amount you walk every day. Try for at least 30 minutes on most days of the week. · Do not smoke. Smoking can increase your risk for health problems. If you need help quitting, talk to your doctor about stop-smoking programs and medicines. These can increase your chances of quitting for good. · Limit alcohol to 2 drinks a day for men and 1 drink a day for women. Too much alcohol can cause health problems. If you have a BMI higher than 25  · Your doctor may do other tests to check your risk for weight-related health problems. This may include measuring the distance around your waist. A waist measurement of more than 40 inches in men or 35 inches in women can increase the risk of weight-related health problems. · Talk with your doctor about steps you can take to stay healthy or improve your health. You may need to make lifestyle changes to lose weight and stay healthy, such as changing your diet and getting regular exercise. If you have a BMI lower than 18.5  · Your doctor may do other tests to check your risk for health problems. · Talk with your doctor about steps you can take to stay healthy or improve your health. You may need to make lifestyle changes to gain or maintain weight and stay healthy, such as getting more healthy foods in your diet and doing exercises to build muscle. Where can you learn more? Go to http://www.cabello.com/  Enter S176 in the search box to learn more about \"Body Mass Index: Care Instructions. \"  Current as of: December 27, 2021               Content Version: 13.2  © 2006-2022 [employer]. Care instructions adapted under license by [employer] (which disclaims liability or warranty for this information). If you have questions about a medical condition or this instruction, always ask your healthcare professional. [place] [address] any warranty or liability for your use of this information.

## 2022-04-28 NOTE — PROGRESS NOTES
Pito Corado presents today for No chief complaint on file. 1. \"Have you been to the ER, urgent care clinic since your last visit? Hospitalized since your last visit? \" ***    2. \"Have you seen or consulted any other health care providers outside of the [address] [place] [address] since your last visit? \" ***     3. For patients aged 39-70: Has the patient had a colonoscopy / FIT/ Cologuard? {Colon Cancer Care Gap present?:65120}      If the patient is female:    4. For patients aged 41-77: Has the patient had a mammogram within the past 2 years? {Cancer Care Gap present?:09507}  See top three    5. For patients aged 21-65: Has the patient had a pap smear?  {Cancer Care Gap present?:65485}

## 2022-04-29 ENCOUNTER — HOSPITAL ENCOUNTER (OUTPATIENT)
Dept: VASCULAR SURGERY | Age: 52
Discharge: HOME | End: 2022-04-29
Attending: INTERNAL MEDICINE
Payer: MEDICAID

## 2022-04-29 ENCOUNTER — HOSPITAL ENCOUNTER (OUTPATIENT)
Dept: LAB | Age: 52
Discharge: HOME | End: 2022-04-29
Payer: MEDICAID

## 2022-04-29 ENCOUNTER — HOSPITAL ENCOUNTER (OUTPATIENT)
Dept: LAB | Age: 52
Discharge: HOME | End: 2022-04-29

## 2022-04-29 LAB
ATRIAL RATE: 79 BPM
CALCULATED P AXIS, ECG09: 27 DEGREES
CALCULATED R AXIS, ECG10: 46 DEGREES
CALCULATED T AXIS, ECG11: 22 DEGREES
DIAGNOSIS, 93000: NORMAL
P-R INTERVAL, ECG05: 170 MS
Q-T INTERVAL, ECG07: 400 MS
QRS DURATION, ECG06: 100 MS
QTC CALCULATION (BEZET), ECG08: 458 MS
SENTARA SPECIMEN COL,SENBCF: NORMAL
VENTRICULAR RATE, ECG03: 79 BPM

## 2022-04-29 PROCEDURE — 99001 SPECIMEN HANDLING PT-LAB: CPT

## 2022-04-29 PROCEDURE — 93971 EXTREMITY STUDY: CPT

## 2022-04-29 PROCEDURE — 93005 ELECTROCARDIOGRAM TRACING: CPT

## 2022-04-29 NOTE — PROGRESS NOTES
Please let him know that his EKG and PVL study are unremarkable.     Dr. Yudith Walters  [facility], [address] [facility], [address]  Phone: [phone]  Fax: [phone]

## 2022-04-30 LAB
A-G RATIO,AGRAT: 1.1 RATIO (ref 1.1–2.6)
ABSOLUTE LYMPHOCYTE COUNT, 10803: 1.6 K/UL (ref 1–4.8)
ALBUMIN SERPL-MCNC: 3.6 G/DL (ref 3.5–5)
ALP SERPL-CCNC: 56 U/L (ref 25–115)
ALT SERPL-CCNC: 20 U/L (ref 5–40)
ANION GAP SERPL CALC-SCNC: 9 MMOL/L (ref 3–15)
AST SERPL W P-5'-P-CCNC: 20 U/L (ref 10–37)
AVG GLU, 10930: 113 MG/DL (ref 91–123)
BASOPHILS # BLD: 0.1 K/UL (ref 0–0.2)
BASOPHILS NFR BLD: 1 % (ref 0–2)
BILIRUB SERPL-MCNC: 0.3 MG/DL (ref 0.2–1.2)
BUN SERPL-MCNC: 13 MG/DL (ref 6–22)
C-REACTIVE PROTEIN, QT, 006627: 6 MG/DL
CALCIUM SERPL-MCNC: 8.5 MG/DL (ref 8.4–10.5)
CHLORIDE SERPL-SCNC: 105 MMOL/L (ref 98–110)
CHOLEST SERPL-MCNC: 157 MG/DL (ref 110–200)
CO2 SERPL-SCNC: 26 MMOL/L (ref 20–32)
CREAT SERPL-MCNC: 0.8 MG/DL (ref 0.5–1.2)
D-DIMER(G): 0.98 MG/L FEU (ref 0–1.12)
EOSINOPHIL # BLD: 0.3 K/UL (ref 0–0.5)
EOSINOPHIL NFR BLD: 4 % (ref 0–6)
ERYTHROCYTE [DISTWIDTH] IN BLOOD BY AUTOMATED COUNT: 15.9 % (ref 10–15.5)
GFRAA, 66117: >60
GFRNA, 66118: >60
GLOBULIN,GLOB: 3.3 G/DL (ref 2–4)
GLUCOSE SERPL-MCNC: 99 MG/DL (ref 70–99)
GRANULOCYTES,GRANS: 65 % (ref 40–75)
HBA1C MFR BLD HPLC: 5.6 % (ref 4.8–5.6)
HCT VFR BLD AUTO: 40.4 % (ref 39.3–51.6)
HDLC SERPL-MCNC: 3.8 MG/DL (ref 0–5)
HDLC SERPL-MCNC: 41 MG/DL
HGB BLD-MCNC: 12 G/DL (ref 13.1–17.2)
LDL/HDL RATIO,LDHD: 2.6
LDLC SERPL CALC-MCNC: 104 MG/DL (ref 50–99)
LYMPHOCYTES, LYMLT: 22 % (ref 20–45)
MCH RBC QN AUTO: 26 PG (ref 26–34)
MCHC RBC AUTO-ENTMCNC: 30 G/DL (ref 31–36)
MCV RBC AUTO: 89 FL (ref 80–95)
MONOCYTES # BLD: 0.6 K/UL (ref 0.1–1)
MONOCYTES NFR BLD: 8 % (ref 3–12)
NEUTROPHILS # BLD AUTO: 4.7 K/UL (ref 1.8–7.7)
NON-HDL CHOLESTEROL, 011976: 116 MG/DL
PLATELET # BLD AUTO: 214 K/UL (ref 140–440)
PMV BLD AUTO: 11.7 FL (ref 9–13)
POTASSIUM SERPL-SCNC: 4.1 MMOL/L (ref 3.5–5.5)
PROT SERPL-MCNC: 6.9 G/DL (ref 6.4–8.3)
RBC # BLD AUTO: 4.56 M/UL (ref 3.8–5.8)
SODIUM SERPL-SCNC: 140 MMOL/L (ref 133–145)
TRIGL SERPL-MCNC: 60 MG/DL (ref 40–149)
URATE SERPL-MCNC: 8.2 MG/DL (ref 3.9–9)
VLDLC SERPL CALC-MCNC: 12 MG/DL (ref 8–30)
WBC # BLD AUTO: 7.1 K/UL (ref 4–11)

## 2022-05-03 DIAGNOSIS — R76.8 RHEUMATOID FACTOR POSITIVE: ICD-10-CM

## 2022-05-03 DIAGNOSIS — D64.9 ANEMIA, UNSPECIFIED TYPE: ICD-10-CM

## 2022-05-03 DIAGNOSIS — R73.02 IMPAIRED GLUCOSE TOLERANCE: ICD-10-CM

## 2022-05-03 DIAGNOSIS — I10 ESSENTIAL HYPERTENSION: Primary | ICD-10-CM

## 2022-05-03 NOTE — PROGRESS NOTES
Please let him know that he is mildly anemic per his hemoglobin but it is barely changed over the past several years. We will continue to follow his blood counts and trend them. His hemoglobin is in the range that it has always been. He will need labs in 6 months. There is no evidence of kidney or liver disease. His uric acid level is quite high, measuring 8.2. This is consistent with likely gout. Please have him scheduled for an appointment to discuss. Meanwhile, his inflammatory marker, CRP, is elevated. He should be on medication for gout. His lipid panel shows improvement from last year. His LDL is down to 104 from 120. Triglycerides are 60. HDL is 41. His total cholesterol is 157. Please make sure that he is scheduled to follow-up with me to discuss these results more in depth and to discuss gout Medication, likely the source of his swollen left ankle. Medication can reduce the frequency of having gout attacks. He will also need a follow-up visit with me sometime in 6 months with labs before that visit-but I need to meet with him within the next 6 weeks and start him on gout medication.     Dr. Sydell Goodell  [facility] of [facility], [address] [place] [address], [facility] [address]  Phone: [phone]  Fax: [phone]

## 2022-05-03 NOTE — PROGRESS NOTES
Please let him know that his A1c is down to 5.6.  His prediabetes seems to have resolved.  He needs to maintain a low-carb diet and reduce his weight in order to prevent the return of prediabetes. Please have him schedule for a follow-up visit in 6 months.   He will need labs 1 week before his prediabetes has returned before the end of the year.     Dr. Maribeth Khan  [facility], [address] [place] [address], [address] [place] [address] Str.  Phone: [phone]  Fax: [phone]

## 2022-05-03 NOTE — PROGRESS NOTES
Please let him know that his A1c is down to 5.6. His prediabetes seems to have resolved. He needs to maintain a low-carb diet and reduce his weight in order to prevent the return of prediabetes.     Dr. Fernando Gold  [facility] of [facility], [address] [place] [address], [address] [place] [address] Str.  Phone: [phone]  Fax: [phone]

## 2022-05-03 NOTE — PROGRESS NOTES
Patient contacted, patient identified with two identifiers (Name & ). Patient aware of results per DR. Estrada and verbalizes understanding.

## 2022-05-04 PROBLEM — M10.9 GOUT: Status: ACTIVE | Noted: 2022-05-04

## 2022-05-12 ENCOUNTER — TELEPHONE (OUTPATIENT)
Dept: SURGERY | Age: 52
End: 2022-05-12

## 2022-05-12 NOTE — TELEPHONE ENCOUNTER
Attempted to call the patient to remind him of his appointment on Monday 05/16/22 at 2:00PM. I was unable to leave a voicemail because the patient mailbox is full.

## 2022-06-16 ENCOUNTER — OFFICE VISIT (OUTPATIENT)
Dept: INTERNAL MEDICINE CLINIC | Age: 52
End: 2022-06-16
Payer: MEDICARE

## 2022-06-16 VITALS
SYSTOLIC BLOOD PRESSURE: 146 MMHG | HEIGHT: 69 IN | DIASTOLIC BLOOD PRESSURE: 96 MMHG | TEMPERATURE: 98.2 F | OXYGEN SATURATION: 98 % | WEIGHT: 315 LBS | BODY MASS INDEX: 46.65 KG/M2 | HEART RATE: 73 BPM | RESPIRATION RATE: 20 BRPM

## 2022-06-16 DIAGNOSIS — K59.09 CHRONIC CONSTIPATION: ICD-10-CM

## 2022-06-16 DIAGNOSIS — R06.09 DOE (DYSPNEA ON EXERTION): ICD-10-CM

## 2022-06-16 DIAGNOSIS — M25.572 ACUTE LEFT ANKLE PAIN: ICD-10-CM

## 2022-06-16 DIAGNOSIS — R06.09 DOE (DYSPNEA ON EXERTION): Primary | ICD-10-CM

## 2022-06-16 DIAGNOSIS — I10 ESSENTIAL HYPERTENSION: ICD-10-CM

## 2022-06-16 DIAGNOSIS — R06.83 SNORING: ICD-10-CM

## 2022-06-16 DIAGNOSIS — R73.02 IMPAIRED GLUCOSE TOLERANCE: ICD-10-CM

## 2022-06-16 DIAGNOSIS — M1A.9XX0 CHRONIC GOUT WITHOUT TOPHUS, UNSPECIFIED CAUSE, UNSPECIFIED SITE: ICD-10-CM

## 2022-06-16 PROCEDURE — G0463 HOSPITAL OUTPT CLINIC VISIT: HCPCS | Performed by: INTERNAL MEDICINE

## 2022-06-16 PROCEDURE — 99214 OFFICE O/P EST MOD 30 MIN: CPT | Performed by: INTERNAL MEDICINE

## 2022-06-16 PROCEDURE — G8417 CALC BMI ABV UP PARAM F/U: HCPCS | Performed by: INTERNAL MEDICINE

## 2022-06-16 PROCEDURE — G8432 DEP SCR NOT DOC, RNG: HCPCS | Performed by: INTERNAL MEDICINE

## 2022-06-16 PROCEDURE — G8753 SYS BP > OR = 140: HCPCS | Performed by: INTERNAL MEDICINE

## 2022-06-16 PROCEDURE — 3017F COLORECTAL CA SCREEN DOC REV: CPT | Performed by: INTERNAL MEDICINE

## 2022-06-16 PROCEDURE — G8427 DOCREV CUR MEDS BY ELIG CLIN: HCPCS | Performed by: INTERNAL MEDICINE

## 2022-06-16 RX ORDER — DULAGLUTIDE 0.75 MG/.5ML
0.75 INJECTION, SOLUTION SUBCUTANEOUS
Qty: 4 PEN | Refills: 0 | Status: SHIPPED | OUTPATIENT
Start: 2022-06-16 | End: 2022-07-19

## 2022-06-16 RX ORDER — INDOMETHACIN 50 MG/1
50 CAPSULE ORAL 3 TIMES DAILY
COMMUNITY
End: 2022-07-19 | Stop reason: SDUPTHER

## 2022-06-16 RX ORDER — HYDROCHLOROTHIAZIDE 25 MG/1
25 TABLET ORAL DAILY
Qty: 30 TABLET | Refills: 5 | Status: SHIPPED | OUTPATIENT
Start: 2022-06-16 | End: 2022-07-12

## 2022-06-16 RX ORDER — PREDNISONE 20 MG/1
40 TABLET ORAL
Qty: 10 TABLET | Refills: 0 | Status: SHIPPED | OUTPATIENT
Start: 2022-06-16 | End: 2022-07-12

## 2022-06-16 RX ORDER — CARVEDILOL 12.5 MG/1
12.5 TABLET ORAL 2 TIMES DAILY WITH MEALS
Qty: 69 TABLET | Refills: 5 | Status: SHIPPED | OUTPATIENT
Start: 2022-06-16 | End: 2022-07-19

## 2022-06-16 RX ORDER — LOSARTAN POTASSIUM 100 MG/1
100 TABLET ORAL DAILY
Qty: 30 TABLET | Refills: 6 | Status: SHIPPED | OUTPATIENT
Start: 2022-06-16 | End: 2022-08-02 | Stop reason: SDUPTHER

## 2022-06-16 RX ORDER — DOCUSATE SODIUM 100 MG/1
100 CAPSULE, LIQUID FILLED ORAL 2 TIMES DAILY
Qty: 60 CAPSULE | Refills: 5 | Status: SHIPPED | OUTPATIENT
Start: 2022-06-16 | End: 2022-07-27

## 2022-06-16 NOTE — PROGRESS NOTES
Reece Lemons presents today for   Chief Complaint   Patient presents with    Gout     [facility] prescribed indomethacin         1. \"Have you been to the ER, urgent care clinic since your last visit? Hospitalized since your last visit? \" no    2. \"Have you seen or consulted any other health care providers outside of the [address] [place] [address] since your last visit? \" yes     3. For patients aged 39-70: Has the patient had a colonoscopy / FIT/ Cologuard? Yes - no Care Gap present      If the patient is female:    4. For patients aged 41-77: Has the patient had a mammogram within the past 2 years? NA - based on age or sex  See top three    5. For patients aged 21-65: Has the patient had a pap smear?  NA - based on age or sex

## 2022-06-16 NOTE — PROGRESS NOTES
INTERNISTS [facility]:  6/16/2022, MRN: [id number]      Jewels Valdovinos is a 46 y.o. male and presents to clinic for Gout ([facility] prescribed ind[facility]acin)      Subjective: The patient is a 48-year-old male with history of hypertension, obesity, CVA, h/o H.pylori infection (treated last year), prediabetes, suspected rheumatoid arthritis, suspected gout, lumbar disc disease, and chronic bilateral knee pain (followed by Orthopedics). 1. Gout and RA: He drinks once or twice a wk. He had a presumed gout attack earlier this month. He went to [facility] earlier this month and was given a course of indomethacin. Sx improved but never resolved. He presentyl has right big toe pain. He has a h/o an elevated CRP and positive RF. +H/o jt pains. No pain in his hands today but has some pain along b/l wrists. He was referred to Rheumatology for RA evaluation but he never scheduled. Results for Liliane White (MRN [id number]) as of 5/4/2022 04:46    Ref. Range 4/7/2021 18:33 8/12/2021 09:01 4/29/2022 10:30   C-Reactive Protein, Qt Latest Ref Range: <=0.5 mg/dL   2.7 (H) 6.0 (H)   C REACTIVE PROTEIN, QT Unknown   Rpt (A) Rpt (A)   Rheumatoid Arthritis Factor Latest Units: Units/mL 29.3 (H) 31.3 (H)     14. 3.3 ETA, Rheum. Arthritis Latest Units: ng/mL <0.20 <0.20       2. HTN: Present for >3 months. He's been out of his BP meds x 1 months. He needs refills on losartan, HCTZ, and coreg. 3. Prediabetes: He is out of Trulicity. He couldn't tell if this rx suppressed his appetite when he took it. He gained weight in between apts. Weight is up to 502lbs. 4. SOB: He has a 2 wks of SOB. No sick contacts. No CP or palpitations. He has sx only with exertion. Rest improves his sx. No tobacco/vaping. No cough. Note that he reported LEWIS sx at his last apt.        Patient Active Problem List    Diagnosis Date Noted    Gout 05/04/2022    Chronic low back pain without sciatica 10/19/2019    Essential hypertension 06/10/2019    Impaired glucose tolerance 06/10/2019    History of Helicobacter pylori infection 06/10/2019    Obesity, morbid ([address] 07/13/2018    History of CVA (cerebrovascular accident) 07/13/2018    Chronic pain of both knees 07/13/2018       Current Outpatient Medications   Medication Sig Dispense Refill    indomethacin (INDOCIN) 50 mg capsule Take 50 mg by mouth three (3) times daily.  predniSONE (DELTASONE) 20 mg tablet Take 40 mg by mouth daily (with breakfast). (Patient not taking: Reported on 6/16/2022) 10 Tablet 0    magnesium citrate solution Take 1/3 of bottle every 8 hours until finished or until you have a bowel movement. (Patient not taking: Reported on 6/16/2022) 300 mL 0    dulaglutide (Trulicity) 3.64 PB/2.9 mL sub-q pen 0.5 mL by SubCUTAneous route every seven (7) days. (Patient not taking: Reported on 4/28/2022) 4 Pen 1    glycerin, adult, suppository Insert 1 Suppository into rectum daily as needed (Constipation). (Patient not taking: Reported on 4/28/2022) 7 Suppository 0    docusate sodium (COLACE) 100 mg capsule Take 1 Capsule by mouth two (2) times a day. (Patient not taking: Reported on 6/16/2022) 60 Capsule 5    carvediloL (COREG) 12.5 mg tablet Take 1 Tab by mouth two (2) times daily (with meals). (Patient not taking: Reported on 6/16/2022) 69 Tab 5    losartan (COZAAR) 100 mg tablet Take 1 Tab by mouth daily. (Patient not taking: Reported on 6/16/2022) 30 Tab 6    hydroCHLOROthiazide (HYDRODIURIL) 25 mg tablet Take 1 Tab by mouth daily.  (Patient not taking: Reported on 6/16/2022) 30 Tab 5       Allergies   Allergen Reactions    Norvasc [Amlodipine] Swelling     BLE Edema       Past Medical History:   Diagnosis Date    Ambulates with cane     Arthritis     lower back and kness    Hypertension     Knee pain     Morbid obesity ([address]        Past Surgical History:   Procedure Laterality Date    COLONOSCOPY N/A 6/19/2020 COLONOSCOPY with polypectomy performed by Angel Lopez MD at [facility] ENDOSCOPY    HX CHOLECYSTECTOMY         Family History   Problem Relation Age of Onset    Heart Disease Mother     Diabetes Mother     Stroke Father        Social History     Tobacco Use    Smoking status: Never Smoker    Smokeless tobacco: Never Used   Substance Use Topics    Alcohol use: Yes     Alcohol/week: 1.0 standard drink     Types: 1 Cans of beer per week       ROS   Review of Systems   Constitutional: Negative for chills and fever. HENT: Negative for ear pain and sore throat. Eyes: Negative for blurred vision and pain. Respiratory: Positive for shortness of breath. Negative for cough. Cardiovascular: Negative for chest pain. Gastrointestinal: Negative for abdominal pain, blood in stool and melena. Genitourinary: Negative for dysuria and hematuria. Musculoskeletal: Positive for back pain and joint pain. Negative for myalgias. Skin: Negative for rash. Neurological: Negative for headaches. Endo/Heme/Allergies: Does not bruise/bleed easily. Psychiatric/Behavioral: Negative for substance abuse. Objective     Vitals:    06/16/22 1431 06/16/22 1436   BP: (!) 146/97 (!) 146/96   Pulse: 73    Resp: 20    Temp: 98.2 °F (36.8 °C)    TempSrc: Temporal    SpO2: 98%    Weight: (!) 502 lb (227.7 kg)    Height: 5' 9\" (1.753 m)    PainSc:   7    PainLoc: Generalized        Physical Exam  Vitals and nursing note reviewed. Constitutional:       Appearance: Normal appearance. HENT:      Head: Normocephalic and atraumatic. Right Ear: External ear normal.      Left Ear: External ear normal.   Eyes:      Extraocular Movements: Extraocular movements intact. Conjunctiva/sclera: Conjunctivae normal.   Cardiovascular:      Rate and Rhythm: Normal rate and regular rhythm. Pulses: Normal pulses. Heart sounds: Normal heart sounds.    Pulmonary:      Effort: Pulmonary effort is normal.      Breath sounds: Normal breath sounds. Abdominal:      General: Abdomen is flat. Bowel sounds are normal. There is no distension. Palpations: Abdomen is soft. Tenderness: There is no abdominal tenderness. There is no guarding. Musculoskeletal:         General: No swelling (BUE) or tenderness (BUE). Cervical back: Neck supple. Comments: +Right big toe is TTP. His b/l wrists are TTP   Lymphadenopathy:      Cervical: No cervical adenopathy. Skin:     General: Skin is warm and dry. Findings: No erythema. Neurological:      Mental Status: He is alert. Mental status is at baseline. Gait: Gait normal.   Psychiatric:         Mood and Affect: Mood normal.         LABS   Data Review:   Lab Results   Component Value Date/Time    WBC 7.1 04/29/2022 10:30 AM    HGB 12.0 (L) 04/29/2022 10:30 AM    HCT 40.4 04/29/2022 10:30 AM    PLATELET 791 04/45/2814 10:30 AM    MCV 89 04/29/2022 10:30 AM       Lab Results   Component Value Date/Time    Sodium 140 04/29/2022 10:30 AM    Potassium 4.1 04/29/2022 10:30 AM    Chloride 105 04/29/2022 10:30 AM    CO2 26 04/29/2022 10:30 AM    Anion gap 9.0 04/29/2022 10:30 AM    Glucose 99 04/29/2022 10:30 AM    BUN 13 04/29/2022 10:30 AM    Creatinine 0.8 04/29/2022 10:30 AM    BUN/Creatinine ratio 12 10/16/2020 07:25 AM    GFR est AA >60 10/16/2020 07:25 AM    GFR est non-AA >60 10/16/2020 07:25 AM    Calcium 8.5 04/29/2022 10:30 AM       Lab Results   Component Value Date/Time    Cholesterol, total 157 04/29/2022 10:30 AM    HDL Cholesterol 41 04/29/2022 10:30 AM    LDL, calculated 104 (H) 04/29/2022 10:30 AM    VLDL, calculated 12 04/29/2022 10:30 AM    Triglyceride 60 04/29/2022 10:30 AM    CHOL/HDL Ratio 3.8 02/18/2019 09:50 AM       Lab Results   Component Value Date/Time    Hemoglobin A1c 5.6 04/29/2022 10:30 AM       Assessment/Plan:   1.  Essential hypertension: BP is not controlled but he is out of his rx  - Refilling his rx  - RTC for a BP check    ORDERS:  - losartan (COZAAR) 100 mg tablet; Take 1 Tablet by mouth daily. Dispense: 30 Tablet; Refill: 6  - hydroCHLOROthiazide (HYDRODIURIL) 25 mg tablet; Take 1 Tablet by mouth daily. Dispense: 30 Tablet; Refill: 5  - carvediloL (COREG) 12.5 mg tablet; Take 1 Tablet by mouth two (2) times daily (with meals). Dispense: 69 Tablet; Refill: 5    2. Health Maintenance:  - Refilling his docusate given his h/o chronic constipation. ORDERS:  - docusate sodium (COLACE) 100 mg capsule; Take 1 Capsule by mouth two (2) times a day. Dispense: 60 Capsule; Refill: 5    3. Impaired glucose tolerance: His last A1C was 5.6. +Gaining weight  - Increasing his dose of Trulicity to 1.5 mg weekly  - I encouraged him to cut back on his portions to reduce his weight. ORDERS:  - dulaglutide (Trulicity) 5.10 XL/0.9 mL sub-q pen; 0.5 mL by SubCUTAneous route every seven (7) days. Dispense: 4 Pen; Refill: 0  - dulaglutide (TRULICITY) 1.5 XN/7.5 mL sub-q pen; 0.5 mL by SubCUTAneous route every seven (7) days. Dispense: 4 Each; Refill: 5    4. LEWIS (dyspnea on exertion):  No CP/palpitations. +Snoringn.  - CXR ordered  - Referral to Pulmonology    ORDERS:  - XR CHEST PA LAT; Future  - REFERRAL TO PULMONARY DISEASE    5. Acute left ankle/Toe Pain: From gout and/or RA.   - Prednisone course prescribed. - I stressed the importance of avoidance of ETOH  - I encouraged him to schedule with Rheumatology for evaluation. ORDERS:  - predniSONE (DELTASONE) 20 mg tablet; Take 2 Tablets by mouth daily (with breakfast). Dispense: 10 Tablet; Refill: 0        Health Maintenance Due   Topic Date Due    DTaP/Tdap/Td series (1 - Tdap) Never done    Shingrix Vaccine Age 50> (1 of 2) Never done    COVID-19 Vaccine (3 - Booster for Pfizer series) 02/15/2022         Lab review: labs are reviewed in the EHR    I have discussed the diagnosis with the patient and the intended plan as seen in the above orders.   The patient has received an after-visit summary and questions were answered concerning future plans. I have discussed medication side effects and warnings with the patient as well. I have reviewed the plan of care with the patient, accepted their input and they are in agreement with the treatment goals. All questions were answered. The patient understands the plan of care. Handouts provided today with above information. Pt instructed if symptoms worsen to call the office or report to the ED for continued care. Greater than 50% of the visit time was spent in counseling and/or coordination of care. Voice recognition was used to generate this report, which may have resulted in some phonetic based errors in grammar and contents. Even though attempts were made to correct all the mistakes, some may have been missed, and remained in the body of the document.           Russel Castaneda MD

## 2022-06-16 NOTE — PATIENT INSTRUCTIONS
Home Blood Pressure Test: About This Test  What is it? A home blood pressure test allows you to keep track of your blood pressure at home. Blood pressure is a measure of the force of blood against the walls of your arteries. Blood pressure readings include two numbers, such as 130/80 (say \"130 over 80\"). The first number is the systolic pressure. The second number is the diastolic pressure. Why is this test done? You may do this test at home to:  · Find out if you have high blood pressure. · Track your blood pressure if you have high blood pressure. · Track how well medicine is working to reduce high blood pressure. · Check how lifestyle changes, such as weight loss and exercise, are affecting blood pressure. How do you prepare for the test?  For at least 30 minutes before you take your blood pressure, don't exercise, drink caffeine, or smoke. Empty your bladder before the test. Sit quietly with your back straight and both feet on the floor for at least 5 minutes. This helps you take your blood pressure while you feel comfortable and relaxed. How is the test done? · If your doctor recommends it, take your blood pressure twice a day. Take it in the morning and evening. · Sit with your arm slightly bent and resting on a table so that your upper arm is at the same level as your heart. · Use the same arm each time you take your blood pressure. · Place the blood pressure cuff on the bare skin of your upper arm. You may have to roll up your sleeve, remove your arm from the sleeve, or take your shirt off. · Wrap the blood pressure cuff around your upper arm so that the lower edge of the cuff is about 1 inch above the bend of your elbow. · Do not move, talk, or text while you take your blood pressure. Follow the instructions that came with your blood pressure monitor. They might be different from the following. · Press the on/off button on the automatic monitor.  Then you may need to wait until the screen says the monitor is ready. · Press the start button. The cuff will inflate and deflate by itself. · Your blood pressure numbers will appear on the screen. · Wait one minute and take your blood pressure again. · If your monitor does not automatically save your numbers, write them in your log book, along with the date and time. Follow-up care is a key part of your treatment and safety. Be sure to make and go to all appointments, and call your doctor if you are having problems. It's also a good idea to keep a list of the medicines you take. Where can you learn more? Go to http://www.cabello.com/  Enter C427 in the search box to learn more about \"Home Blood Pressure Test: About This Test.\"  Current as of: January 10, 2022               Content Version: 13.2  © 2006-2022 [employer]. Care instructions adapted under license by [employer] (which disclaims liability or warranty for this information). If you have questions about a medical condition or this instruction, always ask your healthcare professional. [place] [address] any warranty or liability for your use of this information. Gout: Care Instructions  Overview     Gout is a form of arthritis caused by a buildup of uric acid crystals in a joint. It causes sudden attacks of pain, swelling, redness, and stiffness, usually in one joint, especially the big toe. Gout usually comes on without a cause. But it can be brought on by drinking alcohol (especially beer), eating or drinking things made with high-fructose corn syrup, or eating seafood or red meat. Taking certain medicines, such as diuretics, can also trigger an attack of gout. Taking your medicines as prescribed and following up with your doctor regularly can help you avoid gout attacks in the future. Follow-up care is a key part of your treatment and safety.  Be sure to make and go to all appointments, and call your doctor if you are having problems. It's also a good idea to know your test results and keep a list of the medicines you take. How can you care for yourself at home? · If the joint is swollen, put ice or a cold pack on the area for 10 to 20 minutes at a time. Put a thin cloth between the ice and your skin. · Prop up the sore limb on a pillow when you ice it or anytime you sit or lie down during the next 3 days. Try to keep it above the level of your heart. This can help reduce swelling. · Rest sore joints. Avoid activities that put weight or strain on the joints for a few days. Take short rest breaks from your regular activities during the day. · Take your medicines exactly as prescribed. Call your doctor if you think you are having a problem with your medicine. · Take pain medicines exactly as directed. ? If the doctor gave you a prescription medicine for pain, take it as prescribed. ? If you are not taking a prescription pain medicine, ask your doctor if you can take an over-the-counter medicine. · Eat less seafood and red meat. · Avoid foods or drinks that are made with high-fructose corn syrup. · Check with your doctor before drinking alcohol. · Losing weight, if you are overweight, may help reduce attacks of gout. But do not go on a diet that causes rapid weight loss. Losing a lot of weight in a short amount of time can cause a gout attack. When should you call for help? Call your doctor now or seek immediate medical care if:    · You have a fever.     · The joint is so painful you cannot use it.     · You have sudden, unexplained swelling, redness, warmth, or severe pain in one or more joints. Watch closely for changes in your health, and be sure to contact your doctor if:    · You have joint pain.     · Your symptoms get worse or are not improving after 2 or 3 days. Where can you learn more?   Go to http://www.gray.com/  Enter S586 in the search box to learn more about \"Gout: Care Instructions. \"  Current as of: December 20, 2021               Content Version: 13.2  © 3422-6093 [employer]. Care instructions adapted under license by [employer] (which disclaims liability or warranty for this information). If you have questions about a medical condition or this instruction, always ask your healthcare professional. [place] [address] any warranty or liability for your use of this information.

## 2022-06-17 ENCOUNTER — DOCUMENTATION ONLY (OUTPATIENT)
Dept: PULMONOLOGY | Age: 52
End: 2022-06-17

## 2022-06-17 NOTE — PROGRESS NOTES
Pt returned call. Stated that he had a sleep study done about 6 months ago. Never started on CPAP. Explained to pt that we need sleep study & records BEFORE we can set up new patient appt. Our fax # given. Pt ok'd. Ref in system.

## 2022-06-20 ENCOUNTER — APPOINTMENT (OUTPATIENT)
Dept: GENERAL RADIOLOGY | Age: 52
End: 2022-06-20
Attending: EMERGENCY MEDICINE
Payer: MEDICARE

## 2022-06-20 ENCOUNTER — HOSPITAL ENCOUNTER (EMERGENCY)
Age: 52
Discharge: HOME | End: 2022-06-20
Attending: EMERGENCY MEDICINE
Payer: MEDICARE

## 2022-06-20 VITALS
BODY MASS INDEX: 46.65 KG/M2 | OXYGEN SATURATION: 97 % | WEIGHT: 315 LBS | TEMPERATURE: 98 F | RESPIRATION RATE: 15 BRPM | HEART RATE: 65 BPM | SYSTOLIC BLOOD PRESSURE: 143 MMHG | HEIGHT: 69 IN | DIASTOLIC BLOOD PRESSURE: 92 MMHG

## 2022-06-20 DIAGNOSIS — E83.42 HYPOMAGNESEMIA: ICD-10-CM

## 2022-06-20 DIAGNOSIS — R07.9 CHEST PAIN, UNSPECIFIED TYPE: ICD-10-CM

## 2022-06-20 DIAGNOSIS — M54.9 ACUTE BACK PAIN, UNSPECIFIED BACK LOCATION, UNSPECIFIED BACK PAIN LATERALITY: Primary | ICD-10-CM

## 2022-06-20 LAB
ALBUMIN SERPL-MCNC: 3.1 G/DL (ref 3.4–5)
ALBUMIN/GLOB SERPL: 0.7 {RATIO} (ref 0.8–1.7)
ALP SERPL-CCNC: 65 U/L (ref 45–117)
ALT SERPL-CCNC: 28 U/L (ref 16–61)
ANION GAP SERPL CALC-SCNC: 7 MMOL/L (ref 3–18)
APPEARANCE UR: CLEAR
AST SERPL-CCNC: 23 U/L (ref 10–38)
BASOPHILS # BLD: 0 K/UL (ref 0–0.1)
BASOPHILS NFR BLD: 1 % (ref 0–2)
BILIRUB SERPL-MCNC: 0.3 MG/DL (ref 0.2–1)
BILIRUB UR QL: NEGATIVE
BNP SERPL-MCNC: 327 PG/ML (ref 0–900)
BUN SERPL-MCNC: 16 MG/DL (ref 7–18)
BUN/CREAT SERPL: 16 (ref 12–20)
CALCIUM SERPL-MCNC: 8.2 MG/DL (ref 8.5–10.1)
CHLORIDE SERPL-SCNC: 105 MMOL/L (ref 100–111)
CO2 SERPL-SCNC: 30 MMOL/L (ref 21–32)
COLOR UR: YELLOW
CREAT SERPL-MCNC: 1.02 MG/DL (ref 0.6–1.3)
DIFFERENTIAL METHOD BLD: ABNORMAL
EOSINOPHIL # BLD: 0.3 K/UL (ref 0–0.4)
EOSINOPHIL NFR BLD: 4 % (ref 0–5)
ERYTHROCYTE [DISTWIDTH] IN BLOOD BY AUTOMATED COUNT: 15.5 % (ref 11.6–14.5)
GLOBULIN SER CALC-MCNC: 4.3 G/DL (ref 2–4)
GLUCOSE SERPL-MCNC: 103 MG/DL (ref 74–99)
GLUCOSE UR STRIP.AUTO-MCNC: NEGATIVE MG/DL
HCT VFR BLD AUTO: 34.7 % (ref 36–48)
HGB BLD-MCNC: 11 G/DL (ref 13–16)
HGB UR QL STRIP: NEGATIVE
IMM GRANULOCYTES # BLD AUTO: 0 K/UL (ref 0–0.04)
IMM GRANULOCYTES NFR BLD AUTO: 0 % (ref 0–0.5)
KETONES UR QL STRIP.AUTO: ABNORMAL MG/DL
LEUKOCYTE ESTERASE UR QL STRIP.AUTO: NEGATIVE
LYMPHOCYTES # BLD: 1.8 K/UL (ref 0.9–3.6)
LYMPHOCYTES NFR BLD: 23 % (ref 21–52)
MAGNESIUM SERPL-MCNC: 1.6 MG/DL (ref 1.6–2.6)
MCH RBC QN AUTO: 26.2 PG (ref 24–34)
MCHC RBC AUTO-ENTMCNC: 31.7 G/DL (ref 31–37)
MCV RBC AUTO: 82.6 FL (ref 78–100)
MONOCYTES # BLD: 0.9 K/UL (ref 0.05–1.2)
MONOCYTES NFR BLD: 11 % (ref 3–10)
NEUTS SEG # BLD: 4.7 K/UL (ref 1.8–8)
NEUTS SEG NFR BLD: 61 % (ref 40–73)
NITRITE UR QL STRIP.AUTO: NEGATIVE
NRBC # BLD: 0 K/UL (ref 0–0.01)
NRBC BLD-RTO: 0 PER 100 WBC
PH UR STRIP: 5 [PH] (ref 5–8)
PLATELET # BLD AUTO: 237 K/UL (ref 135–420)
PMV BLD AUTO: 11.2 FL (ref 9.2–11.8)
POTASSIUM SERPL-SCNC: 3.5 MMOL/L (ref 3.5–5.5)
PROT SERPL-MCNC: 7.4 G/DL (ref 6.4–8.2)
PROT UR STRIP-MCNC: NEGATIVE MG/DL
RBC # BLD AUTO: 4.2 M/UL (ref 4.35–5.65)
SODIUM SERPL-SCNC: 142 MMOL/L (ref 136–145)
SP GR UR REFRACTOMETRY: 1.02 (ref 1–1.03)
TROPONIN-HIGH SENSITIVITY: 8 NG/L (ref 0–78)
TROPONIN-HIGH SENSITIVITY: 8 NG/L (ref 0–78)
UROBILINOGEN UR QL STRIP.AUTO: 0.2 EU/DL (ref 0.2–1)
WBC # BLD AUTO: 7.8 K/UL (ref 4.6–13.2)

## 2022-06-20 PROCEDURE — 74011250637 HC RX REV CODE- 250/637: Performed by: EMERGENCY MEDICINE

## 2022-06-20 PROCEDURE — 96375 TX/PRO/DX INJ NEW DRUG ADDON: CPT

## 2022-06-20 PROCEDURE — 84484 ASSAY OF TROPONIN QUANT: CPT

## 2022-06-20 PROCEDURE — 93005 ELECTROCARDIOGRAM TRACING: CPT

## 2022-06-20 PROCEDURE — 81003 URINALYSIS AUTO W/O SCOPE: CPT

## 2022-06-20 PROCEDURE — 71045 X-RAY EXAM CHEST 1 VIEW: CPT

## 2022-06-20 PROCEDURE — 96365 THER/PROPH/DIAG IV INF INIT: CPT

## 2022-06-20 PROCEDURE — 99285 EMERGENCY DEPT VISIT HI MDM: CPT

## 2022-06-20 PROCEDURE — 83735 ASSAY OF MAGNESIUM: CPT

## 2022-06-20 PROCEDURE — 80053 COMPREHEN METABOLIC PANEL: CPT

## 2022-06-20 PROCEDURE — 83880 ASSAY OF NATRIURETIC PEPTIDE: CPT

## 2022-06-20 PROCEDURE — 74011250636 HC RX REV CODE- 250/636: Performed by: EMERGENCY MEDICINE

## 2022-06-20 PROCEDURE — 85025 COMPLETE CBC W/AUTO DIFF WBC: CPT

## 2022-06-20 RX ORDER — CYCLOBENZAPRINE HCL 5 MG
10 TABLET ORAL 3 TIMES DAILY
Qty: 18 TABLET | Refills: 0 | Status: SHIPPED | OUTPATIENT
Start: 2022-06-20 | End: 2022-06-23

## 2022-06-20 RX ORDER — OXYCODONE AND ACETAMINOPHEN 5; 325 MG/1; MG/1
1 TABLET ORAL
Status: COMPLETED | OUTPATIENT
Start: 2022-06-20 | End: 2022-06-20

## 2022-06-20 RX ORDER — LANOLIN ALCOHOL/MO/W.PET/CERES
400 CREAM (GRAM) TOPICAL DAILY
Qty: 30 TABLET | Refills: 0 | Status: SHIPPED | OUTPATIENT
Start: 2022-06-20 | End: 2022-07-12

## 2022-06-20 RX ORDER — KETOROLAC TROMETHAMINE 15 MG/ML
15 INJECTION, SOLUTION INTRAMUSCULAR; INTRAVENOUS ONCE
Status: COMPLETED | OUTPATIENT
Start: 2022-06-20 | End: 2022-06-20

## 2022-06-20 RX ORDER — FAMOTIDINE 20 MG/1
20 TABLET, FILM COATED ORAL
Status: COMPLETED | OUTPATIENT
Start: 2022-06-20 | End: 2022-06-20

## 2022-06-20 RX ORDER — LOSARTAN POTASSIUM 50 MG/1
100 TABLET ORAL
Status: COMPLETED | OUTPATIENT
Start: 2022-06-20 | End: 2022-06-20

## 2022-06-20 RX ORDER — CARVEDILOL 6.25 MG/1
12.5 TABLET ORAL
Status: COMPLETED | OUTPATIENT
Start: 2022-06-20 | End: 2022-06-20

## 2022-06-20 RX ORDER — NAPROXEN 500 MG/1
500 TABLET ORAL 2 TIMES DAILY WITH MEALS
Qty: 6 TABLET | Refills: 0 | Status: SHIPPED | OUTPATIENT
Start: 2022-06-20 | End: 2022-06-23

## 2022-06-20 RX ORDER — GUAIFENESIN 100 MG/5ML
324 LIQUID (ML) ORAL
Status: COMPLETED | OUTPATIENT
Start: 2022-06-20 | End: 2022-06-20

## 2022-06-20 RX ORDER — MAGNESIUM SULFATE HEPTAHYDRATE 40 MG/ML
2 INJECTION, SOLUTION INTRAVENOUS ONCE
Status: COMPLETED | OUTPATIENT
Start: 2022-06-20 | End: 2022-06-20

## 2022-06-20 RX ADMIN — LOSARTAN POTASSIUM 100 MG: 50 TABLET, FILM COATED ORAL at 16:04

## 2022-06-20 RX ADMIN — CARVEDILOL 12.5 MG: 6.25 TABLET, FILM COATED ORAL at 16:04

## 2022-06-20 RX ADMIN — KETOROLAC TROMETHAMINE 15 MG: 15 INJECTION, SOLUTION INTRAMUSCULAR; INTRAVENOUS at 17:28

## 2022-06-20 RX ADMIN — OXYCODONE HYDROCHLORIDE AND ACETAMINOPHEN 1 TABLET: 5; 325 TABLET ORAL at 16:04

## 2022-06-20 RX ADMIN — MAGNESIUM SULFATE 2 G: 2 INJECTION INTRAVENOUS at 17:28

## 2022-06-20 RX ADMIN — FAMOTIDINE 20 MG: 20 TABLET ORAL at 16:04

## 2022-06-20 RX ADMIN — ASPIRIN 81 MG CHEWABLE TABLET 324 MG: 81 TABLET CHEWABLE at 16:04

## 2022-06-20 NOTE — ED TRIAGE NOTES
Pt c/o left side CP started at 1500 after getting out of his car. Pt also states his lower lumbar hurts, denies recent injury.

## 2022-06-20 NOTE — ED PROVIDER NOTES
EMERGENCY DEPARTMENT HISTORY AND PHYSICAL EXAM    3:31 PM  Date: 6/20/2022  Patient Name: Jaye Mercer    History of Presenting Illness       History Provided By:     HPI: Jaye Mercer is a 46 y.o. male with past medical history of hypertension, obesity, CVA, prediabetes, suspected gout, rheumatoid arthritis presents with chest pain that started a few minutes ago. Patient states that he was coming to the emergency department to be evaluated for lower back pain that started yesterday when he started experiencing central chest pain. Patient describes chest pain as sharp, moderate severity, no associated symptoms or modifying factors. Denies any shortness of breath. Patient states that he did not take his blood pressure medications today     PCP: Marizol Reeves MD    Past History     Past Medical History:  Past Medical History:   Diagnosis Date    Ambulates with cane     Arthritis     lower back and kness    Hypertension     Knee pain     Morbid obesity ([address]        Past Surgical History:  Past Surgical History:   Procedure Laterality Date    COLONOSCOPY N/A 6/19/2020    COLONOSCOPY with polypectomy performed by Fernando Wolff MD at [facility]    HX CHOLECYSTECTOMY         Family History:  Family History   Problem Relation Age of Onset    Heart Disease Mother     Diabetes Mother     Stroke Father        Social History:  Social History     Tobacco Use    Smoking status: Never Smoker    Smokeless tobacco: Never Used   Substance Use Topics    Alcohol use: Yes     Alcohol/week: 1.0 standard drink     Types: 1 Cans of beer per week    Drug use: Not Currently       Allergies: Allergies   Allergen Reactions    Norvasc [Amlodipine] Swelling     BLE Edema       Review of Systems   Review of Systems   Constitutional: Negative for activity change, appetite change and chills. HENT: Negative for congestion, ear discharge, ear pain and sore throat.     Eyes: Negative for photophobia and pain. Respiratory: Negative for cough and choking. Cardiovascular: Positive for chest pain. Negative for palpitations and leg swelling. Gastrointestinal: Negative for anal bleeding and rectal pain. Endocrine: Negative for polydipsia and polyuria. Genitourinary: Negative for genital sores and urgency. Musculoskeletal: Positive for back pain. Negative for arthralgias and myalgias. Neurological: Negative for dizziness, seizures and speech difficulty. Psychiatric/Behavioral: Negative for hallucinations, self-injury and suicidal ideas. Physical Exam     Patient Vitals for the past 12 hrs:   Pulse Resp BP   06/20/22 1528 81 19 (!) 148/78       Physical Exam  Vitals and nursing note reviewed. Constitutional:       Appearance: He is well-developed. HENT:      Head: Normocephalic and atraumatic. Eyes:      General:         Right eye: No discharge. Left eye: No discharge. Cardiovascular:      Rate and Rhythm: Normal rate and regular rhythm. Heart sounds: Normal heart sounds. No murmur heard. Pulmonary:      Effort: Pulmonary effort is normal. No respiratory distress. Breath sounds: Normal breath sounds. No stridor. No wheezing or rales. Chest:      Chest wall: No tenderness. Abdominal:      General: Bowel sounds are normal. There is no distension. Palpations: Abdomen is soft. Tenderness: There is no abdominal tenderness. There is no guarding or rebound. Musculoskeletal:         General: Normal range of motion. Cervical back: Normal range of motion and neck supple. Skin:     General: Skin is warm and dry. Neurological:      Mental Status: He is alert and oriented to person, place, and time.          Diagnostic Study Results     Labs -  Recent Results (from the past 12 hour(s))   EKG, 12 LEAD, INITIAL    Collection Time: 06/20/22  3:27 PM   Result Value Ref Range    Ventricular Rate 82 BPM    Atrial Rate 82 BPM    P-R Interval 198 ms    QRS Duration 106 ms    Q-T Interval 410 ms    QTC Calculation (Bezet) 479 ms    Calculated P Axis 58 degrees    Calculated R Axis 79 degrees    Calculated T Axis 52 degrees    Diagnosis       Normal sinus rhythm with sinus arrhythmia  Normal ECG  When compared with ECG of 29-APR-2022 10:57,  No significant change was found         Radiologic Studies -   No results found. Medical Decision Making     ED Course: Progress Notes, Reevaluation, and Consults:    3:31 PM Initial assessment performed. The patients presenting problems have been discussed, and they/their family are in agreement with the care plan formulated and outlined with them. I have encouraged them to ask questions as they arise throughout their visit. Provider Notes (Medical Decision Making):   Patient presents with chest pain that started few minutes ago  Vitals within normal limits upon hypertension, patient did not take his antihypertensive medicines today  Plan to obtain labs, imaging  Old medical records reviewed:  EKG as interpreted by me: 82, normal sinus rhythm, no ST elevations  Labs as interpreted by me: No leukocytosis, no electrolyte abnormality  2 sets of troponin negative  I do not suspect ACS  Clinical impression musculoskeletal, atypical chest pain  X-ray chest no acute abnormality  Heart score 3  Patient be discharged with PMD follow-up  Patient feels well on reassessment, no chest pain  Will be discharged with PMD follow-up within 24 hours          Vital Signs-Reviewed the patient's vital signs. Reviewed pt's pulse ox reading. Records Reviewed: old medical records  -I am the first provider for this patient.  -I reviewed the vital signs, available nursing notes, past medical history, past surgical history, family history and social history. Current Outpatient Medications   Medication Sig Dispense Refill    losartan (COZAAR) 100 mg tablet Take 1 Tablet by mouth daily.  30 Tablet 6    hydroCHLOROthiazide (HYDRODIURIL) 25 mg tablet Take 1 Tablet by mouth daily. 30 Tablet 5    carvediloL (COREG) 12.5 mg tablet Take 1 Tablet by mouth two (2) times daily (with meals). 69 Tablet 5    docusate sodium (COLACE) 100 mg capsule Take 1 Capsule by mouth two (2) times a day. 60 Capsule 5    dulaglutide (Trulicity) 8.72 DG/0.2 mL sub-q pen 0.5 mL by SubCUTAneous route every seven (7) days. 4 Pen 0    indomethacin (INDOCIN) 50 mg capsule Take 50 mg by mouth three (3) times daily.  [START ON 7/17/2022] dulaglutide (TRULICITY) 1.5 GI/2.1 mL sub-q pen 0.5 mL by SubCUTAneous route every seven (7) days. 4 Each 5    predniSONE (DELTASONE) 20 mg tablet Take 2 Tablets by mouth daily (with breakfast). 10 Tablet 0    magnesium citrate solution Take 1/3 of bottle every 8 hours until finished or until you have a bowel movement. (Patient not taking: Reported on 6/16/2022) 300 mL 0    glycerin, adult, suppository Insert 1 Suppository into rectum daily as needed (Constipation). (Patient not taking: Reported on 4/28/2022) 7 Suppository 0        Clinical Impression     Clinical Impression: No diagnosis found. Disposition:    Pt has been reexamined. Patient has no new complaints, changes, or physical findings. Care plan outlined and precautions discussed. Results were reviewed with the patient. All medications were reviewed with the patient; will d/c home with PMD f/u. All of pt's questions and concerns were addressed. Patient was instructed and agrees to follow up with PMD, as well as to return to the ED upon further deterioration. Patient is ready to go home. This note was dictated utilizing voice recognition software which may lead to typographical errors. I apologize in advance if the situation occurs. If questions arise please do not hesitate to contact me or call our department. This note was dictated utilizing voice recognition software which may lead to typographical errors. I apologize in advance if the situation occurs.   If questions arise please do not hesitate to contact me or call our department.     Aaron Peralta MD  3:31 PM

## 2022-06-21 LAB
ATRIAL RATE: 82 BPM
CALCULATED P AXIS, ECG09: 58 DEGREES
CALCULATED R AXIS, ECG10: 79 DEGREES
CALCULATED T AXIS, ECG11: 52 DEGREES
DIAGNOSIS, 93000: NORMAL
P-R INTERVAL, ECG05: 198 MS
Q-T INTERVAL, ECG07: 410 MS
QRS DURATION, ECG06: 106 MS
QTC CALCULATION (BEZET), ECG08: 479 MS
VENTRICULAR RATE, ECG03: 82 BPM

## 2022-07-12 ENCOUNTER — HOSPITAL ENCOUNTER (EMERGENCY)
Age: 52
Discharge: HOME | End: 2022-07-12
Attending: EMERGENCY MEDICINE
Payer: MEDICARE

## 2022-07-12 VITALS
HEART RATE: 88 BPM | SYSTOLIC BLOOD PRESSURE: 140 MMHG | HEIGHT: 69 IN | TEMPERATURE: 99.1 F | WEIGHT: 315 LBS | DIASTOLIC BLOOD PRESSURE: 87 MMHG | BODY MASS INDEX: 46.65 KG/M2 | RESPIRATION RATE: 18 BRPM | OXYGEN SATURATION: 97 %

## 2022-07-12 DIAGNOSIS — M10.9 ACUTE GOUT OF LEFT HAND, UNSPECIFIED CAUSE: Primary | ICD-10-CM

## 2022-07-12 PROCEDURE — 99283 EMERGENCY DEPT VISIT LOW MDM: CPT

## 2022-07-12 PROCEDURE — 74011250637 HC RX REV CODE- 250/637: Performed by: EMERGENCY MEDICINE

## 2022-07-12 PROCEDURE — A9270 NON-COVERED ITEM OR SERVICE: HCPCS | Performed by: EMERGENCY MEDICINE

## 2022-07-12 PROCEDURE — 74011636637 HC RX REV CODE- 636/637: Performed by: EMERGENCY MEDICINE

## 2022-07-12 RX ORDER — OXYCODONE AND ACETAMINOPHEN 5; 325 MG/1; MG/1
1 TABLET ORAL
Status: COMPLETED | OUTPATIENT
Start: 2022-07-12 | End: 2022-07-12

## 2022-07-12 RX ORDER — COLCHICINE 0.6 MG/1
1.2 TABLET ORAL
Status: DISCONTINUED | OUTPATIENT
Start: 2022-07-12 | End: 2022-07-12

## 2022-07-12 RX ORDER — OXYCODONE AND ACETAMINOPHEN 5; 325 MG/1; MG/1
1 TABLET ORAL
Qty: 12 TABLET | Refills: 0 | Status: SHIPPED | OUTPATIENT
Start: 2022-07-12 | End: 2022-07-15

## 2022-07-12 RX ORDER — PREDNISONE 20 MG/1
60 TABLET ORAL
Status: COMPLETED | OUTPATIENT
Start: 2022-07-12 | End: 2022-07-12

## 2022-07-12 RX ORDER — PREDNISONE 20 MG/1
60 TABLET ORAL DAILY
Qty: 15 TABLET | Refills: 0 | Status: SHIPPED | OUTPATIENT
Start: 2022-07-12 | End: 2022-07-17

## 2022-07-12 RX ADMIN — OXYCODONE HYDROCHLORIDE AND ACETAMINOPHEN 1 TABLET: 5; 325 TABLET ORAL at 19:39

## 2022-07-12 RX ADMIN — PREDNISONE 60 MG: 20 TABLET ORAL at 19:39

## 2022-07-12 NOTE — ED TRIAGE NOTES
A&O male with c/o left hand pain and swelling x 2 days. Evaluated at urgent care yesterday, x-rays negative. Told either gout or rheumatoid arthritis. Prescribed Indomethacin without any improvement.

## 2022-07-12 NOTE — ED PROVIDER NOTES
EMERGENCY DEPARTMENT HISTORY AND PHYSICAL EXAM    7:31 PM  Date: (Not on file)  Patient Name: Xander Ybarra    History of Presenting Illness       History Provided By:     HPI: Xander Ybarra is a 46 y.o. male with past medical history of gout, diabetes, morbid obesity presents with left hand pain and swelling for 2 days that started 2 days ago. Patient states that he drinks alcohol once a week-4 shots of heavy liquor. Patient denies any injury. Denies any fever or chills. He is not on allopurinol. He was seen in urgent care yesterday had x-rays that were normal and was prescribed indomethacin. Patient states that he feels that his pain is not well controlled. PCP: Louis Jaffe MD    Past History     Past Medical History:  Past Medical History:   Diagnosis Date    Ambulates with cane     Arthritis     lower back and kness    Hypertension     Knee pain     Morbid obesity ([address]        Past Surgical History:  Past Surgical History:   Procedure Laterality Date    COLONOSCOPY N/A 6/19/2020    COLONOSCOPY with polypectomy performed by Iggy Canales MD at [facility]    HX CHOLECYSTECTOMY         Family History:  Family History   Problem Relation Age of Onset    Heart Disease Mother     Diabetes Mother     Stroke Father        Social History:  Social History     Tobacco Use    Smoking status: Never Smoker    Smokeless tobacco: Never Used   Substance Use Topics    Alcohol use: Yes     Alcohol/week: 1.0 standard drink     Types: 1 Cans of beer per week    Drug use: Not Currently       Allergies: Allergies   Allergen Reactions    Norvasc [Amlodipine] Swelling     BLE Edema       Review of Systems   Review of Systems   Constitutional: Negative for activity change, appetite change and chills. HENT: Negative for congestion, ear discharge, ear pain and sore throat. Eyes: Negative for photophobia and pain. Respiratory: Negative for cough and choking.     Cardiovascular: Negative for palpitations and leg swelling. Gastrointestinal: Negative for anal bleeding and rectal pain. Endocrine: Negative for polydipsia and polyuria. Genitourinary: Negative for genital sores and urgency. Musculoskeletal: Negative for arthralgias and myalgias. Neurological: Negative for dizziness, seizures and speech difficulty. Psychiatric/Behavioral: Negative for hallucinations, self-injury and suicidal ideas. Physical Exam     Patient Vitals for the past 12 hrs:   Temp Pulse Resp BP SpO2   07/12/22 1914 99.1 °F (37.3 °C) 88 18 (!) 140/87 97 %       Physical Exam  Vitals and nursing note reviewed. Constitutional:       Appearance: He is well-developed. HENT:      Head: Normocephalic and atraumatic. Eyes:      General:         Right eye: No discharge. Left eye: No discharge. Cardiovascular:      Rate and Rhythm: Normal rate and regular rhythm. Heart sounds: Normal heart sounds. No murmur heard. Pulmonary:      Effort: Pulmonary effort is normal. No respiratory distress. Breath sounds: Normal breath sounds. No stridor. No wheezing or rales. Chest:      Chest wall: No tenderness. Abdominal:      General: Bowel sounds are normal. There is no distension. Palpations: Abdomen is soft. Tenderness: There is no abdominal tenderness. There is no guarding or rebound. Musculoskeletal:         General: Normal range of motion. Cervical back: Normal range of motion and neck supple. Comments: Left hand tenderness, swelling  Full range of movement, neurovascular intact   Skin:     General: Skin is warm and dry. Neurological:      Mental Status: He is alert and oriented to person, place, and time. Diagnostic Study Results     Labs -  No results found for this or any previous visit (from the past 12 hour(s)). Radiologic Studies -   No results found.       Medical Decision Making     ED Course: Progress Notes, Reevaluation, and Consults:    7:31 PM Initial assessment performed. The patients presenting problems have been discussed, and they/their family are in agreement with the care plan formulated and outlined with them. I have encouraged them to ask questions as they arise throughout their visit. Provider Notes (Medical Decision Making):   Patient presents with left hand swelling and pain  Patient has history of gout currently not on allopurinol  Patient already takes carvedilol so colchicine would not be a good choice as increased risk of toxicity. He is on indomethacin already  Patient does not want lab tests at present  We are going to add prednisone and Percocet to his treatment  Advised to follow-up with PMD.  Strict return precautions given        Vital Signs-Reviewed the patient's vital signs. Reviewed pt's pulse ox reading. Records Reviewed: old medical records  -I am the first provider for this patient.  -I reviewed the vital signs, available nursing notes, past medical history, past surgical history, family history and social history. Current Facility-Administered Medications   Medication Dose Route Frequency Provider Last Rate Last Admin    oxyCODONE-acetaminophen (PERCOCET) 5-325 mg per tablet 1 Tablet  1 Tablet Oral NOW Tim Negron MD        predniSONE (DELTASONE) tablet 60 mg  60 mg Oral NOW Tim Negron MD         Current Outpatient Medications   Medication Sig Dispense Refill    losartan (COZAAR) 100 mg tablet Take 1 Tablet by mouth daily. 30 Tablet 6    indomethacin (INDOCIN) 50 mg capsule Take 50 mg by mouth three (3) times daily.  carvediloL (COREG) 12.5 mg tablet Take 1 Tablet by mouth two (2) times daily (with meals). 69 Tablet 5    docusate sodium (COLACE) 100 mg capsule Take 1 Capsule by mouth two (2) times a day. 60 Capsule 5    dulaglutide (Trulicity) 0.88 BC/8.4 mL sub-q pen 0.5 mL by SubCUTAneous route every seven (7) days.  4 Pen 0    [START ON 7/17/2022] dulaglutide (TRULICITY) 1.5 NW/7.2 mL sub-q pen 0.5 mL by SubCUTAneous route every seven (7) days. 4 Each 5        Clinical Impression     Clinical Impression: No diagnosis found. Disposition: This note was dictated utilizing voice recognition software which may lead to typographical errors. I apologize in advance if the situation occurs. If questions arise please do not hesitate to contact me or call our department.     Jillian Tran MD  7:31 PM

## 2022-07-19 ENCOUNTER — OFFICE VISIT (OUTPATIENT)
Dept: INTERNAL MEDICINE CLINIC | Age: 52
End: 2022-07-19
Payer: MEDICARE

## 2022-07-19 VITALS
WEIGHT: 315 LBS | HEART RATE: 83 BPM | HEIGHT: 69 IN | OXYGEN SATURATION: 98 % | TEMPERATURE: 98.5 F | RESPIRATION RATE: 18 BRPM | BODY MASS INDEX: 46.65 KG/M2 | DIASTOLIC BLOOD PRESSURE: 90 MMHG | SYSTOLIC BLOOD PRESSURE: 147 MMHG

## 2022-07-19 DIAGNOSIS — G47.33 OSA (OBSTRUCTIVE SLEEP APNEA): ICD-10-CM

## 2022-07-19 DIAGNOSIS — M25.532 LEFT WRIST PAIN: ICD-10-CM

## 2022-07-19 DIAGNOSIS — M79.642 LEFT HAND PAIN: ICD-10-CM

## 2022-07-19 DIAGNOSIS — M10.9 GOUT, UNSPECIFIED CAUSE, UNSPECIFIED CHRONICITY, UNSPECIFIED SITE: ICD-10-CM

## 2022-07-19 DIAGNOSIS — R76.8 ELEVATED RHEUMATOID FACTOR: ICD-10-CM

## 2022-07-19 DIAGNOSIS — R73.02 IMPAIRED GLUCOSE TOLERANCE: ICD-10-CM

## 2022-07-19 DIAGNOSIS — I10 ESSENTIAL HYPERTENSION: ICD-10-CM

## 2022-07-19 DIAGNOSIS — R79.82 ELEVATED C-REACTIVE PROTEIN (CRP): ICD-10-CM

## 2022-07-19 DIAGNOSIS — R06.09 DOE (DYSPNEA ON EXERTION): Primary | ICD-10-CM

## 2022-07-19 PROCEDURE — 3017F COLORECTAL CA SCREEN DOC REV: CPT | Performed by: INTERNAL MEDICINE

## 2022-07-19 PROCEDURE — G0463 HOSPITAL OUTPT CLINIC VISIT: HCPCS | Performed by: INTERNAL MEDICINE

## 2022-07-19 PROCEDURE — G8427 DOCREV CUR MEDS BY ELIG CLIN: HCPCS | Performed by: INTERNAL MEDICINE

## 2022-07-19 PROCEDURE — G8417 CALC BMI ABV UP PARAM F/U: HCPCS | Performed by: INTERNAL MEDICINE

## 2022-07-19 PROCEDURE — G8753 SYS BP > OR = 140: HCPCS | Performed by: INTERNAL MEDICINE

## 2022-07-19 PROCEDURE — 99214 OFFICE O/P EST MOD 30 MIN: CPT | Performed by: INTERNAL MEDICINE

## 2022-07-19 RX ORDER — INDOMETHACIN 50 MG/1
50 CAPSULE ORAL
Qty: 90 CAPSULE | Refills: 5 | Status: SHIPPED | OUTPATIENT
Start: 2022-07-19 | End: 2022-07-27

## 2022-07-19 RX ORDER — ALBUTEROL SULFATE 90 UG/1
2 AEROSOL, METERED RESPIRATORY (INHALATION)
Qty: 1 EACH | Refills: 5 | Status: SHIPPED | OUTPATIENT
Start: 2022-07-19 | End: 2022-07-27

## 2022-07-19 RX ORDER — CARVEDILOL 25 MG/1
25 TABLET ORAL 2 TIMES DAILY WITH MEALS
Qty: 60 TABLET | Refills: 11 | Status: SHIPPED | OUTPATIENT
Start: 2022-07-19 | End: 2022-08-02 | Stop reason: SDUPTHER

## 2022-07-19 NOTE — ANESTHESIA PREPROCEDURE EVALUATION
Relevant Problems   No relevant active problems       Anesthetic History   No history of anesthetic complications            Review of Systems / Medical History  Patient summary reviewed and pertinent labs reviewed    Pulmonary  Within defined limits                 Neuro/Psych   Within defined limits           Cardiovascular    Hypertension: well controlled                   GI/Hepatic/Renal  Within defined limits              Endo/Other        Morbid obesity and arthritis     Other Findings   Comments: BMI 67.58         Physical Exam    Airway  Mallampati: III  TM Distance: > 6 cm  Neck ROM: normal range of motion   Mouth opening: Normal     Cardiovascular  Regular rate and rhythm,  S1 and S2 normal,  no murmur, click, rub, or gallop             Dental    Dentition: Poor dentition     Pulmonary  Breath sounds clear to auscultation               Abdominal  GI exam deferred       Other Findings            Anesthetic Plan    ASA: 3  Anesthesia type: MAC          Induction: Intravenous  Anesthetic plan and risks discussed with: Patient Island Pedicle Flap With Canthal Suspension Text: The defect edges were debeveled with a #15 scalpel blade.  Given the location of the defect, shape of the defect and the proximity to free margins an island pedicle advancement flap was deemed most appropriate.  Using a sterile surgical marker, an appropriate advancement flap was drawn incorporating the defect, outlining the appropriate donor tissue and placing the expected incisions within the relaxed skin tension lines where possible. The area thus outlined was incised deep to adipose tissue with a #15 scalpel blade.  The skin margins were undermined to an appropriate distance in all directions around the primary defect and laterally outward around the island pedicle utilizing iris scissors.  There was minimal undermining beneath the pedicle flap. A suspension suture was placed in the canthal tendon to prevent tension and prevent ectropion.

## 2022-07-19 NOTE — PATIENT INSTRUCTIONS
Indomethacin (By mouth)   Indomethacin (in-[id number]-METH-a-sin)  Treats pain. This is an NSAID. Brand Name(s): Indocin, Indocin SR, Tivorbex   There may be other brand names for this medicine. When This Medicine Should Not Be Used: This medicine is not right for everyone. Do not use it if you had an allergic reaction (including asthma) to indomethacin, aspirin, or other NSAIDs. Do not use it if you have had a heart surgery (such as coronary artery bypass graft). How to Use This Medicine:   Capsule, Long Acting Capsule, Liquid  · Take your medicine as directed. Your dose may need to be changed several times to find what works best for you. · It is best to take this medicine with food, milk, or antacids so it does not upset your stomach. · Swallow the capsule whole. Do not open, crush, break, or chew it. · Swallow the extended-release capsule whole. Do not crush, break, or chew it. · Oral liquid: Measure the oral liquid medicine with a marked measuring spoon, oral syringe, or medicine cup. Shake well before using. · This medicine should come with a Medication Guide. Ask your pharmacist for a copy if you do not have one. · Missed dose: Take a dose as soon as you remember. If it is almost time for your next dose, wait until then and take a regular dose. Do not take extra medicine to make up for a missed dose. · Store the medicine in a closed container at room temperature, away from heat, moisture, and direct light. Do not freeze the oral liquid. Drugs and Foods to Avoid:   Ask your doctor or pharmacist before using any other medicine, including over-the-counter medicines, vitamins, and herbal products. · Do not use any other NSAID medicine unless your doctor says it is okay. Some other NSAIDs are aspirin, celecoxib, diclofenac, diflunisal, ibuprofen, naproxen, or salsalate. · Some foods and medicines can affect how indomethacin works.  Tell your doctor if you are using any of the following:  ¨ Cyclosporine, digoxin, lithium, methotrexate, pemetrexed, or probenecid  ¨ Blood pressure medicine  ¨ Blood thinner (including warfarin)  ¨ Diuretic (water pill)  ¨ Medicine to treat depression  ¨ Steroid medicine  Warnings While Using This Medicine:   · Tell your doctor if you are pregnant or breastfeeding. Do not use this medicine during the later part of a pregnancy, unless your doctor tells you to. · Tell your doctor if you have kidney disease, liver disease, asthma, bleeding problems, heart disease, high blood pressure, heart failure, or a history of stomach or bowel problems (including bleeding or ulcers), depression, mental illness, epilepsy, or Parkinson disease. Tell your doctor if you smoke or drink alcohol. · This medicine may cause the following problems:  ¨ Higher risk of blood clots, heart attack, stroke, or heart failure  ¨ Bleeding and ulcers in your stomach or intestines  ¨ Liver damage  ¨ Kidney damage  ¨ Serious skin reactions  ¨ Changes in vision  · Ovulation may be delayed in some women while this medicine is being used. Talk to your doctor if you have concerns about this. · This medicine may make you drowsy. Do not drive or do anything else that could be dangerous until you know how this medicine affects you. · Your doctor will do lab tests at regular visits to check on the effects of this medicine. Keep all appointments. · Keep all medicine out of the reach of children. Never share your medicine with anyone.   Possible Side Effects While Using This Medicine:   Call your doctor right away if you notice any of these side effects:  · Allergic reaction: Itching or hives, swelling in your face or hands, swelling or tingling in your mouth or throat, chest tightness, trouble breathing  · Blistering, peeling, or red skin rash  · Bloody or black, tarry stools, severe stomach pain, vomiting blood or something that looks like coffee grounds  · Change in how much or how often you urinate  · Chest pain that may spread, trouble breathing, unusual sweating, fainting  · Dark urine or pale stools, nausea, vomiting, loss of appetite, stomach pain, yellow skin or eyes  · Numbness or weakness on one side of your body, sudden or severe headache, problems with vision, speech, or walking  · Rapid weight gain, swelling in your hands, ankles, or feet  · Unusual bleeding, bruising, or weakness  If you notice these less serious side effects, talk with your doctor:   · Constipation, gas, stomach pain  · Headache or drowsiness  If you notice other side effects that you think are caused by this medicine, tell your doctor. Call your doctor for medical advice about side effects. You may report side effects to FDA at 2-141-FDA-7209  © 2017 [facility] Information is for End User's use only and may not be sold, redistributed or otherwise used for commercial purposes. The above information is an  only. It is not intended as medical advice for individual conditions or treatments. Talk to your doctor, nurse or pharmacist before following any medical regimen to see if it is safe and effective for you.

## 2022-07-19 NOTE — PROGRESS NOTES
Ida Holland presents today for   Chief Complaint   Patient presents with   [address],[facility] Annual Wellness Visit         1. \"Have you been to the ER, urgent care clinic since your last visit? Hospitalized since your last visit? \" YES    2. \"Have you seen or consulted any other health care providers outside of the [address] [place] [address] since your last visit? \" YES     3. For patients aged 39-70: Has the patient had a colonoscopy / FIT/ Cologuard? Yes - no Care Gap present      If the patient is female:    4. For patients aged 41-77: Has the patient had a mammogram within the past 2 years? NA - based on age or sex  See top three    5. For patients aged 21-65: Has the patient had a pap smear?  NA - based on age or sex

## 2022-07-19 NOTE — PROGRESS NOTES
INTERNISTS [facility]:  7/19/2022, MRN: [id number]      Erick Doran is a 46 y.o. male and presents to clinic for Follow-up and Annual Wellness Visit      Subjective: The patient is a 80-year-old male with history of hypertension, obesity, CVA, h/o H.pylori infection (treated last year), prediabetes, suspected rheumatoid arthritis, suspected gout, lumbar disc disease, and chronic bilateral knee pain (followed by Orthopedics). 1. Arthralgia: He went to the ED in between apts after his left hand became swollen across his wrist and MCP jts. He was given steroid rx for his sx. Sx improved but did not resolve with this rx. His pain is 5/10 today along his left wrist.  He completed rx 2 days ago. He has yet to schedule with Rheumatology yet has a h/o an elevated RF and CRP. He is suspected to have gout as well. Results for Chandra Gilford (MRN [id number]) as of 5/4/2022 04:46    Ref. Range 4/7/2021 18:33 8/12/2021 09:01 4/29/2022 10:30   C-Reactive Protein, Qt Latest Ref Range: <=0.5 mg/dL   2.7 (H) 6.0 (H)   C REACTIVE PROTEIN, QT Unknown   Rpt (A) Rpt (A)   Rheumatoid Arthritis Factor Latest Units: Units/mL 29.3 (H) 31.3 (H)     14.3.3 ETA, Rheum. Arthritis Latest Units: ng/mL <0.20 <0.20       2. RON: He tried to schedule with Sleep Medicine but he has to get his records in order to schedule a first patient apt. He is not on a CPAP at this time. 3. Prediabetes/Obesity: BMI is 72. His weight is 490lbs. He is not taking Trulicity at all >1 month. It was prescribed given his h/o prediabetes and BMI. 4. HTN: BP is 147/90. Taking coreg and losartan. He used to take HCTZ but somehow this rx \"fell off\" his med list.     5. LEWIS: Unchanged. Reported at his last apt. No sick contacts. No CP/palpitations. Improved by rest. No tobacco use. No cough. He was referred to the Pulmonology team but he has yet to schedule an apt. No tobacco exposure. S/p an unremarkable CXR. S/p EKG.          Patient Active Problem List    Diagnosis Date Noted    Gout 05/04/2022    Chronic low back pain without sciatica 10/19/2019    Essential hypertension 06/10/2019    Impaired glucose tolerance 06/10/2019    History of Helicobacter pylori infection 06/10/2019    Obesity, morbid ([address] 07/13/2018    History of CVA (cerebrovascular accident) 07/13/2018    Chronic pain of both knees 07/13/2018       Current Outpatient Medications   Medication Sig Dispense Refill    losartan (COZAAR) 100 mg tablet Take 1 Tablet by mouth daily. 30 Tablet 6    carvediloL (COREG) 12.5 mg tablet Take 1 Tablet by mouth two (2) times daily (with meals). 69 Tablet 5    indomethacin (INDOCIN) 50 mg capsule Take 50 mg by mouth three (3) times daily. docusate sodium (COLACE) 100 mg capsule Take 1 Capsule by mouth two (2) times a day. (Patient not taking: Reported on 7/19/2022) 60 Capsule 5    dulaglutide (Trulicity) 2.82 QB/2.5 mL sub-q pen 0.5 mL by SubCUTAneous route every seven (7) days. (Patient not taking: Reported on 7/19/2022) 4 Pen 0    dulaglutide (TRULICITY) 1.5 MQ/1.4 mL sub-q pen 0.5 mL by SubCUTAneous route every seven (7) days. (Patient not taking: Reported on 7/19/2022) 4 Each 5       Allergies   Allergen Reactions    Norvasc [Amlodipine] Swelling     BLE Edema       Past Medical History:   Diagnosis Date    Ambulates with cane     Arthritis     lower back and kness    Hypertension     Knee pain     Morbid obesity ([address]        Past Surgical History:   Procedure Laterality Date    COLONOSCOPY N/A 6/19/2020    COLONOSCOPY with polypectomy performed by Grady Birmingham MD at [facility]    HX CHOLECYSTECTOMY         Family History   Problem Relation Age of Onset    Heart Disease Mother     Diabetes Mother     Stroke Father        Social History     Tobacco Use    Smoking status: Never Smoker    Smokeless tobacco: Never Used   Substance Use Topics    Alcohol use:  Yes     Alcohol/week: 1.0 standard drink     Types: 1 Cans of beer per week ROS   Review of Systems   Constitutional:  Negative for chills and fever. HENT:  Negative for ear pain and sore throat. Eyes:  Negative for blurred vision and pain. Respiratory:  Negative for cough and shortness of breath. Cardiovascular:  Negative for chest pain. Gastrointestinal:  Negative for abdominal pain, blood in stool and melena. Genitourinary:  Negative for dysuria and hematuria. Musculoskeletal:  Positive for joint pain. Negative for myalgias. Skin:  Negative for rash. Neurological:  Negative for headaches. Endo/Heme/Allergies:  Does not bruise/bleed easily. Psychiatric/Behavioral:  Negative for substance abuse. Objective     Vitals:    07/19/22 1145 07/19/22 1150   BP: (!) 146/100 (!) 147/90   Pulse: 83    Resp: 18    Temp: 98.5 °F (36.9 °C)    TempSrc: Temporal    SpO2: 98%    Weight: (!) 490 lb (222.3 kg)    Height: 5' 9\" (1.753 m)    PainSc:   5    PainLoc: Hand        Physical Exam  Vitals and nursing note reviewed. Constitutional:       Appearance: Normal appearance. HENT:      Head: Normocephalic and atraumatic. Right Ear: External ear normal.      Left Ear: External ear normal.   Eyes:      General: No scleral icterus. Right eye: No discharge. Left eye: No discharge. Extraocular Movements: Extraocular movements intact. Conjunctiva/sclera: Conjunctivae normal.   Cardiovascular:      Rate and Rhythm: Normal rate and regular rhythm. Pulses: Normal pulses. Heart sounds: Normal heart sounds. Pulmonary:      Effort: Pulmonary effort is normal.      Breath sounds: Normal breath sounds. Abdominal:      General: Abdomen is flat. Bowel sounds are normal. There is no distension. Palpations: Abdomen is soft. Tenderness: There is no abdominal tenderness. Musculoskeletal:         General: No swelling (Along his left wrist) or tenderness (except along his left wrist). Cervical back: Neck supple.    Lymphadenopathy: Cervical: No cervical adenopathy. Skin:     General: Skin is warm and dry. Findings: No erythema. Neurological:      Mental Status: He is alert. Mental status is at baseline. Gait: Gait normal.   Psychiatric:         Mood and Affect: Mood normal.       LABS   Data Review:   Lab Results   Component Value Date/Time    WBC 7.8 06/20/2022 03:34 PM    HGB 11.0 (L) 06/20/2022 03:34 PM    HCT 34.7 (L) 06/20/2022 03:34 PM    PLATELET 794 24/17/5376 03:34 PM    MCV 82.6 06/20/2022 03:34 PM       Lab Results   Component Value Date/Time    Sodium 142 06/20/2022 03:34 PM    Potassium 3.5 06/20/2022 03:34 PM    Chloride 105 06/20/2022 03:34 PM    CO2 30 06/20/2022 03:34 PM    Anion gap 7 06/20/2022 03:34 PM    Glucose 103 (H) 06/20/2022 03:34 PM    BUN 16 06/20/2022 03:34 PM    Creatinine 1.02 06/20/2022 03:34 PM    BUN/Creatinine ratio 16 06/20/2022 03:34 PM    GFR est AA >60 06/20/2022 03:34 PM    GFR est non-AA >60 06/20/2022 03:34 PM    Calcium 8.2 (L) 06/20/2022 03:34 PM       Lab Results   Component Value Date/Time    Cholesterol, total 157 04/29/2022 10:30 AM    HDL Cholesterol 41 04/29/2022 10:30 AM    LDL, calculated 104 (H) 04/29/2022 10:30 AM    VLDL, calculated 12 04/29/2022 10:30 AM    Triglyceride 60 04/29/2022 10:30 AM    CHOL/HDL Ratio 3.8 02/18/2019 09:50 AM       Lab Results   Component Value Date/Time    Hemoglobin A1c 5.6 04/29/2022 10:30 AM       Assessment/Plan:   1. Impaired glucose tolerance/Obesity: Weight is 490lbs. - Refilling Trulicity  - Low carb diet recommended. - I encouraged him to reduce his weight by lowering his caloric intake. ORDERS:  - dulaglutide (TRULICITY) 1.5 EI/2.9 mL sub-q pen; 0.5 mL by SubCUTAneous route every seven (7) days. (Patient not taking: Reported on 7/25/2022)  Dispense: 4 Each; Refill: 5    2. Essential hypertension: BP is falsely elevated today. It was 140/87 earlier this month. +RON - untreated.    - I am increasing his coreg today  - C/w rx as prescribed. - I encouraged him to get his records so that he can schedule a visit with his new Sleep Medicine team.     ORDERS:  - carvediloL (COREG) 25 mg tablet; Take 1 Tablet by mouth two (2) times daily (with meals). Dispense: 60 Tablet; Refill: 11    3. LEWIS (dyspnea on exertion): Etiology is unknow. - I encouraged him to schedule an apt with Pulmonology  - Ordering an albuterol inhaler to use prn. ORDERS:   - albuterol (PROVENTIL HFA, VENTOLIN HFA, PROAIR HFA) 90 mcg/actuation inhaler; Take 2 Puffs by inhalation every six (6) hours as needed for Wheezing. (Patient not taking: Reported on 7/25/2022)  Dispense: 1 Each; Refill: 5    4. Left wrist pain: Likely from suspected RA. +H/o gout and an elevated CRP. - Indomethacin ordered. - Ordering xrays to r/o inflammatory arthritis. - Pt encouraged to follow up with Rheumatology    ORDERS:  - indomethacin (INDOCIN) 50 mg capsule; Take 1 Capsule by mouth three (3) times daily as needed for Gout or Pain. (Patient not taking: Reported on 7/25/2022)  Dispense: 90 Capsule; Refill: 5  - XR HAND LT MIN 3 V; Future  - XR WRIST LT AP/LAT/OBL MIN 3V; Future  - REFERRAL TO RHEUMATOLOGY      Health Maintenance Due   Topic Date Due    DTaP/Tdap/Td series (1 - Tdap) Never done    Shingrix Vaccine Age 50> (1 of 2) Never done    COVID-19 Vaccine (3 - Booster for Pfizer series) 02/15/2022    Medicare Yearly Exam  06/22/2022         Lab review: labs are reviewed in the EHR    I have discussed the diagnosis with the patient and the intended plan as seen in the above orders. The patient has received an after-visit summary and questions were answered concerning future plans. I have discussed medication side effects and warnings with the patient as well. I have reviewed the plan of care with the patient, accepted their input and they are in agreement with the treatment goals. All questions were answered. The patient understands the plan of care.  Handouts provided today with above information. Pt instructed if symptoms worsen to call the office or report to the ED for continued care. Greater than 50% of the visit time was spent in counseling and/or coordination of care. Voice recognition was used to generate this report, which may have resulted in some phonetic based errors in grammar and contents. Even though attempts were made to correct all the mistakes, some may have been missed, and remained in the body of the document.           Andriy Carrillo MD

## 2022-07-25 ENCOUNTER — HOSPITAL ENCOUNTER (EMERGENCY)
Age: 52
Discharge: HOME | End: 2022-07-25
Attending: STUDENT IN AN ORGANIZED HEALTH CARE EDUCATION/TRAINING PROGRAM
Payer: MEDICARE

## 2022-07-25 ENCOUNTER — TELEPHONE (OUTPATIENT)
Dept: INTERNAL MEDICINE CLINIC | Age: 52
End: 2022-07-25

## 2022-07-25 VITALS
HEIGHT: 69 IN | BODY MASS INDEX: 46.65 KG/M2 | SYSTOLIC BLOOD PRESSURE: 153 MMHG | WEIGHT: 315 LBS | DIASTOLIC BLOOD PRESSURE: 96 MMHG | OXYGEN SATURATION: 94 % | HEART RATE: 87 BPM | TEMPERATURE: 98.5 F | RESPIRATION RATE: 22 BRPM

## 2022-07-25 DIAGNOSIS — R42 LIGHTHEADEDNESS: ICD-10-CM

## 2022-07-25 DIAGNOSIS — E87.6 HYPOKALEMIA: ICD-10-CM

## 2022-07-25 DIAGNOSIS — E86.0 DEHYDRATION: Primary | ICD-10-CM

## 2022-07-25 LAB
ANION GAP SERPL CALC-SCNC: 4 MMOL/L (ref 3–18)
BASOPHILS # BLD: 0 K/UL (ref 0–0.1)
BASOPHILS NFR BLD: 0 % (ref 0–2)
BUN SERPL-MCNC: 14 MG/DL (ref 7–18)
BUN/CREAT SERPL: 13 (ref 12–20)
CALCIUM SERPL-MCNC: 8.4 MG/DL (ref 8.5–10.1)
CHLORIDE SERPL-SCNC: 107 MMOL/L (ref 100–111)
CO2 SERPL-SCNC: 30 MMOL/L (ref 21–32)
CREAT SERPL-MCNC: 1.11 MG/DL (ref 0.6–1.3)
DIFFERENTIAL METHOD BLD: ABNORMAL
EOSINOPHIL # BLD: 0.1 K/UL (ref 0–0.4)
EOSINOPHIL NFR BLD: 1 % (ref 0–5)
ERYTHROCYTE [DISTWIDTH] IN BLOOD BY AUTOMATED COUNT: 15.6 % (ref 11.6–14.5)
GLUCOSE SERPL-MCNC: 119 MG/DL (ref 74–99)
HCT VFR BLD AUTO: 38.6 % (ref 36–48)
HGB BLD-MCNC: 12.3 G/DL (ref 13–16)
IMM GRANULOCYTES # BLD AUTO: 0.1 K/UL (ref 0–0.04)
IMM GRANULOCYTES NFR BLD AUTO: 1 % (ref 0–0.5)
LYMPHOCYTES # BLD: 1.9 K/UL (ref 0.9–3.6)
LYMPHOCYTES NFR BLD: 19 % (ref 21–52)
MAGNESIUM SERPL-MCNC: 1.8 MG/DL (ref 1.6–2.6)
MCH RBC QN AUTO: 26.1 PG (ref 24–34)
MCHC RBC AUTO-ENTMCNC: 31.9 G/DL (ref 31–37)
MCV RBC AUTO: 81.8 FL (ref 78–100)
MONOCYTES # BLD: 0.8 K/UL (ref 0.05–1.2)
MONOCYTES NFR BLD: 8 % (ref 3–10)
NEUTS SEG # BLD: 7.3 K/UL (ref 1.8–8)
NEUTS SEG NFR BLD: 72 % (ref 40–73)
NRBC # BLD: 0 K/UL (ref 0–0.01)
NRBC BLD-RTO: 0 PER 100 WBC
PLATELET # BLD AUTO: 237 K/UL (ref 135–420)
PMV BLD AUTO: 10.4 FL (ref 9.2–11.8)
POTASSIUM SERPL-SCNC: 3 MMOL/L (ref 3.5–5.5)
RBC # BLD AUTO: 4.72 M/UL (ref 4.35–5.65)
SODIUM SERPL-SCNC: 141 MMOL/L (ref 136–145)
TROPONIN-HIGH SENSITIVITY: 6 NG/L (ref 0–78)
WBC # BLD AUTO: 10.2 K/UL (ref 4.6–13.2)

## 2022-07-25 PROCEDURE — 96374 THER/PROPH/DIAG INJ IV PUSH: CPT

## 2022-07-25 PROCEDURE — 74011250636 HC RX REV CODE- 250/636: Performed by: STUDENT IN AN ORGANIZED HEALTH CARE EDUCATION/TRAINING PROGRAM

## 2022-07-25 PROCEDURE — 96360 HYDRATION IV INFUSION INIT: CPT

## 2022-07-25 PROCEDURE — 83735 ASSAY OF MAGNESIUM: CPT

## 2022-07-25 PROCEDURE — 84484 ASSAY OF TROPONIN QUANT: CPT

## 2022-07-25 PROCEDURE — 96361 HYDRATE IV INFUSION ADD-ON: CPT

## 2022-07-25 PROCEDURE — 99284 EMERGENCY DEPT VISIT MOD MDM: CPT

## 2022-07-25 PROCEDURE — 93005 ELECTROCARDIOGRAM TRACING: CPT

## 2022-07-25 PROCEDURE — 74011250637 HC RX REV CODE- 250/637: Performed by: STUDENT IN AN ORGANIZED HEALTH CARE EDUCATION/TRAINING PROGRAM

## 2022-07-25 PROCEDURE — 85025 COMPLETE CBC W/AUTO DIFF WBC: CPT

## 2022-07-25 PROCEDURE — 80048 BASIC METABOLIC PNL TOTAL CA: CPT

## 2022-07-25 RX ORDER — ONDANSETRON 4 MG/1
4 TABLET, FILM COATED ORAL
Qty: 14 TABLET | Refills: 0 | Status: SHIPPED | OUTPATIENT
Start: 2022-07-25 | End: 2022-08-02 | Stop reason: SDUPTHER

## 2022-07-25 RX ORDER — LANOLIN ALCOHOL/MO/W.PET/CERES
400 CREAM (GRAM) TOPICAL
Status: COMPLETED | OUTPATIENT
Start: 2022-07-25 | End: 2022-07-25

## 2022-07-25 RX ORDER — POTASSIUM CHLORIDE 20 MEQ/1
40 TABLET, EXTENDED RELEASE ORAL
Status: COMPLETED | OUTPATIENT
Start: 2022-07-25 | End: 2022-07-25

## 2022-07-25 RX ORDER — ONDANSETRON 2 MG/ML
4 INJECTION INTRAMUSCULAR; INTRAVENOUS ONCE
Status: COMPLETED | OUTPATIENT
Start: 2022-07-25 | End: 2022-07-25

## 2022-07-25 RX ADMIN — SODIUM CHLORIDE 1000 ML: 900 INJECTION, SOLUTION INTRAVENOUS at 12:32

## 2022-07-25 RX ADMIN — ONDANSETRON 4 MG: 2 INJECTION INTRAMUSCULAR; INTRAVENOUS at 13:45

## 2022-07-25 RX ADMIN — Medication 400 MG: at 13:41

## 2022-07-25 RX ADMIN — POTASSIUM CHLORIDE 40 MEQ: 1500 TABLET, EXTENDED RELEASE ORAL at 13:41

## 2022-07-25 NOTE — TELEPHONE ENCOUNTER
Patient called and states he woke up this morning around 2 or 3am and has been dizzy/light headed since. He is requesting a return call from a nurse or Dr. Chas Delarosa. He also mentioned that he had been prescribed medication for his blood pressure, but he never picked it up cause there was an issue with insurance paying for it. He can be reached at [phone].   Please advise, thank you

## 2022-07-25 NOTE — ED PROVIDER NOTES
EMERGENCY DEPARTMENT HISTORY AND PHYSICAL EXAM      Date: 7/25/2022  Patient Name: Jhony Alaniz    History of Presenting Illness     Chief Complaint   Patient presents with    Dizziness    Nausea       History (Context): Jhony Alaniz is a 46 y.o. male with a past medical history significant for obesity, hypertension comes into the ED today due to lightheadedness and nausea. Patient states symptoms began over the past 2 to 3 days and have not worsened or improved since onset. States that he has not taken any medication for treatment of her symptoms prior to arrival.  Admits to lightheadedness with position changes. Denies any fever, chills, chest pain, dyspnea, abdominal pain, or vomiting. Denies any alleviating or exacerbating factors. States symptoms are severe in quality. PCP: Evette Izquierdo MD    Current Facility-Administered Medications   Medication Dose Route Frequency Provider Last Rate Last Admin    potassium chloride (K-DUR, KLOR-CON M20) SR tablet 40 mEq  40 mEq Oral NOW Jose Rafael Granda, DO        magnesium oxide (MAG-OX) tablet 400 mg  400 mg Oral NOW Jose Rafael Granda, DO        ondansetron (ZOFRAN) injection 4 mg  4 mg IntraVENous ONCE [place], DO         Current Outpatient Medications   Medication Sig Dispense Refill    carvediloL (COREG) 25 mg tablet Take 1 Tablet by mouth two (2) times daily (with meals). 60 Tablet 11    losartan (COZAAR) 100 mg tablet Take 1 Tablet by mouth daily. 30 Tablet 6    dulaglutide (TRULICITY) 1.5 MQ/7.8 mL sub-q pen 0.5 mL by SubCUTAneous route every seven (7) days. (Patient not taking: Reported on 7/25/2022) 4 Each 5    albuterol (PROVENTIL HFA, VENTOLIN HFA, PROAIR HFA) 90 mcg/actuation inhaler Take 2 Puffs by inhalation every six (6) hours as needed for Wheezing.  (Patient not taking: Reported on 7/25/2022) 1 Each 5    indomethacin (INDOCIN) 50 mg capsule Take 1 Capsule by mouth three (3) times daily as needed for Gout or Pain. (Patient not taking: Reported on 7/25/2022) 90 Capsule 5    docusate sodium (COLACE) 100 mg capsule Take 1 Capsule by mouth two (2) times a day. (Patient not taking: No sig reported) 60 Capsule 5       Past History     Past Medical History:   Past Medical History:   Diagnosis Date    Ambulates with cane     Arthritis     lower back and kness    Hypertension     Knee pain     Morbid obesity ([address]        Past Surgical History:  Past Surgical History:   Procedure Laterality Date    COLONOSCOPY N/A 6/19/2020    COLONOSCOPY with polypectomy performed by Felix Carrero MD at [facility]    HX CHOLECYSTECTOMY         Family History:  Family History   Problem Relation Age of Onset    Heart Disease Mother     Diabetes Mother     Stroke Father        Social History:   Social History     Tobacco Use    Smoking status: Never    Smokeless tobacco: Never   Vaping Use    Vaping Use: Never used   Substance Use Topics    Alcohol use: Yes     Alcohol/week: 1.0 standard drink     Types: 1 Cans of beer per week    Drug use: Not Currently       Allergies: Allergies   Allergen Reactions    Norvasc [Amlodipine] Swelling     BLE Edema       PMH, PSH, family history, social history, allergies reviewed with the patient with significant items noted above. Review of Systems   Review of Systems   Constitutional:  Negative for chills and fever. HENT:  Negative for sore throat. Eyes:  Negative for visual disturbance. Negative recent vision problems   Respiratory:  Negative for shortness of breath. Cardiovascular:  Negative for chest pain. Gastrointestinal:  Positive for nausea. Negative for abdominal pain and diarrhea. Genitourinary:  Negative for difficulty urinating. Musculoskeletal:  Negative for myalgias. Skin:  Negative for rash. Neurological:  Positive for light-headedness. Negative for numbness and headaches.         Negative altered level of consciousness   All other systems reviewed and are negative. Physical Exam     Vitals:    07/25/22 1217 07/25/22 1220   BP: (!) 166/85 (!) 166/85   Pulse: 86 86   Resp:  17   Temp: 98.5 °F (36.9 °C)    SpO2: 98% 94%   Weight: (!) 222.3 kg (490 lb) (!) 222.3 kg (490 lb)   Height: 5' 9\" (1.753 m) 5' 9\" (1.753 m)       Physical Exam  Vitals and nursing note reviewed. Constitutional:       General: He is not in acute distress. Appearance: He is obese. He is not ill-appearing or toxic-appearing. Comments: Fatigued   HENT:      Head: Normocephalic and atraumatic. Mouth/Throat:      Mouth: Mucous membranes are moist.   Eyes:      General: No scleral icterus. Conjunctiva/sclera: Conjunctivae normal.   Cardiovascular:      Rate and Rhythm: Normal rate and regular rhythm. Pulses: Normal pulses. Pulmonary:      Effort: Pulmonary effort is normal. No respiratory distress. Abdominal:      General: There is no distension. Palpations: Abdomen is soft. Tenderness: There is no abdominal tenderness. There is no guarding or rebound. Musculoskeletal:         General: No deformity. Normal range of motion. Cervical back: Normal range of motion and neck supple. Skin:     General: Skin is warm and dry. Findings: No rash. Neurological:      General: No focal deficit present. Mental Status: He is alert and oriented to person, place, and time. Mental status is at baseline.    Psychiatric:         Mood and Affect: Mood normal.         Behavior: Behavior normal.       Diagnostic Study Results     Labs -     Recent Results (from the past 12 hour(s))   EKG, 12 LEAD, INITIAL    Collection Time: 07/25/22 12:05 PM   Result Value Ref Range    Ventricular Rate 92 BPM    Atrial Rate 92 BPM    P-R Interval 188 ms    QRS Duration 96 ms    Q-T Interval 386 ms    QTC Calculation (Bezet) 477 ms    Calculated P Axis 69 degrees    Calculated R Axis 22 degrees    Calculated T Axis 41 degrees    Diagnosis       Sinus rhythm with premature atrial complexes  Otherwise normal ECG  When compared with ECG of 20-JUN-2022 15:27,  premature atrial complexes are now present  Questionable change in QRS axis     CBC WITH AUTOMATED DIFF    Collection Time: 07/25/22 12:29 PM   Result Value Ref Range    WBC 10.2 4.6 - 13.2 K/uL    RBC 4.72 4.35 - 5.65 M/uL    HGB 12.3 (L) 13.0 - 16.0 g/dL    HCT 38.6 36.0 - 48.0 %    MCV 81.8 78.0 - 100.0 FL    MCH 26.1 24.0 - 34.0 PG    MCHC 31.9 31.0 - 37.0 g/dL    RDW 15.6 (H) 11.6 - 14.5 %    PLATELET 381 725 - 611 K/uL    MPV 10.4 9.2 - 11.8 FL    NRBC 0.0 0  WBC    ABSOLUTE NRBC 0.00 0.00 - 0.01 K/uL    NEUTROPHILS 72 40 - 73 %    LYMPHOCYTES 19 (L) 21 - 52 %    MONOCYTES 8 3 - 10 %    EOSINOPHILS 1 0 - 5 %    BASOPHILS 0 0 - 2 %    IMMATURE GRANULOCYTES 1 (H) 0.0 - 0.5 %    ABS. NEUTROPHILS 7.3 1.8 - 8.0 K/UL    ABS. LYMPHOCYTES 1.9 0.9 - 3.6 K/UL    ABS. MONOCYTES 0.8 0.05 - 1.2 K/UL    ABS. EOSINOPHILS 0.1 0.0 - 0.4 K/UL    ABS. BASOPHILS 0.0 0.0 - 0.1 K/UL    ABS. IMM.  GRANS. 0.1 (H) 0.00 - 0.04 K/UL    DF AUTOMATED     METABOLIC PANEL, BASIC    Collection Time: 07/25/22 12:29 PM   Result Value Ref Range    Sodium 141 136 - 145 mmol/L    Potassium 3.0 (L) 3.5 - 5.5 mmol/L    Chloride 107 100 - 111 mmol/L    CO2 30 21 - 32 mmol/L    Anion gap 4 3.0 - 18 mmol/L    Glucose 119 (H) 74 - 99 mg/dL    BUN 14 7.0 - 18 MG/DL    Creatinine 1.11 0.6 - 1.3 MG/DL    BUN/Creatinine ratio 13 12 - 20      GFR est AA >60 >60 ml/min/1.73m2    GFR est non-AA >60 >60 ml/min/1.73m2    Calcium 8.4 (L) 8.5 - 10.1 MG/DL   TROPONIN-HIGH SENSITIVITY    Collection Time: 07/25/22 12:29 PM   Result Value Ref Range    Troponin-High Sensitivity 6 0 - 78 ng/L   MAGNESIUM    Collection Time: 07/25/22 12:29 PM   Result Value Ref Range    Magnesium 1.8 1.6 - 2.6 mg/dL      Labs Reviewed   CBC WITH AUTOMATED DIFF - Abnormal; Notable for the following components:       Result Value    HGB 12.3 (*)     RDW 15.6 (*)     LYMPHOCYTES 19 (*)     IMMATURE GRANULOCYTES 1 (*)     ABS. IMM. GRANS. 0.1 (*)     All other components within normal limits   METABOLIC PANEL, BASIC - Abnormal; Notable for the following components:    Potassium 3.0 (*)     Glucose 119 (*)     Calcium 8.4 (*)     All other components within normal limits   TROPONIN-HIGH SENSITIVITY   MAGNESIUM       Radiologic Studies -   No orders to display     CT Results  (Last 48 hours)      None          CXR Results  (Last 48 hours)      None            The laboratory results, imaging results, and other diagnostic exams were reviewed in the EMR. Medical Decision Making   I am the first provider for this patient. I reviewed the vital signs, available nursing notes, past medical history, past surgical history, family history and social history. Vital Signs-Reviewed the patient's vital signs. Records Reviewed: Personally, on initial evaluation    MDM:   Melanie Rubio presents with complaint of lightheadedness and nausea  DDX includes but is not limited to: Dehydration, electrolyte abnormality, orthostatic hypotension    Patient overall well-appearing, no acute distress, vital signs grossly within normal limits. Will obtain lab work for further evaluation of patients complaint. Will continue to monitor and evaluate patient while in the ED. Orders as below:  Orders Placed This Encounter    CBC WITH AUTOMATED DIFF    BASIC METABOLIC PANEL    TROPONIN-HIGH SENSITIVITY    MAGNESIUM    EKG, 12 LEAD, INITIAL    sodium chloride 0.9 % bolus infusion 1,000 mL    potassium chloride (K-DUR, KLOR-CON M20) SR tablet 40 mEq    magnesium oxide (MAG-OX) tablet 400 mg    ondansetron (ZOFRAN) injection 4 mg        ED Course:   ED Course as of 07/25/22 1803   Mon Jul 25, 2022   1304 Patient's lab work significant for potassium 3.0, magnesium 1.8, hemoglobin 12.3, otherwise labs grossly within normal limits. We will continue to monitor patient. [DV]   1421 Patient states improvement of his symptoms. Will discharge patient home with strict return precautions and follow-up recommendations. Patient verbalized understanding and is without any further questions. [DV]      ED Course User Index  [DV] Ania Minor DO           Procedures:  Procedures        Diagnosis and Disposition     CLINICAL IMPRESSION:  No diagnosis found. Current Discharge Medication List          Disposition: Home    Patient condition at time of disposition: Stable    DISCHARGE NOTE:   Pt has been reexamined. Patient has no new complaints, changes, or physical findings. Care plan outlined and precautions discussed. Results were reviewed with the patient. All medications were reviewed with the patient. All of pt's questions and concerns were addressed. Alarm symptoms and return precautions associated with chief complaint and evaluation were reviewed with the patient in detail. The patient demonstrated adequate understanding. Patient was instructed to follow up with PCP, as well as strict return precautions to the ED upon further deterioration. Patient is ready to go home. The patient is happy with this plan        Dragon Disclaimer     Please note that this dictation was completed with [employer], the computer voice recognition software. Quite often unanticipated grammatical, syntax, homophones, and other interpretive errors are inadvertently transcribed by the computer software. Please disregard these errors. Please excuse any errors that have escaped final proofreading. Annie MCCULLOUGH.

## 2022-07-25 NOTE — TELEPHONE ENCOUNTER
Spoke with patient this morning, he stated that his pharmacy was unable to fill his medications, but had no further info. Called and spoke with pharmacist, and they are having issues with his insurance, but would be calling them today. Called patient back but he was unavailable.

## 2022-07-25 NOTE — TELEPHONE ENCOUNTER
Returned call to patient, he states that he was at the ER now. Will follow up with patient after ER visit.

## 2022-07-26 LAB
ATRIAL RATE: 92 BPM
CALCULATED P AXIS, ECG09: 69 DEGREES
CALCULATED R AXIS, ECG10: 22 DEGREES
CALCULATED T AXIS, ECG11: 41 DEGREES
DIAGNOSIS, 93000: NORMAL
P-R INTERVAL, ECG05: 188 MS
Q-T INTERVAL, ECG07: 386 MS
QRS DURATION, ECG06: 96 MS
QTC CALCULATION (BEZET), ECG08: 477 MS
VENTRICULAR RATE, ECG03: 92 BPM

## 2022-07-27 PROBLEM — R29.898 LEFT LEG WEAKNESS: Status: ACTIVE | Noted: 2022-07-27

## 2022-07-27 PROBLEM — I63.9 CVA (CEREBRAL VASCULAR ACCIDENT) (HCC): Status: ACTIVE | Noted: 2022-07-27

## 2022-08-01 ENCOUNTER — TELEPHONE (OUTPATIENT)
Dept: INTERNAL MEDICINE CLINIC | Age: 52
End: 2022-08-01

## 2022-08-01 NOTE — TELEPHONE ENCOUNTER
Patient had stroke and was admitted on 7/25/22. Pt left hospital on 7/30/22 without being discharge. Pt requesting medication and a sooner appt. Pt has scheduled appt for 8/16/22.  Please contact patient at [phone]

## 2022-08-02 ENCOUNTER — OFFICE VISIT (OUTPATIENT)
Dept: INTERNAL MEDICINE CLINIC | Age: 52
End: 2022-08-02
Payer: MEDICARE

## 2022-08-02 VITALS
HEART RATE: 78 BPM | SYSTOLIC BLOOD PRESSURE: 133 MMHG | RESPIRATION RATE: 16 BRPM | HEIGHT: 69 IN | TEMPERATURE: 98.2 F | DIASTOLIC BLOOD PRESSURE: 83 MMHG | BODY MASS INDEX: 75.71 KG/M2

## 2022-08-02 DIAGNOSIS — M54.50 CHRONIC LOW BACK PAIN WITHOUT SCIATICA, UNSPECIFIED BACK PAIN LATERALITY: ICD-10-CM

## 2022-08-02 DIAGNOSIS — Z09 HOSPITAL DISCHARGE FOLLOW-UP: ICD-10-CM

## 2022-08-02 DIAGNOSIS — M10.9 GOUT, UNSPECIFIED CAUSE, UNSPECIFIED CHRONICITY, UNSPECIFIED SITE: ICD-10-CM

## 2022-08-02 DIAGNOSIS — G89.29 CHRONIC LOW BACK PAIN WITHOUT SCIATICA, UNSPECIFIED BACK PAIN LATERALITY: ICD-10-CM

## 2022-08-02 DIAGNOSIS — R06.09 DOE (DYSPNEA ON EXERTION): ICD-10-CM

## 2022-08-02 DIAGNOSIS — I10 ESSENTIAL HYPERTENSION: ICD-10-CM

## 2022-08-02 DIAGNOSIS — I63.9 CEREBROVASCULAR ACCIDENT (CVA), UNSPECIFIED MECHANISM (HCC): Primary | ICD-10-CM

## 2022-08-02 DIAGNOSIS — G89.29 CHRONIC PAIN OF BOTH KNEES: ICD-10-CM

## 2022-08-02 DIAGNOSIS — R29.898 LEFT LEG WEAKNESS: ICD-10-CM

## 2022-08-02 DIAGNOSIS — M25.562 CHRONIC PAIN OF BOTH KNEES: ICD-10-CM

## 2022-08-02 DIAGNOSIS — M25.561 CHRONIC PAIN OF BOTH KNEES: ICD-10-CM

## 2022-08-02 PROCEDURE — 1111F DSCHRG MED/CURRENT MED MERGE: CPT | Performed by: INTERNAL MEDICINE

## 2022-08-02 PROCEDURE — 99495 TRANSJ CARE MGMT MOD F2F 14D: CPT | Performed by: INTERNAL MEDICINE

## 2022-08-02 PROCEDURE — G8427 DOCREV CUR MEDS BY ELIG CLIN: HCPCS | Performed by: INTERNAL MEDICINE

## 2022-08-02 RX ORDER — METHYLPREDNISOLONE 4 MG/1
TABLET ORAL
Qty: 1 DOSE PACK | Refills: 0 | Status: SHIPPED | OUTPATIENT
Start: 2022-08-02 | End: 2022-08-17

## 2022-08-02 RX ORDER — ATORVASTATIN CALCIUM 40 MG/1
40 TABLET, FILM COATED ORAL DAILY
Qty: 90 TABLET | Refills: 3 | Status: SHIPPED | OUTPATIENT
Start: 2022-08-02

## 2022-08-02 RX ORDER — ASPIRIN 81 MG/1
81 TABLET ORAL DAILY
Qty: 90 TABLET | Refills: 3 | Status: SHIPPED | OUTPATIENT
Start: 2022-08-02

## 2022-08-02 RX ORDER — ONDANSETRON 4 MG/1
4 TABLET, FILM COATED ORAL
Qty: 14 TABLET | Refills: 0 | Status: SHIPPED | OUTPATIENT
Start: 2022-08-02

## 2022-08-02 RX ORDER — CARVEDILOL 25 MG/1
25 TABLET ORAL 2 TIMES DAILY WITH MEALS
Qty: 60 TABLET | Refills: 11 | Status: SHIPPED | OUTPATIENT
Start: 2022-08-02 | End: 2022-08-02

## 2022-08-02 RX ORDER — LOSARTAN POTASSIUM 100 MG/1
100 TABLET ORAL DAILY
Qty: 30 TABLET | Refills: 6 | Status: SHIPPED | OUTPATIENT
Start: 2022-08-02 | End: 2022-08-02

## 2022-08-02 RX ORDER — CLOPIDOGREL BISULFATE 75 MG/1
75 TABLET ORAL DAILY
Qty: 90 TABLET | Refills: 3 | Status: SHIPPED | OUTPATIENT
Start: 2022-08-02

## 2022-08-02 NOTE — PROGRESS NOTES
Lala Agudelo presents today for   Chief Complaint   Patient presents with    Hospital Follow Up     Patient left [facility] from [facility]. DX CVA       1. \"Have you been to the ER, urgent care clinic since your last visit? Hospitalized since your last visit? \" YES    2. \"Have you seen or consulted any other health care providers outside of the [address] [place] [address] since your last visit? \" YES     3. For patients aged 39-70: Has the patient had a colonoscopy / FIT/ Cologuard? NA - based on age      If the patient is female:    4. For patients aged 41-77: Has the patient had a mammogram within the past 2 years? Yes - no Care Gap present  See top three    5. For patients aged 21-65: Has the patient had a pap smear?  NA - based on age or sex

## 2022-08-02 NOTE — PROGRESS NOTES
TRANSITIONS OF CARE FACE-TO-FACE VISIT  INTERNISTS OF [facility]:  8/2/2022, MRN: [id number]  Chief Complaint   Patient presents with    Hospital Follow Up     Patient left AMA from [facility]. DX CVA         Albino Tejeda is a 46 y.o. male and presents to clinic after recent hospitalization. Subjective:   Discharged from: [facility]    Summary of hospitalization problems/diagnoses: The patient is a 49-year-old male with history of hypertension, obesity, CVA, h/o H.pylori infection (treated last year), prediabetes, suspected rheumatoid arthritis, suspected gout, lumbar disc disease, and chronic bilateral knee pain (followed by Orthopedics). 1. LEWIS: Reported at his last apt. He continues to have sx but sx are better. No sick contacts. No chest pain or palpitations. Dyspnea on exertion resolves with rest.  No tobacco use. No cough. Although he was referred to a pulmonologist, he has yet to schedule an appointment. No tobacco exposure. He had an unremarkable chest x-ray while in the hospital.    7/27/22 CXR: Cardiomegaly. 2. CVA: S/p recent hospitalization for CVA. He has some residual left leg weakness. He is using his walker. He is using a urinal. He is not set up for OT or PT but is interested in [facility] services. No new neurologic sx since he left the hospital. He is still having numbness along his left foot. No HA. His A1C was WNL when recently checked. +H/o prediabetes. He used to take losartan and coreg and has not taken either rx since leaving the hospital. BP is 133/83.     7/29/22 Carotid PVL: There is no evidence of hemodynamically significant stenosis in the right carotid system. < 50% left internal carotid artery stenosis. Antegrade flow noted in the vertebral arteries. Normal flow noted in the right subclavian artery. Unable to visualize the left subclavian artery.  Technically difficult and limited exam due to pateint body habitus and vessel depth.    7/29/22 Echocardiogram: The left ventricle is normal in size with mild LV hypertrophy. The estimated LVEF is 50%. Normal diastolic function. There is mild global hypokinesis of the LV. The right ventricle is normal in size and function. TAPSE: 2.2 cm. Saline contrast was injected but is inconclusive for evaluation of an interatrial shunt. There is mild mitral annular calcification. There is trivial tricuspid regurgitation. The estimated right ventricular systolic pressure is 15 mmHg. Mild aortic root dilatation. Other findings are noted below. No previous exam for comparison. Normal diastolic function. 3. Suspected RA and Gout: No ETOH or shellfish. No organ meat. He is having pain along his left lateral foot. Pain feels tight. He has a history of joint swelling along his hands, feet, and ankles. He is suspected of having rheumatoid arthritis. His rotatory factor when checked was elevated along with a CRP. Although he was referred to a rheumatologist, he has yet to schedule an appointment. He also has suspected gout. Flareups along his big toe and ankle joints typically occurred after intake of alcoholic beverages. Results for Montez Lawler (MRN [id number]) as of 5/4/2022 04:46    Ref. Range 4/7/2021 18:33 8/12/2021 09:01 4/29/2022 10:30   C-Reactive Protein, Qt Latest Ref Range: <=0.5 mg/dL   2.7 (H) 6.0 (H)   C REACTIVE PROTEIN, QT Unknown   Rpt (A) Rpt (A)   Rheumatoid Arthritis Factor Latest Units: Units/mL 29.3 (H) 31.3 (H)     14.3.3 ETA, Rheum. Arthritis Latest Units: ng/mL <0.20 <0.20          Current Outpatient Medications on File Prior to Visit   Medication Sig Dispense Refill    ondansetron hcl (Zofran) 4 mg tablet Take 1 Tablet by mouth every eight (8) hours as needed for Nausea. (Patient not taking: Reported on 8/2/2022) 14 Tablet 0    carvediloL (COREG) 25 mg tablet Take 1 Tablet by mouth two (2) times daily (with meals).  (Patient not taking: Reported on 8/2/2022) 60 Tablet 11    losartan (COZAAR) 100 mg tablet Take 1 Tablet by mouth daily. (Patient not taking: Reported on 8/2/2022) 30 Tablet 6     No current facility-administered medications on file prior to visit. Medication changes: yes  Medication list updated:  yes  Needs referral or labs:  yes  Treatment Barriers: no      Patient Active Problem List    Diagnosis Date Noted    CVA (cerebral vascular accident) ([facility] [address] 07/27/2022    Left leg weakness 07/27/2022    Gout 05/04/2022    Chronic low back pain without sciatica 10/19/2019    Essential hypertension 06/10/2019    Impaired glucose tolerance 06/10/2019    History of Helicobacter pylori infection 06/10/2019    Obesity, morbid ([facility] [address] 07/13/2018    History of CVA (cerebrovascular accident) 07/13/2018    Chronic pain of both knees 07/13/2018       Current Outpatient Medications   Medication Sig Dispense Refill    ondansetron hcl (Zofran) 4 mg tablet Take 1 Tablet by mouth every eight (8) hours as needed for Nausea. (Patient not taking: Reported on 8/2/2022) 14 Tablet 0    carvediloL (COREG) 25 mg tablet Take 1 Tablet by mouth two (2) times daily (with meals). (Patient not taking: Reported on 8/2/2022) 60 Tablet 11    losartan (COZAAR) 100 mg tablet Take 1 Tablet by mouth daily. (Patient not taking: Reported on 8/2/2022) 30 Tablet 6       Allergies   Allergen Reactions    Norvasc [Amlodipine] Swelling     BLE Edema       Past Medical History:   Diagnosis Date    Ambulates with cane     Arthritis     lower back and kness    Hypertension     Knee pain     Morbid obesity ([facility] [address]        Past Surgical History:   Procedure Laterality Date    COLONOSCOPY N/A 6/19/2020    COLONOSCOPY with polypectomy performed by Mariluz Steiner MD at [facility] ENDOSCOPY    HX CHOLECYSTECTOMY         Family History   Problem Relation Age of Onset    Heart Disease Mother     Diabetes Mother     Stroke Father        Social History     Tobacco Use    Smoking status: Never    Smokeless tobacco: Never   Substance Use Topics    Alcohol use:  Yes Alcohol/week: 1.0 standard drink     Types: 1 Cans of beer per week       ROS   Review of Systems   Constitutional:  Negative for chills and fever. HENT:  Negative for ear pain and sore throat. Eyes:  Negative for blurred vision and pain. Respiratory:  Negative for cough and shortness of breath. Cardiovascular:  Negative for chest pain. Gastrointestinal:  Negative for abdominal pain, blood in stool and melena. Genitourinary:  Negative for dysuria and hematuria. Musculoskeletal:  Positive for joint pain. Negative for myalgias. Skin:  Negative for rash. Neurological:  Positive for tingling and focal weakness (LLE). Negative for headaches. Endo/Heme/Allergies:  Does not bruise/bleed easily. Psychiatric/Behavioral:  Negative for substance abuse. Objective     Vitals:    08/02/22 1351   BP: 133/83   Pulse: 78   Resp: 16   Temp: 98.2 °F (36.8 °C)   TempSrc: Temporal   Height: 5' 9\" (1.753 m)   PainSc:   6   PainLoc: Foot       Physical Exam  Vitals and nursing note reviewed. Constitutional:       Appearance: Normal appearance. HENT:      Head: Normocephalic and atraumatic. Right Ear: External ear normal.      Left Ear: External ear normal.   Eyes:      General:         Right eye: No discharge. Left eye: No discharge. Extraocular Movements: Extraocular movements intact. Conjunctiva/sclera: Conjunctivae normal.   Cardiovascular:      Rate and Rhythm: Normal rate and regular rhythm. Pulses: Normal pulses. Heart sounds: Normal heart sounds. Pulmonary:      Effort: Pulmonary effort is normal.      Breath sounds: Normal breath sounds. Abdominal:      General: Abdomen is flat. Bowel sounds are normal.      Palpations: Abdomen is soft. Tenderness: There is no abdominal tenderness. Musculoskeletal:         General: No swelling (BUE) or tenderness (BUE). Cervical back: Neck supple.       Comments: +Left lateral foot area is TTP but w/o erythema or increased warmth. No effusions are present   Lymphadenopathy:      Cervical: No cervical adenopathy. Skin:     General: Skin is warm and dry. Findings: No erythema. Neurological:      Mental Status: He is alert. Comments: He can barely move his LLE   Psychiatric:         Mood and Affect: Mood normal.       LABS   Data Review:   Lab Results   Component Value Date/Time    WBC 7.8 07/28/2022 01:31 AM    HGB 11.1 (L) 07/28/2022 01:31 AM    HCT 36.2 (L) 07/28/2022 01:31 AM    PLATELET 352 50/25/7512 01:31 AM    MCV 83.6 07/28/2022 01:31 AM       Lab Results   Component Value Date/Time    Sodium 140 07/28/2022 01:31 AM    Potassium 3.4 07/28/2022 01:31 AM    Chloride 104 07/28/2022 01:31 AM    CO2 29 07/28/2022 01:31 AM    Anion gap 7 07/28/2022 01:31 AM    Glucose 100 07/28/2022 01:31 AM    BUN 11 07/28/2022 01:31 AM    Creatinine 0.91 07/28/2022 01:31 AM    BUN/Creatinine ratio 13 07/25/2022 12:29 PM    GFR est AA >60 07/28/2022 01:31 AM    GFR est non-AA >60 07/28/2022 01:31 AM    Calcium 8.5 (L) 07/28/2022 01:31 AM       Lab Results   Component Value Date/Time    Cholesterol, total 128 07/28/2022 01:31 AM    HDL Cholesterol 28 (L) 07/28/2022 01:31 AM    LDL, calculated 86 07/28/2022 01:31 AM    VLDL, calculated 12 04/29/2022 10:30 AM    Triglyceride 70 07/28/2022 01:31 AM    CHOL/HDL Ratio 4.6 07/28/2022 01:31 AM       Lab Results   Component Value Date/Time    Hemoglobin A1c 5.5 07/28/2022 01:31 AM       Assessment/Plan:   Lab review: labs are reviewed in the EHR  Referrals: ordered  Complexity: Moderate    Community resources identified for patient: home health agency  Durable Medical Equipment: none needed    1. CVA:  +Essential hypertension  -Placing a referral to home health for Physical therapy and Occupational Therapy.   I am including his chronic knee pain and lower back pain and his PT/OT order.  - Refilling his Plavix, aspirin, and Lipitor.  - Placing a referral to Neurology    ORDERS:   - clopidogreL (Plavix) 75 mg tab; Take 1 Tablet by mouth in the morning. Dispense: 90 Tablet; Refill: 3  - atorvastatin (LIPITOR) 40 mg tablet; Take 1 Tablet by mouth in the morning. Dispense: 90 Tablet; Refill: 3  - aspirin delayed-release 81 mg tablet; Take 1 Tablet by mouth in the morning. Dispense: 90 Tablet; Refill: 3  - REFERRAL TO NEUROLOGY  - REFERRAL TO HOME HEALTH    2. LEWIS (dyspnea on exertion): From MOHS? PE?  -Order placed for a chest CTA [address]:  - CTA CHEST W OR W WO CONT; Future  - REFERRAL TO HOME HEALTH    3. Gout: +Suspected RA  -Once again, I encouraged him to schedule an appointment with a rheumatologist for evaluation.  - Ordering a Medrol Dosepak. ORDERS:  - methylPREDNISolone (MEDROL DOSEPACK) 4 mg tablet; Take per package instructions (Patient taking differently: Take per package instructions: 6 tabs day 1, 5 tabs day 2, 4 tabs day 3, 3 tabs day 4, 2 tabs day 5, 1 tab day 6 )  Dispense: 1 Dose Pack; Refill: 0  - REFERRAL TO HOME HEALTH      **He needs a bariatric wheelchair as his current weight is 482lbs and he suffered a recent CVA. He has poor muscle strength in his LLE from his CVA. This is the reason he needs a bariatric wheelchair.     The pt has a mobility limitation that significantly impairs his ability to participate in mobility-related activities of daily living (MRADLs) such as toileting, feeding, dressing, grooming, and bathing in customary locations in the home; AND The mobility limitation cannot be sufficiently resolved by the use of an appropriately fitted cane or walker; AND Use of a manual wheelchair will significantly improve his ability to participate in MRADLs and the pt will use it on a regular basis in the home; AND The pt has not expressed an unwillingness to use the manual wheelchair that is provided in the home; AND he has sufficient upper extremity function and other physical and mental capabilities needed to safely self-propel the manual wheelchair that is provided in the home during a typical day. **       A. Key points we discussed today:  1. Pt instructed to call our office if symptoms worsen or if the pt/caregiver has any questions. 2. Handout given       No orders of the defined types were placed in this encounter. B. New labs/medications ordered today:   1. A new medication list was given to the patient/family/caregiver. The medication list has been updated. A new medication list was given today to the patient. I have discussed the diagnosis with the patient and the intended plan as seen in the above orders. The patient has received an after-visit summary and questions were answered concerning future plans. I have discussed medication side effects and warnings with the patient as well. I have reviewed the plan of care with the patient, accepted their input and they are in agreement with the treatment goals. All questions were answered. The patient understands the plan of care. Handouts provided today with above information. Pt instructed if symptoms worsen to call the office or report to the ED for continued care. Greater than 50% of the visit time was spent in counseling and/or coordination of care.       Initial transitional care contact was made on: he was seen within 1 business day of hospital d/c

## 2022-08-05 ENCOUNTER — HOME HEALTH ADMISSION (OUTPATIENT)
Dept: HOME HEALTH SERVICES | Facility: HOME HEALTH | Age: 52
End: 2022-08-05
Payer: MEDICARE

## 2022-08-08 ENCOUNTER — HOME CARE VISIT (OUTPATIENT)
Dept: SCHEDULING | Facility: HOME HEALTH | Age: 52
End: 2022-08-08
Payer: MEDICARE

## 2022-08-08 ENCOUNTER — HOME CARE VISIT (OUTPATIENT)
Dept: HOME HEALTH SERVICES | Facility: HOME HEALTH | Age: 52
End: 2022-08-08

## 2022-08-08 VITALS
RESPIRATION RATE: 18 BRPM | SYSTOLIC BLOOD PRESSURE: 138 MMHG | DIASTOLIC BLOOD PRESSURE: 80 MMHG | TEMPERATURE: 97.6 F | OXYGEN SATURATION: 98 % | HEART RATE: 82 BPM

## 2022-08-08 PROCEDURE — 400018 HH-NO PAY CLAIM PROCEDURE

## 2022-08-08 PROCEDURE — 3331090002 HH PPS REVENUE DEBIT

## 2022-08-08 PROCEDURE — 3331090001 HH PPS REVENUE CREDIT

## 2022-08-08 PROCEDURE — G0151 HHCP-SERV OF PT,EA 15 MIN: HCPCS

## 2022-08-08 PROCEDURE — 400013 HH SOC

## 2022-08-08 NOTE — Clinical Note
PT admission completed. Patient expresses a desire to return to aquatic OPPT, and PT plans the following frequecy to restore safety and independence in the home prior to return to OPPT:  PT 3w1, 2w1, 1w1. Thank you.

## 2022-08-09 PROCEDURE — 3331090002 HH PPS REVENUE DEBIT

## 2022-08-09 PROCEDURE — 3331090001 HH PPS REVENUE CREDIT

## 2022-08-09 NOTE — HOME HEALTH
HPI:   \"Discharged from: [facility]      Summary of hospitalization problems/diagnoses: The patient is a 24-year-old male with history of hypertension, obesity, CVA, h/o H.pylori infection (treated last year), prediabetes, suspected rheumatoid art hritis, suspected gout, lumbar disc disease, and chronic bilateral knee pain (followed by Orthopedics). LEWIS: Reported at his last apt. He continues to have sx but sx are better. CVA: He has some residual left leg weakness. He is using his walker. He is using urinal. He is not set up for OT or PT but is interested in [facility] services. No new neurologic sx since he left the hospital. He is still having numbness along his lef t foot. No HA. His A1C was WNL when recently checked. +H/o prediabetes. He used ot take losartan and coreg and has not taken either rx since leaving the hospital. BP is 133/83. \"  . PMHx:    Obesity, morbid    History of CVA (cerebrovascular accident)  7/13/2018    Chronic pain of both knees 7/13/2018    Osteoarthritis of both knees  7/13/2018 6/10/2019   Family history of diabetes mellitus  7/13/2018 6/10/2019   Essential hypertension  6/10/2019    Impaired glucose tolerance  6/10/2019    History of Helicobacter pylori infection  6/10/2019    Chronic low back pain without sciatica  10/19/2019    Gout 5/4/2022    CVA (cerebral vascular accident)  7/27/2022    Left leg weakness  7/27/2022   . SUBJECTIVE:  Patient presents sitting up in living room, he is ready for evaluation. His goal ois to return to independence asap and he wants to transition to OPPT for aquatic therapy after Home Care d/c. He reports aquatic therapy helped his joints. LIVING SITUATION:  1 sotry home, threshhold to enter, no steps. Clear areas in home for ambulation. No grab bars or assistive devices in bathroom. CAREGIVER INVOLVEMENT/ASSISTANCE NEEDED FOR:  Safety with mobility, transfers, ambulation, ADs, meals, transportation, shopping. PLOF:  Independent. MEDICATIONS RECONCILED AND UPDATED AS FOLLOWS: No issues. High risk medication teaching regarding anticoagulants, hyperglycemic agents or opioid narcotics performed for: anticoagulants. Instructed patient on Plavix and aspirin and s/s of bleeding, prevention and when to seek medical advice in case of falls. .  The following medication discrepancies/interactions were noted: none. Maurice GILLESPIE MD APPT:  TBD. Patient/caregiver encouraged/instructed to keep appointment as lack of follow through with physician appointment could result in discontinuation of home care services for non-compliance. .  ROM:  Decreased AROM due to LE weakness and limitations of body habitus. STRENGTH: B hip flexion and L LE weakness noted, see PT ROM  WOUNDS:  n/a. BED MOBILITY:  SBA for safety  TRANSFERS: Uses widened TWYLA and rocking forward onto feet from low couch. From chair:  wide TWYLA and less rocking, needs UE support and unsteady transfers. GAIT: Cane. Gait characterized by Wide TWYLA, lateral w/s progression with lateral sway. STAIRS:  none  BALANCE:  Pt scored 12/28 on Tinetti Balance Assessment placing patient at high risk for falls. PATIENT RESPONSE TO TREATMENT: Patient was fatigued. Maurice Mendez PATIENT EDUCATION PROVIDED THIS VISIT:  Home safety to include correct use of AD, grab bars for bathroom, HEP, walking, deep breathing, HIPPA, HHBOR     PATIENT RESPONSE TO EDUCATION PROVIDED: Verbal acknowledgement  . HEP consisting of:  1. Walking every hour during the day with cane   2. Sitting:  AP, LAQ, knee raises, sit to stand x 10-20 2-3 times daily  3. Deep breathing x 10 reps every hour during the day. Written HEP issued, patient/caregiver verbalized understanding; however, will need reinforcement and continued education for advancement of HEP. .    . ASSESSMENT AND CONTINUED NEED FOR THE FOLLOWING SKILLS: Patient presents with deficits in strength, balance, gait that impairs mobility and safety.   HHPT is needed to retore independence and safety in home through skilled interventions, and prepare for transition to OPPT. .   POC:  Patient/caregiver instructed on POC and are agreeable to POC at this time. POC and admission to home health status called to Néstor Cloud MD.   PLAN:  HHPT 3w1, 2w1, 1w1, then transition to OPPT when patient is safe in the home. DISCHARGE PLANNING DISCUSSED: Discharge to self and family under MD supervision once all goals have been met or patient has reached max potential. Patient/caregiver verbalized understanding.

## 2022-08-10 ENCOUNTER — DOCUMENTATION ONLY (OUTPATIENT)
Dept: PULMONOLOGY | Age: 52
End: 2022-08-10

## 2022-08-10 ENCOUNTER — HOME CARE VISIT (OUTPATIENT)
Dept: SCHEDULING | Facility: HOME HEALTH | Age: 52
End: 2022-08-10
Payer: MEDICARE

## 2022-08-10 PROCEDURE — 3331090001 HH PPS REVENUE CREDIT

## 2022-08-10 PROCEDURE — G0157 HHC PT ASSISTANT EA 15: HCPCS

## 2022-08-10 PROCEDURE — 3331090002 HH PPS REVENUE DEBIT

## 2022-08-10 NOTE — PROGRESS NOTES
Attempted again to reach pt to set up appt. Unable to leave message due to mailbox full. We still have not received records. Will mail another letter to address on file.

## 2022-08-11 ENCOUNTER — HOME CARE VISIT (OUTPATIENT)
Dept: HOME HEALTH SERVICES | Facility: HOME HEALTH | Age: 52
End: 2022-08-11
Payer: MEDICARE

## 2022-08-11 PROCEDURE — 3331090002 HH PPS REVENUE DEBIT

## 2022-08-11 PROCEDURE — 3331090001 HH PPS REVENUE CREDIT

## 2022-08-12 ENCOUNTER — TELEPHONE (OUTPATIENT)
Dept: INTERNAL MEDICINE CLINIC | Age: 52
End: 2022-08-12

## 2022-08-12 ENCOUNTER — HOME CARE VISIT (OUTPATIENT)
Dept: SCHEDULING | Facility: HOME HEALTH | Age: 52
End: 2022-08-12
Payer: MEDICARE

## 2022-08-12 PROCEDURE — 3331090002 HH PPS REVENUE DEBIT

## 2022-08-12 PROCEDURE — 3331090001 HH PPS REVENUE CREDIT

## 2022-08-12 PROCEDURE — G0157 HHC PT ASSISTANT EA 15: HCPCS

## 2022-08-12 NOTE — TELEPHONE ENCOUNTER
----- Message from Tammy Mix MD sent at 8/11/2022  4:40 PM EDT -----  Regarding: Chest CTA at [facility]? Has this pt scheduled his chest CTA at [facility] in [place]? It needs to go to [place] due to his BMI.      Respectfully,  Dr. Carlos Moore  [facility], [address] [place] [address], [facility] [address]  Phone: [phone]  Fax: [phone]

## 2022-08-13 PROCEDURE — 3331090002 HH PPS REVENUE DEBIT

## 2022-08-13 PROCEDURE — 3331090001 HH PPS REVENUE CREDIT

## 2022-08-14 VITALS
HEART RATE: 84 BPM | SYSTOLIC BLOOD PRESSURE: 138 MMHG | OXYGEN SATURATION: 98 % | RESPIRATION RATE: 17 BRPM | DIASTOLIC BLOOD PRESSURE: 80 MMHG | TEMPERATURE: 98.2 F

## 2022-08-14 PROCEDURE — 3331090001 HH PPS REVENUE CREDIT

## 2022-08-14 PROCEDURE — 3331090002 HH PPS REVENUE DEBIT

## 2022-08-14 NOTE — HOME HEALTH
SUBJECTIVE: Pt reports he is doing better but gets tired easily. CAREGIVER INVOLVEMENT/ASSISTANCE NEEDED FOR: pts wife in home during PT session and assist pt with all needs prn. HOME HEALTH SUPPLIES BY TYPE AND QUANTITY ORDERED/DELIVERED THIS VISIT INCLUDE: na  OBJECTIVE:  See interventions. 1.Patient level of understanding of education provided: Pt issued a copy of seated HEP and demonstrates a good understanding. 2.Patient response to treatment:  Pt requires seated rest break after each seated exercise and amb secondary to c/o fatigue. ASSESSMENT OF PROGRESS TOWARD GOALS: Pt requires frequent rest breaks throughout PT session but is very cooperative. Pt has a wide TWYLA and requires much effort/extra time with transfers. CONTINUED NEED FOR THE FOLLOWING SKILLS: Pt requires continued PT to improve LE strength/gait ability. PLAN FOR NEXT VISIT: Will plan to attempt standing exercises next visit to improve gait balance. THE FOLLOWING DISCHARGE PLANNING WAS DISCUSSED WITH THE PATIENT/CAREGIVER: Pt is scheduled to continue 1 more visit this week then 2w1, 1w1.

## 2022-08-15 PROCEDURE — 3331090002 HH PPS REVENUE DEBIT

## 2022-08-15 PROCEDURE — 3331090001 HH PPS REVENUE CREDIT

## 2022-08-15 NOTE — HOME HEALTH
SUBJECTIVE:Pt reports he is doing okay but gets very tired easily. CAREGIVER INVOLVEMENT/ASSISTANCE NEEDED FOR: pts wife in home during PT session and assist pt with all needs prn. HOME HEALTH SUPPLIES BY TYPE AND QUANTITY ORDERED/DELIVERED THIS VISIT INCLUDE: na  OBJECTIVE:  See interventions. 1.Patient level of understanding of education provided: Pt issued a copy of seated HEP and demonstrates a good understanding. 2.Patient response to treatment:  Pt requires seated rest break after each seated exercise and amb secondary to c/o fatigue. ASSESSMENT OF PROGRESS TOWARD GOALS: Pt reports a fair compliance with HEP and reviewed with pt importance of compliance and increasing amb time to every 1-2 waking hours in home. Pt performed standing exercises today to improve gait ability. CONTINUED NEED FOR THE FOLLOWING SKILLS: Pt requires continued PT to improve LE strength/gait ability. PLAN FOR NEXT VISIT: Will plan to attempt standing exercises next visit to improve gait balance. THE FOLLOWING DISCHARGE PLANNING WAS DISCUSSED WITH THE PATIENT/CAREGIVER: Pt is scheduled to continue PT 2w1, 1w1.

## 2022-08-16 ENCOUNTER — HOME CARE VISIT (OUTPATIENT)
Dept: SCHEDULING | Facility: HOME HEALTH | Age: 52
End: 2022-08-16
Payer: MEDICARE

## 2022-08-16 PROCEDURE — G0157 HHC PT ASSISTANT EA 15: HCPCS

## 2022-08-16 PROCEDURE — 3331090001 HH PPS REVENUE CREDIT

## 2022-08-16 PROCEDURE — 3331090002 HH PPS REVENUE DEBIT

## 2022-08-17 ENCOUNTER — OFFICE VISIT (OUTPATIENT)
Dept: NEUROLOGY | Age: 52
End: 2022-08-17
Payer: MEDICARE

## 2022-08-17 VITALS
SYSTOLIC BLOOD PRESSURE: 130 MMHG | OXYGEN SATURATION: 97 % | HEIGHT: 69 IN | BODY MASS INDEX: 46.65 KG/M2 | RESPIRATION RATE: 18 BRPM | DIASTOLIC BLOOD PRESSURE: 80 MMHG | WEIGHT: 315 LBS | HEART RATE: 86 BPM

## 2022-08-17 VITALS
TEMPERATURE: 98.2 F | RESPIRATION RATE: 17 BRPM | OXYGEN SATURATION: 98 % | HEART RATE: 82 BPM | DIASTOLIC BLOOD PRESSURE: 82 MMHG | SYSTOLIC BLOOD PRESSURE: 136 MMHG

## 2022-08-17 DIAGNOSIS — R20.0 LEFT SIDED NUMBNESS: ICD-10-CM

## 2022-08-17 DIAGNOSIS — R06.83 SNORING: ICD-10-CM

## 2022-08-17 DIAGNOSIS — R53.1 LEFT-SIDED WEAKNESS: ICD-10-CM

## 2022-08-17 DIAGNOSIS — I63.9 CEREBROVASCULAR ACCIDENT (CVA), UNSPECIFIED MECHANISM (HCC): Primary | ICD-10-CM

## 2022-08-17 PROCEDURE — G0463 HOSPITAL OUTPT CLINIC VISIT: HCPCS | Performed by: NURSE PRACTITIONER

## 2022-08-17 PROCEDURE — 1111F DSCHRG MED/CURRENT MED MERGE: CPT | Performed by: NURSE PRACTITIONER

## 2022-08-17 PROCEDURE — G8427 DOCREV CUR MEDS BY ELIG CLIN: HCPCS | Performed by: NURSE PRACTITIONER

## 2022-08-17 PROCEDURE — G8754 DIAS BP LESS 90: HCPCS | Performed by: NURSE PRACTITIONER

## 2022-08-17 PROCEDURE — G8417 CALC BMI ABV UP PARAM F/U: HCPCS | Performed by: NURSE PRACTITIONER

## 2022-08-17 PROCEDURE — 3331090001 HH PPS REVENUE CREDIT

## 2022-08-17 PROCEDURE — G8432 DEP SCR NOT DOC, RNG: HCPCS | Performed by: NURSE PRACTITIONER

## 2022-08-17 PROCEDURE — 99204 OFFICE O/P NEW MOD 45 MIN: CPT | Performed by: NURSE PRACTITIONER

## 2022-08-17 PROCEDURE — G8752 SYS BP LESS 140: HCPCS | Performed by: NURSE PRACTITIONER

## 2022-08-17 PROCEDURE — 3017F COLORECTAL CA SCREEN DOC REV: CPT | Performed by: NURSE PRACTITIONER

## 2022-08-17 PROCEDURE — 3331090002 HH PPS REVENUE DEBIT

## 2022-08-17 NOTE — Clinical Note
Jasmina Stoddard, Can you please let him know I called [address] [place] [address] and we should be able to do the MRI there. If you can give him a scheduling # for them to call just in case he doesn't hear.

## 2022-08-17 NOTE — PROGRESS NOTES
Mary Castro presents today for   Chief Complaint   Patient presents with    Stroke       Is someone accompanying this pt? no    Is the patient using any DME equipment during [address]? no    Depression Screening:  3 most recent PHQ Screens 7/19/2022   PHQ Not Done -   Little interest or pleasure in doing things Not at all   Feeling down, depressed, irritable, or hopeless Not at all   Total Score PHQ 2 0       Learning Assessment:  Learning Assessment 1/28/2020   PRIMARY LEARNER Patient   HIGHEST LEVEL OF EDUCATION - PRIMARY LEARNER  GRADUATED HIGH SCHOOL OR GED   BARRIERS PRIMARY LEARNER NONE   CO-LEARNER CAREGIVER No   PRIMARY LANGUAGE ENGLISH   LEARNER PREFERENCE PRIMARY DEMONSTRATION   ANSWERED BY patient   RELATIONSHIP SELF       Abuse Screening:  Abuse Screening Questionnaire 4/28/2022   Do you ever feel afraid of your partner? N   Are you in a relationship with someone who physically or mentally threatens you? N   Is it safe for you to go home? Y       Fall Risk  Fall Risk Assessment, last 12 mths 7/19/2022   Able to walk? Yes   Fall in past 12 months? 0   Do you feel unsteady? 0   Are you worried about falling 0         Coordination of Care:  1. Have you been to the ER, urgent care clinic since your last visit? Hospitalized since your last visit? no    2. Have you seen or consulted any other health care providers outside of the [address] [place] [address] since your last visit? Include any pap smears or colon screening.  no

## 2022-08-17 NOTE — PROGRESS NOTES
[facility]  [address], [address], [place], [address]  [address] Bony Hauser, [address]  Office:  [phone]  Fax: [phone]    Referring: Shahzad Bangura MD      Chief Complaint   Patient presents with    Stroke       HPI:  Sabrina Holman presents in evaluation for stroke. Records were reviewed in system. He presented to [facility] on 7/27/2022 via EMS after noting that he feels weak in his left leg. He reported that his leg gave out on him twice per day before. Also feels a little weak in his left upper extremity and some tingling sensation in his left upper extremity. Denied headache, neck pain, difficulty speaking, difficulty swallowing, back pain, or any other symptoms. Last known well was at 1500 the day before. He has medical history to include hypertension, knee pain, arthritis in lower back and knees, and morbid obesity. He ambulates with a cane. There was additional information that he arrived in the ER 2 days ago with lightheadedness and dizziness and reportedly had a CT of the head which was normal.  His weight was above the limit for the CT scanner at [facility]. He was evaluated by teleneurology there. He was not a candidate for IV tPA or neuro intervention. The recommendation was to obtain further evaluation for stroke with MRI if able, and otherwise complete stroke evaluation. There are results from a CT of the head from 7/26/2022 from an ER visit at [facility] for chief complaint of fall which reported no acute process but reported several small chronic infarcts within the right MCA distribution. Several small chronic appearing deep cerebral lacunar infarcts. ER note from 7/26/2022 indicates that he reported that his right leg gave out and he fell.   At the ER visit at [facility] on 7/27/2022 it was noted that [facility] transfer center was contacted but they were unable to take the patient in transfer and if he needs repeat imaging or meets size capacity for MRI that he may be transferred to their facility for imaging then returned to their hospital, but it was felt that he did not need repeat CT at that time as the symptoms had started yesterday or the day before, and it was discussed with the neurologist and plan was to treat with aspirin and Plavix and admit for further stroke evaluation. He was seen in neurology consultation in the hospital by Dr. Luis Ness. It was suspected that he had a right hemispheric small ischemic cerebrovascular accident and branch right MCA distribution or just deeper lacunar infarct that caused left lower extremity more than upper extremity weakness for several days. Multiple old ischemic infarcts in the right MCA distribution as well as deep white and gray matter lacunar infarcts by head CT 7/26/2022. He was treated with dual antiplatelet therapy with aspirin 325 mg and Plavix 75 mg daily for at least 3 weeks then aspirin 325 mg daily. Lipitor 40 mg daily for LDL goal less than 70. He had a carotid ultrasound which reported no evidence of hemodynamically significant stenosis in the right carotid system. Less than 50% left internal carotid artery stenosis. Antegrade flow in the vertebral arteries. Normal flow in the right subclavian artery. Unable to visualize the left subclavian artery. Technically difficult and limited exam.  TTE reported LV normal in size with mild LVH. Estimated LVEF 50%. Mild global hypokinesis of the LV. Saline contrast was injected but inconclusive for evaluation of an intra-arterial shunt. Plan was to consider follow-up head CT at another facility and preferably CTA head and neck at the same time. Strict blood sugar and blood pressure control. Outpatient evaluation for sleep apnea recommended. He worked with Shant[facility] HOYT, and [address] [place] [address] . Vitamin B12 level was normal, 418. Homocystine was elevated, 16.8. TSH was normal.  Hemoglobin A1c was normal, 5.5.   Total cholesterol 128.  LDL-C 86. He presents today in evaluation. He is feeling a lot better. He had left arm and leg numbness/tingling and weakness that started the day before. Denies speech issues or dysarthria. Denies vision issues. Denies memory problems. Has a cane prior to this. Walking is a lot better. No more falls. It was just the one fall. Feels like his walking is back to baseline. Denies any more left-sided weakness. Still some numbness located to left foot. A little bit on left hand. Just the hand and foot. No facial sensory changes. Denies facial asymmetry. Still taking aspirin and Plavix. The aspirin is 81 mg. Taking atorvastatin 40 mg. Denies easy bleeding/bruising, blood in stool or urine, or black tarry stools. /80. Takes a BP med (not on list but found carvedilol and losartan in chart but patient says he is taking only one). Denies recurrence of stroke like symptoms. He has had this occur before, around 25 years ago, was told it was a stroke, does not remember what it was due to but endorses history of hypertension at that time, diagnosed in the . Presented the same way with weakness on the left. Denies smoking or drug use. Endorses alcohol use about once a week, 3-4 shots. Endorses doing fine mental health wise. Endorses adequate quantity of sleep.  +snoring. Denies known witnessed apneic spells or choking/gasping in sleep. Endorses fatigue in day time, but reports only when he walks. +knees pain and back pain, reports arthritis, takes Tylenol as needed. Denies headaches. +LEWIS. Denies CP or palpitations. +back pain when sitting down too long. He is in home health PT. Denies left hand motor function issues. Lives with his fiance. Parents both had HTN, mother had DM, father  from a stroke in his early 46s.      Social History     Socioeconomic History    Marital status:      Spouse name: Not on file    Number of children: Not on file    Years of education: Not on file    Highest education level: Not on file   Occupational History    Not on file   Tobacco Use    Smoking status: Never    Smokeless tobacco: Never   Vaping Use    Vaping Use: Never used   Substance and Sexual Activity    Alcohol use: Yes     Alcohol/week: 1.0 standard drink     Types: 1 Cans of beer per week    Drug use: Not Currently    Sexual activity: Not Currently   Other Topics Concern    Not on file   Social History Narrative    Not on file     Social Determinants of Health     Financial Resource Strain: Not on file   Food Insecurity: Not on file   Transportation Needs: Not on file   Physical Activity: Not on file   Stress: Not on file   Social Connections: Not on file   Intimate Partner Violence: Not on file   Housing Stability: Not on file       Family History   Problem Relation Age of Onset    Heart Disease Mother     Diabetes Mother     Stroke Father        Current Outpatient Medications   Medication Sig Dispense Refill    ondansetron hcl (Zofran) 4 mg tablet Take 1 Tablet by mouth every eight (8) hours as needed for Nausea. 14 Tablet 0    clopidogreL (Plavix) 75 mg tab Take 1 Tablet by mouth in the morning. 90 Tablet 3    atorvastatin (LIPITOR) 40 mg tablet Take 1 Tablet by mouth in the morning. 90 Tablet 3    aspirin delayed-release 81 mg tablet Take 1 Tablet by mouth in the morning.  80 Tablet 3       Past Medical History:   Diagnosis Date    Ambulates with cane     Arthritis     lower back and kness    Hypertension     Knee pain     Morbid obesity ([facility] [address]        Past Surgical History:   Procedure Laterality Date    COLONOSCOPY N/A 6/19/2020    COLONOSCOPY with polypectomy performed by Amanuel Prater MD at [facility] ENDOSCOPY    HX CHOLECYSTECTOMY         Allergies   Allergen Reactions    Norvasc [Amlodipine] Swelling     BLE Edema       Patient Active Problem List   Diagnosis Code    Obesity, morbid ([facility] [address] E66.01    History of CVA (cerebrovascular accident) Z86.73    Chronic pain of both knees M25.561, M25.562, G89.29    Essential hypertension I10    Impaired glucose tolerance R73.02    History of Helicobacter pylori infection Z86.19    Chronic low back pain without sciatica M54.50, G89.29    Gout M10.9    CVA (cerebral vascular accident) ([facility] [address] I63.9    Left leg weakness R29.898         Review of Systems:   Constitutional: no fever or chills  Skin denies rash or itching  HEENT:  Denies tinnitus, hearing loss, or visual changes  Respiratory: denies shortness of breath. +LEWIS  Cardiovascular: denies chest pain. +dyspnea on exertion  Gastrointestinal: does not report nausea or vomiting  Genitourinary: does not report dysuria or incontinence  Musculoskeletal: +bilateral knee pain and back pain, chronic. Endocrine: denies weight change  Hematology: denies easy bruising or bleeding. Neurological: as above in HPI      PHYSICAL EXAMINATION:      VITAL SIGNS:  Visit Vitals  /80   Pulse 86   Resp 18   Ht 5' 9\" (1.753 m)   Wt (!) 218.6 kg (482 lb)   SpO2 97%   BMI 71.18 kg/m²       GENERAL: Well developed, well nourished, in no apparent distress. Obese. HEART: RR.  LUNGS:                      Respirations regular and unlabored. EXTREMITIES: Pulses 2+ and symmetrical.  HEAD:   Normocephalic, atraumatic. NEUROLOGIC EXAMINATION    MENTAL STATUS: Awake, alert, and oriented x 4. Attention and STM are grossly normal. There is no aphasia. Fund of knowledge is adequate. Mood and affect are appropriate. Speech clear. CRANIAL NERVES: Visual fields are full to confrontation. Pupils are reactive to light and accommodation. Extraocular movements are intact and there is no nystagmus. Facial sensation is normal.  Face is symmetrical.   Hearing is grossly intact. SCM/TPZ 5/5. Palate rises symmetrically. Tongue is in the midline. MOTOR:  Normal tone, bulk, and strength, 5/5 muscle strength throughout except mild weakness on right side. 5-/5. No cogwheel rigidity. No drift of the BUE.  Fine finger movements intact. CEREBELLAR: Finger to nose was normal.   No tremors or dysmetria. SENSORY: Normal PP, vibration, proprioception. DTRs: 1+ throughout, toes downgoing. GAIT:   Antalgic with cane. CBC:   Lab Results   Component Value Date/Time    WBC 7.8 07/28/2022 01:31 AM    RBC 4.33 07/28/2022 01:31 AM    HGB 11.1 (L) 07/28/2022 01:31 AM    HCT 36.2 (L) 07/28/2022 01:31 AM    PLATELET 959 10/23/0464 01:31 AM     BMP:   Lab Results   Component Value Date/Time    Glucose 100 07/28/2022 01:31 AM    Sodium 140 07/28/2022 01:31 AM    Potassium 3.4 07/28/2022 01:31 AM    Chloride 104 07/28/2022 01:31 AM    CO2 29 07/28/2022 01:31 AM    BUN 11 07/28/2022 01:31 AM    Creatinine 0.91 07/28/2022 01:31 AM    Calcium 8.5 (L) 07/28/2022 01:31 AM     CMP:   Lab Results   Component Value Date/Time    Glucose 100 07/28/2022 01:31 AM    Sodium 140 07/28/2022 01:31 AM    Potassium 3.4 07/28/2022 01:31 AM    Chloride 104 07/28/2022 01:31 AM    CO2 29 07/28/2022 01:31 AM    BUN 11 07/28/2022 01:31 AM    Creatinine 0.91 07/28/2022 01:31 AM    Calcium 8.5 (L) 07/28/2022 01:31 AM    Anion gap 7 07/28/2022 01:31 AM    BUN/Creatinine ratio 13 07/25/2022 12:29 PM    Alk. phosphatase 52 07/28/2022 01:31 AM    Protein, total 6.6 07/28/2022 01:31 AM    Albumin 3.0 (L) 07/28/2022 01:31 AM    Globulin 4.3 (H) 06/20/2022 03:34 PM    A-G Ratio 0.7 (L) 06/20/2022 03:34 PM     Coagulation:   Lab Results   Component Value Date/Time    Prothrombin time 12.6 07/28/2022 01:31 AM    INR 1.1 07/28/2022 01:31 AM         Impression/Plan:   Rohit Cummings is a 46 y.o. male whose history and physical are consistent with right-sided CVA. He presented in the end of July with left upper and lower extremity weakness and sensory changes and fall.   He was not able to have a CT at the facility but CT of the head at another facility the day before reported no acute changes but reported several small chronic infarcts within the right MCA distribution. Several small chronic appearing deep cerebral lacunar infarcts. There is a CT head in the system from February 2019 which reported multiple suspected onnacute right cerebral convexity infarcts, as well at right greater than left probably periventricular small nonacute lacunar infarcts at that time. He reports doing better but still with some numbness of the left hand and foot. Will obtain MRI brain and MRA head and neck- called [facility] MRI department to check the weight limit on the MRI machine to make sure we can have this done there. Will continue dual antiplatelet therapy at this time while obtaining additional imaging to evaluate for any significant intracranial stenosis. Carotid ultrasound did not report significant stenosis. Continue statin. LDL goal less than 70. BP management with goal <130/<80. Cardiology evaluation to consider cardiac monitoring and/or repeat echocardiogram.  No arrhythmias noted in the hospital.  Sleep specialist evaluation to evaluate for potential sleep disorder due to signs of snoring and fatigue. Encouraged reduction of alcohol use, healthy diet, weight loss. Advised on the s/s of stroke/ reasons to call 911 and present to the ED. Follow up in 2 months. Diagnoses and all orders for this visit:    1. Cerebrovascular accident (CVA), unspecified mechanism ([address]  -     REFERRAL TO CARDIOLOGY    2. Snoring  -     REFERRAL TO NEUROLOGY      I spent 50 minutes with the patient in face-to-face consultation, with 30 minutes spent in counseling and coordination of care as described above. Signed By: Asa Rowan NP              PLEASE NOTE:   Portions of this document may have been produced using voice recognition software. Unrecognized errors in transcription may be present.

## 2022-08-17 NOTE — HOME HEALTH
SUBJECTIVE:Pt reports he is tired today and his R ankle has been sore. CAREGIVER INVOLVEMENT/ASSISTANCE NEEDED FOR: pts wife in home during PT session and assist pt with all needs prn. HOME HEALTH SUPPLIES BY TYPE AND QUANTITY ORDERED/DELIVERED THIS VISIT INCLUDE: na  OBJECTIVE:  See interventions. 1.Patient level of understanding of education provided: Pt issued a copy of seated HEP and demonstrates a good understanding. Pt required constant cuing for proper posture and technique with standing exercises. 2.Patient response to treatment:  Pt requires seated rest break after each seated exercise and amb secondary to c/o fatigue and unable to perform heel raises today secondary to c/o R ankle pain. ASSESSMENT OF PROGRESS TOWARD GOALS: Pt had a decreased tolerance for PT overall today secondary to c/o fatigue. Pt reports a poor to fair compliance with HEP and reviewed with pt importance of compliance. CONTINUED NEED FOR THE FOLLOWING SKILLS: Pt requires continued PT to improve LE strength/gait ability. PLAN FOR NEXT VISIT: Will plan to attempt standing exercises next visit to improve gait balance and attempt car transfers. THE FOLLOWING DISCHARGE PLANNING WAS DISCUSSED WITH THE PATIENT/CAREGIVER: Pt is scheduled to continue PT 1 more visits this week then dc next week with HEP and caregiver.

## 2022-08-18 ENCOUNTER — TELEPHONE (OUTPATIENT)
Dept: INTERNAL MEDICINE CLINIC | Age: 52
End: 2022-08-18

## 2022-08-18 ENCOUNTER — HOME CARE VISIT (OUTPATIENT)
Dept: SCHEDULING | Facility: HOME HEALTH | Age: 52
End: 2022-08-18
Payer: MEDICARE

## 2022-08-18 VITALS
DIASTOLIC BLOOD PRESSURE: 80 MMHG | SYSTOLIC BLOOD PRESSURE: 122 MMHG | HEART RATE: 61 BPM | OXYGEN SATURATION: 98 % | RESPIRATION RATE: 17 BRPM | TEMPERATURE: 98.2 F

## 2022-08-18 DIAGNOSIS — M25.562 CHRONIC PAIN OF BOTH KNEES: ICD-10-CM

## 2022-08-18 DIAGNOSIS — R53.1 LEFT-SIDED WEAKNESS: ICD-10-CM

## 2022-08-18 DIAGNOSIS — M54.50 CHRONIC LOW BACK PAIN WITHOUT SCIATICA, UNSPECIFIED BACK PAIN LATERALITY: ICD-10-CM

## 2022-08-18 DIAGNOSIS — I63.9 CEREBROVASCULAR ACCIDENT (CVA), UNSPECIFIED MECHANISM (HCC): Primary | ICD-10-CM

## 2022-08-18 DIAGNOSIS — R20.0 LEFT SIDED NUMBNESS: ICD-10-CM

## 2022-08-18 DIAGNOSIS — R29.898 LEFT LEG WEAKNESS: ICD-10-CM

## 2022-08-18 DIAGNOSIS — M25.561 CHRONIC PAIN OF BOTH KNEES: ICD-10-CM

## 2022-08-18 DIAGNOSIS — E66.01 OBESITY, MORBID (HCC): ICD-10-CM

## 2022-08-18 DIAGNOSIS — I63.9 CEREBROVASCULAR ACCIDENT (CVA), UNSPECIFIED MECHANISM (HCC): ICD-10-CM

## 2022-08-18 DIAGNOSIS — G89.29 CHRONIC LOW BACK PAIN WITHOUT SCIATICA, UNSPECIFIED BACK PAIN LATERALITY: ICD-10-CM

## 2022-08-18 DIAGNOSIS — G89.29 CHRONIC PAIN OF BOTH KNEES: ICD-10-CM

## 2022-08-18 PROCEDURE — 3331090001 HH PPS REVENUE CREDIT

## 2022-08-18 PROCEDURE — G0157 HHC PT ASSISTANT EA 15: HCPCS

## 2022-08-18 PROCEDURE — 3331090002 HH PPS REVENUE DEBIT

## 2022-08-18 NOTE — Clinical Note
Pt demonstrates a good understanding of HEP but reports a poor to fair compliance. Pt is scheduled for dc from [facility] PT services next week with HEP and caregiver. Recommend outpt aquatic therapy for further rehabilitation. Pts functional status as follows:   Bed mobility:pt is I per pt with all bed mobility which demonstrates an improvement from initial evaluation of SB assist.  This allows pt to be more independent. Transfers: Pt is S with all transfers which demonstrates an improvement from initial evaluation of SB assist.  This allows the pt increased functional independence and mobilty. Gait/wc mobility:Pt is able to amb 50 x 2 feet outside over even/uneven surfaces with S assist with SC AD. This represents an improvement from initial evaluation amb SC AD on level surfaces with SB assist.  Stairs:Pt amb up/down threshold with SC and SBA for safety. Balance:Pts final Tinetti score is 16/28 which is an improvement from initial evaluation score of /28. This allows the pt to be functionally more independent and have a decreased fall risk.

## 2022-08-18 NOTE — TELEPHONE ENCOUNTER
Leatha Weinberg is calling from [facility] PT. Patient is being d/c nexte week and he had mentioned that he was doing aqua therapy before. She wants to know if they can get an order for him to start that again. Also stating patient needs a bariatric wheelchair for community resources.

## 2022-08-19 PROCEDURE — 3331090002 HH PPS REVENUE DEBIT

## 2022-08-19 PROCEDURE — 3331090001 HH PPS REVENUE CREDIT

## 2022-08-20 PROCEDURE — 3331090001 HH PPS REVENUE CREDIT

## 2022-08-20 PROCEDURE — 3331090002 HH PPS REVENUE DEBIT

## 2022-08-21 PROCEDURE — 3331090001 HH PPS REVENUE CREDIT

## 2022-08-21 PROCEDURE — 3331090002 HH PPS REVENUE DEBIT

## 2022-08-22 ENCOUNTER — TELEPHONE (OUTPATIENT)
Dept: INTERNAL MEDICINE CLINIC | Age: 52
End: 2022-08-22

## 2022-08-22 PROCEDURE — 3331090002 HH PPS REVENUE DEBIT

## 2022-08-22 PROCEDURE — 3331090001 HH PPS REVENUE CREDIT

## 2022-08-22 NOTE — TELEPHONE ENCOUNTER
Patient thinks he may be having a reaction to something. Stating his hand sand feet started itching at 2 am. No redness or rash. Please advise.

## 2022-08-22 NOTE — TELEPHONE ENCOUNTER
Please have him take Benadryl over the counter for relief. If he develops shortness of breath or any rash, he needs to be seen in person. In the absence of any rash, shortness of breath, or tongue swelling, he should continue taking all of his medications as prescribed.     Dr. Carlos Moore  [facility] [address], [facility] [address]  Phone: [phone]  Fax: [phone]

## 2022-08-23 ENCOUNTER — HOME CARE VISIT (OUTPATIENT)
Dept: SCHEDULING | Facility: HOME HEALTH | Age: 52
End: 2022-08-23
Payer: MEDICARE

## 2022-08-23 VITALS
HEART RATE: 78 BPM | OXYGEN SATURATION: 98 % | DIASTOLIC BLOOD PRESSURE: 80 MMHG | RESPIRATION RATE: 17 BRPM | TEMPERATURE: 98.2 F | SYSTOLIC BLOOD PRESSURE: 122 MMHG

## 2022-08-23 VITALS
TEMPERATURE: 96.8 F | OXYGEN SATURATION: 99 % | RESPIRATION RATE: 18 BRPM | DIASTOLIC BLOOD PRESSURE: 82 MMHG | HEART RATE: 93 BPM | SYSTOLIC BLOOD PRESSURE: 135 MMHG

## 2022-08-23 PROCEDURE — G0151 HHCP-SERV OF PT,EA 15 MIN: HCPCS

## 2022-08-23 PROCEDURE — 3331090001 HH PPS REVENUE CREDIT

## 2022-08-23 PROCEDURE — 3331090002 HH PPS REVENUE DEBIT

## 2022-08-23 NOTE — HOME HEALTH
SUBJECTIVE: Pt reports he is ready for dc and to get back to aquatic therapy. CAREGIVER INVOLVEMENT/ASSISTANCE NEEDED FOR: pts wife present during PT session and assist pt with all needs prn. HOME HEALTH SUPPLIES BY TYPE AND QUANTITY ORDERED/DELIVERED THIS VISIT INCLUDE: n/a  OBJECTIVE:  See interventions. 1.Patient level of understanding of education provided: Pt demonstrates a good understanding of sitting HEP but reports a poor to fair compliance. 2.Patient response to treatment:  Pt was able to amb to/from car and perform car transfers with SBA and extra time/effort. ASSESSMENT OF PROGRESS TOWARD GOALS:   Bed mobility:pt is I per pt with all bed mobility which demonstrates an improvement from initial evaluation of SB assist.  This allows pt to be more independent. Transfers: Pt is S with all transfers which demonstrates an improvement from initial evaluation of SB assist.  This allows the pt increased functional independence and mobilty. Gait/wc mobility:Pt is able to amb 50 x 2 feet outside over even/uneven surfaces with S assist with SC AD. This represents an improvement from initial evaluation amb SC AD on level surfaces with SB assist.  Stairs:Pt amb up/down threshold with SC and SBA for safety. Balance:Pts final Tinetti score is 16/28 which is an improvement from initial evaluation score of /28. This allows the pt to be functionally more independent and have a decreased fall risk. THE FOLLOWING DISCHARGE PLANNING WAS DISCUSSED WITH THE PATIENT/CAREGIVER: Pt is scheduled for dc from [facility] PT next visit with HEP and caregiver. Requesting outpt aquatic therapy for further rehabilitation.

## 2022-08-24 ENCOUNTER — TELEPHONE (OUTPATIENT)
Dept: INTERNAL MEDICINE CLINIC | Age: 52
End: 2022-08-24

## 2022-08-24 NOTE — TELEPHONE ENCOUNTER
Received fax from [facility] in regards to the bariatric wheelchair order. They require that his last note be addended to add the following components. Medical Records  The records document that all of the following basic criteria are met: The beneficiary has a mobility limitation that significantly impairs his/her ability to  participate in one or more mobility-related activities of daily living (MRADLs) such  as toileting, feeding, dressing, grooming, and bathing in customary locations in the  home; AND  The mobility limitation cannot be sufficiently resolved by the use of an appropriately  fitted cane or walker; AND  Use of a manual wheelchair will significantly improve the beneficiarys ability  to participate in MRADLs and the beneficiary will use it on a regular basis in  the home; AND  The beneficiary has not expressed an unwillingness to use the manual wheelchair  that is provided in the home; AND  The beneficiary has sufficient upper extremity function and other physical and mental  capabilities needed to safely self-propel the manual wheelchair that is provided in the  home during a typical day OR the beneficiary has a caregiver who is available, willing,  and able to provide assistance with the wheelchair. Also please state why the patient requires a bariatric wheelchair and his current weight of 482 lb.

## 2022-08-24 NOTE — HOME HEALTH
Patient d/c from PT d/t reached goals and reached max rehab potentials at this time. During this visit, patient presents with the following clinical findings:    SUBJECTIVE: Patient denies fall since evaluation. Patient reported gout flare started yesterday. Patient c/o sore, stabbing pain on L foot with highest pain level of 9/10 and least pain level of 5/10. Patient manages pain by taking Tylenol. CAREGIVER ASSISTANCE:   Primary caregiver is available days and eves and can assist with bathing, dressing, meal prep and setup, medication management, grocery shopping, household chores and transportation to MD appointment. MEDICATIONS RECONCILED AND UPDATED: no changes in medications at this time    MMT: presents with :  R hip flex 4/5   R hip Abd 4/5   R hip add 4/5   R hip ext. 4/5  R knee flex 4/5  R knee ext. 4/5    R ankle DF 4/5  L hip flex 4-/5  L hip Abd 4-/5  L hip ext. 4-/5    L hip add 4-/5  L knee flex 4-/5  L knee ext. 4-/5  L ankle DF 4-/5    FTSTS requires more than 1 attempt to complete sit to stand  compared to R hip flexion             3-/5       Extension:          Abduction:     4-/5       Adduction:      4-/5     L hip flexion                                3-/5  Extension:          Abduction:     3+/5       Adduction:     3+/5      R knee  Flexion:     4/5       Extension:     4/5     L knee  Flexion:     3+/5       Extension:     3+/5     during initial evaluation. BALANCE: Patient is moderate  risk of falls evidenced by 20/28 on Tinetti balance and gait test  compared to 12/28 during initial evaluation. BED MOBILITY:  indep in bed mobility without cues from SBA  during initial evaluation. TRANSFERS: indep in transfers to and from bed, chair, toilet, and car using SPC without cues from Uses widened TWYLA and rocking forward onto feet from low couch. From chair: wide TWYLA and less rocking, needs UE support and unsteady transfers during initial evaluation.     GAIT: Patient is now indep in household ambulation using SPC and 1able to ambulate 150 indep  on even and uneven surfaces using FWW with decreased stance phase on L, unequal step length, decreased yessenia, wide TWYLA compared to Sridevi Ok. Gait characterized by Wide TWYLA, lateral w/s progression with lateral sway  during initial evaluation. ASSESSMENT: Patient has met the goals established at the start of care. Patient d/c to self, family and under supervision of MD. Pt instructed to continue to perform HEP and recommend using AD for safe transfers and ambulation to prevent falls and for joint protection. Pt verbalized understanding. PATIENT EDUCATION PROVIDED THIS VISIT TO INCLUDE:   FALL PREVENTION TECHNIQUES: monitor medication that may alter mental status,  keeping walking pathways clean and clear of clutter and throw rugs, keeping night lights on for ability to see and easily, good visibility for safe transitioning thresholds and proper use and good compliance of using AD. PAIN MANAGEMENT TECHNIQUES: take pain medication as prescribed by MD, application of cold pack for soreness and achy  type of pain /hot  packs for achy, stiffness type of pain on painful area x 20 mins, positioning and relaxation. ENERGY CONSERVATION TECHNIQUES:  rest, slow down, pacing, and complete tasks in manageable steps. PATIENT LEVEL OF UNDERSTANDING OF EDUCATION PROVIDED: Patient verbalized understanding and able to teach back information provided. PATIENT RESPONSE TO PROCEDURE PERFORMED: Patient is now able to perform functional mobility skills indep using SPC for household mobility and FWW for community mobility. Patient/caregiver instructed to contact MD if medical issue arises. Pt/cg verbalized understanding. Care coordination done with PTA regarding POC, progress made and d/c plans.

## 2022-08-25 NOTE — CASE COMMUNICATION
Patient was seen for [address] [address]  discharge visit. Pt had education on pain management techniques, graded therapeutic exercises, balance training, bed mobility training, transfers training, HEP ed, gait/steps management training and d/c planning services. Patient has met goals and is ready for DC. Patient able to teach back HEP and medications. No issues noted at discharge. Patient is aware to follow up with PCP and other MD's. Please call #[phone] with any questions. Thank you! Priyanka Solis

## 2022-09-07 ENCOUNTER — HOSPITAL ENCOUNTER (OUTPATIENT)
Dept: PHYSICAL THERAPY | Age: 52
Discharge: HOME | End: 2022-09-07
Attending: INTERNAL MEDICINE
Payer: MEDICARE

## 2022-09-07 PROCEDURE — 97162 PT EVAL MOD COMPLEX 30 MIN: CPT

## 2022-09-07 NOTE — PROGRESS NOTES
[facility] - [facility] [address]  [phone] fax  Plan of Care/ Statement of Necessity for Physical Therapy Services    Patient name: Kathya Ortega Start of Care: 2022   Referral source: Wilbert Hawkins MD : 1970    Medical Diagnosis: Muscle weakness (generalized) [M62.81]  Other low back pain [M54.59]  Cerebrovascular accident (CVA), unspecified mechanism ([address] [I63.9]  Pain in right knee [M25.561]  Pain in left knee [M25.562]  Left leg weakness [R29.898]  Payor: [place] MEDICARE / Plan: [place] MEDICARE PART A & B / Product Type: Medicare /  Onset Date:22 (script)    Treatment Diagnosis: Low Back, B LE pain, weakness   Prior Hospitalization: see medical history Provider#: [id number]   Medications: Verified on Patient summary List    Comorbidities: Arthritis; BMI > 30 - morbid obesity; HTN; hx right CVA, left hemiparesis   Prior Level of Function: Not working; lives in [facility] with fiancee, 3 children; use of SPC in home, community      The Plan of Care and following information is based on the information from the initial evaluation. Assessment/ key information: Pt is a 46 y.o. male who presents with c/o chronic low back, B knee pain and left LE weakness from recent right CVA in the past month, per Pt report. Pt was hospitalized x 1 week and then discharged to home for HHPT x 3-4 weeks. Pt continues to report difficulty with sit to stand transfers, limited standing/amb tolerance of 2 minutes, inability to squat, step to pattern with stairs, inability to maintain upright posture due to low back pain and fatigue, and impaired balance with need for use of SPC in home, community. Upon exam, Pt exhibited FWD trunk posture in standing; decreased functional LE strength; poor core/trunk stability; impaired standing balance; and antalgic gait using SPC with WBOS.    Pt would benefit from skilled PT to address above deficits to improve Pt's functional mobility and strength for ease of ADLs. Evaluation Complexity History MEDIUM  Complexity : 1-2 comorbidities / personal factors will impact the outcome/ POC ; Examination MEDIUM Complexity : 3 Standardized tests and measures addressing body structure, function, activity limitation and / or participation in recreation  ;Presentation MEDIUM Complexity : Evolving with changing characteristics  ; Clinical Decision Making MEDIUM Complexity : FOTO score of 26-74  Overall Complexity Rating: MEDIUM  Problem List: pain affecting function, decrease ROM, decrease strength, impaired gait/ balance, decrease ADL/ functional abilitiies, decrease activity tolerance, decrease flexibility/ joint mobility, and decrease transfer abilities   Treatment Plan may include any combination of the following: Therapeutic exercise, Therapeutic activities, Neuromuscular re-education, Physical agent/modality, Gait/balance training, Manual therapy, Aquatic therapy, Patient education, Functional mobility training, and Stair training  Patient / Family readiness to learn indicated by: asking questions and interest  Persons(s) to be included in education: patient (P)  Barriers to Learning/Limitations: None  Patient Goal (s): Strengthen my legs.   Patient Self Reported Health Status: poor  Rehabilitation Potential: good    Short Term Goals: To be accomplished in 1 weeks:  Goal: Pt to be compliant with initial HEP to improve LE strength for ease of transfers, standing tolerance. Status at last note/certification: Established and reviewed with Pt  Goal: Pt to initiate aquatics program without increased pain to aid in achievement of all LTGs. Status at last note/certification: N/A  Long Term Goals: To be accomplished in 5 weeks:  Goal: Pt to perform 10 sit to stands in 30 seconds without UE support, increased pain or fatigue for ease of sit to stand transfers.   Status at last note/certification: 5 x without UE support with right knee pain  Goal: Pt to increase standing/amb tolerance to 10 minutes without increased LBP to increase ease of ADLs in home. Status at last note/certification: 2 minute tolerance  Goal: Pt to negotiate stairs with reciprocal pattern, 1 UE on handrail for increased ease navigating in community. Status at last note/certification: step to pattern with use of handrail, pain  Goal: Pt to report FOTO score of 58 pts to show improved function and quality of life. Status at last note/certification: FOTO 43 pts     Frequency / Duration: Patient to be seen 2 times per week for 5 weeks. Patient/ Caregiver education and instruction: Diagnosis, prognosis, exercises   [x]  Plan of care has been reviewed with PTA    Certification Period: 9/7/22 - 10/6/22  Nu Ngyuen, PT 9/7/2022 5:18 PM  _____________________________________________________________________  I certify that the above Therapy Services are being furnished while the patient is under my care. I agree with the treatment plan and certify that this therapy is necessary.     Physician's Signature:____________Date:_________TIME:________     Evette Izquierdo MD  ** Signature, Date and Time must be completed for valid certification **    Please sign and return to [facility] - [address]  [phone] fax

## 2022-09-07 NOTE — PROGRESS NOTES
PT DAILY TREATMENT NOTE     Patient Name: Rosita Reese  Date:2022  : 1970  [x]  Patient  Verified  Payor: VA MEDICARE / Plan: VA MEDICARE PART A & B / Product Type: Medicare /    In time:3:53  Out time:4:30  Total Treatment Time (min): 37  Visit #: 1 of 10    Medicare/BCBS Only   Total Timed Codes (min):  10 1:1 Treatment Time:  37       Treatment Area: Muscle weakness (generalized) [M62.81]  Other low back pain [M54.59]  Cerebrovascular accident (CVA), unspecified mechanism ([facility] [address] [facility] [I63.9]  Pain in right knee [M25.561]  Pain in left knee [M25.562]  Left leg weakness [R29.898]    SUBJECTIVE  Pain Level (0-10 scale): 0/10  Any medication changes, allergies to medications, adverse drug reactions, diagnosis change, or new procedure performed?: [x] No    [] Yes (see summary sheet for update)  Subjective functional status/changes:   [] No changes reported    Chief Complaint: B LE pain, weakness; LBP  History/Mechanism of Injury: Pt with hx of chronic low back, B knee pain x 4 yrs due to presence of arthritis. Pt also recently suffered CVA with left hemiparesis. Pt unsure of date of CVA but notes was in hospital x 1 week and then was discharged to home. Pt had HHPT x 3-4 weeks. Current Symptoms/Deficits: Pt was having weakness in left LE and noted knee buckling and frequent falls but notes after HHPT this has improved. Pt still would like further strengthen LEs. Pt notes limited standing/amb tolerance of 2 minutes.   Pain-  Current: 0/10     Worst: 9/10   Best: 0/10  Previous Treatment/Compliance: HHPT after hospitalization  Gait/Mobility Devices: SPC in home, community x 3.5 yrs  PMHx/Surgical Hx: LBP, B knee pain, CVA, left hemiparesis  Work Hx: Not working  Living Situation: 1-story home with fiancee and three children; no steps to front door; one step down into garage  Household Modifications: unable to assist with cooking, cleaning from back/knee pain  Hobbies: playing cards  Pt Goals: \"Strengthen my legs. \"    OBJECTIVE    27 min [x]Eval                  []Re-Eval     10 min Therapeutic Activity:  []  See flow sheet : Patient education on therapy assessment, prognosis, expectations for therapy sessions, patient goals, aquatic benefits/rules, and HEP. Rationale: to improve the patients ability to adhere to HEP and therapy sessions for increased compliance when working toward therapy goals. With   [] TE   [x] TA   [] neuro   [] other: Patient Education: [x] Review HEP    [] Progressed/Changed HEP based on:   [] positioning   [] body mechanics   [] transfers   [] heat/ice application    [] other:      Other Objective/Functional Measures: FOTO 43 pts    Observation: morbid obesity; FWD trunk posture  Palpation: no TTP    Tone: WNL    LE Strength:   Right (/5) Left (/5)   Hip     Flexion 5 5             Abduction 5 5             Adduction 5 5             Extension 5 5             ER 5 4             IR 5 4   Knee   Extension 5 5              Flexion 5 5   Ankle   Dorsiflexion 5 5               PF NT  NT                Inversion NT NT               Eversion NT NT     Gait: WBOS, antalgic limp with use of SPC    Functional Squat: only FDC with increased right knee pain    Stair Negotiation: step to pattern with use of handrail, SPC    Reflexes/Sensation: intact to light touch    Optional Tests:       Dynamic Gait Index (24pt scale):        Functional Gait Assessment (30pt scale):       [place]inn Balance Scale (56pt scale):       Timed Up and Go test (< 20 seconds):       5x Sit to Stand test (20 seconds):       30 sec Sit to Stand test (12-16): 5 x without UE support, B knee aching    Balance/ Equilibrium:         Sitting Balance: Static:  [x] Good    [] Fair    [] Poor     Dynamic:   [x] Good    [] Fair    [] Poor        Standing Balance: Static:   [] Good    [x] Fair    [] Poor     Dynamic:   [] Good    [x] Fair    [] Poor        Protective Extension:  [] Present    [x] Delayed    [] Absent        Single Leg Stance:         Eyes Open  Eyes Closed   L 1 sec L NT sec   R 1 sec R NT sec         Behavior: [x] Cooperative    [] Impulsive    [] Agitated    [] Perseverative    [] Confused   Oriented x: 3    Cognition: [] One Step Commands   [x] Multiple Commands   [] Displays Neglect [] R  [] L    Other:       Impaired Judgement: [] Y    [x] N      Impaired Vision:  [] Y    [x] N      Safety Awareness Deficits  [] Y    [x] N      Impaired Hearing  [] Y    [x] N      Able to Express Needs [x] Y    [] N      -  Pain Level (0-10 scale) post treatment: 0/10    ASSESSMENT/Changes in Function: See POC    Patient will continue to benefit from skilled PT services to modify and progress therapeutic interventions, address functional mobility deficits, address ROM deficits, address strength deficits, analyze and address soft tissue restrictions, analyze and cue movement patterns, analyze and modify body mechanics/ergonomics, assess and modify postural abnormalities, address imbalance/dizziness and instruct in home and community integration to attain remaining goals.      [x]  See Plan of Care  []  See progress note/recertification  []  See Discharge Summary         Progress towards goals / Updated goals:  See POC    PLAN  [x]  Upgrade activities as tolerated     []  Continue plan of care  [x]  Update interventions per flow sheet       []  Discharge due to:_  []  Other:_      Shanna Nguyen, PT 9/7/2022  3:58 PM    Future Appointments   Date Time Provider Michelle Reddy   9/13/2022  1:40 PM Franck Alan MD [facility] BS AMB   10/17/2022  2:00 PM Filemon Noriega NP [facility] BS AMB   11/14/2022  3:00 PM [facility] LAB VISIT [facility] BS AMB   11/18/2022  2:40 PM Franck Alan MD [facility] BS AMB

## 2022-09-13 ENCOUNTER — TELEPHONE (OUTPATIENT)
Dept: PHYSICAL THERAPY | Age: 52
End: 2022-09-13

## 2022-09-20 ENCOUNTER — TELEPHONE (OUTPATIENT)
Dept: PHYSICAL THERAPY | Age: 52
End: 2022-09-20

## 2022-09-21 ENCOUNTER — TELEPHONE (OUTPATIENT)
Dept: CARDIOLOGY CLINIC | Age: 52
End: 2022-09-21

## 2022-09-21 NOTE — PROGRESS NOTES
[facility] - [facility], [address] [place] [address]  [phone] fax    Discharge Summary    Patient name: Prasanna Johnson Start of Care: 2022   Referral source: Franck Alan MD : 1970   Medical/Treatment Diagnosis: Muscle weakness (generalized) [M62.81]  Other low back pain [M54.59]  Cerebrovascular accident (CVA), unspecified mechanism ([address] [I63.9]  Pain in right knee [M25.561]  Pain in left knee [M25.562]  Left leg weakness [R29.898]  Payor: [employer] MEDICARE / Plan: [address] [place] [address] HMO / Product Type: [employer] Medicare /  Onset Date:  22 (script)   Prior Hospitalization: see medical history Provider#: [id number]   Medications: Verified on Patient Summary List    Comorbidities: Arthritis; BMI > 30 - morbid obesity; HTN; hx right CVA, left hemiparesis  Prior Level of Function: Not working; lives in [facility] with michael, 3 children; use of [facility] in home, community    Visits from [place] of Care: 1    Missed Visits: 3    Reporting Period : 2022 to 2022    Assessment/ Summary of Care: Dear / FNP/ PA:  Franck Alan MD, under your direction, we have been providing physical therapy for your patient Prasanna Johnson, for a diagnosis of Muscle weakness (generalized) [M62.81]  Other low back pain [M54.59]  Cerebrovascular accident (CVA), unspecified mechanism ([address] [I63.9]  Pain in right knee [M25.561]  Pain in left knee [M25.562]  Left leg weakness [R29.898]. The patient was scheduled for 3 visits. They attended 0 of them and was last seen on 2022. On the last visit they reported with c/o chronic low back, B knee pain and left LE weakness - please refer to plan of care written on 22. Due to the inability to further their care from non-attendance, we are discharging the patient from physical therapy at this time.       We appreciate the kind referral and would willingly work with this patient again, should they be able to arrange regular attendance. Your patient's health is our primary concern. Should you have any further questions or concerns, please feel free to contact me at your convenience.       RECOMMENDATIONS:  [x]Discontinue therapy: []Patient has reached or is progressing toward set goals      [x]Patient is non-compliant or has abdicated      []Due to lack of appreciable progress towards set goals    Naa Nguyen, PT 9/21/2022 8:09 AM

## 2022-09-22 ENCOUNTER — APPOINTMENT (OUTPATIENT)
Dept: PHYSICAL THERAPY | Age: 52
End: 2022-09-22
Attending: INTERNAL MEDICINE
Payer: MEDICARE

## 2022-11-03 DIAGNOSIS — R76.8 RHEUMATOID FACTOR POSITIVE: ICD-10-CM

## 2022-11-03 DIAGNOSIS — R73.02 IMPAIRED GLUCOSE TOLERANCE: ICD-10-CM

## 2022-11-03 DIAGNOSIS — I10 ESSENTIAL HYPERTENSION: ICD-10-CM

## 2022-11-03 DIAGNOSIS — D64.9 ANEMIA, UNSPECIFIED TYPE: ICD-10-CM

## 2022-11-18 ENCOUNTER — TELEPHONE (OUTPATIENT)
Dept: INTERNAL MEDICINE CLINIC | Age: 52
End: 2022-11-18

## 2023-03-01 ENCOUNTER — OFFICE VISIT (OUTPATIENT)
Age: 53
End: 2023-03-01
Payer: MEDICARE

## 2023-03-01 VITALS
OXYGEN SATURATION: 96 % | RESPIRATION RATE: 18 BRPM | SYSTOLIC BLOOD PRESSURE: 153 MMHG | DIASTOLIC BLOOD PRESSURE: 92 MMHG | TEMPERATURE: 97.9 F | BODY MASS INDEX: 46.65 KG/M2 | HEIGHT: 69 IN | HEART RATE: 77 BPM | WEIGHT: 315 LBS

## 2023-03-01 DIAGNOSIS — Z11.59 NEED FOR HEPATITIS C SCREENING TEST: ICD-10-CM

## 2023-03-01 DIAGNOSIS — K21.9 GASTROESOPHAGEAL REFLUX DISEASE, UNSPECIFIED WHETHER ESOPHAGITIS PRESENT: ICD-10-CM

## 2023-03-01 DIAGNOSIS — R06.83 SNORING: ICD-10-CM

## 2023-03-01 DIAGNOSIS — M51.9 LUMBAR DISC DISEASE: ICD-10-CM

## 2023-03-01 DIAGNOSIS — N52.9 ERECTILE DYSFUNCTION, UNSPECIFIED ERECTILE DYSFUNCTION TYPE: ICD-10-CM

## 2023-03-01 DIAGNOSIS — M10.9 GOUT, UNSPECIFIED CAUSE, UNSPECIFIED CHRONICITY, UNSPECIFIED SITE: ICD-10-CM

## 2023-03-01 DIAGNOSIS — A04.8 H. PYLORI INFECTION: ICD-10-CM

## 2023-03-01 DIAGNOSIS — R76.8 RHEUMATOID FACTOR POSITIVE: ICD-10-CM

## 2023-03-01 DIAGNOSIS — R73.9 HYPERGLYCEMIA: ICD-10-CM

## 2023-03-01 DIAGNOSIS — H40.9 GLAUCOMA, UNSPECIFIED GLAUCOMA TYPE, UNSPECIFIED LATERALITY: ICD-10-CM

## 2023-03-01 DIAGNOSIS — I10 ESSENTIAL (PRIMARY) HYPERTENSION: Primary | ICD-10-CM

## 2023-03-01 DIAGNOSIS — Z12.5 ENCOUNTER FOR SCREENING FOR MALIGNANT NEOPLASM OF PROSTATE: ICD-10-CM

## 2023-03-01 DIAGNOSIS — I51.7 CARDIOMEGALY: ICD-10-CM

## 2023-03-01 DIAGNOSIS — R35.1 NOCTURIA: ICD-10-CM

## 2023-03-01 PROCEDURE — G8484 FLU IMMUNIZE NO ADMIN: HCPCS | Performed by: INTERNAL MEDICINE

## 2023-03-01 PROCEDURE — 1036F TOBACCO NON-USER: CPT | Performed by: INTERNAL MEDICINE

## 2023-03-01 PROCEDURE — 3017F COLORECTAL CA SCREEN DOC REV: CPT | Performed by: INTERNAL MEDICINE

## 2023-03-01 PROCEDURE — G8428 CUR MEDS NOT DOCUMENT: HCPCS | Performed by: INTERNAL MEDICINE

## 2023-03-01 PROCEDURE — 99211 OFF/OP EST MAY X REQ PHY/QHP: CPT

## 2023-03-01 PROCEDURE — 3077F SYST BP >= 140 MM HG: CPT | Performed by: INTERNAL MEDICINE

## 2023-03-01 PROCEDURE — 3080F DIAST BP >= 90 MM HG: CPT | Performed by: INTERNAL MEDICINE

## 2023-03-01 PROCEDURE — G8417 CALC BMI ABV UP PARAM F/U: HCPCS | Performed by: INTERNAL MEDICINE

## 2023-03-01 PROCEDURE — 99214 OFFICE O/P EST MOD 30 MIN: CPT | Performed by: INTERNAL MEDICINE

## 2023-03-01 RX ORDER — TRIPROLIDINE/PSEUDOEPHEDRINE 2.5MG-60MG
TABLET ORAL
COMMUNITY
Start: 2023-02-25

## 2023-03-01 RX ORDER — OMEPRAZOLE 20 MG/1
20 CAPSULE, DELAYED RELEASE ORAL 2 TIMES DAILY
COMMUNITY
Start: 2021-08-10 | End: 2023-03-01 | Stop reason: SDUPTHER

## 2023-03-01 RX ORDER — KETOROLAC TROMETHAMINE 5 MG/ML
SOLUTION OPHTHALMIC
COMMUNITY
Start: 2023-02-24

## 2023-03-01 RX ORDER — OMEPRAZOLE 20 MG/1
20 CAPSULE, DELAYED RELEASE ORAL 2 TIMES DAILY
Qty: 60 CAPSULE | Refills: 11 | Status: SHIPPED | OUTPATIENT
Start: 2023-03-01 | End: 2023-03-01

## 2023-03-01 RX ORDER — DULOXETIN HYDROCHLORIDE 30 MG/1
30 CAPSULE, DELAYED RELEASE ORAL DAILY
Qty: 90 CAPSULE | Refills: 1 | Status: SHIPPED | OUTPATIENT
Start: 2023-03-01

## 2023-03-01 RX ORDER — TADALAFIL 20 MG/1
20 TABLET ORAL DAILY PRN
Qty: 30 TABLET | Refills: 11 | Status: SHIPPED | OUTPATIENT
Start: 2023-03-01

## 2023-03-01 RX ORDER — DORZOLAMIDE HYDROCHLORIDE AND TIMOLOL MALEATE 20; 5 MG/ML; MG/ML
SOLUTION/ DROPS OPHTHALMIC
COMMUNITY
Start: 2023-02-24

## 2023-03-01 RX ORDER — FAMOTIDINE 40 MG/1
40 TABLET, FILM COATED ORAL EVERY EVENING
Qty: 30 TABLET | Refills: 11 | Status: SHIPPED | OUTPATIENT
Start: 2023-03-01

## 2023-03-01 SDOH — ECONOMIC STABILITY: FOOD INSECURITY: WITHIN THE PAST 12 MONTHS, YOU WORRIED THAT YOUR FOOD WOULD RUN OUT BEFORE YOU GOT MONEY TO BUY MORE.: NEVER TRUE

## 2023-03-01 SDOH — ECONOMIC STABILITY: INCOME INSECURITY: HOW HARD IS IT FOR YOU TO PAY FOR THE VERY BASICS LIKE FOOD, HOUSING, MEDICAL CARE, AND HEATING?: NOT HARD AT ALL

## 2023-03-01 SDOH — ECONOMIC STABILITY: FOOD INSECURITY: WITHIN THE PAST 12 MONTHS, THE FOOD YOU BOUGHT JUST DIDN'T LAST AND YOU DIDN'T HAVE MONEY TO GET MORE.: NEVER TRUE

## 2023-03-01 SDOH — ECONOMIC STABILITY: HOUSING INSECURITY
IN THE LAST 12 MONTHS, WAS THERE A TIME WHEN YOU DID NOT HAVE A STEADY PLACE TO SLEEP OR SLEPT IN A SHELTER (INCLUDING NOW)?: NO

## 2023-03-01 ASSESSMENT — PATIENT HEALTH QUESTIONNAIRE - PHQ9
SUM OF ALL RESPONSES TO PHQ QUESTIONS 1-9: 0
2. FEELING DOWN, DEPRESSED OR HOPELESS: 0
1. LITTLE INTEREST OR PLEASURE IN DOING THINGS: 0
SUM OF ALL RESPONSES TO PHQ9 QUESTIONS 1 & 2: 0

## 2023-03-01 ASSESSMENT — ENCOUNTER SYMPTOMS
BACK PAIN: 1
COUGH: 0
ABDOMINAL PAIN: 0
EYE PAIN: 0
SHORTNESS OF BREATH: 0
ANAL BLEEDING: 0
BLOOD IN STOOL: 0
SORE THROAT: 0

## 2023-03-01 NOTE — PROGRESS NOTES
INTERNISTS OF [facility]:  3/1/2023, MRN: [id number]      Pilar Denis is a 48 y.o. male and presents to clinic for Follow-up      Subjective: The patient is a 49-year-old male with history of hypertension, obesity, CVA, h/o H.pylori infection (treated last year), prediabetes, suspected rheumatoid arthritis, suspected gout, lumbar disc disease, and chronic bilateral knee pain (followed by Orthopedics). 1. Glaucoma and Cataracts: He has b/l blurry vision. His right eye blurry vision is worse than his left. No eye pain. He is using two rx eye drops but does not know the name of these rx. He is not driving. Vision is \"cloudy. \"  +Glaucoma. He is supposed to be on 3 rx eye drops but the 3rd was not covered by his insurance per his hx - so he is not taking it. He is scheduled to see his eye doctor on Friday. He saw him last wk. 2. CVA: S/p PT. CVA occurred in 2022. No new sx. His left sided weakness resolved per his hx. He uses a cane with ambulation. No HAs. No changes to hearing. +Taking ASA, plavix, and lipitor. 3. Snoring Hx and HTN: He met with Ga Canales and states he had a sleep study. He was lost to follow up thereafter. Loved ones tell him he has a snoring problem. BP is elevated at 153/92 but he is not on any BP rx. He was to see a cardiologist per review of Neurology recommendations's in their last office note per my review. +H/o cardiomegaly - seen as an incidental finding. 7/29/22 Echocardiogram: The left ventricle is normal in size with mild LV hypertrophy. The estimated LVEF is 50%. Normal diastolic function. There is mild global hypokinesis of the LV. The right ventricle is normal in size and function. TAPSE: 2.2 cm. Saline contrast was injected but is inconclusive for evaluation of an interatrial shunt. There is mild mitral annular calcification. There is trivial tricuspid regurgitation. The estimated right ventricular systolic pressure is 15 mmHg. Mild aortic root dilatation. Other findings are noted below. No previous exam for comparison. Normal diastolic function. 4. Epigastric Pain: He has epigastric pain. Pain is off/on. Sx are dependent of what he eats. His sx are not worsening. Taking prilosec bid. He takes pepto bismaul w/o relief. +H/o treated H.pylori infection. 5. Chronic Pain, Gout, and H/o Positive Rheumatoid Arthritis Serology: He has some chronic pain in his knees. Walking worsens his sx. He has chronic lower back pain. Pain radiates down both legs. He has had isolated jt swelling with no h/o trauma/trigger. He was found to have a positive rheumatoid arthritis serology in the setting of an elevated CRP and referred to Rheumatology but he never scheduled an apt. Results for Paul Tomlinson (MRN [id number]) as of 5/4/2022 04:46    Ref. Range 4/7/2021 18:33 8/12/2021 09:01 4/29/2022 10:30   C-Reactive Protein, Qt Latest Ref Range: <=0.5 mg/dL   2.7 (H) 6.0 (H)   C REACTIVE PROTEIN, QT Unknown   Rpt (A) Rpt (A)   Rheumatoid Arthritis Factor Latest Units: Units/mL 29.3 (H) 31.3 (H)     14.3.3 ETA, Rheum. Arthritis Latest Units: ng/mL <0.20 <0.20        6. Health Maintenance:  - Overdue for the shingles vaccine, flu shot, and Covid-19.     7. ED: +Nocturia.  He has a hard time maintaining and getting an erection          Patient Active Problem List    Diagnosis Date Noted    Glaucoma 03/01/2023    Rheumatoid factor positive 03/01/2023    Erectile dysfunction 03/01/2023    Lumbar disc disease 03/01/2023    Gastroesophageal reflux disease 03/01/2023    CVA (cerebral vascular accident) ([address] 07/27/2022    Left leg weakness 07/27/2022    Gout 05/04/2022    Chronic low back pain without sciatica 10/19/2019    Essential hypertension 06/10/2019    History of Helicobacter pylori infection 06/10/2019    Impaired glucose tolerance 06/10/2019    Obesity, morbid ([address] 07/13/2018    Chronic pain of both knees 07/13/2018    History of CVA (cerebrovascular accident) 07/13/2018 Current Outpatient Medications   Medication Sig Dispense Refill    DUREZOL 0.05 % EMUL       dorzolamide-timolol (COSOPT) 22.3-6.8 MG/ML ophthalmic solution INSTILL 1 DROP INTO EACH EYE TWICE DAILY      ketorolac (ACULAR) 0.5 % ophthalmic solution INSTILL 1 DROP INTO EACH EYE TWICE DAILY      famotidine (PEPCID) 40 MG tablet Take 1 tablet by mouth every evening 30 tablet 11    DULoxetine (CYMBALTA) 30 MG extended release capsule Take 1 capsule by mouth daily 90 capsule 1    tadalafil (CIALIS) 20 MG tablet Take 1 tablet by mouth daily as needed for Erectile Dysfunction Do not exceed one tab per 24 hours. Take 30 min prior to sex. 30 tablet 11    aspirin 81 MG EC tablet Take 81 mg by mouth daily      atorvastatin (LIPITOR) 40 MG tablet Take 40 mg by mouth daily      clopidogrel (PLAVIX) 75 MG tablet Take 75 mg by mouth daily      ondansetron (ZOFRAN) 4 MG tablet Take 4 mg by mouth every 8 hours as needed       No current facility-administered medications for this visit. Allergies   Allergen Reactions    Amlodipine Swelling     BLE Edema       Past Medical History:   Diagnosis Date    Ambulates with cane     Arthritis     lower back and kness    Hypertension     Knee pain     Morbid obesity ([address]        Past Surgical History:   Procedure Laterality Date    CHOLECYSTECTOMY      COLONOSCOPY N/A 6/19/2020    COLONOSCOPY with polypectomy performed by Carolyn Carter MD at [facility]       Family History   Problem Relation Age of Onset    Diabetes Mother     Heart Disease Mother     Stroke Father        Social History     Tobacco Use    Smoking status: Never    Smokeless tobacco: Never   Substance Use Topics    Alcohol use: Yes     Alcohol/week: 1.0 standard drink       ROS   Review of Systems   Constitutional:  Negative for fever. HENT:  Negative for ear pain and sore throat. Eyes:  Positive for visual disturbance. Negative for pain. Respiratory:  Negative for cough and shortness of breath. Cardiovascular:  Negative for chest pain. Gastrointestinal:  Negative for abdominal pain, anal bleeding and blood in stool. Genitourinary:  Negative for dysuria and hematuria. Musculoskeletal:  Positive for arthralgias and back pain. Negative for myalgias. Skin:  Negative for rash. Neurological:  Negative for headaches. Hematological:  Does not bruise/bleed easily. Psychiatric/Behavioral:  Negative for suicidal ideas. Objective     BP (!) 153/92   Pulse 77   Temp 97.9 °F (36.6 °C) (Temporal)   Resp 18   Ht 5' 9\" (1.753 m)   Wt (!) 484 lb (219.5 kg)   SpO2 96%   BMI 71.47 kg/m²      Physical Exam  Constitutional:       Appearance: Normal appearance. HENT:      Head: Normocephalic and atraumatic. Right Ear: External ear normal.      Left Ear: External ear normal.   Eyes:      General: No scleral icterus. Right eye: No discharge. Left eye: No discharge. Conjunctiva/sclera: Conjunctivae normal.   Cardiovascular:      Rate and Rhythm: Normal rate and regular rhythm. Pulses: Normal pulses. Heart sounds: Normal heart sounds. Pulmonary:      Effort: Pulmonary effort is normal.      Breath sounds: Normal breath sounds. Abdominal:      General: Abdomen is flat. Bowel sounds are normal. There is no distension. Palpations: Abdomen is soft. Tenderness: There is abdominal tenderness (along his epigastric area). Musculoskeletal:         General: No swelling (BUE) or tenderness (BUE). Cervical back: Neck supple. Comments: He has pain with ROM along bl knee jts. He has crepitus with ROM exercises along his b/l knee jts   Lymphadenopathy:      Cervical: No cervical adenopathy. Skin:     General: Skin is warm and dry. Findings: No erythema. Neurological:      Mental Status: He is alert. Mental status is at baseline.       Comments: He has 4/5 strength along his left hand (right  strength is 5/5)   Psychiatric:         Mood and Affect: Mood normal.         LABS   Data Review:   Lab Results   Component Value Date/Time    WBC 7.8 07/28/2022 01:31 AM    HGB 11.1 07/28/2022 01:31 AM    HCT 36.2 07/28/2022 01:31 AM     07/28/2022 01:31 AM    MCV 83.6 07/28/2022 01:31 AM       Lab Results   Component Value Date/Time     07/28/2022 01:31 AM    K 3.4 07/28/2022 01:31 AM     07/28/2022 01:31 AM    CO2 29 07/28/2022 01:31 AM    BUN 11 07/28/2022 01:31 AM    GFRAA >60 07/28/2022 01:31 AM       Lab Results   Component Value Date/Time    CHOL 128 07/28/2022 01:31 AM    HDL 28 07/28/2022 01:31 AM       No results found for: HBA1C, [id number]    Assessment/Plan:   1. Cardiomegaly: Seen on his previous echocardiogram.  - Referral to Cardiology. Repeat echocardiogram recommended by Neurology per their last office note. 2. Essential (primary) hypertension: +SHAHRZAD. White coat HTN vs benign essential HTN.+H/o CVA. - Checking labs  - C/w rx (statin, ASA, and plavix)  - RTC for a BP check. If still elevated at his follow up apt, he will need rx  - Goal BP is <140/90 as discussed with him today. 3. Hyperglycemia/Prediabetes Hx: Overdue for labs. +BMI is 71.   - Checking labs  - At one point, he was interested in bariatric surgery. May discuss the nonsurgical weight loss program with him at his upcoming apt.     4. H. pylori infection Hx: +Having epigastric pain  - Checking a stool Ag test  - Checking labs  - Ordering pepcid. Ideally he has his stool test before starting pepcid. 5. Lumbar disc disease: +Chronic knee pain. - Lumbar spine xray ordered - he had a previous xray 3 yrs ago or so that showed findings suggestive of a lumbar radiculopathy (mild). - Starting cymbalta  - Pt given the contact information to Orthopedics and encouraged to schedule an apt    6. Health Maintenance:  -Screening for hepatitis C    7. Nocturia and ED Hx:   - Checking labs (PSA and testosterone)  - Cialis ordered    8.  Gout and H/o Positive Rheumatoid Arthritis serology:   - Checking labs.    9. Glaucoma: Worsening blurry vision. +Cataracts per pt hx  - I encouraged him to follow up with his eye doctor.   - He should c/w rx eye drops and I encouraged him to discuss replacing the 3rd eye drop with another that is covered by his insurance. I also asked him to notify us of what drops he has been prescribed.          ICD-10-CM    1. Essential (primary) hypertension  I10 Comprehensive Metabolic Panel     Hemoglobin A1C     Lipid Panel     Microalbumin / Creatinine Urine Ratio      2. Cardiomegaly  I51.7 [facility] - Kalie Kate MD, Cardiology, [place] ([facility])      3. Hyperglycemia  R73.9 Comprehensive Metabolic Panel     Hemoglobin A1C      4. Body mass index (BMI) 70 or greater, adult ([facility])  Z68.45       5. H. pylori infection  A04.8 H. Pylori Antigen, Stool     CBC with Auto Differential     DISCONTINUED: omeprazole (PRILOSEC) 20 MG delayed release capsule      6. Gastroesophageal reflux disease, unspecified whether esophagitis present  K21.9 H. Pylori Antigen, Stool     CBC with Auto Differential     famotidine (PEPCID) 40 MG tablet     DISCONTINUED: omeprazole (PRILOSEC) 20 MG delayed release capsule      7. Snoring  R06.83 CANCELED: [facility] - Michaelle Greenberg DO, Sleep Medicine, [place] ([facility])      8. Lumbar disc disease  M51.9 XR LUMBAR SPINE (MIN 4 VIEWS)     DULoxetine (CYMBALTA) 30 MG extended release capsule      9. Need for hepatitis C screening test  Z11.59 Hepatitis C Antibody      10. Nocturia  R35.1 PSA, Total and Free     Testosterone, free, total      11. Encounter for screening for malignant neoplasm of prostate  Z12.5 PSA, Total and Free      12. Erectile dysfunction, unspecified erectile dysfunction type  N52.9 PSA, Total and Free     Testosterone, free, total     tadalafil (CIALIS) 20 MG tablet      13. Gout, unspecified cause, unspecified chronicity, unspecified site  M10.9 Uric Acid     C-Reactive Protein      14. Rheumatoid  factor positive  R76.8 C-Reactive Protein     RheumAssure      15. Glaucoma, unspecified glaucoma type, unspecified laterality  H40.9             Health Maintenance Due   Topic Date Due    Hepatitis C screen  Never done    Shingles vaccine (1 of 2) Never done    COVID-19 Vaccine (3 - Booster for Pfizer series) 11/10/2021    Flu vaccine (1) 08/01/2022    Annual Wellness Visit (AWV)  02/27/2023         Lab review: labs are reviewed in the EHR and ordered as mentioned above    I have discussed the diagnosis with the patient and the intended plan as seen in the above orders. The patient has received an after-visit summary and questions were answered concerning future plans. I have discussed medication side effects and warnings with the patient as well. I have reviewed the plan of care with the patient, accepted their input and they are in agreement with the treatment goals. All questions were answered. The patient understands the plan of care. Handouts provided today with above information. Pt instructed if symptoms worsen to call the office or report to the ED for continued care. Greater than 50% of the visit time was spent in counseling and/or coordination of care. Voice recognition was used to generate this report, which may have resulted in some phonetic based errors in grammar and contents. Even though attempts were made to correct all the mistakes, some may have been missed, and remained in the body of the document. No follow-up provider specified.     Arron Carter MD

## 2023-03-01 NOTE — PROGRESS NOTES
Naldo Grossman presents today for   Chief Complaint   Patient presents with    Follow-up     no    1. \"Have you been to the ER, urgent care clinic since your last visit? Hospitalized since your last visit? \" no    2. \"Have you seen or consulted any other health care providers outside of the [address] [place] [address] since your last visit? \" no     3. For patients aged 39-70: Has the patient had a colonoscopy / FIT/ Cologuard? Yes - Care Gap present. Most recent result on file      If the patient is female:    4. For patients aged 41-77: Has the patient had a mammogram within the past 2 years? NA - based on age or sex      11. For patients aged 21-65: Has the patient had a pap smear?  NA - based on age or sex

## 2023-03-01 NOTE — PATIENT INSTRUCTIONS
Home Blood Pressure Test: About This Test  What is it? A home blood pressure test allows you to keep track of your blood pressure at home. Blood pressure is a measure of the force of blood against the walls of your arteries. Blood pressure readings include two numbers, such as 130/80 (say \"130 over 80\"). The first number is the systolic pressure. The second number is the diastolic pressure. Why is this test done? You may do this test at home to:  Find out if you have high blood pressure. Track your blood pressure if you have high blood pressure. Track how well medicine is working to reduce high blood pressure. Check how lifestyle changes, such as weight loss and exercise, are affecting blood pressure. How do you prepare for the test?  For at least 30 minutes before you take your blood pressure, don't exercise, drink caffeine, or smoke. Empty your bladder before the test. Sit quietly with your back straight and both feet on the floor for at least 5 minutes. This helps you take your blood pressure while you feel comfortable and relaxed. How is the test done? If your doctor recommends it, take your blood pressure twice a day. Take it in the morning and evening. Sit with your arm slightly bent and resting on a table so that your upper arm is at the same level as your heart. Use the same arm each time you take your blood pressure. Place the blood pressure cuff on the bare skin of your upper arm. You may have to roll up your sleeve, remove your arm from the sleeve, or take your shirt off. Wrap the blood pressure cuff around your upper arm so that the lower edge of the cuff is about 1 inch above the bend of your elbow. Do not move, talk, or text while you take your blood pressure. Follow the instructions that came with your blood pressure monitor. They might be different from the following. Press the on/off button on the automatic monitor.  Then you may need to wait until the screen says the monitor is ready. Press the start button. The cuff will inflate and deflate by itself. Your blood pressure numbers will appear on the screen. Wait one minute and take your blood pressure again. If your monitor does not automatically save your numbers, write them in your log book, along with the date and time. Follow-up care is a key part of your treatment and safety. Be sure to make and go to all appointments, and call your doctor if you are having problems. It's also a good idea to keep a list of the medicines you take. Where can you learn more? Go to http://www.woods.com/ and enter C427 to learn more about \"Home Blood Pressure Test: About This Test.\"  Current as of: September 7, 2022               Content Version: 13.5  © 2006-2022 [employer]. Care instructions adapted under license by [facility] [place] ([facility]). If you have questions about a medical condition or this instruction, always ask your healthcare professional. [place] [address] any warranty or liability for your use of this information.

## 2023-03-02 ENCOUNTER — TELEPHONE (OUTPATIENT)
Age: 53
End: 2023-03-02

## 2023-03-02 NOTE — TELEPHONE ENCOUNTER
Patient has been referred over to see a cardiologist for cardiomegaly per Jennifer Small. I have called patient and eft message on his voicemail to call the office to schedule his appointment.

## 2023-03-13 ENCOUNTER — HOSPITAL ENCOUNTER (EMERGENCY)
Facility: HOSPITAL | Age: 53
Discharge: LEFT BEFORE DISCHARGE | End: 2023-03-13

## 2023-03-14 ENCOUNTER — HOSPITAL ENCOUNTER (EMERGENCY)
Facility: HOSPITAL | Age: 53
Discharge: HOME | End: 2023-03-14
Attending: EMERGENCY MEDICINE
Payer: MEDICARE

## 2023-03-14 VITALS
OXYGEN SATURATION: 98 % | HEART RATE: 90 BPM | RESPIRATION RATE: 19 BRPM | BODY MASS INDEX: 46.65 KG/M2 | SYSTOLIC BLOOD PRESSURE: 172 MMHG | DIASTOLIC BLOOD PRESSURE: 103 MMHG | HEIGHT: 69 IN | TEMPERATURE: 98.5 F | WEIGHT: 315 LBS

## 2023-03-14 DIAGNOSIS — K04.7 DENTAL INFECTION: ICD-10-CM

## 2023-03-14 DIAGNOSIS — K08.89 PAIN, DENTAL: Primary | ICD-10-CM

## 2023-03-14 PROCEDURE — 99283 EMERGENCY DEPT VISIT LOW MDM: CPT

## 2023-03-14 RX ORDER — AMOXICILLIN 500 MG/1
500 CAPSULE ORAL 2 TIMES DAILY
Qty: 14 CAPSULE | Refills: 0 | Status: SHIPPED | OUTPATIENT
Start: 2023-03-14 | End: 2023-03-21

## 2023-03-14 RX ORDER — HYDROCODONE BITARTRATE AND ACETAMINOPHEN 7.5; 325 MG/1; MG/1
1 TABLET ORAL EVERY 8 HOURS PRN
Qty: 9 TABLET | Refills: 0 | Status: SHIPPED | OUTPATIENT
Start: 2023-03-14 | End: 2023-03-17

## 2023-03-14 RX ORDER — CHLORHEXIDINE GLUCONATE 0.12 MG/ML
15 RINSE ORAL 2 TIMES DAILY
Qty: 420 ML | Refills: 0 | Status: SHIPPED | OUTPATIENT
Start: 2023-03-14 | End: 2023-03-28

## 2023-03-14 ASSESSMENT — PAIN - FUNCTIONAL ASSESSMENT: PAIN_FUNCTIONAL_ASSESSMENT: 0-10

## 2023-03-14 ASSESSMENT — ENCOUNTER SYMPTOMS
COUGH: 0
SHORTNESS OF BREATH: 0
TROUBLE SWALLOWING: 0
CHEST TIGHTNESS: 0
SORE THROAT: 0

## 2023-03-14 ASSESSMENT — PAIN SCALES - GENERAL: PAINLEVEL_OUTOF10: 10

## 2023-03-14 NOTE — ED PROVIDER NOTES
EMERGENCY DEPARTMENT HISTORY AND PHYSICAL EXAM    9:48 PM      Date: 3/14/2023  Patient Name: Sole Lew    History of Presenting Illness     Chief Complaint   Patient presents with    Dental Pain         History Provided By: the patient. Additional History (Context): Sole Lew is a 48 y.o. male with a history of arthritis and hypertension presenting to the ED due to dental pain x2 days. Patient states that he has a bad tooth. States that he has been taking Tylenol for the pain which does seem to help some. Denies any fever or chills. Denies any difficulty swallowing. Patient denies any chest pain or shortness of breath. States that the pain makes his whole face hurt. PCP: Evans Grimaldo MD    No current facility-administered medications for this encounter. Current Outpatient Medications   Medication Sig Dispense Refill    HYDROcodone-acetaminophen (NORCO) 7.5-325 MG per tablet Take 1 tablet by mouth every 8 hours as needed for Pain for up to 3 days. Max Daily Amount: 3 tablets 9 tablet 0    amoxicillin (AMOXIL) 500 MG capsule Take 1 capsule by mouth 2 times daily for 7 days 14 capsule 0    chlorhexidine (PERIDEX) 0.12 % solution Swish and spit 15 mLs 2 times daily for 14 days 420 mL 0    DUREZOL 0.05 % EMUL       dorzolamide-timolol (COSOPT) 22.3-6.8 MG/ML ophthalmic solution INSTILL 1 DROP INTO EACH EYE TWICE DAILY      ketorolac (ACULAR) 0.5 % ophthalmic solution INSTILL 1 DROP INTO EACH EYE TWICE DAILY      famotidine (PEPCID) 40 MG tablet Take 1 tablet by mouth every evening 30 tablet 11    DULoxetine (CYMBALTA) 30 MG extended release capsule Take 1 capsule by mouth daily 90 capsule 1    tadalafil (CIALIS) 20 MG tablet Take 1 tablet by mouth daily as needed for Erectile Dysfunction Do not exceed one tab per 24 hours. Take 30 min prior to sex.  30 tablet 11    aspirin 81 MG EC tablet Take 81 mg by mouth daily      atorvastatin (LIPITOR) 40 MG tablet Take 40 mg by mouth daily      clopidogrel (PLAVIX) 75 MG tablet Take 75 mg by mouth daily      ondansetron (ZOFRAN) 4 MG tablet Take 4 mg by mouth every 8 hours as needed         Past History     Past Medical History:  Past Medical History:   Diagnosis Date    Ambulates with cane     Arthritis     lower back and kness    Hypertension     Knee pain     Morbid obesity ([address]        Past Surgical History:  Past Surgical History:   Procedure Laterality Date    CHOLECYSTECTOMY      COLONOSCOPY N/A 6/19/2020    COLONOSCOPY with polypectomy performed by Sujit Ware MD at [facility]       Family History:  Family History   Problem Relation Age of Onset    Diabetes Mother     Heart Disease Mother     Stroke Father        Social History:  Social History     Tobacco Use    Smoking status: Never    Smokeless tobacco: Never   Substance Use Topics    Alcohol use: Yes     Alcohol/week: 1.0 standard drink    Drug use: Not Currently       Allergies: Allergies   Allergen Reactions    Amlodipine Swelling     BLE Edema         Review of Systems       Review of Systems   Constitutional:  Negative for chills and fever. HENT:  Positive for dental problem. Negative for sore throat and trouble swallowing. Respiratory:  Negative for cough, chest tightness and shortness of breath. Cardiovascular:  Negative for chest pain. Physical Exam   BP (!) 172/103   Pulse 90   Temp 98.5 °F (36.9 °C) (Oral)   Resp 19   Ht 5' 9\" (1.753 m)   Wt (!) 485 lb (220 kg)   SpO2 98%   BMI 71.62 kg/m²       Physical Exam  Vitals and nursing note reviewed. Constitutional:       General: He is awake. He is not in acute distress. Appearance: Normal appearance. He is well-developed, well-groomed and overweight. He is not ill-appearing. HENT:      Head: Normocephalic and atraumatic. Mouth/Throat:      Dentition: Abnormal dentition. Dental tenderness and dental caries present. No dental abscesses. Pharynx: Oropharynx is clear. Uvula midline. No pharyngeal swelling, oropharyngeal exudate or posterior oropharyngeal erythema. Tonsils: No tonsillar exudate. Cardiovascular:      Rate and Rhythm: Normal rate and regular rhythm. Heart sounds: Normal heart sounds, S1 normal and S2 normal. No murmur heard. Pulmonary:      Effort: Pulmonary effort is normal. No tachypnea or respiratory distress. Breath sounds: Normal breath sounds and air entry. No decreased breath sounds, wheezing, rhonchi or rales. Musculoskeletal:      Right lower leg: No edema. Left lower leg: No edema. Skin:     General: Skin is warm and dry. Capillary Refill: Capillary refill takes less than 2 seconds. Neurological:      Mental Status: He is alert. Psychiatric:         Behavior: Behavior is cooperative. Diagnostic Study Results     Labs -  No results found for this or any previous visit (from the past 12 hour(s)). Radiologic Studies -   No orders to display         Medical Decision Making   I am the first provider for this patient. I reviewed the vital signs, available nursing notes, past medical history, past surgical history, family history and social history. Vital Signs-Reviewed the patient's vital signs. Pulse Oximetry Analysis -  98 on room air (Interpretation)    Records Reviewed: Prior medical records(Time of Review: 9:48 PM)    ED Course: Progress Notes, Reevaluation, and Consults:  DDx: Abscess, tooth infection, dental caries    Provider Notes (Medical Decision Making):   Patient is a 51-year-old male with a history of arthritis and hypertension presenting to the ED due to dental pain x2 days. Pain, patient is alert, oriented x3, no acute distress. Heart regular rate and rhythm. No murmurs appreciated. No lower extremity edema. Lungs clear to auscultation bilaterally. Condition is abnormal.  Dental tenderness on the upper right molar as circled on the physical exam.  Area is erythematous.   No appreciated dental abscess. Concern for dental infection. Due to poor dentition as well as increasing pain, will cover patient with amoxicillin twice a day for 7 days. Patient also prescribed chlorhexidine rinse and instructed to use this twice a day. Patient provided Patsey Pedrito for pain. Instructed that this medication will make him drowsy so do not drive or operate heavy machinery while taking. Patient instructed to follow-up with both PCP and dentist within the next few days. Informed him that pain medication and antibiotics are only a temporary fix and he will likely require further management with a dentist.  Patient displays understanding and is in agreement with plan and discharge at this time. Educated on signs and symptoms to monitor for and given strict return precautions. Vital signs reassessed prior to discharge and are within normal limits. Diagnosis     Clinical Impression:   1. Pain, dental    2. Dental infection        Disposition: Home with PCP and dental follow-up    [facility] EMERGENCY DEPT  [facility] [place] [id number] [phone]    As needed, If symptoms worsen    Shani Koenig, [address]  [address]  [phone]      Follow up from ER        Medication List        START taking these medications      amoxicillin 500 MG capsule  Commonly known as: AMOXIL  Take 1 capsule by mouth 2 times daily for 7 days     chlorhexidine 0.12 % solution  Commonly known as: Peridex  Swish and spit 15 mLs 2 times daily for 14 days     HYDROcodone-acetaminophen 7.5-325 MG per tablet  Commonly known as: Norco  Take 1 tablet by mouth every 8 hours as needed for Pain for up to 3 days.  Max Daily Amount: 3 tablets            ASK your doctor about these medications      aspirin 81 MG EC tablet     atorvastatin 40 MG tablet  Commonly known as: LIPITOR     clopidogrel 75 MG tablet  Commonly known as: PLAVIX     dorzolamide-timolol 22.3-6.8 MG/ML ophthalmic solution  Commonly known as: COSOPT     DULoxetine 30 MG extended release capsule  Commonly known as: CYMBALTA  Take 1 capsule by mouth daily     Durezol 0.05 % Emul  Generic drug: difluprednate     famotidine 40 MG tablet  Commonly known as: PEPCID  Take 1 tablet by mouth every evening     ketorolac 0.5 % ophthalmic solution  Commonly known as: ACULAR     ondansetron 4 MG tablet  Commonly known as: ZOFRAN     tadalafil 20 MG tablet  Commonly known as: CIALIS  Take 1 tablet by mouth daily as needed for Erectile Dysfunction Do not exceed one tab per 24 hours. Take 30 min prior to sex. Where to Get Your Medications        These medications were sent to [facility], [address], [address] [place] [address]      Phone: [phone]   amoxicillin 500 MG capsule  chlorhexidine 0.12 % solution  HYDROcodone-acetaminophen 7.5-325 MG per tablet          Dictation disclaimer:  Please note that this dictation was completed with [employer], the [employer] voice recognition software. Quite often unanticipated grammatical, syntax, homophones, and other interpretive errors are inadvertently transcribed by the computer software. Please disregard these errors. Please excuse any errors that have escaped final proofreading.         Ben Ahmadi PA-C  03/14/23 [id number]

## 2023-03-21 ENCOUNTER — CARE COORDINATION (OUTPATIENT)
Facility: CLINIC | Age: 53
End: 2023-03-21

## 2023-03-30 ENCOUNTER — CARE COORDINATION (OUTPATIENT)
Facility: CLINIC | Age: 53
End: 2023-03-30

## 2023-04-07 ENCOUNTER — CARE COORDINATION (OUTPATIENT)
Facility: CLINIC | Age: 53
End: 2023-04-07

## 2023-04-07 NOTE — CARE COORDINATION
Attempted to contact patient for Complex Care Management. No answer, left message with contact information provided. Unable to contact, no further calls planned at this time.

## 2023-04-28 LAB
ALBUMIN SERPL-MCNC: 3.9 G/DL (ref 3.8–4.9)
ALBUMIN/GLOB SERPL: 1.3 {RATIO} (ref 1.2–2.2)
ALP SERPL-CCNC: 58 IU/L (ref 44–121)
ALT SERPL-CCNC: 16 IU/L (ref 0–44)
AST SERPL-CCNC: 16 IU/L (ref 0–40)
BASOPHILS # BLD AUTO: 0 X10E3/UL (ref 0–0.2)
BASOPHILS NFR BLD AUTO: 1 %
BILIRUB SERPL-MCNC: 0.3 MG/DL (ref 0–1.2)
BUN SERPL-MCNC: 13 MG/DL (ref 6–24)
BUN/CREAT SERPL: 16 (ref 9–20)
CALCIUM SERPL-MCNC: 8.7 MG/DL (ref 8.7–10.2)
CHLORIDE SERPL-SCNC: 105 MMOL/L (ref 96–106)
CHOLEST SERPL-MCNC: 177 MG/DL (ref 100–199)
CO2 SERPL-SCNC: 24 MMOL/L (ref 20–29)
CREAT SERPL-MCNC: 0.82 MG/DL (ref 0.76–1.27)
CRP SERPL-MCNC: 24 MG/L (ref 0–10)
EGFRCR SERPLBLD CKD-EPI 2021: 105 ML/MIN/1.73
EOSINOPHIL # BLD AUTO: 0.2 X10E3/UL (ref 0–0.4)
EOSINOPHIL NFR BLD AUTO: 3 %
ERYTHROCYTE [DISTWIDTH] IN BLOOD BY AUTOMATED COUNT: 15.2 % (ref 11.6–15.4)
GLOBULIN SER CALC-MCNC: 2.9 G/DL (ref 1.5–4.5)
GLUCOSE SERPL-MCNC: 99 MG/DL (ref 70–99)
HBA1C MFR BLD: 6 % (ref 4.8–5.6)
HCT VFR BLD AUTO: 39.6 % (ref 37.5–51)
HCV IGG SERPL QL IA: NON REACTIVE
HDLC SERPL-MCNC: 37 MG/DL
HGB BLD-MCNC: 12.8 G/DL (ref 13–17.7)
IMM GRANULOCYTES # BLD AUTO: 0 X10E3/UL (ref 0–0.1)
IMM GRANULOCYTES NFR BLD AUTO: 0 %
LDLC SERPL CALC-MCNC: 126 MG/DL (ref 0–99)
LYMPHOCYTES # BLD AUTO: 2.1 X10E3/UL (ref 0.7–3.1)
LYMPHOCYTES NFR BLD AUTO: 29 %
MCH RBC QN AUTO: 26.1 PG (ref 26.6–33)
MCHC RBC AUTO-ENTMCNC: 32.3 G/DL (ref 31.5–35.7)
MCV RBC AUTO: 81 FL (ref 79–97)
MONOCYTES # BLD AUTO: 0.7 X10E3/UL (ref 0.1–0.9)
MONOCYTES NFR BLD AUTO: 10 %
NEUTROPHILS # BLD AUTO: 4.3 X10E3/UL (ref 1.4–7)
NEUTROPHILS NFR BLD AUTO: 57 %
PLATELET # BLD AUTO: 173 X10E3/UL (ref 150–450)
POTASSIUM SERPL-SCNC: 4.4 MMOL/L (ref 3.5–5.2)
PROT SERPL-MCNC: 6.8 G/DL (ref 6–8.5)
PSA FREE MFR SERPL: 16.3 %
PSA FREE SERPL-MCNC: 0.13 NG/ML
PSA SERPL-MCNC: 0.8 NG/ML (ref 0–4)
RBC # BLD AUTO: 4.91 X10E6/UL (ref 4.14–5.8)
SODIUM SERPL-SCNC: 144 MMOL/L (ref 134–144)
SPECIMEN STATUS REPORT: NORMAL
TESTOST FREE SERPL-MCNC: 6.4 PG/ML (ref 7.2–24)
TESTOST SERPL-MCNC: 210 NG/DL (ref 264–916)
TRIGL SERPL-MCNC: 72 MG/DL (ref 0–149)
URATE SERPL-MCNC: 7 MG/DL (ref 3.8–8.4)
VLDLC SERPL CALC-MCNC: 14 MG/DL (ref 5–40)
WBC # BLD AUTO: 7.4 X10E3/UL (ref 3.4–10.8)

## 2023-04-29 LAB
ALBUMIN/CREAT UR: 4 MG/G CREAT (ref 0–29)
CREAT UR-MCNC: 182 MG/DL
MICROALBUMIN UR-MCNC: 7.6 UG/ML

## 2023-05-02 LAB — SPECIMEN STATUS REPORT: NORMAL

## 2023-05-06 LAB
14-3-3 ETA AG SER IA-MCNC: <0.2 NG/ML
CCP IGA+IGG SERPL IA-ACNC: <20 UNITS
RHEUMATOID FACT SERPL-ACNC: 19.6 UNITS/ML

## 2023-06-28 ENCOUNTER — CARE COORDINATION (OUTPATIENT)
Facility: CLINIC | Age: 53
End: 2023-06-28

## 2023-06-29 ENCOUNTER — OFFICE VISIT (OUTPATIENT)
Age: 53
End: 2023-06-29
Payer: MEDICARE

## 2023-06-29 VITALS
DIASTOLIC BLOOD PRESSURE: 88 MMHG | HEART RATE: 88 BPM | BODY MASS INDEX: 46.65 KG/M2 | OXYGEN SATURATION: 93 % | WEIGHT: 315 LBS | RESPIRATION RATE: 18 BRPM | HEIGHT: 69 IN | TEMPERATURE: 98.1 F | SYSTOLIC BLOOD PRESSURE: 150 MMHG

## 2023-06-29 DIAGNOSIS — I63.9 CEREBROVASCULAR ACCIDENT (CVA), UNSPECIFIED MECHANISM (HCC): ICD-10-CM

## 2023-06-29 DIAGNOSIS — I10 ESSENTIAL HYPERTENSION: Primary | ICD-10-CM

## 2023-06-29 DIAGNOSIS — M25.561 CHRONIC PAIN OF BOTH KNEES: ICD-10-CM

## 2023-06-29 DIAGNOSIS — G89.29 CHRONIC PAIN OF BOTH KNEES: ICD-10-CM

## 2023-06-29 DIAGNOSIS — R76.8 RHEUMATOID FACTOR POSITIVE: ICD-10-CM

## 2023-06-29 DIAGNOSIS — M25.562 CHRONIC PAIN OF BOTH KNEES: ICD-10-CM

## 2023-06-29 PROCEDURE — 99214 OFFICE O/P EST MOD 30 MIN: CPT | Performed by: INTERNAL MEDICINE

## 2023-06-29 PROCEDURE — 3017F COLORECTAL CA SCREEN DOC REV: CPT | Performed by: INTERNAL MEDICINE

## 2023-06-29 PROCEDURE — G8427 DOCREV CUR MEDS BY ELIG CLIN: HCPCS | Performed by: INTERNAL MEDICINE

## 2023-06-29 PROCEDURE — 3074F SYST BP LT 130 MM HG: CPT | Performed by: INTERNAL MEDICINE

## 2023-06-29 PROCEDURE — 3078F DIAST BP <80 MM HG: CPT | Performed by: INTERNAL MEDICINE

## 2023-06-29 PROCEDURE — 1036F TOBACCO NON-USER: CPT | Performed by: INTERNAL MEDICINE

## 2023-06-29 PROCEDURE — G8417 CALC BMI ABV UP PARAM F/U: HCPCS | Performed by: INTERNAL MEDICINE

## 2023-06-29 PROCEDURE — 99211 OFF/OP EST MAY X REQ PHY/QHP: CPT

## 2023-06-29 RX ORDER — TRAMADOL HYDROCHLORIDE 100 MG/1
100 TABLET, EXTENDED RELEASE ORAL DAILY
Qty: 14 TABLET | Refills: 0 | Status: SHIPPED | OUTPATIENT
Start: 2023-06-29 | End: 2023-07-13

## 2023-06-29 RX ORDER — CARVEDILOL 12.5 MG/1
12.5 TABLET ORAL 2 TIMES DAILY
Qty: 60 TABLET | Refills: 5 | Status: SHIPPED | OUTPATIENT
Start: 2023-06-29

## 2023-06-29 ASSESSMENT — PATIENT HEALTH QUESTIONNAIRE - PHQ9
SUM OF ALL RESPONSES TO PHQ QUESTIONS 1-9: 0
SUM OF ALL RESPONSES TO PHQ9 QUESTIONS 1 & 2: 0
SUM OF ALL RESPONSES TO PHQ QUESTIONS 1-9: 0
2. FEELING DOWN, DEPRESSED OR HOPELESS: 0
1. LITTLE INTEREST OR PLEASURE IN DOING THINGS: 0

## 2023-06-29 ASSESSMENT — ANXIETY QUESTIONNAIRES
4. TROUBLE RELAXING: 0
5. BEING SO RESTLESS THAT IT IS HARD TO SIT STILL: 0
2. NOT BEING ABLE TO STOP OR CONTROL WORRYING: 0
6. BECOMING EASILY ANNOYED OR IRRITABLE: 0
7. FEELING AFRAID AS IF SOMETHING AWFUL MIGHT HAPPEN: 0
3. WORRYING TOO MUCH ABOUT DIFFERENT THINGS: 0
1. FEELING NERVOUS, ANXIOUS, OR ON EDGE: 0
GAD7 TOTAL SCORE: 0

## 2023-07-06 ENCOUNTER — CARE COORDINATION (OUTPATIENT)
Facility: CLINIC | Age: 53
End: 2023-07-06

## 2023-07-06 NOTE — CARE COORDINATION
Attempted to contact patient for Complex Care follow up. No answer, no option to leave a VM. Will attempt to contact at a later time.

## 2023-07-10 ENCOUNTER — CARE COORDINATION (OUTPATIENT)
Facility: CLINIC | Age: 53
End: 2023-07-10

## 2023-07-10 NOTE — CARE COORDINATION
Attempted to contact patient for Complex Care Management. No answer, left message with contact information provided. Unable to contact, no further outreach planned at this time.

## 2023-07-12 ASSESSMENT — ENCOUNTER SYMPTOMS
BACK PAIN: 1
ABDOMINAL PAIN: 0
ANAL BLEEDING: 0
BLOOD IN STOOL: 0
COUGH: 0
EYE DISCHARGE: 0
EYE PAIN: 1
SORE THROAT: 0

## 2023-08-16 ENCOUNTER — OFFICE VISIT (OUTPATIENT)
Age: 53
End: 2023-08-16
Payer: MEDICARE

## 2023-08-16 VITALS
WEIGHT: 315 LBS | HEIGHT: 69 IN | RESPIRATION RATE: 22 BRPM | DIASTOLIC BLOOD PRESSURE: 71 MMHG | BODY MASS INDEX: 46.65 KG/M2 | OXYGEN SATURATION: 97 % | HEART RATE: 78 BPM | TEMPERATURE: 96.8 F | SYSTOLIC BLOOD PRESSURE: 119 MMHG

## 2023-08-16 DIAGNOSIS — B35.1 ONYCHOMYCOSIS: ICD-10-CM

## 2023-08-16 DIAGNOSIS — G89.29 OTHER CHRONIC PAIN: ICD-10-CM

## 2023-08-16 DIAGNOSIS — I10 ESSENTIAL HYPERTENSION: ICD-10-CM

## 2023-08-16 DIAGNOSIS — M10.9 GOUT, UNSPECIFIED CAUSE, UNSPECIFIED CHRONICITY, UNSPECIFIED SITE: ICD-10-CM

## 2023-08-16 DIAGNOSIS — M51.9 LUMBAR DISC DISEASE: Primary | ICD-10-CM

## 2023-08-16 DIAGNOSIS — R73.9 HYPERGLYCEMIA: ICD-10-CM

## 2023-08-16 PROCEDURE — 99214 OFFICE O/P EST MOD 30 MIN: CPT | Performed by: INTERNAL MEDICINE

## 2023-08-16 PROCEDURE — 3017F COLORECTAL CA SCREEN DOC REV: CPT | Performed by: INTERNAL MEDICINE

## 2023-08-16 PROCEDURE — 3079F DIAST BP 80-89 MM HG: CPT | Performed by: INTERNAL MEDICINE

## 2023-08-16 PROCEDURE — 1036F TOBACCO NON-USER: CPT | Performed by: INTERNAL MEDICINE

## 2023-08-16 PROCEDURE — G8417 CALC BMI ABV UP PARAM F/U: HCPCS | Performed by: INTERNAL MEDICINE

## 2023-08-16 PROCEDURE — 99211 OFF/OP EST MAY X REQ PHY/QHP: CPT | Performed by: INTERNAL MEDICINE

## 2023-08-16 PROCEDURE — G8427 DOCREV CUR MEDS BY ELIG CLIN: HCPCS | Performed by: INTERNAL MEDICINE

## 2023-08-16 PROCEDURE — 3074F SYST BP LT 130 MM HG: CPT | Performed by: INTERNAL MEDICINE

## 2023-08-16 RX ORDER — METHYLPREDNISOLONE 4 MG/1
TABLET ORAL
Qty: 1 KIT | Refills: 0 | Status: SHIPPED | OUTPATIENT
Start: 2023-08-16 | End: 2023-08-22

## 2023-08-16 RX ORDER — COLCHICINE 0.6 MG/1
0.6 TABLET ORAL DAILY
Qty: 30 TABLET | Refills: 3 | Status: SHIPPED | OUTPATIENT
Start: 2023-08-16

## 2023-08-16 RX ORDER — PREGABALIN 75 MG/1
75 CAPSULE ORAL 2 TIMES DAILY
Qty: 180 CAPSULE | Refills: 0 | Status: SHIPPED | OUTPATIENT
Start: 2023-08-16 | End: 2023-11-14

## 2023-08-16 NOTE — PROGRESS NOTES
[facility] OF [facility]:  8/22/2023, MRN: [id number]      Liya Kahn is a 48 y.o. male and presents to clinic for Hypertension, Lupus, and Chronic Pain      Subjective: The patient is a 77-year-old male with history of hypertension, obesity, CVA, h/o H.pylori infection (treated last year), prediabetes, suspected rheumatoid arthritis, suspected gout, lumbar disc disease, and chronic bilateral knee pain (followed by Orthopedics). 1. HTN/CVA: BP is 119/71 today. Taking:  losartan 100 mg daily and carvedilol 12.5 mg twice daily (added at his last visit). Given his history of CVA, he is still taking aspirin 81 mg daily, Plavix 75 mg daily, and Lipitor 40 mg daily. 2.  Chronic pain: He has suspected rheumatoid arthritis. He has a history of a positive CRP and rheumatoid factor + 14.3.3 ETA test.  He has a longstanding history of bilateral chronic knee pain and chronic lower back pain from lumbar disc disease. He has blurry vision and sees an eye doctor regularly. +Treated with tramadol: he is not taking this rx at this time. Drug use: none. ETOH use: none. Tobacco use: none    He developed 3-4 days of left foot swelling and pain. He is taking motrin and this helps a little. He is concerned he could have gout. No h/o trauma. He continues to have lower back pain. He has lower back pain that radiates down his legs. Sx are triggered by lots of walking. No weakness/paresthesias. 3. Prediabetes/Hyperglycemia: His last A1C was 6 in April. 4. General: His next eye exam is in October for cataract surgery. No eye pain.        Patient Active Problem List    Diagnosis Date Noted    Glaucoma 03/01/2023    Rheumatoid factor positive 03/01/2023    Erectile dysfunction 03/01/2023    Lumbar disc disease 03/01/2023    Gastroesophageal reflux disease 03/01/2023    CVA (cerebral vascular accident) ([address]) 07/27/2022    Left leg weakness 07/27/2022    Gout 05/04/2022    Chronic low back pain without sciatica

## 2023-08-22 ENCOUNTER — OFFICE VISIT (OUTPATIENT)
Age: 53
End: 2023-08-22
Payer: MEDICARE

## 2023-08-22 VITALS
SYSTOLIC BLOOD PRESSURE: 114 MMHG | HEART RATE: 68 BPM | WEIGHT: 315 LBS | DIASTOLIC BLOOD PRESSURE: 86 MMHG | OXYGEN SATURATION: 97 % | BODY MASS INDEX: 72.07 KG/M2

## 2023-08-22 DIAGNOSIS — Z86.73 HISTORY OF CVA (CEREBROVASCULAR ACCIDENT): ICD-10-CM

## 2023-08-22 DIAGNOSIS — R06.09 DYSPNEA ON EXERTION: ICD-10-CM

## 2023-08-22 DIAGNOSIS — I10 ESSENTIAL (PRIMARY) HYPERTENSION: Primary | ICD-10-CM

## 2023-08-22 DIAGNOSIS — R07.89 OTHER CHEST PAIN: ICD-10-CM

## 2023-08-22 PROCEDURE — 93000 ELECTROCARDIOGRAM COMPLETE: CPT | Performed by: INTERNAL MEDICINE

## 2023-08-22 PROCEDURE — 3079F DIAST BP 80-89 MM HG: CPT | Performed by: INTERNAL MEDICINE

## 2023-08-22 PROCEDURE — 3074F SYST BP LT 130 MM HG: CPT | Performed by: INTERNAL MEDICINE

## 2023-08-22 PROCEDURE — 99214 OFFICE O/P EST MOD 30 MIN: CPT | Performed by: INTERNAL MEDICINE

## 2023-08-22 ASSESSMENT — ENCOUNTER SYMPTOMS
SHORTNESS OF BREATH: 0
DIARRHEA: 0
CHEST TIGHTNESS: 1
COUGH: 0
COUGH: 0
BACK PAIN: 1
CONSTIPATION: 0
BLOOD IN STOOL: 0
EYE PAIN: 0
APNEA: 0
ABDOMINAL PAIN: 0
ANAL BLEEDING: 0
BLOOD IN STOOL: 0
NAUSEA: 0
SORE THROAT: 0
WHEEZING: 0
COLOR CHANGE: 0
ABDOMINAL PAIN: 0
SHORTNESS OF BREATH: 1

## 2023-08-22 NOTE — PROGRESS NOTES
Have you had PND? No     2. Have you had Fatigue? Yes    3. Have you had Dyspnea? Yes     4. Have you had Orthopnea? Yes     5. Have you had Palpitations? No     6. Have you had Syncope? No     7. Have you had Chest Pain? Yes   8. Have you had any weight gain? No      9. Have you had any swelling?  Yes
Palpitations? No      6. Have you had Syncope? No      7. Have you had Chest Pain? Yes states that has in the center substernal pressure and sharp, reports that when he gets an episode lasts about 10 seconds at least mild, reports that can happen anytime can be in bed. Denies traveling down the neck arm jaw or teeth states not sure if gas. 8. Have you had any weight gain? No      9. Have you had any swelling? Yes, reports has history of gout. Reviewed with patient and is as such        Family History   Problem Relation Age of Onset    Diabetes Mother     Heart Disease Mother     Stroke Father        Past Medical History:   Diagnosis Date    Ambulates with cane     Arthritis     lower back and kness    Hypertension     Knee pain     Morbid obesity ([address])        Past Surgical History:   Procedure Laterality Date    CHOLECYSTECTOMY      COLONOSCOPY N/A 6/19/2020    COLONOSCOPY with polypectomy performed by Deshawn Akbar MD at [facility]       Social History     Tobacco Use    Smoking status: Never    Smokeless tobacco: Never   Substance Use Topics    Alcohol use: Yes     Alcohol/week: 1.0 standard drink       Allergies   Allergen Reactions    Amlodipine Swelling     BLE Edema       Prior to Admission medications    Medication Sig Start Date End Date Taking?  Authorizing Provider   colchicine (COLCRYS) 0.6 MG tablet Take 1 tablet by mouth daily 8/16/23  Yes Anjali Hernandez MD   methylPREDNISolone ([facility] [place]) 4 MG tablet Take by mouth per package instructions and do not take with motrin/ibuprofen 8/16/23 8/22/23 Yes Anjali Hernandez MD   carvedilol (COREG) 12.5 MG tablet Take 1 tablet by mouth 2 times daily 6/29/23  Yes Anjali Hernandez MD   aspirin 81 MG EC tablet Take 1 tablet by mouth daily This is for stroke prevention 6/12/23  Yes Anjali Hernandez MD   atorvastatin (LIPITOR) 40 MG tablet Take 1 tablet by mouth daily This is for stroke prevention 6/12/23  Yes Anjali Hernandez MD

## 2023-08-22 NOTE — PATIENT INSTRUCTIONS
Learning About the [address] [place] [address] Diet  What is the Mediterranean diet? The Mediterranean diet is a style of eating rather than a diet plan. It features foods eaten in [facility], [place], [place], and other countries along the [facility]. It emphasizes eating foods like fish, fruits, vegetables, beans, high-fiber breads and whole grains, nuts, and olive oil. This style of eating includes limited red meat, cheese, and sweets. Why choose the Mediterranean diet? A Mediterranean-style diet may improve heart health. It contains more fat than other heart-healthy diets. But the fats are mainly from nuts, unsaturated oils (such as fish oils and olive oil), and certain nut or seed oils (such as canola, soybean, or flaxseed oil). These fats may help protect the heart and blood vessels. How can you get started on the Mediterranean diet? Here are some things you can do to switch to a more Mediterranean way of eating. What to eat  Eat a variety of fruits and vegetables each day, such as grapes, blueberries, tomatoes, broccoli, peppers, figs, olives, spinach, eggplant, beans, lentils, and chickpeas. Eat a variety of whole-grain foods each day, such as oats, brown rice, and whole wheat bread, pasta, and couscous. Eat fish at least 2 times a week. Try tuna, salmon, mackerel, lake trout, herring, or sardines. Eat moderate amounts of low-fat dairy products, such as milk, cheese, or yogurt. Eat moderate amounts of poultry and eggs. Choose healthy (unsaturated) fats, such as nuts, olive oil, and certain nut or seed oils like canola, soybean, and flaxseed. Limit unhealthy (saturated) fats, such as butter, palm oil, and coconut oil. And limit fats found in animal products, such as meat and dairy products made with whole milk. Try to eat red meat only a few times a month in very small amounts. Limit sweets and desserts to only a few times a week. This includes sugar-sweetened drinks like soda.   The

## 2023-08-24 ENCOUNTER — TELEPHONE (OUTPATIENT)
Age: 53
End: 2023-08-24

## 2023-08-24 NOTE — TELEPHONE ENCOUNTER
[facility] called and states the prescription for colchicine interacts with the carvedilol and atorvastatin. They are calling to verify if it is okay to fill this prescription. They can be reached at [phone]. Please advise, thank you.

## 2023-08-28 NOTE — TELEPHONE ENCOUNTER
Pt tolerated this rx in the past. Rx written.     Dr. Shelley Obrien  [facility] of [address] [place] [address]  Phone: [phone]  Fax: [phone]

## 2023-10-23 ENCOUNTER — OFFICE VISIT (OUTPATIENT)
Age: 53
End: 2023-10-23
Payer: MEDICARE

## 2023-10-23 VITALS
SYSTOLIC BLOOD PRESSURE: 171 MMHG | RESPIRATION RATE: 16 BRPM | BODY MASS INDEX: 46.65 KG/M2 | OXYGEN SATURATION: 99 % | DIASTOLIC BLOOD PRESSURE: 104 MMHG | HEIGHT: 69 IN | WEIGHT: 315 LBS | TEMPERATURE: 99.2 F | HEART RATE: 69 BPM

## 2023-10-23 DIAGNOSIS — R76.8 RHEUMATOID FACTOR POSITIVE: ICD-10-CM

## 2023-10-23 DIAGNOSIS — I63.9 CEREBROVASCULAR ACCIDENT (CVA), UNSPECIFIED MECHANISM (HCC): ICD-10-CM

## 2023-10-23 DIAGNOSIS — M54.2 NECK PAIN: ICD-10-CM

## 2023-10-23 DIAGNOSIS — M51.9 LUMBAR DISC DISEASE: ICD-10-CM

## 2023-10-23 DIAGNOSIS — I10 ESSENTIAL HYPERTENSION: ICD-10-CM

## 2023-10-23 DIAGNOSIS — R73.9 HYPERGLYCEMIA: ICD-10-CM

## 2023-10-23 DIAGNOSIS — M10.9 GOUT, UNSPECIFIED CAUSE, UNSPECIFIED CHRONICITY, UNSPECIFIED SITE: Primary | ICD-10-CM

## 2023-10-23 PROCEDURE — 99214 OFFICE O/P EST MOD 30 MIN: CPT | Performed by: INTERNAL MEDICINE

## 2023-10-23 PROCEDURE — 3078F DIAST BP <80 MM HG: CPT | Performed by: INTERNAL MEDICINE

## 2023-10-23 PROCEDURE — 1036F TOBACCO NON-USER: CPT | Performed by: INTERNAL MEDICINE

## 2023-10-23 PROCEDURE — G8417 CALC BMI ABV UP PARAM F/U: HCPCS | Performed by: INTERNAL MEDICINE

## 2023-10-23 PROCEDURE — 3017F COLORECTAL CA SCREEN DOC REV: CPT | Performed by: INTERNAL MEDICINE

## 2023-10-23 PROCEDURE — G8427 DOCREV CUR MEDS BY ELIG CLIN: HCPCS | Performed by: INTERNAL MEDICINE

## 2023-10-23 PROCEDURE — G8484 FLU IMMUNIZE NO ADMIN: HCPCS | Performed by: INTERNAL MEDICINE

## 2023-10-23 PROCEDURE — 3074F SYST BP LT 130 MM HG: CPT | Performed by: INTERNAL MEDICINE

## 2023-10-23 RX ORDER — ALLOPURINOL 300 MG/1
300 TABLET ORAL DAILY
Qty: 90 TABLET | Refills: 1 | Status: SHIPPED | OUTPATIENT
Start: 2023-10-23

## 2023-10-23 RX ORDER — CARVEDILOL 12.5 MG/1
12.5 TABLET ORAL 2 TIMES DAILY
Qty: 60 TABLET | Refills: 5 | Status: SHIPPED | OUTPATIENT
Start: 2023-10-23

## 2023-10-23 RX ORDER — LOSARTAN POTASSIUM 100 MG/1
100 TABLET ORAL DAILY
Qty: 90 TABLET | Refills: 1 | Status: SHIPPED | OUTPATIENT
Start: 2023-10-23

## 2023-10-23 RX ORDER — PREGABALIN 75 MG/1
75 CAPSULE ORAL 3 TIMES DAILY
Qty: 90 CAPSULE | Refills: 5 | Status: SHIPPED | OUTPATIENT
Start: 2023-10-23 | End: 2024-04-20

## 2023-10-23 ASSESSMENT — PATIENT HEALTH QUESTIONNAIRE - PHQ9
SUM OF ALL RESPONSES TO PHQ QUESTIONS 1-9: 0
SUM OF ALL RESPONSES TO PHQ QUESTIONS 1-9: 0
2. FEELING DOWN, DEPRESSED OR HOPELESS: 0
SUM OF ALL RESPONSES TO PHQ QUESTIONS 1-9: 0
SUM OF ALL RESPONSES TO PHQ QUESTIONS 1-9: 0
1. LITTLE INTEREST OR PLEASURE IN DOING THINGS: 0
SUM OF ALL RESPONSES TO PHQ9 QUESTIONS 1 & 2: 0

## 2023-10-29 ASSESSMENT — ENCOUNTER SYMPTOMS
ANAL BLEEDING: 0
BACK PAIN: 1
EYE PAIN: 0
COUGH: 0
ABDOMINAL PAIN: 0
SORE THROAT: 0
BLOOD IN STOOL: 0
SHORTNESS OF BREATH: 0

## 2023-11-30 ENCOUNTER — NURSE ONLY (OUTPATIENT)
Age: 53
End: 2023-11-30

## 2023-11-30 DIAGNOSIS — R73.9 HYPERGLYCEMIA: ICD-10-CM

## 2023-11-30 DIAGNOSIS — M10.9 GOUT, UNSPECIFIED CAUSE, UNSPECIFIED CHRONICITY, UNSPECIFIED SITE: ICD-10-CM

## 2023-11-30 DIAGNOSIS — R76.8 RHEUMATOID FACTOR POSITIVE: ICD-10-CM

## 2023-11-30 DIAGNOSIS — R07.89 OTHER CHEST PAIN: ICD-10-CM

## 2023-11-30 DIAGNOSIS — R06.09 DYSPNEA ON EXERTION: ICD-10-CM

## 2023-12-01 LAB
ALBUMIN SERPL-MCNC: 3.8 G/DL (ref 3.8–4.9)
ALBUMIN/GLOB SERPL: 1 {RATIO} (ref 1.2–2.2)
ALP SERPL-CCNC: 61 IU/L (ref 44–121)
ALT SERPL-CCNC: 23 IU/L (ref 0–44)
AST SERPL-CCNC: 23 IU/L (ref 0–40)
BASOPHILS # BLD AUTO: 0 X10E3/UL (ref 0–0.2)
BASOPHILS NFR BLD AUTO: 1 %
BILIRUB SERPL-MCNC: 0.4 MG/DL (ref 0–1.2)
BUN SERPL-MCNC: 12 MG/DL (ref 6–24)
BUN/CREAT SERPL: 15 (ref 9–20)
CALCIUM SERPL-MCNC: 8.9 MG/DL (ref 8.7–10.2)
CHLORIDE SERPL-SCNC: 103 MMOL/L (ref 96–106)
CO2 SERPL-SCNC: 26 MMOL/L (ref 20–29)
CREAT SERPL-MCNC: 0.82 MG/DL (ref 0.76–1.27)
CRP SERPL-MCNC: 22 MG/L (ref 0–10)
EGFRCR SERPLBLD CKD-EPI 2021: 105 ML/MIN/1.73
EOSINOPHIL # BLD AUTO: 0.2 X10E3/UL (ref 0–0.4)
EOSINOPHIL NFR BLD AUTO: 2 %
ERYTHROCYTE [DISTWIDTH] IN BLOOD BY AUTOMATED COUNT: 13.9 % (ref 11.6–15.4)
GLOBULIN SER CALC-MCNC: 3.7 G/DL (ref 1.5–4.5)
GLUCOSE SERPL-MCNC: 106 MG/DL (ref 70–99)
HBA1C MFR BLD: 6 % (ref 4.8–5.6)
HCT VFR BLD AUTO: 40.6 % (ref 37.5–51)
HGB BLD-MCNC: 12.9 G/DL (ref 13–17.7)
IMM GRANULOCYTES # BLD AUTO: 0 X10E3/UL (ref 0–0.1)
IMM GRANULOCYTES NFR BLD AUTO: 0 %
LYMPHOCYTES # BLD AUTO: 2 X10E3/UL (ref 0.7–3.1)
LYMPHOCYTES NFR BLD AUTO: 30 %
MCH RBC QN AUTO: 26.5 PG (ref 26.6–33)
MCHC RBC AUTO-ENTMCNC: 31.8 G/DL (ref 31.5–35.7)
MCV RBC AUTO: 83 FL (ref 79–97)
MONOCYTES # BLD AUTO: 0.6 X10E3/UL (ref 0.1–0.9)
MONOCYTES NFR BLD AUTO: 9 %
NEUTROPHILS # BLD AUTO: 3.8 X10E3/UL (ref 1.4–7)
NEUTROPHILS NFR BLD AUTO: 58 %
PLATELET # BLD AUTO: 204 X10E3/UL (ref 150–450)
POTASSIUM SERPL-SCNC: 4.2 MMOL/L (ref 3.5–5.2)
PROT SERPL-MCNC: 7.5 G/DL (ref 6–8.5)
RBC # BLD AUTO: 4.87 X10E6/UL (ref 4.14–5.8)
SODIUM SERPL-SCNC: 141 MMOL/L (ref 134–144)
SPECIMEN STATUS REPORT: NORMAL
WBC # BLD AUTO: 6.7 X10E3/UL (ref 3.4–10.8)

## 2023-12-05 LAB
14-3-3 ETA AG SER IA-MCNC: <0.2 NG/ML
CCP IGA+IGG SERPL IA-ACNC: <20 UNITS
RHEUMATOID FACT SERPL-ACNC: 27.5 UNITS/ML

## 2024-01-24 ENCOUNTER — OFFICE VISIT (OUTPATIENT)
Age: 54
End: 2024-01-24
Payer: MEDICARE

## 2024-01-24 VITALS
HEART RATE: 82 BPM | OXYGEN SATURATION: 95 % | RESPIRATION RATE: 19 BRPM | SYSTOLIC BLOOD PRESSURE: 105 MMHG | DIASTOLIC BLOOD PRESSURE: 82 MMHG | TEMPERATURE: 98.2 F | WEIGHT: 315 LBS | BODY MASS INDEX: 46.65 KG/M2 | HEIGHT: 69 IN

## 2024-01-24 DIAGNOSIS — Z71.89 ADVANCE CARE PLANNING: Primary | ICD-10-CM

## 2024-01-24 DIAGNOSIS — K64.9 HEMORRHOIDS, UNSPECIFIED HEMORRHOID TYPE: ICD-10-CM

## 2024-01-24 DIAGNOSIS — Z00.00 MEDICARE ANNUAL WELLNESS VISIT, SUBSEQUENT: ICD-10-CM

## 2024-01-24 DIAGNOSIS — M06.9 RHEUMATOID ARTHRITIS, INVOLVING UNSPECIFIED SITE, UNSPECIFIED WHETHER RHEUMATOID FACTOR PRESENT (HCC): ICD-10-CM

## 2024-01-24 DIAGNOSIS — L30.9 PERIANAL DERMATITIS: ICD-10-CM

## 2024-01-24 PROBLEM — I50.22 CHRONIC SYSTOLIC (CONGESTIVE) HEART FAILURE (HCC): Status: ACTIVE | Noted: 2024-01-24

## 2024-01-24 PROBLEM — M05.9 RHEUMATOID ARTHRITIS WITH RHEUMATOID FACTOR, UNSPECIFIED (HCC): Status: ACTIVE | Noted: 2024-01-24

## 2024-01-24 PROCEDURE — 3074F SYST BP LT 130 MM HG: CPT | Performed by: INTERNAL MEDICINE

## 2024-01-24 PROCEDURE — G0439 PPPS, SUBSEQ VISIT: HCPCS | Performed by: INTERNAL MEDICINE

## 2024-01-24 PROCEDURE — 3079F DIAST BP 80-89 MM HG: CPT | Performed by: INTERNAL MEDICINE

## 2024-01-24 PROCEDURE — G8417 CALC BMI ABV UP PARAM F/U: HCPCS | Performed by: INTERNAL MEDICINE

## 2024-01-24 PROCEDURE — G8484 FLU IMMUNIZE NO ADMIN: HCPCS | Performed by: INTERNAL MEDICINE

## 2024-01-24 PROCEDURE — 1036F TOBACCO NON-USER: CPT | Performed by: INTERNAL MEDICINE

## 2024-01-24 PROCEDURE — 3017F COLORECTAL CA SCREEN DOC REV: CPT | Performed by: INTERNAL MEDICINE

## 2024-01-24 PROCEDURE — G8427 DOCREV CUR MEDS BY ELIG CLIN: HCPCS | Performed by: INTERNAL MEDICINE

## 2024-01-24 PROCEDURE — 99214 OFFICE O/P EST MOD 30 MIN: CPT | Performed by: INTERNAL MEDICINE

## 2024-01-24 RX ORDER — FLUCONAZOLE 150 MG/1
150 TABLET ORAL
Qty: 6 TABLET | Refills: 0 | Status: SHIPPED | OUTPATIENT
Start: 2024-01-24

## 2024-01-24 RX ORDER — AMOXICILLIN AND CLAVULANATE POTASSIUM 875; 125 MG/1; MG/1
1 TABLET, FILM COATED ORAL 2 TIMES DAILY
Qty: 10 TABLET | Refills: 0 | Status: SHIPPED | OUTPATIENT
Start: 2024-01-24 | End: 2024-01-29

## 2024-01-24 ASSESSMENT — PATIENT HEALTH QUESTIONNAIRE - PHQ9
1. LITTLE INTEREST OR PLEASURE IN DOING THINGS: 0
SUM OF ALL RESPONSES TO PHQ QUESTIONS 1-9: 0
SUM OF ALL RESPONSES TO PHQ QUESTIONS 1-9: 0
SUM OF ALL RESPONSES TO PHQ9 QUESTIONS 1 & 2: 0
2. FEELING DOWN, DEPRESSED OR HOPELESS: 0
SUM OF ALL RESPONSES TO PHQ QUESTIONS 1-9: 0
SUM OF ALL RESPONSES TO PHQ QUESTIONS 1-9: 0

## 2024-01-24 NOTE — PROGRESS NOTES
Juan Samuel presents today for   Chief Complaint   Patient presents with    Hemorrhoids                 1. \"Have you been to the ER, urgent care clinic since your last visit?  Hospitalized since your last visit?\" no    2. \"Have you seen or consulted any other health care providers outside of the [facility] since your last visit?\" no     3. For patients aged 45-75: Has the patient had a colonoscopy / FIT/ Cologuard? Yes - no Care Gap present      If the patient is female:    4. For patients aged 40-74: Has the patient had a mammogram within the past 2 years? NA - based on age or sex      5. For patients aged 21-65: Has the patient had a pap smear? NA - based on age or sex    
(HCC)  Perianal dermatitis  -     [facility] - Mario Padilla MD, Colon & Rectal Surgery, [place]  -     fluconazole (DIFLUCAN) 150 MG tablet; Take 1 tablet by mouth every 7 days, Disp-6 tablet, R-0Normal  -     amoxicillin-clavulanate (AUGMENTIN) 875-125 MG per tablet; Take 1 tablet by mouth 2 times daily for 5 days, Disp-10 tablet, R-0Normal  Medicare annual wellness visit, subsequent    Recommendations for Preventive Services Due: see orders and patient instructions/AVS.  Recommended screening schedule for the next 5-10 years is provided to the patient in written form: see Patient Instructions/AVS.     No follow-ups on file.     Subjective     Patient's complete Health Risk Assessment and screening values have been reviewed and are found in Flowsheets. The following problems were reviewed today and where indicated follow up appointments were made and/or referrals ordered.    Health Maintenance:  - Overdue for the shingles/flu/covid shots.  - ETOH: yes - on avg he is drinking weekends - 3-4 drinks - like a vodka and pineapple drinks.  I encouraged him to not drink.  - I encouraged him to get all recommended vaccinations.  - No drug use.   - Tobacco use: none,  - Falls: He suffered once in the past yr - 2 month ago -he tripped while walking in his house over something. He uses a cane with ambulation.  He will let me know if he wants to pursue PT  - No hematuria.   - No depression in the past 2 wks.   - Normal clock draw.  - He wants his fiance Wilma to be his primary POA and his brother to be his successor POA (Ray)    Positive Risk Factor Screenings with Interventions:                Activity, Diet, and Weight:               Body mass index is 67.72 kg/m². (!) Abnormal    Obesity Interventions:  See AVS for additional education material                           Objective   Vitals:    01/24/24 1418   BP: 105/82   Site: Right Upper Arm   Position: Sitting   Cuff Size: Large Adult   Pulse: 82   Resp: 19   Temp: 98.2

## 2024-01-25 ENCOUNTER — TELEPHONE (OUTPATIENT)
Age: 54
End: 2024-01-25

## 2024-01-25 NOTE — TELEPHONE ENCOUNTER
Please let patient know that his prescriptions from his appointment yesterday were sent (fluconazole and the antibiotic).  Patient refrain from using any topical creams or ointments along his rectal area since his skin was raw/irritated during his physical exam yesterday.    Dr. Wendy Roman  [facility] of [address], [address]  [place] [address]  Phone: [phone]  Fax: [phone]

## 2024-01-25 NOTE — TELEPHONE ENCOUNTER
Patient care giver called stating that patient had an appt yesterday and he was advise to get 2 medications for his hemorrhoids but forgot the names.Please Advise      Patient caregiver (Salena) [phone]

## 2024-02-01 PROBLEM — M06.9 RHEUMATOID ARTHRITIS, UNSPECIFIED (HCC): Status: RESOLVED | Noted: 2024-01-24 | Resolved: 2024-02-01

## 2024-02-01 ASSESSMENT — ENCOUNTER SYMPTOMS
BACK PAIN: 1
EYE PAIN: 0
ANAL BLEEDING: 0
BLOOD IN STOOL: 0
SHORTNESS OF BREATH: 0
ABDOMINAL PAIN: 0
SORE THROAT: 0
COUGH: 0

## 2024-02-02 NOTE — ACP (ADVANCE CARE PLANNING)
Advance Care Planning     General Advance Care Planning (ACP) Conversation    Date of Conversation: 1/24/24    Conducted with: Patient with Decision Making Capacity    Healthcare Decision Maker:  Today we documented desired Decision Maker(s), who is (are) NOT Legal Next of Kin. ACP documents are required for decision maker authority.    Content/Action Overview:  Has NO ACP documents/care preferences - requested patient complete ACP documents  He wants his fiance Wilma to be his primary POA and his brother to be his successor POA (Ray).  He was given advance directive and encouraged to complete it.    Length of Voluntary ACP Conversation in minutes:  <16 minutes (Non-Billable)    Wendy Roman MD

## 2024-02-19 ENCOUNTER — OFFICE VISIT (OUTPATIENT)
Age: 54
End: 2024-02-19
Payer: MEDICARE

## 2024-02-19 VITALS
RESPIRATION RATE: 17 BRPM | BODY MASS INDEX: 46.65 KG/M2 | SYSTOLIC BLOOD PRESSURE: 149 MMHG | TEMPERATURE: 97.4 F | OXYGEN SATURATION: 99 % | DIASTOLIC BLOOD PRESSURE: 89 MMHG | HEART RATE: 68 BPM | WEIGHT: 315 LBS | HEIGHT: 69 IN

## 2024-02-19 DIAGNOSIS — L30.9 DERMATITIS OF PERIANAL REGION: Primary | ICD-10-CM

## 2024-02-19 PROCEDURE — G8484 FLU IMMUNIZE NO ADMIN: HCPCS | Performed by: COLON & RECTAL SURGERY

## 2024-02-19 PROCEDURE — 1036F TOBACCO NON-USER: CPT | Performed by: COLON & RECTAL SURGERY

## 2024-02-19 PROCEDURE — 3017F COLORECTAL CA SCREEN DOC REV: CPT | Performed by: COLON & RECTAL SURGERY

## 2024-02-19 PROCEDURE — 99203 OFFICE O/P NEW LOW 30 MIN: CPT | Performed by: COLON & RECTAL SURGERY

## 2024-02-19 PROCEDURE — G8428 CUR MEDS NOT DOCUMENT: HCPCS | Performed by: COLON & RECTAL SURGERY

## 2024-02-19 PROCEDURE — G8417 CALC BMI ABV UP PARAM F/U: HCPCS | Performed by: COLON & RECTAL SURGERY

## 2024-02-19 PROCEDURE — 3077F SYST BP >= 140 MM HG: CPT | Performed by: COLON & RECTAL SURGERY

## 2024-02-19 PROCEDURE — 3079F DIAST BP 80-89 MM HG: CPT | Performed by: COLON & RECTAL SURGERY

## 2024-02-19 NOTE — PROGRESS NOTES
HPI: Juan Samuel is a 54 y.o. male presenting with chief complain of anorectal burning.  It is typically with bowel movements.  He denies fevers.  He moves his bowels 2-3 times per day.  They can be loose.  He has some rare fecal incontinence of loose stool.  He is not on a bowel regimen.  He has no family history of colon cancer.  He had a colonoscopy in 2020 by GI last year where 1 small polyp was identified with a 5-year recall.    Past Medical History:   Diagnosis Date    Ambulates with cane     Arthritis     lower back and kness    Hypertension     Knee pain     Morbid obesity (HCC)        Past Surgical History:   Procedure Laterality Date    CHOLECYSTECTOMY      COLONOSCOPY N/A 6/19/2020    COLONOSCOPY with polypectomy performed by Robert Ness MD at [facility] ENDOSCOPY       Family History   Problem Relation Age of Onset    Diabetes Mother     Heart Disease Mother     Stroke Father        Social History     Socioeconomic History    Marital status:      Spouse name: None    Number of children: None    Years of education: None    Highest education level: None   Tobacco Use    Smoking status: Never    Smokeless tobacco: Never   Substance and Sexual Activity    Alcohol use: Yes     Alcohol/week: 1.0 standard drink of alcohol    Drug use: Not Currently     Social Determinants of Health     Financial Resource Strain: Low Risk  (12/7/2023)    Overall Financial Resource Strain (CARDIA)     Difficulty of Paying Living Expenses: Not hard at all   Food Insecurity: No Food Insecurity (12/7/2023)    Hunger Vital Sign     Worried About Running Out of Food in the Last Year: Never true     Ran Out of Food in the Last Year: Never true   Transportation Needs: Unknown (12/7/2023)    PRAPARE - Transportation     Lack of Transportation (Non-Medical): No   Physical Activity: Insufficiently Active (6/12/2023)    Exercise Vital Sign     Days of Exercise per Week: 3 days     Minutes of Exercise per Session: 10 min

## 2024-02-19 NOTE — PATIENT INSTRUCTIONS
A & D ointment or Desitin ointment to rectal area as needed and after bowel movements  Metamcuil fiber powder once a day, if needed add 1-2 Imodium daily  Follow up as needed if symptoms dont improve

## 2024-02-29 ENCOUNTER — HOSPITAL ENCOUNTER (OUTPATIENT)
Facility: HOSPITAL | Age: 54
Setting detail: SPECIMEN
Discharge: HOME | End: 2024-02-29
Payer: MEDICARE

## 2024-02-29 LAB
ALBUMIN SERPL-MCNC: 3 G/DL (ref 3.4–5)
ALBUMIN/GLOB SERPL: 0.7 (ref 0.8–1.7)
ALP SERPL-CCNC: 65 U/L (ref 45–117)
ALT SERPL-CCNC: 21 U/L (ref 16–61)
ANION GAP SERPL CALC-SCNC: 2 MMOL/L (ref 3–18)
AST SERPL-CCNC: 19 U/L (ref 10–38)
BASOPHILS # BLD: 0 K/UL (ref 0–0.1)
BASOPHILS NFR BLD: 1 % (ref 0–2)
BILIRUB SERPL-MCNC: 0.5 MG/DL (ref 0.2–1)
BUN SERPL-MCNC: 11 MG/DL (ref 7–18)
BUN/CREAT SERPL: 13 (ref 12–20)
CALCIUM SERPL-MCNC: 8.8 MG/DL (ref 8.5–10.1)
CHLORIDE SERPL-SCNC: 108 MMOL/L (ref 100–111)
CO2 SERPL-SCNC: 29 MMOL/L (ref 21–32)
CREAT SERPL-MCNC: 0.86 MG/DL (ref 0.6–1.3)
CRP SERPL-MCNC: 3.2 MG/DL (ref 0–0.3)
DIFFERENTIAL METHOD BLD: ABNORMAL
EOSINOPHIL # BLD: 0.1 K/UL (ref 0–0.4)
EOSINOPHIL NFR BLD: 2 % (ref 0–5)
ERYTHROCYTE [DISTWIDTH] IN BLOOD BY AUTOMATED COUNT: 15.8 % (ref 11.6–14.5)
EST. AVERAGE GLUCOSE BLD GHB EST-MCNC: 120 MG/DL
GLOBULIN SER CALC-MCNC: 4.3 G/DL (ref 2–4)
GLUCOSE SERPL-MCNC: 95 MG/DL (ref 74–99)
HBA1C MFR BLD: 5.8 % (ref 4.2–5.6)
HCT VFR BLD AUTO: 44 % (ref 36–48)
HGB BLD-MCNC: 13.2 G/DL (ref 13–16)
IMM GRANULOCYTES # BLD AUTO: 0 K/UL (ref 0–0.04)
IMM GRANULOCYTES NFR BLD AUTO: 0 % (ref 0–0.5)
LYMPHOCYTES # BLD: 1.8 K/UL (ref 0.9–3.6)
LYMPHOCYTES NFR BLD: 27 % (ref 21–52)
MCH RBC QN AUTO: 26.2 PG (ref 24–34)
MCHC RBC AUTO-ENTMCNC: 30 G/DL (ref 31–37)
MCV RBC AUTO: 87.5 FL (ref 78–100)
MONOCYTES # BLD: 0.6 K/UL (ref 0.05–1.2)
MONOCYTES NFR BLD: 9 % (ref 3–10)
NEUTS SEG # BLD: 4.1 K/UL (ref 1.8–8)
NEUTS SEG NFR BLD: 62 % (ref 40–73)
NRBC # BLD: 0 K/UL (ref 0–0.01)
NRBC BLD-RTO: 0 PER 100 WBC
PLATELET # BLD AUTO: 207 K/UL (ref 135–420)
PMV BLD AUTO: 12 FL (ref 9.2–11.8)
POTASSIUM SERPL-SCNC: 4.3 MMOL/L (ref 3.5–5.5)
PROT SERPL-MCNC: 7.3 G/DL (ref 6.4–8.2)
RBC # BLD AUTO: 5.03 M/UL (ref 4.35–5.65)
SODIUM SERPL-SCNC: 139 MMOL/L (ref 136–145)
WBC # BLD AUTO: 6.6 K/UL (ref 4.6–13.2)

## 2024-02-29 PROCEDURE — 83036 HEMOGLOBIN GLYCOSYLATED A1C: CPT

## 2024-02-29 PROCEDURE — 36415 COLL VENOUS BLD VENIPUNCTURE: CPT

## 2024-02-29 PROCEDURE — 86140 C-REACTIVE PROTEIN: CPT

## 2024-02-29 PROCEDURE — 80053 COMPREHEN METABOLIC PANEL: CPT

## 2024-02-29 PROCEDURE — 85025 COMPLETE CBC W/AUTO DIFF WBC: CPT

## 2024-03-07 ENCOUNTER — TELEMEDICINE (OUTPATIENT)
Age: 54
End: 2024-03-07
Payer: MEDICARE

## 2024-03-07 DIAGNOSIS — R73.02 IGT (IMPAIRED GLUCOSE TOLERANCE): ICD-10-CM

## 2024-03-07 DIAGNOSIS — L30.9 PERIANAL DERMATITIS: Primary | ICD-10-CM

## 2024-03-07 DIAGNOSIS — Z86.73 HISTORY OF STROKE: ICD-10-CM

## 2024-03-07 DIAGNOSIS — Z87.09 HISTORY OF URI (UPPER RESPIRATORY INFECTION): ICD-10-CM

## 2024-03-07 DIAGNOSIS — M06.9 RHEUMATOID ARTHRITIS, INVOLVING UNSPECIFIED SITE, UNSPECIFIED WHETHER RHEUMATOID FACTOR PRESENT (HCC): ICD-10-CM

## 2024-03-07 DIAGNOSIS — I10 ESSENTIAL HYPERTENSION: ICD-10-CM

## 2024-03-07 PROBLEM — I50.22 CHRONIC SYSTOLIC (CONGESTIVE) HEART FAILURE (HCC): Status: RESOLVED | Noted: 2024-01-24 | Resolved: 2024-03-07

## 2024-03-07 PROBLEM — M54.50 CHRONIC LOW BACK PAIN WITHOUT SCIATICA: Status: RESOLVED | Noted: 2019-10-19 | Resolved: 2024-03-07

## 2024-03-07 PROBLEM — G89.29 CHRONIC LOW BACK PAIN WITHOUT SCIATICA: Status: RESOLVED | Noted: 2019-10-19 | Resolved: 2024-03-07

## 2024-03-07 PROCEDURE — G8427 DOCREV CUR MEDS BY ELIG CLIN: HCPCS | Performed by: INTERNAL MEDICINE

## 2024-03-07 PROCEDURE — 3017F COLORECTAL CA SCREEN DOC REV: CPT | Performed by: INTERNAL MEDICINE

## 2024-03-07 PROCEDURE — 99214 OFFICE O/P EST MOD 30 MIN: CPT | Performed by: INTERNAL MEDICINE

## 2024-03-07 RX ORDER — FLUCONAZOLE 150 MG/1
150 TABLET ORAL
Qty: 6 TABLET | Refills: 0 | Status: SHIPPED | OUTPATIENT
Start: 2024-03-07

## 2024-03-07 NOTE — PROGRESS NOTES
Juan Samuel is a 54 y.o. male who was seen by synchronous (real-time) audio-video technology on 3/7/2024.        Assessment & Plan:   1. Perianal dermatitis: Improved.  - Ordering fluconazole 150mg weekly x 6   - He will c/w A&D and/or desitin ointment for prevention.     2. History of stroke: No new sx.  - C/w ASA 81mg daily and lipitor 40mg daily.   - C/w coreg 12.5mg bid and losartan 100mg daily.     3. Rheumatoid arthritis: Improved per CRP but his CRP is still elevated. I stressed how he needs to see a rheumatologist who specializes in RA rx.   - Pt agreeable to meeting with a Rheumatologist. Placing a referral.     4. History of URI (upper respiratory infection): Resolved.   - Observation.     5. IGT (impaired glucose tolerance): Improved.  - Checking labs in 6 months.          Diagnosis Orders   1. Perianal dermatitis  fluconazole (DIFLUCAN) 150 MG tablet      2. History of stroke        3. Rheumatoid arthritis, involving unspecified site, unspecified whether rheumatoid factor present (HCC)  External Referral To Rheumatology    C-Reactive Protein    CBC with Auto Differential    Comprehensive Metabolic Panel      4. History of URI (upper respiratory infection)        5. IGT (impaired glucose tolerance)  Hemoglobin A1C    Comprehensive Metabolic Panel      6. Essential hypertension  Hemoglobin A1C    Comprehensive Metabolic Panel    Lipid Panel    Microalbumin / Creatinine Urine Ratio              Lab review: labs are reviewed in the EHR and ordered as mentioned above     I have discussed the diagnosis with the patient and the intended plan as seen in the above orders. I have discussed medication side effects and warnings with the patient as well. I have reviewed the plan of care with the patient, accepted their input and they are in agreement with the treatment goals. All questions were answered. The patient understands the plan of care. Pt instructed if symptoms worsen to call the office or

## 2024-03-07 NOTE — PROGRESS NOTES
Juan Samuel presents today for   Chief Complaint   Patient presents with    Follow-up     RA; perianal dermatitis f/u           \"Have you been to the ER, urgent care clinic since your last visit?  Hospitalized since your last visit?\"    NO    “Have you seen or consulted any other health care providers outside of [facility] since your last visit?”    NO

## 2024-03-13 ENCOUNTER — OFFICE VISIT (OUTPATIENT)
Age: 54
End: 2024-03-13
Payer: MEDICARE

## 2024-03-13 VITALS
HEIGHT: 69 IN | DIASTOLIC BLOOD PRESSURE: 88 MMHG | OXYGEN SATURATION: 98 % | BODY MASS INDEX: 46.65 KG/M2 | TEMPERATURE: 97.7 F | HEART RATE: 84 BPM | WEIGHT: 315 LBS | SYSTOLIC BLOOD PRESSURE: 141 MMHG

## 2024-03-13 DIAGNOSIS — M10.9 GOUT, UNSPECIFIED CAUSE, UNSPECIFIED CHRONICITY, UNSPECIFIED SITE: ICD-10-CM

## 2024-03-13 DIAGNOSIS — M06.9 RHEUMATOID ARTHRITIS, INVOLVING UNSPECIFIED SITE, UNSPECIFIED WHETHER RHEUMATOID FACTOR PRESENT (HCC): ICD-10-CM

## 2024-03-13 DIAGNOSIS — Z86.73 HISTORY OF STROKE: ICD-10-CM

## 2024-03-13 DIAGNOSIS — I10 ESSENTIAL HYPERTENSION: ICD-10-CM

## 2024-03-13 DIAGNOSIS — E78.5 HYPERLIPIDEMIA, UNSPECIFIED HYPERLIPIDEMIA TYPE: ICD-10-CM

## 2024-03-13 DIAGNOSIS — N52.9 ERECTILE DYSFUNCTION, UNSPECIFIED ERECTILE DYSFUNCTION TYPE: ICD-10-CM

## 2024-03-13 DIAGNOSIS — R73.02 IGT (IMPAIRED GLUCOSE TOLERANCE): ICD-10-CM

## 2024-03-13 DIAGNOSIS — E34.9 TESTOSTERONE DEFICIENCY: ICD-10-CM

## 2024-03-13 DIAGNOSIS — L30.9 PERIANAL DERMATITIS: Primary | ICD-10-CM

## 2024-03-13 DIAGNOSIS — Z12.5 ENCOUNTER FOR PROSTATE CANCER SCREENING: ICD-10-CM

## 2024-03-13 PROCEDURE — G8484 FLU IMMUNIZE NO ADMIN: HCPCS | Performed by: INTERNAL MEDICINE

## 2024-03-13 PROCEDURE — 3017F COLORECTAL CA SCREEN DOC REV: CPT | Performed by: INTERNAL MEDICINE

## 2024-03-13 PROCEDURE — G8417 CALC BMI ABV UP PARAM F/U: HCPCS | Performed by: INTERNAL MEDICINE

## 2024-03-13 PROCEDURE — 1036F TOBACCO NON-USER: CPT | Performed by: INTERNAL MEDICINE

## 2024-03-13 PROCEDURE — G8428 CUR MEDS NOT DOCUMENT: HCPCS | Performed by: INTERNAL MEDICINE

## 2024-03-13 PROCEDURE — 99214 OFFICE O/P EST MOD 30 MIN: CPT | Performed by: INTERNAL MEDICINE

## 2024-03-13 PROCEDURE — 3079F DIAST BP 80-89 MM HG: CPT | Performed by: INTERNAL MEDICINE

## 2024-03-13 PROCEDURE — 3077F SYST BP >= 140 MM HG: CPT | Performed by: INTERNAL MEDICINE

## 2024-03-13 RX ORDER — FLUCONAZOLE 150 MG/1
TABLET ORAL
Qty: 40 TABLET | Refills: 0 | Status: SHIPPED | OUTPATIENT
Start: 2024-03-13 | End: 2024-03-14

## 2024-03-13 RX ORDER — TADALAFIL 20 MG/1
20 TABLET ORAL DAILY PRN
Qty: 30 TABLET | Refills: 5 | Status: SHIPPED | OUTPATIENT
Start: 2024-03-13 | End: 2024-03-13

## 2024-03-13 RX ORDER — TADALAFIL 20 MG/1
20 TABLET ORAL DAILY PRN
Qty: 30 TABLET | Refills: 5 | Status: SHIPPED | OUTPATIENT
Start: 2024-03-13

## 2024-03-13 ASSESSMENT — ENCOUNTER SYMPTOMS
EYE PAIN: 0
SORE THROAT: 0
COUGH: 0
BACK PAIN: 1
BLOOD IN STOOL: 0
SHORTNESS OF BREATH: 0
ABDOMINAL PAIN: 0
ANAL BLEEDING: 0

## 2024-03-13 NOTE — PROGRESS NOTES
[facility] OF [facility]:  3/13/2024, MRN: [id number]      Juan Samuel is a 54 y.o. male and presents to clinic for Follow-up      Subjective:   The patient is a 54-year-old male with history of hypertension, obesity, CVA, h/o H.pylori infection (treated last year), prediabetes, suspected rheumatoid arthritis, suspected gout, lumbar disc disease, onychomycosis, and chronic bilateral knee pain (followed by Orthopedics).    1. Perianal Dermatitis: His sx are 90% better since his last apt. At that time, I ordered fluconazole 150mg daily every 7 days.  A&D ointment and/or desitin were encouraged.     2. ED: He was never able to get his cialis.  He has persisted to the symptoms.  His testosterone level was low when checked previously.  It was 210 in April 2023.    3.  Rheumatoid Arthritis and Gout: He has yet to schedule an appointment with rheumatology.  Patient had an elevated CRP and rheumatoid factor+14.4.4. ETA test result.  He had severe hand pain that improved with starting Plaquenil 2 mg daily.  His CRP also decreased from 24 last year to 3.2 since taking this medication.  In the past, he has been hesitant to see a rheumatologist but I strongly advised him at his last visit to see a rheumatologist for RA evaluation and treatment.  Also has gout and reports persistent gout related pain along his big toe on his foot.  The pain is not flaring up today.  Pain is chronic.  He is on allopurinol 300 mg daily in addition to colchicine 0.6 mg daily.    4.  Prediabetes/Hyperlipidemia/CVA History/HTN: No new neurologic symptoms.  Taking aspirin 81 mg daily, Lipitor 40 mg daily, carvedilol 12.5 mg twice daily, and losartan 100 mg daily.  Blood pressure is 141/88.  His weight is 493 pounds.  His February labs show a normal set of electrolytes.  His renal function is normal.  His A1c was 5.8 when checked in February.  His LDL was 126 when checked in April 2023.        Patient Active Problem List    Diagnosis Date

## 2024-03-13 NOTE — PROGRESS NOTES
Juan Samuel presents today for   Chief Complaint   Patient presents with    Follow-up                 1. \"Have you been to the ER, urgent care clinic since your last visit?  Hospitalized since your last visit?\" no    2. \"Have you seen or consulted any other health care providers outside of the [facility] since your last visit?\" no     3. For patients aged 45-75: Has the patient had a colonoscopy / FIT/ Cologuard? Yes - no Care Gap present      If the patient is female:    4. For patients aged 40-74: Has the patient had a mammogram within the past 2 years? NA - based on age or sex      5. For patients aged 21-65: Has the patient had a pap smear? NA - based on age or sex

## 2024-03-14 ENCOUNTER — TELEPHONE (OUTPATIENT)
Age: 54
End: 2024-03-14

## 2024-03-14 DIAGNOSIS — L30.9 PERIANAL DERMATITIS: ICD-10-CM

## 2024-03-14 RX ORDER — FLUCONAZOLE 150 MG/1
TABLET ORAL
Qty: 40 TABLET | Refills: 0 | Status: SHIPPED | OUTPATIENT
Start: 2024-03-14

## 2024-03-14 NOTE — TELEPHONE ENCOUNTER
Called pt to advise that script is here and to inquire if pt would like for us to fax to pharmacy or if he would like to pick it up since he didn't get it at yesterday's last visit (Dr. Roman printed at wrong printer). Left [facility] with person to call the office.       LARS Gray LPN

## 2024-03-14 NOTE — TELEPHONE ENCOUNTER
Prescription clarified and sent back to [facility].  The patient will take 150 mg every 7 days of fluconazole.    Dr. Wendy Roman  [facility] [address], [address]  [place] [address]  Phone: [phone]  Fax: [phone]

## 2024-03-14 NOTE — TELEPHONE ENCOUNTER
Lobo Brody Dr. called asking to clarify how many pills do you want the patient to take of the diflucan that was prescribed yesterday?    Please advise.    LARS Gray LPN

## 2024-03-15 NOTE — TELEPHONE ENCOUNTER
Completed by provider and sent to pharmacy. No further action. Encounter close.      LARS Gray LPN

## 2024-06-07 DIAGNOSIS — L30.9 PERIANAL DERMATITIS: ICD-10-CM

## 2024-06-07 RX ORDER — AMOXICILLIN AND CLAVULANATE POTASSIUM 875; 125 MG/1; MG/1
TABLET, FILM COATED ORAL
Qty: 10 TABLET | Refills: 0 | OUTPATIENT
Start: 2024-06-07

## 2024-08-28 ENCOUNTER — OFFICE VISIT (OUTPATIENT)
Age: 54
End: 2024-08-28
Payer: MEDICARE

## 2024-08-28 VITALS
DIASTOLIC BLOOD PRESSURE: 83 MMHG | RESPIRATION RATE: 18 BRPM | BODY MASS INDEX: 46.65 KG/M2 | SYSTOLIC BLOOD PRESSURE: 139 MMHG | WEIGHT: 315 LBS | TEMPERATURE: 98.2 F | HEART RATE: 79 BPM | OXYGEN SATURATION: 92 % | HEIGHT: 69 IN

## 2024-08-28 DIAGNOSIS — K21.9 GASTROESOPHAGEAL REFLUX DISEASE, UNSPECIFIED WHETHER ESOPHAGITIS PRESENT: ICD-10-CM

## 2024-08-28 DIAGNOSIS — I10 ESSENTIAL (PRIMARY) HYPERTENSION: ICD-10-CM

## 2024-08-28 DIAGNOSIS — M32.9 LUPUS (HCC): ICD-10-CM

## 2024-08-28 DIAGNOSIS — E66.01 MORBID OBESITY (HCC): Primary | ICD-10-CM

## 2024-08-28 DIAGNOSIS — K91.2 POSTOPERATIVE MALABSORPTION: ICD-10-CM

## 2024-08-28 PROCEDURE — G8417 CALC BMI ABV UP PARAM F/U: HCPCS | Performed by: STUDENT IN AN ORGANIZED HEALTH CARE EDUCATION/TRAINING PROGRAM

## 2024-08-28 PROCEDURE — 1036F TOBACCO NON-USER: CPT | Performed by: STUDENT IN AN ORGANIZED HEALTH CARE EDUCATION/TRAINING PROGRAM

## 2024-08-28 PROCEDURE — 99204 OFFICE O/P NEW MOD 45 MIN: CPT | Performed by: STUDENT IN AN ORGANIZED HEALTH CARE EDUCATION/TRAINING PROGRAM

## 2024-08-28 PROCEDURE — 3079F DIAST BP 80-89 MM HG: CPT | Performed by: STUDENT IN AN ORGANIZED HEALTH CARE EDUCATION/TRAINING PROGRAM

## 2024-08-28 PROCEDURE — G8428 CUR MEDS NOT DOCUMENT: HCPCS | Performed by: STUDENT IN AN ORGANIZED HEALTH CARE EDUCATION/TRAINING PROGRAM

## 2024-08-28 PROCEDURE — 3075F SYST BP GE 130 - 139MM HG: CPT | Performed by: STUDENT IN AN ORGANIZED HEALTH CARE EDUCATION/TRAINING PROGRAM

## 2024-08-28 PROCEDURE — 3017F COLORECTAL CA SCREEN DOC REV: CPT | Performed by: STUDENT IN AN ORGANIZED HEALTH CARE EDUCATION/TRAINING PROGRAM

## 2024-08-28 NOTE — PROGRESS NOTES
Juan Samuel is a 54 y.o. male (: 1970)     Chief Complaint   Patient presents with    New Patient     Bariatric consult       Medication list and allergies have been reviewed with Juan Samuel and updated as of today's date.     I have gone over all Medical, Surgical and Social History with Juan Marcelino Lizzie and updated/added the information accordingly.

## 2024-08-28 NOTE — PROGRESS NOTES
Bariatric Surgery Initial Consult    Patient: Juan Samuel MRN: [id number]  SSN: [id number]    YOB: 1970  Age: 54 y.o.  Sex: male      Subjective:      CC: Morbid Obesity    Current Weight: 496  Body mass index is 73.25 kg/m².  Ideal body weight: 70.7 kg (155 lb 13.8 oz)  Adjusted ideal body weight: 132.4 kg (291 lb 14.7 oz)  Excess Body Weight: 342   History of Present Illness  The patient presents for evaluation of weight loss surgery.    He is uncertain about the duration of his weight loss journey but acknowledges the necessity for weight loss surgery, particularly in preparation for potential knee replacement surgery due to arthritis. His highest recorded weight was over 500 pounds. Despite attempts to maintain a healthy diet, including eliminating bread and potatoes, he has regained the weight. His daily fluid intake includes 2 to 3 sodas, approximately 12 ounces each, and frequent snacking throughout the day. He is somewhat of a poor historian.    He experiences reflux, heartburn, and occasional nausea but is unsure of the trigger for these symptoms. He occasionally experiences heartburn, regurgitation, and dyspepsia, which sometimes disrupt his sleep. He manages these symptoms with Pepto-Bismol. He recently underwent a sleep study, which did not indicate sleep apnea. Those records are not currently available.    He has a history of stroke in 2022 and is currently on aspirin.  Per the notes, a similar event happened roughly 25 years ago.  He denies any lasting effects.  He does not have a cardiologist.  He takes Plaquenil which he says is for lupus, though per his PCP notes he has been diagnosed with rheumatoid arthritis and has yet to see a rheumatologist.  He is unsure what symptoms he has from this.  He takes Lyrica for knee and back pain. He occasionally gets gout.  He also has high blood pressure.    SOCIAL HISTORY  He drinks 3 to 4 shots of alcohol once or twice a week. He

## 2024-08-29 ENCOUNTER — PREP FOR PROCEDURE (OUTPATIENT)
Age: 54
End: 2024-08-29

## 2024-08-29 DIAGNOSIS — E66.01 MORBID OBESITY: ICD-10-CM

## 2024-08-30 ENCOUNTER — TELEPHONE (OUTPATIENT)
Age: 54
End: 2024-08-30

## 2024-08-30 NOTE — TELEPHONE ENCOUNTER
Patient left voicemail regarding changing time of EGD on 9/6/24. Called patient back to inform we do not have a later time and we can move to next available date if needed. Left message to call office.

## 2024-09-07 ENCOUNTER — HOSPITAL ENCOUNTER (OUTPATIENT)
Facility: HOSPITAL | Age: 54
Discharge: HOME | End: 2024-09-10
Payer: MEDICARE

## 2024-09-07 DIAGNOSIS — M06.9 RHEUMATOID ARTHRITIS, INVOLVING UNSPECIFIED SITE, UNSPECIFIED WHETHER RHEUMATOID FACTOR PRESENT (HCC): ICD-10-CM

## 2024-09-07 DIAGNOSIS — M10.9 GOUT, UNSPECIFIED CAUSE, UNSPECIFIED CHRONICITY, UNSPECIFIED SITE: ICD-10-CM

## 2024-09-07 DIAGNOSIS — E78.5 HYPERLIPIDEMIA, UNSPECIFIED HYPERLIPIDEMIA TYPE: ICD-10-CM

## 2024-09-07 DIAGNOSIS — N52.9 ERECTILE DYSFUNCTION, UNSPECIFIED ERECTILE DYSFUNCTION TYPE: ICD-10-CM

## 2024-09-07 DIAGNOSIS — Z12.5 ENCOUNTER FOR PROSTATE CANCER SCREENING: ICD-10-CM

## 2024-09-07 DIAGNOSIS — I10 ESSENTIAL HYPERTENSION: ICD-10-CM

## 2024-09-07 DIAGNOSIS — R73.02 IGT (IMPAIRED GLUCOSE TOLERANCE): ICD-10-CM

## 2024-09-07 DIAGNOSIS — E34.9 TESTOSTERONE DEFICIENCY: ICD-10-CM

## 2024-09-07 LAB
ALBUMIN SERPL-MCNC: 3.4 G/DL (ref 3.4–5)
ALBUMIN/GLOB SERPL: 0.7 (ref 0.8–1.7)
ALP SERPL-CCNC: 61 U/L (ref 45–117)
ALT SERPL-CCNC: 20 U/L (ref 16–61)
ANION GAP SERPL CALC-SCNC: 5 MMOL/L (ref 3–18)
AST SERPL-CCNC: 13 U/L (ref 10–38)
BASOPHILS # BLD: 0.1 K/UL (ref 0–0.1)
BASOPHILS NFR BLD: 1 % (ref 0–2)
BILIRUB SERPL-MCNC: 0.5 MG/DL (ref 0.2–1)
BUN SERPL-MCNC: 16 MG/DL (ref 7–18)
BUN/CREAT SERPL: 15 (ref 12–20)
CALCIUM SERPL-MCNC: 9.1 MG/DL (ref 8.5–10.1)
CHLORIDE SERPL-SCNC: 104 MMOL/L (ref 100–111)
CHOLEST SERPL-MCNC: 166 MG/DL
CO2 SERPL-SCNC: 29 MMOL/L (ref 21–32)
CREAT SERPL-MCNC: 1.1 MG/DL (ref 0.6–1.3)
CREAT UR-MCNC: 174 MG/DL (ref 30–125)
CRP SERPL-MCNC: 3.2 MG/DL (ref 0–0.3)
DIFFERENTIAL METHOD BLD: ABNORMAL
EOSINOPHIL # BLD: 0.1 K/UL (ref 0–0.4)
EOSINOPHIL NFR BLD: 2 % (ref 0–5)
ERYTHROCYTE [DISTWIDTH] IN BLOOD BY AUTOMATED COUNT: 16 % (ref 11.6–14.5)
EST. AVERAGE GLUCOSE BLD GHB EST-MCNC: 114 MG/DL
GLOBULIN SER CALC-MCNC: 4.8 G/DL (ref 2–4)
GLUCOSE SERPL-MCNC: 90 MG/DL (ref 74–99)
HBA1C MFR BLD: 5.6 % (ref 4.2–5.6)
HCT VFR BLD AUTO: 43.5 % (ref 36–48)
HDLC SERPL-MCNC: 40 MG/DL (ref 40–60)
HDLC SERPL: 4.2 (ref 0–5)
HGB BLD-MCNC: 13.3 G/DL (ref 13–16)
IMM GRANULOCYTES # BLD AUTO: 0 K/UL (ref 0–0.04)
IMM GRANULOCYTES NFR BLD AUTO: 0 % (ref 0–0.5)
LDLC SERPL CALC-MCNC: 112.6 MG/DL (ref 0–100)
LIPID PANEL: ABNORMAL
LYMPHOCYTES # BLD: 2.4 K/UL (ref 0.9–3.6)
LYMPHOCYTES NFR BLD: 30 % (ref 21–52)
MCH RBC QN AUTO: 26.6 PG (ref 24–34)
MCHC RBC AUTO-ENTMCNC: 30.6 G/DL (ref 31–37)
MCV RBC AUTO: 87 FL (ref 78–100)
MICROALBUMIN UR-MCNC: 1.4 MG/DL (ref 0–3)
MICROALBUMIN/CREAT UR-RTO: 8 MG/G (ref 0–30)
MONOCYTES # BLD: 0.7 K/UL (ref 0.05–1.2)
MONOCYTES NFR BLD: 9 % (ref 3–10)
NEUTS SEG # BLD: 4.7 K/UL (ref 1.8–8)
NEUTS SEG NFR BLD: 59 % (ref 40–73)
NRBC # BLD: 0 K/UL (ref 0–0.01)
NRBC BLD-RTO: 0 PER 100 WBC
PLATELET # BLD AUTO: 249 K/UL (ref 135–420)
PMV BLD AUTO: 11.9 FL (ref 9.2–11.8)
POTASSIUM SERPL-SCNC: 4.2 MMOL/L (ref 3.5–5.5)
PROT SERPL-MCNC: 8.2 G/DL (ref 6.4–8.2)
PSA SERPL-MCNC: 0.7 NG/ML (ref 0–4)
RBC # BLD AUTO: 5 M/UL (ref 4.35–5.65)
SODIUM SERPL-SCNC: 138 MMOL/L (ref 136–145)
TRIGL SERPL-MCNC: 67 MG/DL
URATE SERPL-MCNC: 8 MG/DL (ref 2.6–7.2)
VLDLC SERPL CALC-MCNC: 13.4 MG/DL
WBC # BLD AUTO: 7.9 K/UL (ref 4.6–13.2)

## 2024-09-07 PROCEDURE — 84402 ASSAY OF FREE TESTOSTERONE: CPT

## 2024-09-07 PROCEDURE — 82043 UR ALBUMIN QUANTITATIVE: CPT

## 2024-09-07 PROCEDURE — G0103 PSA SCREENING: HCPCS

## 2024-09-07 PROCEDURE — 80053 COMPREHEN METABOLIC PANEL: CPT

## 2024-09-07 PROCEDURE — 84550 ASSAY OF BLOOD/URIC ACID: CPT

## 2024-09-07 PROCEDURE — 85025 COMPLETE CBC W/AUTO DIFF WBC: CPT

## 2024-09-07 PROCEDURE — 36415 COLL VENOUS BLD VENIPUNCTURE: CPT

## 2024-09-07 PROCEDURE — 82570 ASSAY OF URINE CREATININE: CPT

## 2024-09-07 PROCEDURE — 83036 HEMOGLOBIN GLYCOSYLATED A1C: CPT

## 2024-09-07 PROCEDURE — 80061 LIPID PANEL: CPT

## 2024-09-07 PROCEDURE — 86140 C-REACTIVE PROTEIN: CPT

## 2024-09-07 PROCEDURE — 84403 ASSAY OF TOTAL TESTOSTERONE: CPT

## 2024-09-11 LAB
TESTOST FREE SERPL-MCNC: 2.1 PG/ML (ref 7.2–24)
TESTOST SERPL-MCNC: 111 NG/DL (ref 264–916)

## 2024-09-12 ENCOUNTER — HOSPITAL ENCOUNTER (OUTPATIENT)
Facility: HOSPITAL | Age: 54
Discharge: HOME | End: 2024-09-15

## 2024-09-12 ENCOUNTER — CLINICAL DOCUMENTATION (OUTPATIENT)
Facility: HOSPITAL | Age: 54
End: 2024-09-12

## 2024-09-24 NOTE — PERIOP NOTE
Instructions for your surgery at [facility]      Today's Date:  9/24/2024      Patient's Name:  Juan Samuel           Surgery Date:  10/4              Please enter the main entrance of the hospital and check-in at the front security desk located in the lobby. They will direct you to the area to report for your surgery.     Do NOT eat or drink anything, including candy, gum, or ice chips after midnight prior to your surgery, unless you have specific instructions from your surgeon or anesthesia provider to do so.  Brush your teeth before coming to the hospital. You may swish with water, but do not swallow.  No smoking/Vaping/E-Cigarettes 24 hours prior to the day of surgery.  No alcohol 24 hours prior to the day of surgery.  No recreational drugs for one week prior to the day of surgery.  Bring Photo ID, Insurance information, and Co-pay if required on day of surgery.  Bring in pertinent legal documents, such as, Medical Power of , DNR, Advance Directive, etc.  Leave all valuables, including money/purse, at home.  Remove all jewelry, including ALL body piercings, nail polish, acrylic nails, and makeup (including mascara); no lotions, powders, deodorant, or perfume/cologne/after shave on the skin.  Follow instruction for Hibiclens washes and CHG wipes from surgeon's office.   Glasses and dentures may be worn to the hospital. They must be removed prior to surgery. Please bring case/container for glasses or dentures.   Contact lenses should not be worn on day of surgery.   Call your doctor's office if symptoms of a cold or illness develop within 24-48 hours prior to your surgery.  Call your doctor's office if you have any questions concerning insurance or co-pays.  15. AN ADULT (relative or friend 18 years or older) MUST DRIVE YOU HOME AFTER YOUR SURGERY.  16. Please make arrangements for a responsible adult (18 years or older) to be with you for 24 hours after your surgery.

## 2024-10-03 ENCOUNTER — TELEPHONE (OUTPATIENT)
Age: 54
End: 2024-10-03

## 2024-10-03 ENCOUNTER — ANESTHESIA EVENT (OUTPATIENT)
Facility: HOSPITAL | Age: 54
End: 2024-10-03
Payer: MEDICARE

## 2024-10-03 RX ORDER — SODIUM CHLORIDE 9 MG/ML
INJECTION, SOLUTION INTRAVENOUS PRN
Status: CANCELLED | OUTPATIENT
Start: 2024-10-03

## 2024-10-03 RX ORDER — SODIUM CHLORIDE 0.9 % (FLUSH) 0.9 %
5-40 SYRINGE (ML) INJECTION EVERY 12 HOURS SCHEDULED
Status: CANCELLED | OUTPATIENT
Start: 2024-10-03

## 2024-10-03 RX ORDER — SODIUM CHLORIDE 0.9 % (FLUSH) 0.9 %
5-40 SYRINGE (ML) INJECTION PRN
Status: CANCELLED | OUTPATIENT
Start: 2024-10-03

## 2024-10-03 NOTE — TELEPHONE ENCOUNTER
Left message confirm arrival time of 9 am for surgery on 10/4/24 with Dr. Ortiz.  Left message to call office.

## 2024-10-04 ENCOUNTER — ANESTHESIA (OUTPATIENT)
Facility: HOSPITAL | Age: 54
End: 2024-10-04
Payer: MEDICARE

## 2024-10-04 ENCOUNTER — HOSPITAL ENCOUNTER (OUTPATIENT)
Facility: HOSPITAL | Age: 54
Setting detail: OUTPATIENT SURGERY
Discharge: HOME | End: 2024-10-04
Attending: STUDENT IN AN ORGANIZED HEALTH CARE EDUCATION/TRAINING PROGRAM | Admitting: STUDENT IN AN ORGANIZED HEALTH CARE EDUCATION/TRAINING PROGRAM
Payer: MEDICARE

## 2024-10-04 VITALS
HEART RATE: 80 BPM | SYSTOLIC BLOOD PRESSURE: 136 MMHG | WEIGHT: 315 LBS | HEIGHT: 69 IN | TEMPERATURE: 98 F | RESPIRATION RATE: 20 BRPM | OXYGEN SATURATION: 95 % | DIASTOLIC BLOOD PRESSURE: 86 MMHG | BODY MASS INDEX: 46.65 KG/M2

## 2024-10-04 PROCEDURE — 7100000000 HC PACU RECOVERY - FIRST 15 MIN: Performed by: STUDENT IN AN ORGANIZED HEALTH CARE EDUCATION/TRAINING PROGRAM

## 2024-10-04 PROCEDURE — 7100000011 HC PHASE II RECOVERY - ADDTL 15 MIN: Performed by: STUDENT IN AN ORGANIZED HEALTH CARE EDUCATION/TRAINING PROGRAM

## 2024-10-04 PROCEDURE — 3700000000 HC ANESTHESIA ATTENDED CARE: Performed by: STUDENT IN AN ORGANIZED HEALTH CARE EDUCATION/TRAINING PROGRAM

## 2024-10-04 PROCEDURE — 2580000003 HC RX 258: Performed by: NURSE ANESTHETIST, CERTIFIED REGISTERED

## 2024-10-04 PROCEDURE — 7100000001 HC PACU RECOVERY - ADDTL 15 MIN: Performed by: STUDENT IN AN ORGANIZED HEALTH CARE EDUCATION/TRAINING PROGRAM

## 2024-10-04 PROCEDURE — 2500000003 HC RX 250 WO HCPCS: Performed by: NURSE ANESTHETIST, CERTIFIED REGISTERED

## 2024-10-04 PROCEDURE — 88305 TISSUE EXAM BY PATHOLOGIST: CPT

## 2024-10-04 PROCEDURE — 6360000002 HC RX W HCPCS: Performed by: NURSE ANESTHETIST, CERTIFIED REGISTERED

## 2024-10-04 PROCEDURE — 2709999900 HC NON-CHARGEABLE SUPPLY: Performed by: STUDENT IN AN ORGANIZED HEALTH CARE EDUCATION/TRAINING PROGRAM

## 2024-10-04 PROCEDURE — 3600007512: Performed by: STUDENT IN AN ORGANIZED HEALTH CARE EDUCATION/TRAINING PROGRAM

## 2024-10-04 PROCEDURE — 43239 EGD BIOPSY SINGLE/MULTIPLE: CPT | Performed by: STUDENT IN AN ORGANIZED HEALTH CARE EDUCATION/TRAINING PROGRAM

## 2024-10-04 PROCEDURE — 7100000010 HC PHASE II RECOVERY - FIRST 15 MIN: Performed by: STUDENT IN AN ORGANIZED HEALTH CARE EDUCATION/TRAINING PROGRAM

## 2024-10-04 PROCEDURE — 3600007502: Performed by: STUDENT IN AN ORGANIZED HEALTH CARE EDUCATION/TRAINING PROGRAM

## 2024-10-04 PROCEDURE — 3700000001 HC ADD 15 MINUTES (ANESTHESIA): Performed by: STUDENT IN AN ORGANIZED HEALTH CARE EDUCATION/TRAINING PROGRAM

## 2024-10-04 RX ORDER — GLUCAGON 1 MG
1 KIT INJECTION PRN
Status: DISCONTINUED | OUTPATIENT
Start: 2024-10-04 | End: 2024-10-04 | Stop reason: HOSPADM

## 2024-10-04 RX ORDER — SODIUM CHLORIDE 9 MG/ML
INJECTION, SOLUTION INTRAVENOUS PRN
Status: DISCONTINUED | OUTPATIENT
Start: 2024-10-04 | End: 2024-10-04 | Stop reason: HOSPADM

## 2024-10-04 RX ORDER — SODIUM CHLORIDE 0.9 % (FLUSH) 0.9 %
5-40 SYRINGE (ML) INJECTION EVERY 12 HOURS SCHEDULED
Status: DISCONTINUED | OUTPATIENT
Start: 2024-10-04 | End: 2024-10-04 | Stop reason: HOSPADM

## 2024-10-04 RX ORDER — PROPOFOL 10 MG/ML
INJECTION, EMULSION INTRAVENOUS
Status: DISCONTINUED | OUTPATIENT
Start: 2024-10-04 | End: 2024-10-04 | Stop reason: SDUPTHER

## 2024-10-04 RX ORDER — LIDOCAINE HYDROCHLORIDE 10 MG/ML
1 INJECTION, SOLUTION EPIDURAL; INFILTRATION; INTRACAUDAL; PERINEURAL
Status: DISCONTINUED | OUTPATIENT
Start: 2024-10-04 | End: 2024-10-04 | Stop reason: HOSPADM

## 2024-10-04 RX ORDER — SODIUM CHLORIDE 0.9 % (FLUSH) 0.9 %
5-40 SYRINGE (ML) INJECTION PRN
Status: DISCONTINUED | OUTPATIENT
Start: 2024-10-04 | End: 2024-10-04 | Stop reason: HOSPADM

## 2024-10-04 RX ORDER — SODIUM CHLORIDE, SODIUM LACTATE, POTASSIUM CHLORIDE, CALCIUM CHLORIDE 600; 310; 30; 20 MG/100ML; MG/100ML; MG/100ML; MG/100ML
INJECTION, SOLUTION INTRAVENOUS CONTINUOUS
Status: DISCONTINUED | OUTPATIENT
Start: 2024-10-04 | End: 2024-10-04 | Stop reason: HOSPADM

## 2024-10-04 RX ORDER — LIDOCAINE HYDROCHLORIDE 20 MG/ML
INJECTION, SOLUTION EPIDURAL; INFILTRATION; INTRACAUDAL; PERINEURAL
Status: DISCONTINUED | OUTPATIENT
Start: 2024-10-04 | End: 2024-10-04 | Stop reason: SDUPTHER

## 2024-10-04 RX ORDER — DEXTROSE MONOHYDRATE 100 MG/ML
INJECTION, SOLUTION INTRAVENOUS CONTINUOUS PRN
Status: DISCONTINUED | OUTPATIENT
Start: 2024-10-04 | End: 2024-10-04 | Stop reason: HOSPADM

## 2024-10-04 RX ORDER — ONDANSETRON 2 MG/ML
4 INJECTION INTRAMUSCULAR; INTRAVENOUS
Status: DISCONTINUED | OUTPATIENT
Start: 2024-10-04 | End: 2024-10-04 | Stop reason: HOSPADM

## 2024-10-04 RX ADMIN — SODIUM CHLORIDE, POTASSIUM CHLORIDE, SODIUM LACTATE AND CALCIUM CHLORIDE: 600; 310; 30; 20 INJECTION, SOLUTION INTRAVENOUS at 10:04

## 2024-10-04 RX ADMIN — LIDOCAINE HYDROCHLORIDE 80 MG: 20 INJECTION, SOLUTION EPIDURAL; INFILTRATION; INTRACAUDAL; PERINEURAL at 10:32

## 2024-10-04 RX ADMIN — PROPOFOL 100 MG: 10 INJECTION, EMULSION INTRAVENOUS at 10:35

## 2024-10-04 RX ADMIN — PROPOFOL 100 MG: 10 INJECTION, EMULSION INTRAVENOUS at 10:32

## 2024-10-04 RX ADMIN — PROPOFOL 50 MG: 10 INJECTION, EMULSION INTRAVENOUS at 10:43

## 2024-10-04 RX ADMIN — PROPOFOL 100 MG: 10 INJECTION, EMULSION INTRAVENOUS at 10:38

## 2024-10-04 ASSESSMENT — PAIN - FUNCTIONAL ASSESSMENT
PAIN_FUNCTIONAL_ASSESSMENT: 0-10
PAIN_FUNCTIONAL_ASSESSMENT: 0-10

## 2024-10-04 ASSESSMENT — PAIN SCALES - GENERAL
PAINLEVEL_OUTOF10: 0
PAINLEVEL_OUTOF10: 0

## 2024-10-04 NOTE — H&P
about the surgical options.  He expressed limited understanding of surgical weight loss and expected goals from surgery.  Will schedule consultation with our dietitian  Will schedule for preoperative psych clearance  Standard preoperative bariatric lab panel ordered.  H. pylori breath test performed in clinic today  Scheduled for EGD  Cardiology clearance: Per note in August 2023, cardiology did want a nuclear stress test.  If he progresses to become a surgical candidate, this will need to be done prior to surgery.  Return to clinic for midpoint evaluation

## 2024-10-04 NOTE — ANESTHESIA POSTPROCEDURE EVALUATION
Department of Anesthesiology  Postprocedure Note    Patient: Juan Samuel  MRN: [id number]  YOB: 1970  Date of evaluation: 10/4/2024    Procedure Summary       Date: 10/04/24 Room / Location: [facility] ENDO 01 / [facility] ENDOSCOPY    Anesthesia Start: 1029 Anesthesia Stop: 1053    Procedure: ESOPHAGOGASTRODUODENOSCOPY BIOPSY (Upper GI Region) Diagnosis:       Morbid obesity      Gastroesophageal reflux disease      (Morbid obesity (HCC) [E66.01])      (Gastroesophageal reflux disease [K21.9])    Surgeons: José Luis Ortiz MD Responsible Provider: Alexia Fairchild MD    Anesthesia Type: MAC ASA Status: 3            Anesthesia Type: MAC    Shahla Phase I: Shahla Score: 8    Shahla Phase II: Shahla Score: 10    Anesthesia Post Evaluation    Patient location during evaluation: PACU  Patient participation: complete - patient participated  Level of consciousness: awake and alert  Pain score: 0  Airway patency: patent  Nausea & Vomiting: no nausea and no vomiting  Cardiovascular status: hemodynamically stable  Respiratory status: acceptable  Hydration status: euvolemic  Multimodal analgesia pain management approach  Pain management: adequate    No notable events documented.

## 2024-10-04 NOTE — DISCHARGE INSTRUCTIONS
Discharge Diet:  Continue liquid diet and advance to regular as tolerated     Discharge Medications:      Medication List          CONTINUE taking these medications       allopurinol 300 MG tablet  Commonly known as: ZYLOPRIM  Take 1 tablet by mouth daily      aspirin 81 MG EC tablet  Take 1 tablet by mouth daily This is for stroke prevention      atorvastatin 40 MG tablet  Commonly known as: LIPITOR  Take 1 tablet by mouth daily This is for stroke prevention      carvedilol 12.5 MG tablet  Commonly known as: COREG  Take 1 tablet by mouth 2 times daily      colchicine 0.6 MG tablet  Commonly known as: Colcrys  Take 1 tablet by mouth daily      hydrocortisone 2.5 % Crea rectal cream  Commonly known as: ANUSOL-HC  Apply twice a day as needed for hemorrhoidal pain.      hydroxychloroquine 200 MG tablet  Commonly known as: Plaquenil  Take 1 tablet by mouth daily      tadalafil 20 MG tablet  Commonly known as: CIALIS  Take 1 tablet by mouth daily as needed for Erectile Dysfunction Take 1 tablet by mouth daily as needed for Erectile Dysfunction.o not exceed one tab per 24 hours. Take 30 min prior to sex.                ASK your doctor about these medications       losartan 100 MG tablet  Commonly known as: COZAAR  Take 1 tablet by mouth daily This is for high blood pressure                   Discharge disposition: home     Discharge condition: stable      Local wound care: None     Activity: as desired      Special Instructions:            - No driving the day of your procedure            - If a biopsy was taken, avoid aspirin or NSAIDs for 48 hours.            - Your results will be available for discussion at your next appointment            - Notify [facility] for a Temp >100.5 or Pulse>115     Follow-up with your surgeon in as directed, or call office for appointment  [phone]     Upper GI Endoscopy: What to Expect at Home    Your Recovery    You had an upper GI endoscopy. Your doctor used a

## 2024-10-04 NOTE — ANESTHESIA PRE PROCEDURE
Department of Anesthesiology  Preprocedure Note       Name:  Juan Samuel   Age:  54 y.o.  :  1970                                          MRN:  [id number]         Date:  10/4/2024      Surgeon: Surgeon(s):  José Luis Ortiz MD    Procedure: Procedure(s):  ESOPHAGOGASTRODUODENOSCOPY BIOPSY    Medications prior to admission:   Prior to Admission medications    Medication Sig Start Date End Date Taking? Authorizing Provider   hydroxychloroquine (PLAQUENIL) 200 MG tablet Take 1 tablet by mouth daily 23  Yes Wendy Roman MD   carvedilol (COREG) 12.5 MG tablet Take 1 tablet by mouth 2 times daily 10/23/23  Yes Wendy Roman MD   allopurinol (ZYLOPRIM) 300 MG tablet Take 1 tablet by mouth daily 10/23/23  Yes Wendy Roman MD   colchicine (COLCRYS) 0.6 MG tablet Take 1 tablet by mouth daily 23  Yes Wendy Roman MD   aspirin 81 MG EC tablet Take 1 tablet by mouth daily This is for stroke prevention 23  Yes Wendy Roman MD   atorvastatin (LIPITOR) 40 MG tablet Take 1 tablet by mouth daily This is for stroke prevention 23  Yes Wendy Roman MD   tadalafil (CIALIS) 20 MG tablet Take 1 tablet by mouth daily as needed for Erectile Dysfunction Take 1 tablet by mouth daily as needed for Erectile Dysfunction.o not exceed one tab per 24 hours. Take 30 min prior to sex.  Patient not taking: Reported on 10/4/2024 3/13/24   Wendy Roman MD   hydrocortisone (ANUSOL-HC) 2.5 % CREA rectal cream Apply twice a day as needed for hemorrhoidal pain.  Patient not taking: Reported on 2024   Wendy Roman MD   losartan (COZAAR) 100 MG tablet Take 1 tablet by mouth daily This is for high blood pressure  Patient not taking: Reported on 2024 10/23/23   Wendy Roman MD       Current medications:    Current Facility-Administered Medications   Medication Dose Route Frequency Provider Last Rate Last Admin   • lactated ringers

## 2024-10-04 NOTE — DISCHARGE SUMMARY
Bariatric Surgery Endoscopy Progress Note    Admission Date: 10/4/2024    Discharge Date: 10/4/2024    Admission Diagnosis: GERD    Discharge Diagnosis: GERD     Procedures: EGD with biopsy    Postop Complications: None    Hospital Course:  Patient was admitted on 10/4/2024 for scheduled outpatient endoscopy.  Operation was without significant complication.   Patient was stable postoperatively and tolerating liquids in the recovery area. All vitals were normal and he emerged from anesthesia without incident and subsequently discharged home.    Discharge Diet:  Continue liquid diet and advance to regular as tolerated    Discharge Medications:     Medication List        CONTINUE taking these medications      allopurinol 300 MG tablet  Commonly known as: ZYLOPRIM  Take 1 tablet by mouth daily     aspirin 81 MG EC tablet  Take 1 tablet by mouth daily This is for stroke prevention     atorvastatin 40 MG tablet  Commonly known as: LIPITOR  Take 1 tablet by mouth daily This is for stroke prevention     carvedilol 12.5 MG tablet  Commonly known as: COREG  Take 1 tablet by mouth 2 times daily     colchicine 0.6 MG tablet  Commonly known as: Colcrys  Take 1 tablet by mouth daily     hydrocortisone 2.5 % Crea rectal cream  Commonly known as: ANUSOL-HC  Apply twice a day as needed for hemorrhoidal pain.     hydroxychloroquine 200 MG tablet  Commonly known as: Plaquenil  Take 1 tablet by mouth daily     tadalafil 20 MG tablet  Commonly known as: CIALIS  Take 1 tablet by mouth daily as needed for Erectile Dysfunction Take 1 tablet by mouth daily as needed for Erectile Dysfunction.o not exceed one tab per 24 hours. Take 30 min prior to sex.            ASK your doctor about these medications      losartan 100 MG tablet  Commonly known as: COZAAR  Take 1 tablet by mouth daily This is for high blood pressure                Discharge disposition: home    Discharge condition: stable      Local wound care: None     Activity: as desired

## 2024-10-08 ENCOUNTER — CLINICAL DOCUMENTATION (OUTPATIENT)
Facility: HOSPITAL | Age: 54
End: 2024-10-08

## 2024-10-08 NOTE — PROGRESS NOTES
10/8/24:  Patient did not show for his nutrition visit.  He has been contacted and moved to October 17.    Viridiana Pitts MS RD

## 2024-10-13 DIAGNOSIS — M10.9 GOUT, UNSPECIFIED CAUSE, UNSPECIFIED CHRONICITY, UNSPECIFIED SITE: ICD-10-CM

## 2024-10-13 RX ORDER — ALLOPURINOL 300 MG/1
300 TABLET ORAL DAILY
Qty: 90 TABLET | Refills: 1 | Status: SHIPPED | OUTPATIENT
Start: 2024-10-13

## 2024-10-17 ENCOUNTER — HOSPITAL ENCOUNTER (OUTPATIENT)
Facility: HOSPITAL | Age: 54
Discharge: HOME | End: 2024-10-20

## 2024-10-17 ENCOUNTER — CLINICAL DOCUMENTATION (OUTPATIENT)
Facility: HOSPITAL | Age: 54
End: 2024-10-17

## 2024-10-17 NOTE — PROGRESS NOTES
[facility]  [address], [address]    Patient's Name: Juan Samuel     Age: 54 y.o.  YOB: 1970     Sex: male           Session: 2 of  4  Surgeon:  Dr. Ortiz    Height:  5  f9   Weight:    491      Lbs.     BMI:    Pounds Lost since last month: 5                 Pounds Gained since last month: 0    Starting Weight: 496     Previous Month’s Weight: 496  Overall Pounds Lost: 5   Overall Pounds Gained: 0    Patient has not attended a support group meeting.    Do you smoke?  None    Alcohol intake:  Number of drinks at a time:  2  Number of times a week: 1    Class Guidelines    Office Use only: IP    Registered Dietitian Initial Class Notes:  n/a    Guidelines are reviewed with patient at the start of every class.    1. Patient understands that weight loss trial classes must be consecutive.  Patient understands if they miss a class, it is their responsibility to contact me to reschedule class.  I will reach out to patient after their first no show.  2.  Patient understands the expectations that weight maintenance/weight loss is expected during the classes.  Failure to demonstrate changes may result in one extra month of weight loss trial, followed by going back to see the surgeon.    3. Patient is also instructed to be doing their labs, blood work, psych visit, support group and any other test that the surgeon has used while they are working on their weight loss trial.  4. Patient is instructed to bring their education binder to all classes.        Changes Made Since Last Class: None     The areas that patient feels that are struggling the most with in term of their diet are:    Grazing  Eating out too often  Alcohol  Food choices:  Eating Habits and Behaviors      Today we reviewed key diet principles.   We talked about patient following a low calorie/low carbohydrate diet while they are in weight loss trials.  To

## 2024-10-21 ENCOUNTER — OFFICE VISIT (OUTPATIENT)
Facility: CLINIC | Age: 54
End: 2024-10-21
Payer: MEDICARE

## 2024-10-21 VITALS
BODY MASS INDEX: 46.65 KG/M2 | OXYGEN SATURATION: 97 % | DIASTOLIC BLOOD PRESSURE: 88 MMHG | RESPIRATION RATE: 19 BRPM | SYSTOLIC BLOOD PRESSURE: 131 MMHG | TEMPERATURE: 98.2 F | WEIGHT: 315 LBS | HEIGHT: 69 IN | HEART RATE: 76 BPM

## 2024-10-21 DIAGNOSIS — M10.9 GOUT, UNSPECIFIED CAUSE, UNSPECIFIED CHRONICITY, UNSPECIFIED SITE: ICD-10-CM

## 2024-10-21 DIAGNOSIS — E34.9 TESTOSTERONE DEFICIENCY: ICD-10-CM

## 2024-10-21 DIAGNOSIS — Z23 NEED FOR VACCINATION: ICD-10-CM

## 2024-10-21 DIAGNOSIS — R73.02 IGT (IMPAIRED GLUCOSE TOLERANCE): ICD-10-CM

## 2024-10-21 DIAGNOSIS — M06.9 RHEUMATOID ARTHRITIS, INVOLVING UNSPECIFIED SITE, UNSPECIFIED WHETHER RHEUMATOID FACTOR PRESENT (HCC): ICD-10-CM

## 2024-10-21 DIAGNOSIS — I10 ESSENTIAL HYPERTENSION: Primary | ICD-10-CM

## 2024-10-21 DIAGNOSIS — E78.5 HYPERLIPIDEMIA, UNSPECIFIED HYPERLIPIDEMIA TYPE: ICD-10-CM

## 2024-10-21 DIAGNOSIS — I63.9 CEREBROVASCULAR ACCIDENT (CVA), UNSPECIFIED MECHANISM (HCC): ICD-10-CM

## 2024-10-21 PROBLEM — E66.01 OBESITY, MORBID: Status: RESOLVED | Noted: 2018-07-13 | Resolved: 2024-10-21

## 2024-10-21 PROCEDURE — G8417 CALC BMI ABV UP PARAM F/U: HCPCS | Performed by: INTERNAL MEDICINE

## 2024-10-21 PROCEDURE — 90661 CCIIV3 VAC ABX FR 0.5 ML IM: CPT | Performed by: INTERNAL MEDICINE

## 2024-10-21 PROCEDURE — G8484 FLU IMMUNIZE NO ADMIN: HCPCS | Performed by: INTERNAL MEDICINE

## 2024-10-21 PROCEDURE — 1036F TOBACCO NON-USER: CPT | Performed by: INTERNAL MEDICINE

## 2024-10-21 PROCEDURE — 3079F DIAST BP 80-89 MM HG: CPT | Performed by: INTERNAL MEDICINE

## 2024-10-21 PROCEDURE — 99214 OFFICE O/P EST MOD 30 MIN: CPT | Performed by: INTERNAL MEDICINE

## 2024-10-21 PROCEDURE — G8427 DOCREV CUR MEDS BY ELIG CLIN: HCPCS | Performed by: INTERNAL MEDICINE

## 2024-10-21 PROCEDURE — 3075F SYST BP GE 130 - 139MM HG: CPT | Performed by: INTERNAL MEDICINE

## 2024-10-21 PROCEDURE — G0008 ADMIN INFLUENZA VIRUS VAC: HCPCS | Performed by: INTERNAL MEDICINE

## 2024-10-21 PROCEDURE — 3017F COLORECTAL CA SCREEN DOC REV: CPT | Performed by: INTERNAL MEDICINE

## 2024-10-21 RX ORDER — ATORVASTATIN CALCIUM 40 MG/1
40 TABLET, FILM COATED ORAL DAILY
Qty: 90 TABLET | Refills: 3 | Status: SHIPPED | OUTPATIENT
Start: 2024-10-21

## 2024-10-21 RX ORDER — DORZOLAMIDE HYDROCHLORIDE AND TIMOLOL MALEATE 20; 5 MG/ML; MG/ML
1 SOLUTION/ DROPS OPHTHALMIC 2 TIMES DAILY
COMMUNITY
Start: 2024-08-29

## 2024-10-21 RX ORDER — EZETIMIBE 10 MG/1
10 TABLET ORAL DAILY
Qty: 30 TABLET | Refills: 3 | Status: CANCELLED | OUTPATIENT
Start: 2024-10-21

## 2024-10-21 RX ORDER — METHOTREXATE 2.5 MG/1
2.5 TABLET ORAL WEEKLY
COMMUNITY
Start: 2024-10-17

## 2024-10-21 RX ORDER — FOLIC ACID 1 MG/1
1000 TABLET ORAL DAILY
COMMUNITY
Start: 2024-10-03

## 2024-10-21 ASSESSMENT — ENCOUNTER SYMPTOMS
ANAL BLEEDING: 0
BLOOD IN STOOL: 0
SHORTNESS OF BREATH: 0
SORE THROAT: 0
EYE PAIN: 0
BACK PAIN: 1
COUGH: 0
ABDOMINAL PAIN: 0

## 2024-10-21 NOTE — PROGRESS NOTES
[facility]:  10/21/2024, MRN: [id number]      Juan Samuel is a 54 y.o. male and presents to clinic for Follow-up (1 mon f/u)      Subjective:   The patient is a 54-year-old male with history of hypertension, obesity, CVA, h/o H.pylori infection (treated last year), prediabetes, suspected rheumatoid arthritis, suspected gout, lumbar disc disease, onychomycosis, and chronic bilateral knee pain (followed by Orthopedics).     1.  Prediabetes/HLD: His A1c last month was 5.6, down from 5.8 in February.  His lipid panel in September show a total cholesterol of 156.  HDL was 40.  LDL is 112.  His triglycerides were 67.  LFTs were unremarkable and his creatinine was normal.  Status post a recent endoscopy with bariatric surgery.  He is participating in the surgical weight loss program. He lost his lipitor bottle and has been out of this rx x 1 month.     Latest Reference Range & Units 09/07/24 11:00   Cholesterol, Total <200 MG/   HDL Cholesterol 40 - 60 MG/DL 40   LDL Cholesterol 0 - 100 MG/.6 (H)   Triglycerides <150 MG/DL 67   (H): Data is abnormally high    EGD Bx 10/4/24: GASTRIC ANTRUM, BIOPSY. MILD REACTIVE FOVEOLAR CHANGES. NO DYSPLASIA OR MALIGNANCY IDENTIFIED.     2.  Hypertension/CVA history: Blood pressure today: 131/88.  Taking aspirin 81 mg daily, Lipitor 40 mg daily, carvedilol 12.5 mg twice daily, and losartan 100 mg daily.  Asymptomatic. +Blurry vision. Followed by a retina specialist. His blurry vision is worsening. He was told he has cataracts.     3.  Gout/RA history: He met with hematology since his last visit.  He has prescriptions per EHR for: colchicine 0.6 mg daily, allopurinol 300 mg daily, Plaquenil 200 g daily, and methotrexate but is not sure if he is taking these rx. Jt pain: unchanged.  Most of his joint pain is along his knees. +Left hand pain. September labs show that his uric acid level is 8.  His CRP is 3.2.  His globulin level is elevated at 4.8. He met

## 2024-10-21 NOTE — PROGRESS NOTES
Juan Samuel presents today for   Chief Complaint   Patient presents with    Follow-up     1 mon f/u           \"Have you been to the ER, urgent care clinic since your last visit?  Hospitalized since your last visit?\"    NO    “Have you seen or consulted any other health care providers outside of [facility] since your last visit?”    NO

## 2024-10-21 NOTE — PROGRESS NOTES
Juan [place] 1970 male who presents for routine immunizations.   Patient denies any symptoms , reactions or allergies that would exclude them from being immunized today.  Risks and adverse reactions were discussed and the VIS was given to them. All questions were addressed.  Order placed for flucelvax,  per Verbal Order from Dr. YAHIR Roman MD with read back.  There were no reactions observed.    Fina Gray LPN

## 2024-10-22 ENCOUNTER — TELEPHONE (OUTPATIENT)
Facility: CLINIC | Age: 54
End: 2024-10-22

## 2024-10-22 NOTE — TELEPHONE ENCOUNTER
Spoke to pt who stated all medications are UTD except for Humira in which is he unsure of. He stated he is unsure when he will be getting the Humira and unclear if it will be a syringe/pen. A woman posed as pt. Advised to the woman that since she is NOT on his HIPAA, I cannot speak to her concerning pt information.Pt stated he had not turned in the HIPAA information for her as of yet.    Pt will advise what he will be getting once the pharmacy fills the medication.    LARS Gray LPN

## 2024-10-22 NOTE — TELEPHONE ENCOUNTER
----- Message from Dr. Wendy Roman MD sent at 10/21/2024  5:51 PM EDT -----  Regarding: Records and meds to be reconciled  Please confirm what medication this patient is taking at home.  He could not answer/confirm this during his visit with me.  Please give his rheumatology records (Dr. Mario).     Thanks,  Dr. Wendy Roman  [facility] [address], [address]  [place] [address]  Phone: [phone]  Fax: [phone]

## 2024-11-13 ENCOUNTER — TELEPHONE (OUTPATIENT)
Facility: CLINIC | Age: 54
End: 2024-11-13

## 2024-11-13 NOTE — TELEPHONE ENCOUNTER
----- Message from Dana CABRERA sent at 11/13/2024  2:26 PM EST -----  Regarding: ECC Escalation To Practice  ECC Escalation To Practice      Type of Escalation: Acute Care Symptom  --------------------------------------------------------------------------------------------------------------------------    Information for Provider: Wendy Roman MD  Patient is looking for appointment for: Symptom Rash in stomach   Reasons for Message: Unable to reach practice     Additional Information: Patient has a rash in his stomach for about a week ago.   --------------------------------------------------------------------------------------------------------------------------    Relationship to Patient: Other (Friend - Wilma)    Call Back Info: OK to leave message on voicemail  Preferred Call Back Number: Phone [phone]

## 2024-11-15 NOTE — TELEPHONE ENCOUNTER
Patient contacted. Given a couple options of times to come into office. Pt was unable to make it to dates given.

## 2024-11-21 ENCOUNTER — CLINICAL DOCUMENTATION (OUTPATIENT)
Facility: HOSPITAL | Age: 54
End: 2024-11-21

## 2024-11-21 ENCOUNTER — HOSPITAL ENCOUNTER (OUTPATIENT)
Facility: HOSPITAL | Age: 54
Discharge: HOME | End: 2024-11-24

## 2024-11-21 NOTE — PROGRESS NOTES
[facility]  [address], [address]    Patient's Name: Juan Samuel     Age: 54 y.o.  YOB: 1970     Sex: male           Session 3 of 4   Surgeon:  Dr. Ortiz    Height: 5 f 9   Weight:    487      Lbs.     BMI:    Pounds Lost since last month: 4                 Pounds Gained since last month: 0    Starting Weight: 496     Previous Month’s Weight: 491  Overall Pounds Lost: 9   Overall Pounds Gained: 0      Class Guidelines    Guidelines are reviewed with patient at the start of every class.    1. Patient understands that weight loss trial classes must be consecutive.  Patient understands if they miss a class, it is their responsibility to contact me to reschedule class.  I will reach out to patient after their first no show.  2.  Patient understands the expectations that weight maintenance/weight loss is expected during the classes.  Failure to demonstrate changes may result in one extra month of weight loss trial, followed by going back to see the surgeon.    3. Patient is also instructed to be doing their labs, blood work, psych visit, support group and any other test that the surgeon has used while they are working on their weight loss trial.    Other Pertinent Information:     Class Guidelines    Office Use only: IP    Registered Dietitian Initial Class Notes:  n/a    Patient is seeking a revision procedure: n/a.      Patient has not been to a support group meeting.        Do you smoke?  None    Alcohol intake:  Number of drinks at a time:  2 x a week  Number of times a week:     Diet Changes Made Since Last Class: Yes    Patient feels they are struggling with the following:     Eating out too often   Food choices (sweets, carbohydrates, junk food):      Dietary Instruction    During today's class we continued to focus on the key diet principles.  Patient was instructed to follow a low carbohydrate diet, focusing

## 2024-11-22 ENCOUNTER — OFFICE VISIT (OUTPATIENT)
Age: 54
End: 2024-11-22
Payer: MEDICARE

## 2024-11-22 VITALS
SYSTOLIC BLOOD PRESSURE: 143 MMHG | HEIGHT: 69 IN | TEMPERATURE: 97.6 F | RESPIRATION RATE: 17 BRPM | BODY MASS INDEX: 46.65 KG/M2 | OXYGEN SATURATION: 97 % | WEIGHT: 315 LBS | DIASTOLIC BLOOD PRESSURE: 93 MMHG | HEART RATE: 80 BPM

## 2024-11-22 DIAGNOSIS — Z71.3 ENCOUNTER FOR WEIGHT LOSS COUNSELING: ICD-10-CM

## 2024-11-22 DIAGNOSIS — Z86.73 HISTORY OF CVA (CEREBROVASCULAR ACCIDENT): ICD-10-CM

## 2024-11-22 DIAGNOSIS — Z71.89 ENCOUNTER FOR PRE-BARIATRIC SURGERY COUNSELING AND EDUCATION: ICD-10-CM

## 2024-11-22 DIAGNOSIS — E66.01 MORBID OBESITY: Primary | ICD-10-CM

## 2024-11-22 DIAGNOSIS — I10 ESSENTIAL (PRIMARY) HYPERTENSION: ICD-10-CM

## 2024-11-22 PROCEDURE — G8417 CALC BMI ABV UP PARAM F/U: HCPCS | Performed by: NURSE PRACTITIONER

## 2024-11-22 PROCEDURE — 3077F SYST BP >= 140 MM HG: CPT | Performed by: NURSE PRACTITIONER

## 2024-11-22 PROCEDURE — 3017F COLORECTAL CA SCREEN DOC REV: CPT | Performed by: NURSE PRACTITIONER

## 2024-11-22 PROCEDURE — 1036F TOBACCO NON-USER: CPT | Performed by: NURSE PRACTITIONER

## 2024-11-22 PROCEDURE — 3080F DIAST BP >= 90 MM HG: CPT | Performed by: NURSE PRACTITIONER

## 2024-11-22 PROCEDURE — G8428 CUR MEDS NOT DOCUMENT: HCPCS | Performed by: NURSE PRACTITIONER

## 2024-11-22 PROCEDURE — G8484 FLU IMMUNIZE NO ADMIN: HCPCS | Performed by: NURSE PRACTITIONER

## 2024-11-22 PROCEDURE — 99214 OFFICE O/P EST MOD 30 MIN: CPT | Performed by: NURSE PRACTITIONER

## 2024-11-22 RX ORDER — SEMAGLUTIDE 0.25 MG/.5ML
0.25 INJECTION, SOLUTION SUBCUTANEOUS
Qty: 2 ML | Refills: 0 | Status: SHIPPED | OUTPATIENT
Start: 2024-11-22

## 2024-11-22 RX ORDER — SEMAGLUTIDE 1 MG/.5ML
1 INJECTION, SOLUTION SUBCUTANEOUS
Qty: 2 ML | Refills: 0 | Status: SHIPPED | OUTPATIENT
Start: 2024-11-22

## 2024-11-22 RX ORDER — SEMAGLUTIDE 0.5 MG/.5ML
0.5 INJECTION, SOLUTION SUBCUTANEOUS
Qty: 2 ML | Refills: 0 | Status: SHIPPED | OUTPATIENT
Start: 2024-11-22

## 2024-11-22 NOTE — PROGRESS NOTES
Medical Weight Loss Progress Note: Initial Evaluation      Notes: STOP-BANG, under significant medical hx   Cancer Screening: Updated 7/31, see under significant medical hx  Format flow  GERD?    Juan Samuel is a 54 y.o. male whose Body mass index is 72.3 kg/m². He is here for his Initial Evaluation for medical bariatric care.    Assessment & Plan   Assessment & Plan  Based on his history and exam, Juan Samuel {IS:19891::\"is\"} a good candidate for the [facility].    {There are no diagnoses linked to this encounter. (Refresh or delete this SmartLink)}     We agreed upon to work toward a weight loss goal of {NUMBERS; 0-180 BY 10:83986} pounds.  Including {Obesity Counseling Plan:6764526845}.    Diet Plan: {DIET TYPES:07870::\"New Direction\"}  Activity:  {BARIATRIC; Exercise activity:346794877}  Medication:  {wt loss med:79864}           PCP: Wendy Roman MD    SUBJECTIVE/OBJECTIVE:  Weight History  Current weight: 473lb  Wt Readings from Last 1 Encounters:   10/21/24 (!) 222.1 kg (489 lb 9.6 oz)      Highest weight 550 lbs      11/22/2024     1:40 PM 10/21/2024     2:09 PM 10/4/2024    11:15 AM 10/4/2024    11:11 AM 10/4/2024    11:01 AM 10/4/2024    10:51 AM 10/4/2024     9:53 AM   Vitals   SYSTOLIC  131 136 139 123 121 137   DIASTOLIC  88 86 84 84 85 88   Site  Left Upper Arm        Position  Sitting        Cuff Size  Medium Adult        Pulse  76 80 81 89 92 85   Temp 97.6 °F (36.4 °C) 98.2 °F (36.8 °C) 98 °F (36.7 °C)   98.3 °F (36.8 °C) 98.8 °F (37.1 °C)   Respirations 17 19 20 17 19 24 20   SpO2 97 % 97 % 95 % 96 % 95 % 95 % 100 %   Weight - Scale  489 lb 9.6 oz     495 lb   Height 5' 9\" 5' 9\"     5' 9\"   Body Mass Index  72.3 kg/m2     73.1 kg/m2   Pain Score  EIGHT        Pain Loc  21        Pain Level   0 0  0 0          Overview and Supplement to New Consult Bariatric Questionnaire:  Dietary Habits: { AMB Dietary Habits:310878360}  Eats out {Number 
History  Bariatric Comorbidities:   {comorbidities:96683}  History of bariatric surgery:   {gen no default/yes/free text:667496::\"no\"}   If no, expresses interest? {gen no default/yes/free text:294647::\"no\"}  (IF YES TO BARIATRIC SURGERY, WAS IT DONE AT A [facility]? {Genaro:05577}  History of substance use disorder:  {CURRENTLYBEINGTREATEDYESNOKS:54195::\"No\"}  History of binge eating or anorexia :  {CURRENTLYBEINGTREATEDYESNOKS:43783::\"No\"}  Contraindications to exercise:  {gen no default/yes/free text:887496::\"no\"}    STOP-BANG score:     EGD/UGI:  GERD-HRQL score:    No cervical cancer screening on file  Date of last Colonoscopy: 6/19/2020   No cologuard on file  No FIT/FOBT on file   No flexible sigmoidoscopy on file   No LDCT on file  No breast cancer screening on file     Significant Social History   Current Major Lifestyle Changes:  {gen no default/yes/free text:606268::\"no\"}  Planning pregnancy w/in 6 months:  {gen no default/yes/free text:514245::\"no\"}  Potential unsupportive people:  {gen no default/yes/free text:338672::\"no\"}     Exercise  Currently exercises essentially none times/week due to knee and back pain.    Motivation  Why are you starting a weight loss program now?  Patient desires to live a healthier lifestyle to promote longevity.  Are you ready?  yes    Review of Systems     Objective    Past Medical History:   Diagnosis Date    Ambulates with cane     Arthritis     lower back and kness    CVA (cerebrovascular accident) (HCC) 2022    no residual    Hypertension     Knee pain     Morbid obesity        Past Surgical History:   Procedure Laterality Date    CHOLECYSTECTOMY      COLONOSCOPY N/A 6/19/2020    COLONOSCOPY with polypectomy performed by Robert Ness MD at [facility] ENDOSCOPY    UPPER GASTROINTESTINAL ENDOSCOPY N/A 10/4/2024    ESOPHAGOGASTRODUODENOSCOPY BIOPSY performed by José Luis Ortiz MD at [facility] ENDOSCOPY       Current Outpatient Medications   Medication Sig Dispense 
communicates understanding that the expectation is to lose or maintain weight during WLT.  Weight gain may result in delay or cancellation of surgery.   -Continue working on interventions such as eliminating soda, focusing on high protein/low carb options, monitoring portion sizes, and limiting snacking.   -Incorporate low-impact activity, and we discussed for him to see if he has the Silver Sneakers benefit with his Medicare.   -Will incorporate Wegovy to assist with cardiovascular protective effects due to his history of CVA in 2022 as well as weight loss (BMI currently 69.85). He took Ozempic in 2021/2022 prior to his CVA and denies any side effects. Side effects and titration schedule for Wegovy discussed and he denies history of MTC and pancreatitis.   -Follow up in 6-8 weeks.     MIHIR Velasquez - NP    I spent >35 minutes reviewing previous notes, test results, and face to face with the patient counseling and discussing the treatment plan as well as documenting on the day of the visit.

## 2024-11-25 ASSESSMENT — ENCOUNTER SYMPTOMS
DIARRHEA: 0
COUGH: 0
NAUSEA: 0
CONSTIPATION: 0
VOMITING: 0
SHORTNESS OF BREATH: 0
ABDOMINAL PAIN: 0
BACK PAIN: 1

## 2024-11-26 ENCOUNTER — TELEPHONE (OUTPATIENT)
Age: 54
End: 2024-11-26

## 2024-11-26 NOTE — TELEPHONE ENCOUNTER
Patient made aware Wegovy has been approved     Message from plan: Request Reference Number: [id number]. WEGOVY INJ 0.25MG is approved through 12/31/2025. Your patient may now fill this prescription and it will be covered.. Authorization Expiration Date: December 31, 2025

## 2024-12-19 ENCOUNTER — CLINICAL DOCUMENTATION (OUTPATIENT)
Facility: HOSPITAL | Age: 54
End: 2024-12-19

## 2024-12-19 NOTE — PROGRESS NOTES
12/19/24:  Patient contacted and was unable to make his final nutrition class.  He has been rescheduled for December 30.    Viridiana Pitts MS RD

## 2024-12-27 ENCOUNTER — TELEPHONE (OUTPATIENT)
Facility: CLINIC | Age: 54
End: 2024-12-27

## 2024-12-27 DIAGNOSIS — I10 ESSENTIAL HYPERTENSION: ICD-10-CM

## 2024-12-27 DIAGNOSIS — I63.9 CEREBROVASCULAR ACCIDENT (CVA), UNSPECIFIED MECHANISM (HCC): ICD-10-CM

## 2024-12-27 RX ORDER — ASPIRIN 81 MG/1
81 TABLET ORAL DAILY
Qty: 90 TABLET | Refills: 3 | Status: SHIPPED | OUTPATIENT
Start: 2024-12-27

## 2024-12-27 RX ORDER — CARVEDILOL 12.5 MG/1
12.5 TABLET ORAL 2 TIMES DAILY
Qty: 60 TABLET | Refills: 5 | Status: SHIPPED | OUTPATIENT
Start: 2024-12-27

## 2024-12-27 NOTE — TELEPHONE ENCOUNTER
Refill request via fax     Medication:   aspirin   carvedilol     Pharmacy:   [id number] - [place], [place] - [facility] [address]     Last Fill:  6/12/2023    10/23/23    PCP: Wendy Roman MD    LAST OFFICE VISIT: 10/21/2024      Future Appointments   Date Time Provider Department Center   12/30/2024  9:15 AM HBV BARIATRIC DIETITIAN [facility] [place]   1/23/2025  2:40 PM Kirsten Zaldivar APRN - NP [facility]   1/24/2025  9:20 AM Sharath Altman PA [facility] [place] Sched   4/14/2025  2:45 PM IOC LAB VISIT [facility] ECC DEP   4/21/2025  2:40 PM Wendy Roman MD [facility] DEP

## 2024-12-29 RX ORDER — CARVEDILOL 12.5 MG/1
12.5 TABLET ORAL 2 TIMES DAILY
Qty: 180 TABLET | Refills: 2 | Status: SHIPPED | OUTPATIENT
Start: 2024-12-29

## 2024-12-29 RX ORDER — HYDROGEN PEROXIDE 2.65 ML/100ML
LIQUID ORAL; TOPICAL
Qty: 90 TABLET | Refills: 2 | Status: SHIPPED | OUTPATIENT
Start: 2024-12-29

## 2024-12-30 ENCOUNTER — HOSPITAL ENCOUNTER (OUTPATIENT)
Facility: HOSPITAL | Age: 54
Discharge: HOME | End: 2025-01-02

## 2024-12-30 ENCOUNTER — CLINICAL DOCUMENTATION (OUTPATIENT)
Facility: HOSPITAL | Age: 54
End: 2024-12-30

## 2024-12-30 NOTE — PROGRESS NOTES
[facility]  [address], [address]    Patient's Name: Juan Samuel     Age: 54 y.o.  YOB: 1970     Sex: male           Session 4 of 4   Surgeon:  Dr. Ortiz    Height: 5 f 9   Weight:    479      Lbs.     BMI:    Pounds Lost since last month: 8                 Pounds Gained since last month: 0    Starting Weight: 496     Previous Month’s Weight: 487  Overall Pounds Lost: 17   Overall Pounds Gained: 0    Office Use only: IP  Class Guidelines  Guidelines are reviewed with patient at the start of every class.  1. Patient understands that weight loss trial classes must be consecutive.  Patient understands if they miss a class, it is their responsibility to contact me to reschedule class.  I will reach out to patient after their first no show.  2.  Patient understands the expectations that weight maintenance/weight loss is expected during the classes.  Failure to demonstrate changes may result in one extra month of weight loss trial, followed by going back to see the surgeon.    3. Patient is also instructed to be doing their labs, blood work, psych visit, support group and any other test that the surgeon has used while they are working on their weight loss trial.    Other Pertinent Information:     Weight Loss Attempts:    Patient is seeking a revision procedure: None.      Patient has been prescribed Semaglutide .25 1 x a week to help with weight loss.  This medication has been prescribed by: Kirsten Zaldivar.    Other:  Patient has not been to a support group yet?    Lifestyle Habits:  Does patient smoke?  None    Alcohol intake:  Number of drinks at a time:  4 drinks  Number of times a week: 1-2 x a week      Eating Habits and Behaviors      Today we reviewed key diet principles.  We talked about protein drinks and ones that would be okay.  Patient was encouraged to start getting into a routine now and drinking a

## 2025-01-23 ENCOUNTER — CLINICAL DOCUMENTATION (OUTPATIENT)
Facility: HOSPITAL | Age: 55
End: 2025-01-23

## 2025-01-23 NOTE — PROGRESS NOTES
1/23/25:  Patient completed his 4 month weight loss trial on December 30.  While he did lose weight, I had concerns about his alcohol intake, which was 4 drinks, 1-2 x a week.    I discussed with patient that he needed to stop drinking before moving forward with surgery.         Patient still needed to make his appointment with the psychologist and I wanted to follow up after he met with them.  We were scheduled to meet today, but patient did not come.  He has not scheduled with the psychologist yet.  Their office has been contacted and they will reach out to the patient to get his appointment scheduled.     Viridiana Pitts MS RD

## 2025-01-27 ENCOUNTER — HOSPITAL ENCOUNTER (OUTPATIENT)
Facility: HOSPITAL | Age: 55
Setting detail: SPECIMEN
Discharge: HOME | End: 2025-01-30

## 2025-01-27 LAB — LABCORP SPECIMEN COLLECTION: NORMAL

## 2025-01-27 PROCEDURE — 99001 SPECIMEN HANDLING PT-LAB: CPT

## 2025-02-18 ENCOUNTER — CLINICAL DOCUMENTATION (OUTPATIENT)
Facility: HOSPITAL | Age: 55
End: 2025-02-18

## 2025-02-18 DIAGNOSIS — M10.9 GOUT, UNSPECIFIED CAUSE, UNSPECIFIED CHRONICITY, UNSPECIFIED SITE: ICD-10-CM

## 2025-02-18 RX ORDER — COLCHICINE 0.6 MG/1
0.6 TABLET ORAL DAILY
Qty: 90 TABLET | Refills: 3 | Status: SHIPPED | OUTPATIENT
Start: 2025-02-18

## 2025-02-18 NOTE — TELEPHONE ENCOUNTER
Pt is requesting a refill for   colchicine (COLCRYS) 0.6 MG tablet to be sent to [facility] [address] [facility] [address]. Pt's gout is flaring up and is in need of this medication. Please advise

## 2025-02-18 NOTE — PROGRESS NOTES
2/18/25:  I have spoken to patient's spouse and patient.  Patient missed an appointment with me on 1/23.  I had some concerns regarding his eating.  Patient has not made his psychologist appointment yet.  I discussed with patient/spouse that once he makes that visit, we can reschedule his missed nutrition visit.  Patient was e-mailed the information to schedule the psychologist visit.  Understanding was expressed.    Viridiana Pitts MS RD

## 2025-03-05 ENCOUNTER — TELEPHONE (OUTPATIENT)
Facility: CLINIC | Age: 55
End: 2025-03-05

## 2025-03-05 NOTE — TELEPHONE ENCOUNTER
Called pt to confirm that he switched pharmacy to [facility] due to rcving fax from [facility] confirming switch. No answer. Left msg to return call.

## 2025-03-07 NOTE — TELEPHONE ENCOUNTER
Cayetano will have patient contact our office to confirm, and at next appointment patient will update his DARRYN form. vz

## 2025-03-17 ENCOUNTER — OFFICE VISIT (OUTPATIENT)
Facility: CLINIC | Age: 55
End: 2025-03-17
Payer: MEDICARE

## 2025-03-17 VITALS
HEIGHT: 69 IN | WEIGHT: 315 LBS | BODY MASS INDEX: 46.65 KG/M2 | TEMPERATURE: 98.4 F | SYSTOLIC BLOOD PRESSURE: 137 MMHG | RESPIRATION RATE: 17 BRPM | OXYGEN SATURATION: 97 % | DIASTOLIC BLOOD PRESSURE: 86 MMHG | HEART RATE: 74 BPM

## 2025-03-17 DIAGNOSIS — I63.9 CEREBROVASCULAR ACCIDENT (CVA), UNSPECIFIED MECHANISM (HCC): ICD-10-CM

## 2025-03-17 DIAGNOSIS — Z71.89 ADVANCE CARE PLANNING: ICD-10-CM

## 2025-03-17 DIAGNOSIS — I10 ESSENTIAL HYPERTENSION: ICD-10-CM

## 2025-03-17 DIAGNOSIS — H53.8 BLURRY VISION, LEFT EYE: ICD-10-CM

## 2025-03-17 DIAGNOSIS — Z00.00 MEDICARE ANNUAL WELLNESS VISIT, SUBSEQUENT: Primary | ICD-10-CM

## 2025-03-17 DIAGNOSIS — R73.9 HYPERGLYCEMIA: ICD-10-CM

## 2025-03-17 PROCEDURE — 3017F COLORECTAL CA SCREEN DOC REV: CPT | Performed by: INTERNAL MEDICINE

## 2025-03-17 PROCEDURE — 3079F DIAST BP 80-89 MM HG: CPT | Performed by: INTERNAL MEDICINE

## 2025-03-17 PROCEDURE — G0439 PPPS, SUBSEQ VISIT: HCPCS | Performed by: INTERNAL MEDICINE

## 2025-03-17 PROCEDURE — 3075F SYST BP GE 130 - 139MM HG: CPT | Performed by: INTERNAL MEDICINE

## 2025-03-17 RX ORDER — PREDNISOLONE ACETATE 10 MG/ML
1 SUSPENSION/ DROPS OPHTHALMIC 3 TIMES DAILY
COMMUNITY
Start: 2025-01-16

## 2025-03-17 RX ORDER — LOSARTAN POTASSIUM 100 MG/1
100 TABLET ORAL DAILY
Qty: 90 TABLET | Refills: 1 | Status: SHIPPED | OUTPATIENT
Start: 2025-03-17

## 2025-03-17 RX ORDER — DIFLUPREDNATE OPHTHALMIC 0.5 MG/ML
1 EMULSION OPHTHALMIC 4 TIMES DAILY
COMMUNITY
Start: 2025-03-08

## 2025-03-17 RX ORDER — MOXIFLOXACIN 5 MG/ML
1 SOLUTION/ DROPS OPHTHALMIC 4 TIMES DAILY
COMMUNITY
Start: 2025-03-08

## 2025-03-17 RX ORDER — KETOROLAC TROMETHAMINE 5 MG/ML
1 SOLUTION OPHTHALMIC ONCE
COMMUNITY
Start: 2025-03-08

## 2025-03-17 RX ORDER — CARVEDILOL 12.5 MG/1
12.5 TABLET ORAL 2 TIMES DAILY
Qty: 60 TABLET | Refills: 5 | Status: SHIPPED | OUTPATIENT
Start: 2025-03-17

## 2025-03-17 SDOH — ECONOMIC STABILITY: FOOD INSECURITY: WITHIN THE PAST 12 MONTHS, THE FOOD YOU BOUGHT JUST DIDN'T LAST AND YOU DIDN'T HAVE MONEY TO GET MORE.: NEVER TRUE

## 2025-03-17 SDOH — ECONOMIC STABILITY: FOOD INSECURITY: WITHIN THE PAST 12 MONTHS, YOU WORRIED THAT YOUR FOOD WOULD RUN OUT BEFORE YOU GOT MONEY TO BUY MORE.: NEVER TRUE

## 2025-03-17 ASSESSMENT — PATIENT HEALTH QUESTIONNAIRE - PHQ9
2. FEELING DOWN, DEPRESSED OR HOPELESS: NOT AT ALL
SUM OF ALL RESPONSES TO PHQ QUESTIONS 1-9: 0
1. LITTLE INTEREST OR PLEASURE IN DOING THINGS: NOT AT ALL

## 2025-03-17 ASSESSMENT — LIFESTYLE VARIABLES
HOW MANY STANDARD DRINKS CONTAINING ALCOHOL DO YOU HAVE ON A TYPICAL DAY: PATIENT DOES NOT DRINK
HOW OFTEN DO YOU HAVE A DRINK CONTAINING ALCOHOL: NEVER

## 2025-03-17 NOTE — PROGRESS NOTES
Juan Samuel presents today for   Chief Complaint   Patient presents with    Pre-op Exam     Cataract Surgery-04/15           \"Have you been to the ER, urgent care clinic since your last visit?  Hospitalized since your last visit?\"    NO    “Have you seen or consulted any other health care providers outside of [facility] since your last visit?”    NO

## 2025-03-17 NOTE — PATIENT INSTRUCTIONS
information.    Personalized Preventive Plan for Juan Samuel - 3/17/2025  Medicare offers a range of preventive health benefits. Some of the tests and screenings are paid in full while other may be subject to a deductible, co-insurance, and/or copay.  Some of these benefits include a comprehensive review of your medical history including lifestyle, illnesses that may run in your family, and various assessments and screenings as appropriate.  After reviewing your medical record and screening and assessments performed today your provider may have ordered immunizations, labs, imaging, and/or referrals for you.  A list of these orders (if applicable) as well as your Preventive Care list are included within your After Visit Summary for your review.

## 2025-03-17 NOTE — ACP (ADVANCE CARE PLANNING)
Advance Care Planning     General Advance Care Planning (ACP) Conversation    Date of Conversation: 3/17/25    Conducted with: Patient with Decision Making Capacity    Healthcare Decision Maker:  Today we documented Decision Maker(s) consistent with ACP documents on file.    Content/Action Overview:  Has ACP document(s) on file - reflects the patient's care preferences    He wants fiance to be his primary POA. His brother is his successor POA. We completed his advance directive today.     Length of Voluntary ACP Conversation in minutes:  <16 minutes (Non-Billable)    Wendy Roman MD

## 2025-03-17 NOTE — PROGRESS NOTES
[facility]:   Preoperative Evaluation    Date of Exam: 03/17/25    MRN: [id number]    Juan Samuel  Is a 55 y.o.  male  who presents for preoperative evaluation and management.     Chief Complaint   Patient presents with    Pre-op Exam     Cataract Surgery-04/15    Medicare AWV       Subjective:   The patient is a 55-year-old male with history of HTN, obesity, CVA, h/o H.pylori infection (treated last year), prediabetes, suspected rheumatoid arthritis, suspected gout, lumbar disc disease, onychomycosis, testosterone deficiency (followed by [facility] [place]), right legally blind, and chronic bilateral knee pain (followed by Orthopedics).     1.  Prediabetes: Overdue for lab work.  He has been referred to the bariatric surgical team in the past.  Today he reports: he is losing weight. He was 489lbs last year. His weight is 447lbs.      Latest Reference Range & Units 02/29/24 14:17 09/07/24 11:00   Hemoglobin A1C 4.2 - 5.6 % 5.8 (H) 5.6   (H): Data is abnormally high    2.  CVA: No new neurologic symptoms.  Blood pressure today: 137/86.  Taking aspirin 81 mg daily, lipitor 40 mg daily, carvedilol 12.5 mg twice daily, and losartan 100 mg daily.    3.  Inflammatory arthritis: Likely secondary to gout and rheumatoid arthritis.  Followed by rheumatology.  Treated with colchicine 0.6 mg daily, allopurinol 300 mg daily, methotrexate 2.5 mg/week, Plaquenil 200 mg daily, and folic acid. His hands are less achy. \"Medicine helps.\"    4. Left Eye Blurry Vision: He is legally blind in his right eye. He has some left eye pain off/on. +Blurry vision along his left eye.      General Information:  Procedure/Surgery: left eye surgery  Primary Physician: Wendy Roman MD  Surgery status: Elective  Surgery risk: Low (endoscopy, cataract, breast, superficial procedure)    Cardiovascular Risk Factors:  1. Coronary revascularization within 5 years: no  2. Recurrent chest pain: no  3. Shortness of

## 2025-03-31 ENCOUNTER — HOSPITAL ENCOUNTER (OUTPATIENT)
Facility: HOSPITAL | Age: 55
Setting detail: SPECIMEN
Discharge: HOME | End: 2025-04-03
Payer: MEDICARE

## 2025-03-31 DIAGNOSIS — R73.9 HYPERGLYCEMIA: ICD-10-CM

## 2025-03-31 DIAGNOSIS — M06.9 RHEUMATOID ARTHRITIS, INVOLVING UNSPECIFIED SITE, UNSPECIFIED WHETHER RHEUMATOID FACTOR PRESENT (HCC): ICD-10-CM

## 2025-03-31 DIAGNOSIS — I10 ESSENTIAL HYPERTENSION: Primary | ICD-10-CM

## 2025-03-31 DIAGNOSIS — I10 ESSENTIAL HYPERTENSION: ICD-10-CM

## 2025-03-31 DIAGNOSIS — M10.9 GOUT, UNSPECIFIED CAUSE, UNSPECIFIED CHRONICITY, UNSPECIFIED SITE: ICD-10-CM

## 2025-03-31 LAB
ALBUMIN SERPL-MCNC: 3.2 G/DL (ref 3.4–5)
ALBUMIN/GLOB SERPL: 0.7 (ref 0.8–1.7)
ALP SERPL-CCNC: 63 U/L (ref 45–117)
ALT SERPL-CCNC: 19 U/L (ref 16–61)
ANION GAP SERPL CALC-SCNC: 5 MMOL/L (ref 3–18)
AST SERPL-CCNC: 17 U/L (ref 10–38)
BASOPHILS # BLD: 0.05 K/UL (ref 0–0.1)
BASOPHILS NFR BLD: 0.8 % (ref 0–2)
BILIRUB SERPL-MCNC: 0.5 MG/DL (ref 0.2–1)
BUN SERPL-MCNC: 13 MG/DL (ref 7–18)
BUN/CREAT SERPL: 14 (ref 12–20)
CALCIUM SERPL-MCNC: 9.1 MG/DL (ref 8.5–10.1)
CHLORIDE SERPL-SCNC: 105 MMOL/L (ref 100–111)
CHOLEST SERPL-MCNC: 156 MG/DL
CO2 SERPL-SCNC: 28 MMOL/L (ref 21–32)
CREAT SERPL-MCNC: 0.95 MG/DL (ref 0.6–1.3)
CREAT UR-MCNC: 175 MG/DL (ref 30–125)
CRP SERPL-MCNC: 3.2 MG/DL (ref 0–0.3)
DIFFERENTIAL METHOD BLD: ABNORMAL
EOSINOPHIL # BLD: 0.14 K/UL (ref 0–0.4)
EOSINOPHIL NFR BLD: 2.2 % (ref 0–5)
ERYTHROCYTE [DISTWIDTH] IN BLOOD BY AUTOMATED COUNT: 14.9 % (ref 11.6–14.5)
EST. AVERAGE GLUCOSE BLD GHB EST-MCNC: 105 MG/DL
GLOBULIN SER CALC-MCNC: 4.3 G/DL (ref 2–4)
GLUCOSE SERPL-MCNC: 96 MG/DL (ref 74–99)
HBA1C MFR BLD: 5.3 % (ref 4.2–5.6)
HCT VFR BLD AUTO: 43.7 % (ref 36–48)
HDLC SERPL-MCNC: 40 MG/DL (ref 40–60)
HDLC SERPL: 3.9 (ref 0–5)
HGB BLD-MCNC: 13.3 G/DL (ref 13–16)
IMM GRANULOCYTES # BLD AUTO: 0.02 K/UL (ref 0–0.04)
IMM GRANULOCYTES NFR BLD AUTO: 0.3 % (ref 0–0.5)
LDLC SERPL CALC-MCNC: 105.6 MG/DL (ref 0–100)
LIPID PANEL: ABNORMAL
LYMPHOCYTES # BLD: 1.74 K/UL (ref 0.9–3.6)
LYMPHOCYTES NFR BLD: 27.2 % (ref 21–52)
MCH RBC QN AUTO: 26.4 PG (ref 24–34)
MCHC RBC AUTO-ENTMCNC: 30.4 G/DL (ref 31–37)
MCV RBC AUTO: 86.7 FL (ref 78–100)
MICROALBUMIN UR-MCNC: 0.92 MG/DL (ref 0–3)
MICROALBUMIN/CREAT UR-RTO: 5 MG/G (ref 0–30)
MONOCYTES # BLD: 0.52 K/UL (ref 0.05–1.2)
MONOCYTES NFR BLD: 8.1 % (ref 3–10)
NEUTS SEG # BLD: 3.92 K/UL (ref 1.8–8)
NEUTS SEG NFR BLD: 61.4 % (ref 40–73)
NRBC # BLD: 0 K/UL (ref 0–0.01)
NRBC BLD-RTO: 0 PER 100 WBC
PLATELET # BLD AUTO: 200 K/UL (ref 135–420)
PMV BLD AUTO: 11.8 FL (ref 9.2–11.8)
POTASSIUM SERPL-SCNC: 3.7 MMOL/L (ref 3.5–5.5)
PROT SERPL-MCNC: 7.5 G/DL (ref 6.4–8.2)
RBC # BLD AUTO: 5.04 M/UL (ref 4.35–5.65)
SODIUM SERPL-SCNC: 138 MMOL/L (ref 136–145)
TRIGL SERPL-MCNC: 52 MG/DL
URATE SERPL-MCNC: 5.7 MG/DL (ref 2.6–7.2)
VLDLC SERPL CALC-MCNC: 10.4 MG/DL
WBC # BLD AUTO: 6.4 K/UL (ref 4.6–13.2)

## 2025-03-31 PROCEDURE — 80053 COMPREHEN METABOLIC PANEL: CPT

## 2025-03-31 PROCEDURE — 84550 ASSAY OF BLOOD/URIC ACID: CPT

## 2025-03-31 PROCEDURE — 80061 LIPID PANEL: CPT

## 2025-03-31 PROCEDURE — 83036 HEMOGLOBIN GLYCOSYLATED A1C: CPT

## 2025-03-31 PROCEDURE — 86140 C-REACTIVE PROTEIN: CPT

## 2025-03-31 PROCEDURE — 36415 COLL VENOUS BLD VENIPUNCTURE: CPT

## 2025-03-31 PROCEDURE — 85025 COMPLETE CBC W/AUTO DIFF WBC: CPT

## 2025-03-31 PROCEDURE — 82570 ASSAY OF URINE CREATININE: CPT

## 2025-03-31 PROCEDURE — 82043 UR ALBUMIN QUANTITATIVE: CPT

## 2025-04-01 ENCOUNTER — RESULTS FOLLOW-UP (OUTPATIENT)
Facility: CLINIC | Age: 55
End: 2025-04-01

## 2025-04-11 ENCOUNTER — OFFICE VISIT (OUTPATIENT)
Age: 55
End: 2025-04-11
Payer: MEDICARE

## 2025-04-11 VITALS
BODY MASS INDEX: 46.65 KG/M2 | SYSTOLIC BLOOD PRESSURE: 124 MMHG | TEMPERATURE: 97.2 F | RESPIRATION RATE: 20 BRPM | OXYGEN SATURATION: 99 % | HEART RATE: 75 BPM | WEIGHT: 315 LBS | HEIGHT: 69 IN | DIASTOLIC BLOOD PRESSURE: 91 MMHG

## 2025-04-11 DIAGNOSIS — Z71.89 ENCOUNTER FOR PRE-BARIATRIC SURGERY COUNSELING AND EDUCATION: ICD-10-CM

## 2025-04-11 DIAGNOSIS — Z71.3 ENCOUNTER FOR WEIGHT LOSS COUNSELING: ICD-10-CM

## 2025-04-11 DIAGNOSIS — K21.9 GASTROESOPHAGEAL REFLUX DISEASE, UNSPECIFIED WHETHER ESOPHAGITIS PRESENT: ICD-10-CM

## 2025-04-11 DIAGNOSIS — I10 ESSENTIAL HYPERTENSION: ICD-10-CM

## 2025-04-11 DIAGNOSIS — Z01.818 PRE-OP TESTING: ICD-10-CM

## 2025-04-11 DIAGNOSIS — Z86.73 HISTORY OF CVA (CEREBROVASCULAR ACCIDENT): ICD-10-CM

## 2025-04-11 DIAGNOSIS — E66.01 MORBID OBESITY: Primary | ICD-10-CM

## 2025-04-11 PROCEDURE — 3074F SYST BP LT 130 MM HG: CPT | Performed by: NURSE PRACTITIONER

## 2025-04-11 PROCEDURE — G8417 CALC BMI ABV UP PARAM F/U: HCPCS | Performed by: NURSE PRACTITIONER

## 2025-04-11 PROCEDURE — 99214 OFFICE O/P EST MOD 30 MIN: CPT | Performed by: NURSE PRACTITIONER

## 2025-04-11 PROCEDURE — 1036F TOBACCO NON-USER: CPT | Performed by: NURSE PRACTITIONER

## 2025-04-11 PROCEDURE — 3017F COLORECTAL CA SCREEN DOC REV: CPT | Performed by: NURSE PRACTITIONER

## 2025-04-11 PROCEDURE — G8428 CUR MEDS NOT DOCUMENT: HCPCS | Performed by: NURSE PRACTITIONER

## 2025-04-11 PROCEDURE — 3080F DIAST BP >= 90 MM HG: CPT | Performed by: NURSE PRACTITIONER

## 2025-04-11 RX ORDER — SEMAGLUTIDE 1.7 MG/.75ML
1.7 INJECTION, SOLUTION SUBCUTANEOUS
Qty: 3 ML | Refills: 0 | Status: SHIPPED | OUTPATIENT
Start: 2025-04-11

## 2025-04-11 RX ORDER — SEMAGLUTIDE 2.4 MG/.75ML
2.4 INJECTION, SOLUTION SUBCUTANEOUS
Qty: 3 ML | Refills: 1 | Status: SHIPPED | OUTPATIENT
Start: 2025-04-11

## 2025-04-11 RX ORDER — SEMAGLUTIDE 0.5 MG/.5ML
0.5 INJECTION, SOLUTION SUBCUTANEOUS
Qty: 2 ML | Refills: 0 | Status: SHIPPED | OUTPATIENT
Start: 2025-04-11

## 2025-04-11 RX ORDER — SEMAGLUTIDE 1 MG/.5ML
1 INJECTION, SOLUTION SUBCUTANEOUS
Qty: 2 ML | Refills: 0 | Status: SHIPPED | OUTPATIENT
Start: 2025-04-11

## 2025-04-14 ASSESSMENT — ENCOUNTER SYMPTOMS
DIARRHEA: 0
ABDOMINAL PAIN: 0
COUGH: 0
BACK PAIN: 1
CONSTIPATION: 0
VOMITING: 0
SHORTNESS OF BREATH: 0
NAUSEA: 0

## 2025-04-14 NOTE — PROGRESS NOTES
1. Have you been to the ER, urgent care clinic since your last visit?  Hospitalized since your last visit?No    2. Have you seen or consulted any other health care providers outside of the [facility] since your last visit?  Include any pap smears or colon screening. No    
injection Inject 0.5 mg into the skin every 7 days Month 1 2 mL 0    Semaglutide-Weight Management (WEGOVY) 1 MG/0.5ML SOAJ SC injection Inject 1 mg into the skin every 7 days Month 2 2 mL 0    Semaglutide-Weight Management (WEGOVY) 1.7 MG/0.75ML SOAJ SC injection Inject 1.7 mg into the skin every 7 days Month 3 3 mL 0    Semaglutide-Weight Management (WEGOVY) 2.4 MG/0.75ML SOAJ SC injection Inject 2.4 mg into the skin every 7 days Month 4 and beyond 3 mL 1    losartan (COZAAR) 100 MG tablet Take 1 tablet by mouth daily This is for high blood pressure 90 tablet 1    carvedilol (COREG) 12.5 MG tablet Take 1 tablet by mouth 2 times daily 60 tablet 5    colchicine (COLCRYS) 0.6 MG tablet Take 1 tablet by mouth daily 90 tablet 3    Testosterone (ANDROGEL) 20.25 MG/ACT (1.62%) GEL gel Apply 1 pump to each shoulder daily. 450 g 0    aspirin 81 MG EC tablet Take 1 tablet by mouth daily This is for stroke prevention 90 tablet 3    dorzolamide-timolol (COSOPT) 2-0.5 % ophthalmic solution Place 1 drop into the left eye 2 times daily      atorvastatin (LIPITOR) 40 MG tablet Take 1 tablet by mouth daily This is for stroke prevention 90 tablet 3    allopurinol (ZYLOPRIM) 300 MG tablet Take 1 tablet by mouth once daily 90 tablet 1    hydroxychloroquine (PLAQUENIL) 200 MG tablet Take 1 tablet by mouth daily 30 tablet 5    difluprednate (DUREZOL) 0.05 % EMUL Place 1 drop into the left eye 4 times daily (Patient not taking: Reported on 4/11/2025)      ketorolac (ACULAR) 0.5 % ophthalmic solution Place 1 drop into the left eye once 3 days prior to surgery (Patient not taking: Reported on 4/11/2025)      moxifloxacin (VIGAMOX) 0.5 % ophthalmic solution Place 1 drop into the left eye 4 times daily (Patient not taking: Reported on 4/11/2025)      prednisoLONE acetate (PRED FORTE) 1 % ophthalmic suspension Place 1 drop into the left eye 3 times daily (Patient not taking: Reported on 4/11/2025)      tadalafil (CIALIS) 5 MG tablet Take 1

## 2025-06-03 ENCOUNTER — OFFICE VISIT (OUTPATIENT)
Facility: CLINIC | Age: 55
End: 2025-06-03
Payer: MEDICARE

## 2025-06-03 VITALS
BODY MASS INDEX: 46.65 KG/M2 | TEMPERATURE: 98 F | SYSTOLIC BLOOD PRESSURE: 108 MMHG | HEIGHT: 69 IN | HEART RATE: 70 BPM | DIASTOLIC BLOOD PRESSURE: 74 MMHG | WEIGHT: 315 LBS | RESPIRATION RATE: 18 BRPM | OXYGEN SATURATION: 96 %

## 2025-06-03 DIAGNOSIS — H33.22 LEFT RETINAL DETACHMENT: ICD-10-CM

## 2025-06-03 DIAGNOSIS — I10 ESSENTIAL HYPERTENSION: ICD-10-CM

## 2025-06-03 DIAGNOSIS — M06.9 RHEUMATOID ARTHRITIS, INVOLVING UNSPECIFIED SITE, UNSPECIFIED WHETHER RHEUMATOID FACTOR PRESENT (HCC): Primary | ICD-10-CM

## 2025-06-03 PROCEDURE — 3074F SYST BP LT 130 MM HG: CPT | Performed by: INTERNAL MEDICINE

## 2025-06-03 PROCEDURE — 99214 OFFICE O/P EST MOD 30 MIN: CPT | Performed by: INTERNAL MEDICINE

## 2025-06-03 PROCEDURE — 3078F DIAST BP <80 MM HG: CPT | Performed by: INTERNAL MEDICINE

## 2025-06-03 ASSESSMENT — ENCOUNTER SYMPTOMS
SHORTNESS OF BREATH: 0
ABDOMINAL PAIN: 0
BLOOD IN STOOL: 0
EYE PAIN: 1
ANAL BLEEDING: 0
COUGH: 0
BACK PAIN: 1
SORE THROAT: 0

## 2025-06-03 NOTE — PROGRESS NOTES
[facility]:   Preoperative Evaluation    Date of Exam: 06/03/25    MRN: [id number]    Juan Samuel  Is a 55 y.o.  male  who presents for preoperative evaluation and management.     Chief Complaint   Patient presents with    Pre-op Exam     Surgery tomorrow for retina detachment        Subjective:   The patient is a 55-year-old male with history of HTN, obesity, CVA, h/o H.pylori infection (treated last year), prediabetes, rheumatoid arthritis, gout, lumbar disc disease, onychomycosis, left retina detachment, testosterone deficiency (followed by [facility] [place]), right legally blind, and chronic bilateral knee pain (followed by Orthopedics).     1. Left Eye Pain/Left Retinal Detachment: Present x 3 wks. He has blurry vision. Dx with retinal detachment. He is scheduled tomorrow for surgery.     2. Inflammatory Arthritis: Taking: colchicine 0.6 mg daily, allopurinol 300 mg daily, methotrexate 2.5 mg/week, Plaquenil 200 mg daily, and folic acid. He is about to run out of his medications. He was seeing a rheumatologist but due to no-shows, he was discharged from their office and is asking to see a rheumatologist as he was closer to where he lives.  Symptoms have dramatically improved since starting treatment.     3. HTN: BP is 108/74 on carvedilol 12.5 mg twice daily and losartan 100 mg daily.  He has a history of a CVA.      General Information:  Procedure/Surgery: Left eye retinal detachment surgery  Date of Procedure/Surgery: June 4, 2025  Primary Physician: Wendy Roman MD  Surgery status: Elective  Surgery risk: Low (endoscopy, cataract, breast, superficial procedure)    Cardiovascular Risk Factors:  1. Coronary revascularization within 5 years: no  2. Recurrent chest pain: no  3. Shortness of breath:  no  4. Recent coronary evaluation/stress test/angiogram:  no  5. Recent MI (less than 1 month ago):  no  6. Prior MI (by way of history or pathological waves):  no  7.

## 2025-06-03 NOTE — PROGRESS NOTES
Juan Samuel presents today for   Chief Complaint   Patient presents with    Pre-op Exam     Surgery tomorrow for retina detachment            \"Have you been to the ER, urgent care clinic since your last visit?  Hospitalized since your last visit?\"    NO    “Have you seen or consulted any other health care providers outside of [facility] since your last visit?”    NO            Two to four times a month

## 2025-06-05 ENCOUNTER — CLINICAL DOCUMENTATION (OUTPATIENT)
Facility: HOSPITAL | Age: 55
End: 2025-06-05

## 2025-06-05 NOTE — PROGRESS NOTES
Nutrition Evaluation    Patient's Name: Juan Samuel   Age: 55 y.o.  YOB: 1970   Sex: male    Height: 5 f9   Starting Weight:  496  Weight: 446  Weight Loss: 50   BMI:  66    Smoking Status:  None  Alcohol Intake:  Number of Drinks at a Time: None  Number of Times a Week: None    Changes made during classes include:  Cut out alcohol    Patient has cut out bread.    He states he is not eating fried food anymore and sticking more to baked food.    Cut out all sweets.  Focusing on vegetables and baked food.    Cut out soda.          Summary:  I feel that Juan Samuel has demonstrated appropriate diet changes and is ready to move forward with surgery.  Patient has been briefed on the importance of the protein drinks, vitamins, and the transition of the diet stages. Patient understands that the long-term diet will focus on protein and vegetables.  Patient understand the effects of carbohydrates after surgery and what reactive hypoglycemia is.      Patient is aware that they will be attending pre-op class 2 weeks before surgery and will get more detailed information on the post-op diet guidelines.    Patient will see me again at 6 weeks post-op. At this 6 week visit, RD will assess how patient is tolerating soft protein and advance to vegetables, if tolerating soft protein without difficulty.  Patient will also see RD again at 9 months post-op.  This visit will assess patient's compliance with current protocol, including diet, vitamins, protein shakes, and exercise.  Post-op diet guidelines will be reinforced.  RD is available for questions and to meet with patient outside of the 6 week and 9 month post-op visit.     We spent a lot of time talking about the vitamins.  Patient understands the importance of being compliant with the diet protocol and the complications and risks that can occur if they are non-compliant with the nutritional protocol.     Patient has attended at least one support

## 2025-07-07 ENCOUNTER — TELEPHONE (OUTPATIENT)
Facility: CLINIC | Age: 55
End: 2025-07-07

## 2025-07-07 DIAGNOSIS — I63.9 CEREBROVASCULAR ACCIDENT (CVA), UNSPECIFIED MECHANISM (HCC): ICD-10-CM

## 2025-07-07 RX ORDER — LOSARTAN POTASSIUM 100 MG/1
100 TABLET ORAL DAILY
Qty: 90 TABLET | Refills: 2 | Status: SHIPPED | OUTPATIENT
Start: 2025-07-07

## 2025-07-07 NOTE — TELEPHONE ENCOUNTER
Refill request via phone    Medication: losartan (COZAAR) 100 MG tablet  fluanxol 1mg  Quantity: 90 tablet  Pharmacy:   [facility] - [address] [facility] [address] - P [phone] - F [phone]     Last Fill: 3/17/25    PCP: Wendy Roman MD    LAST OFFICE VISIT: 6/3/25      Future Appointments   Date Time Provider Department Center   8/18/2025  9:40 AM Sharath Altman PA [facility]   9/10/2025  2:45 PM [facility] LAB VISIT [facility] DE[facility]   9/17/2025  2:20 PM Wendy Roman MD [facility] DEP

## 2025-07-29 PROBLEM — I63.9 CVA (CEREBRAL VASCULAR ACCIDENT) (HCC): Status: RESOLVED | Noted: 2022-07-27 | Resolved: 2025-07-29

## 2025-08-18 ENCOUNTER — TELEPHONE (OUTPATIENT)
Facility: CLINIC | Age: 55
End: 2025-08-18

## 2025-08-18 DIAGNOSIS — M10.9 GOUT, UNSPECIFIED CAUSE, UNSPECIFIED CHRONICITY, UNSPECIFIED SITE: ICD-10-CM

## 2025-08-18 DIAGNOSIS — M06.9 RHEUMATOID ARTHRITIS, INVOLVING UNSPECIFIED SITE, UNSPECIFIED WHETHER RHEUMATOID FACTOR PRESENT (HCC): Primary | ICD-10-CM

## 2025-08-25 DIAGNOSIS — I63.9 CEREBROVASCULAR ACCIDENT (CVA), UNSPECIFIED MECHANISM (HCC): ICD-10-CM

## 2025-08-25 RX ORDER — ASPIRIN 81 MG/1
TABLET, COATED ORAL
Qty: 90 TABLET | Refills: 3 | Status: SHIPPED | OUTPATIENT
Start: 2025-08-25

## (undated) DEVICE — BASIN EMSIS 16OZ GRAPHITE PLAS KID SHP MOLD GRAD FOR ORAL

## (undated) DEVICE — SOLUTION IRRIG 1000ML H2O STRL BLT

## (undated) DEVICE — SYR 10ML LUER LOK 1/5ML GRAD --

## (undated) DEVICE — GAUZE,SPONGE,4"X4",16PLY,STRL,LF,10/TRAY: Brand: MEDLINE

## (undated) DEVICE — CATHETER SUCT TR FL TIP 14FR W/ O CTRL

## (undated) DEVICE — MEDI-VAC NON-CONDUCTIVE SUCTION TUBING: Brand: CARDINAL HEALTH

## (undated) DEVICE — ENDOSCOPY PUMP TUBING/ CAP SET: Brand: ERBE

## (undated) DEVICE — LINER SUCT CANSTR 3000CC PLAS SFT PRE ASSEMB W/OUT TBNG W/

## (undated) DEVICE — FLUFF AND POLYMER UNDERPAD,EXTRA HEAVY: Brand: WINGS

## (undated) DEVICE — AIRLIFE™ NASAL OXYGEN CANNULA CURVED, NONFLARED TIP WITH 14 FOOT (4.3 M) CRUSH-RESISTANT TUBING, OVER-THE-EAR STYLE: Brand: AIRLIFE™

## (undated) DEVICE — SYR 20ML LL STRL LF --

## (undated) DEVICE — STERILE POLYISOPRENE POWDER-FREE SURGICAL GLOVES: Brand: PROTEXIS

## (undated) DEVICE — SNARE VASC L240CM LOOP W10MM SHTH DIA2.4MM RND STIFF CLD

## (undated) DEVICE — YANKAUER,SMOOTH HANDLE,HIGH CAPACITY: Brand: MEDLINE INDUSTRIES, INC.

## (undated) DEVICE — UNDERPAD INCONT W23XL36IN STD BLU POLYPR BK FLUF SFT

## (undated) DEVICE — MEDI-VAC SUCTION HIGH CAPACITY: Brand: CARDINAL HEALTH

## (undated) DEVICE — FORCEPS BX L240CM JAW DIA2.4MM ORNG L CAP W/ NDL DISP RAD

## (undated) DEVICE — BITE BLOCK ENDOSCP UNIV AD 6 TO 9.4 MM

## (undated) DEVICE — CANNULA ORIG TL CLR W FOAM CUSHIONS AND 14FT SUPL TB 3 CHN

## (undated) DEVICE — SYRINGE MED 50ML LUERSLIP TIP

## (undated) DEVICE — FLEX ADVANTAGE 3000CC: Brand: FLEX ADVANTAGE

## (undated) DEVICE — SYRINGE MED 25GA 3ML L5/8IN SUBQ PLAS W/ DETACH NDL SFTY

## (undated) DEVICE — SYR 50ML SLIP TIP NSAF LF STRL --

## (undated) DEVICE — CANNULA NSL AD TBNG L14FT STD PVC O2 CRV CONN NONFLARED NSL

## (undated) DEVICE — TRAP SPEC COLL POLYP POLYSTYR --

## (undated) DEVICE — GOWN ISOL IMPERV UNIV, DISP, OPEN BACK, BLUE --